# Patient Record
Sex: MALE | Race: BLACK OR AFRICAN AMERICAN | NOT HISPANIC OR LATINO | Employment: UNEMPLOYED | URBAN - NONMETROPOLITAN AREA
[De-identification: names, ages, dates, MRNs, and addresses within clinical notes are randomized per-mention and may not be internally consistent; named-entity substitution may affect disease eponyms.]

---

## 2020-02-25 ENCOUNTER — PREP FOR SURGERY (OUTPATIENT)
Dept: OTHER | Facility: HOSPITAL | Age: 60
End: 2020-02-25

## 2020-02-25 DIAGNOSIS — R07.9 CHEST PAIN, UNSPECIFIED TYPE: Primary | ICD-10-CM

## 2020-02-25 RX ORDER — SODIUM CHLORIDE 0.9 % (FLUSH) 0.9 %
3 SYRINGE (ML) INJECTION EVERY 12 HOURS SCHEDULED
Status: CANCELLED | OUTPATIENT
Start: 2020-02-28

## 2020-02-25 RX ORDER — SODIUM CHLORIDE 0.9 % (FLUSH) 0.9 %
10 SYRINGE (ML) INJECTION AS NEEDED
Status: CANCELLED | OUTPATIENT
Start: 2020-02-28

## 2020-02-25 RX ORDER — SODIUM CHLORIDE 9 MG/ML
75 INJECTION, SOLUTION INTRAVENOUS CONTINUOUS
Status: CANCELLED | OUTPATIENT
Start: 2020-02-28

## 2020-02-28 ENCOUNTER — HOSPITAL ENCOUNTER (OUTPATIENT)
Facility: HOSPITAL | Age: 60
Discharge: HOME | End: 2020-02-29
Attending: INTERNAL MEDICINE | Admitting: INTERNAL MEDICINE

## 2020-02-28 DIAGNOSIS — R07.9 CHEST PAIN, UNSPECIFIED TYPE: ICD-10-CM

## 2020-02-28 LAB
ACT BLD: 171 SECONDS (ref 82–152)
ACT BLD: 191 SECONDS (ref 82–152)
ACT BLD: 219 SECONDS (ref 82–152)
ACT BLD: 235 SECONDS (ref 82–152)
ANION GAP SERPL CALCULATED.3IONS-SCNC: 19 MMOL/L (ref 5–15)
BASOPHILS # BLD AUTO: 0.03 10*3/MM3 (ref 0–0.2)
BASOPHILS NFR BLD AUTO: 0.3 % (ref 0–1.5)
BUN BLD-MCNC: 38 MG/DL (ref 6–20)
BUN/CREAT SERPL: 2.6 (ref 7–25)
CALCIUM SPEC-SCNC: 8.4 MG/DL (ref 8.6–10.5)
CHLORIDE SERPL-SCNC: 97 MMOL/L (ref 98–107)
CO2 SERPL-SCNC: 25 MMOL/L (ref 22–29)
CREAT BLD-MCNC: 14.76 MG/DL (ref 0.76–1.27)
DEPRECATED RDW RBC AUTO: 42.5 FL (ref 37–54)
EOSINOPHIL # BLD AUTO: 0.2 10*3/MM3 (ref 0–0.4)
EOSINOPHIL NFR BLD AUTO: 1.9 % (ref 0.3–6.2)
ERYTHROCYTE [DISTWIDTH] IN BLOOD BY AUTOMATED COUNT: 15.1 % (ref 12.3–15.4)
GFR SERPL CREATININE-BSD FRML MDRD: 3 ML/MIN/1.73
GFR SERPL CREATININE-BSD FRML MDRD: 4 ML/MIN/1.73
GLUCOSE BLD-MCNC: 142 MG/DL (ref 65–99)
GLUCOSE BLDC GLUCOMTR-MCNC: 273 MG/DL (ref 70–130)
HBV SURFACE AG SERPL QL IA: NORMAL
HCT VFR BLD AUTO: 34 % (ref 37.5–51)
HGB BLD-MCNC: 10.2 G/DL (ref 13–17.7)
IMM GRANULOCYTES # BLD AUTO: 0.05 10*3/MM3 (ref 0–0.05)
IMM GRANULOCYTES NFR BLD AUTO: 0.5 % (ref 0–0.5)
INR PPP: 1.1 (ref 0.8–1.2)
LYMPHOCYTES # BLD AUTO: 2.78 10*3/MM3 (ref 0.7–3.1)
LYMPHOCYTES NFR BLD AUTO: 26.7 % (ref 19.6–45.3)
MCH RBC QN AUTO: 23.5 PG (ref 26.6–33)
MCHC RBC AUTO-ENTMCNC: 30 G/DL (ref 31.5–35.7)
MCV RBC AUTO: 78.3 FL (ref 79–97)
MONOCYTES # BLD AUTO: 1.6 10*3/MM3 (ref 0.1–0.9)
MONOCYTES NFR BLD AUTO: 15.4 % (ref 5–12)
NEUTROPHILS # BLD AUTO: 5.74 10*3/MM3 (ref 1.7–7)
NEUTROPHILS NFR BLD AUTO: 55.2 % (ref 42.7–76)
NRBC BLD AUTO-RTO: 0 /100 WBC (ref 0–0.2)
PLATELET # BLD AUTO: 253 10*3/MM3 (ref 140–450)
PMV BLD AUTO: 9.4 FL (ref 6–12)
POTASSIUM BLD-SCNC: 4.7 MMOL/L (ref 3.5–5.2)
PROTHROMBIN TIME: 14 SECONDS (ref 11.1–15.3)
RBC # BLD AUTO: 4.34 10*6/MM3 (ref 4.14–5.8)
SODIUM BLD-SCNC: 141 MMOL/L (ref 136–145)
WBC NRBC COR # BLD: 10.4 10*3/MM3 (ref 3.4–10.8)

## 2020-02-28 PROCEDURE — 25010000002 ONDANSETRON PER 1 MG: Performed by: INTERNAL MEDICINE

## 2020-02-28 PROCEDURE — 80048 BASIC METABOLIC PNL TOTAL CA: CPT | Performed by: INTERNAL MEDICINE

## 2020-02-28 PROCEDURE — 82962 GLUCOSE BLOOD TEST: CPT

## 2020-02-28 PROCEDURE — 85610 PROTHROMBIN TIME: CPT | Performed by: INTERNAL MEDICINE

## 2020-02-28 PROCEDURE — 85347 COAGULATION TIME ACTIVATED: CPT

## 2020-02-28 PROCEDURE — 25010000002 BIVALIRUDIN TRIFLUOROACETATE 250 MG RECONSTITUTED SOLUTION 1 EACH VIAL: Performed by: INTERNAL MEDICINE

## 2020-02-28 PROCEDURE — C1887 CATHETER, GUIDING: HCPCS | Performed by: INTERNAL MEDICINE

## 2020-02-28 PROCEDURE — 0 IOPAMIDOL PER 1 ML: Performed by: INTERNAL MEDICINE

## 2020-02-28 PROCEDURE — 25010000002 FENTANYL CITRATE (PF) 100 MCG/2ML SOLUTION: Performed by: INTERNAL MEDICINE

## 2020-02-28 PROCEDURE — 93459 L HRT ART/GRFT ANGIO: CPT | Performed by: INTERNAL MEDICINE

## 2020-02-28 PROCEDURE — C1769 GUIDE WIRE: HCPCS | Performed by: INTERNAL MEDICINE

## 2020-02-28 PROCEDURE — C1894 INTRO/SHEATH, NON-LASER: HCPCS | Performed by: INTERNAL MEDICINE

## 2020-02-28 PROCEDURE — 90945 DIALYSIS ONE EVALUATION: CPT

## 2020-02-28 PROCEDURE — G0378 HOSPITAL OBSERVATION PER HR: HCPCS

## 2020-02-28 PROCEDURE — 85025 COMPLETE CBC W/AUTO DIFF WBC: CPT | Performed by: INTERNAL MEDICINE

## 2020-02-28 PROCEDURE — C1725 CATH, TRANSLUMIN NON-LASER: HCPCS | Performed by: INTERNAL MEDICINE

## 2020-02-28 PROCEDURE — 63710000001 INSULIN ASPART PER 5 UNITS: Performed by: NURSE PRACTITIONER

## 2020-02-28 PROCEDURE — 87340 HEPATITIS B SURFACE AG IA: CPT | Performed by: INTERNAL MEDICINE

## 2020-02-28 PROCEDURE — 25010000002 MIDAZOLAM PER 1 MG: Performed by: INTERNAL MEDICINE

## 2020-02-28 RX ORDER — NITROGLYCERIN 20 MG/100ML
INJECTION INTRAVENOUS CONTINUOUS PRN
Status: COMPLETED | OUTPATIENT
Start: 2020-02-28 | End: 2020-02-28

## 2020-02-28 RX ORDER — SODIUM CHLORIDE 9 MG/ML
100 INJECTION, SOLUTION INTRAVENOUS CONTINUOUS
Status: DISCONTINUED | OUTPATIENT
Start: 2020-02-28 | End: 2020-02-29

## 2020-02-28 RX ORDER — DEXTROSE MONOHYDRATE, SODIUM CHLORIDE, SODIUM LACTATE, CALCIUM CHLORIDE, MAGNESIUM CHLORIDE 1.5; 538; 448; 18.4; 5.08 G/100ML; MG/100ML; MG/100ML; MG/100ML; MG/100ML
10 SOLUTION INTRAPERITONEAL AS NEEDED
Status: DISCONTINUED | OUTPATIENT
Start: 2020-02-28 | End: 2020-02-29 | Stop reason: HOSPADM

## 2020-02-28 RX ORDER — AMLODIPINE BESYLATE 10 MG/1
10 TABLET ORAL 3 TIMES DAILY
Status: DISCONTINUED | OUTPATIENT
Start: 2020-02-28 | End: 2020-02-29

## 2020-02-28 RX ORDER — NICOTINE POLACRILEX 4 MG
15 LOZENGE BUCCAL
Status: DISCONTINUED | OUTPATIENT
Start: 2020-02-28 | End: 2020-02-29 | Stop reason: HOSPADM

## 2020-02-28 RX ORDER — CINACALCET 30 MG/1
30 TABLET, FILM COATED ORAL DAILY
COMMUNITY

## 2020-02-28 RX ORDER — MIDAZOLAM HYDROCHLORIDE 1 MG/ML
INJECTION INTRAMUSCULAR; INTRAVENOUS AS NEEDED
Status: DISCONTINUED | OUTPATIENT
Start: 2020-02-28 | End: 2020-02-28 | Stop reason: HOSPADM

## 2020-02-28 RX ORDER — ALBUTEROL SULFATE 2.5 MG/3ML
2.5 SOLUTION RESPIRATORY (INHALATION) EVERY 6 HOURS PRN
Status: DISCONTINUED | OUTPATIENT
Start: 2020-02-28 | End: 2020-02-29 | Stop reason: HOSPADM

## 2020-02-28 RX ORDER — PRAVASTATIN SODIUM 20 MG
20 TABLET ORAL DAILY
COMMUNITY

## 2020-02-28 RX ORDER — SODIUM CHLORIDE 0.9 % (FLUSH) 0.9 %
10 SYRINGE (ML) INJECTION AS NEEDED
Status: DISCONTINUED | OUTPATIENT
Start: 2020-02-28 | End: 2020-02-29 | Stop reason: HOSPADM

## 2020-02-28 RX ORDER — ISOSORBIDE MONONITRATE 30 MG/1
30 TABLET, EXTENDED RELEASE ORAL DAILY
Status: DISCONTINUED | OUTPATIENT
Start: 2020-02-28 | End: 2020-02-29 | Stop reason: HOSPADM

## 2020-02-28 RX ORDER — CLOPIDOGREL BISULFATE 75 MG/1
75 TABLET ORAL DAILY
COMMUNITY

## 2020-02-28 RX ORDER — AMOXICILLIN 250 MG
2 CAPSULE ORAL 2 TIMES DAILY
Status: DISCONTINUED | OUTPATIENT
Start: 2020-02-28 | End: 2020-02-29 | Stop reason: HOSPADM

## 2020-02-28 RX ORDER — SODIUM CHLORIDE 9 MG/ML
75 INJECTION, SOLUTION INTRAVENOUS CONTINUOUS
Status: DISCONTINUED | OUTPATIENT
Start: 2020-02-28 | End: 2020-02-28 | Stop reason: SDUPTHER

## 2020-02-28 RX ORDER — SODIUM CHLORIDE 9 MG/ML
250 INJECTION, SOLUTION INTRAVENOUS ONCE AS NEEDED
Status: DISCONTINUED | OUTPATIENT
Start: 2020-02-28 | End: 2020-02-29 | Stop reason: HOSPADM

## 2020-02-28 RX ORDER — GENTAMICIN SULFATE 1 MG/G
1 CREAM TOPICAL 3 TIMES DAILY
COMMUNITY
End: 2021-06-10

## 2020-02-28 RX ORDER — ONDANSETRON 2 MG/ML
INJECTION INTRAMUSCULAR; INTRAVENOUS AS NEEDED
Status: DISCONTINUED | OUTPATIENT
Start: 2020-02-28 | End: 2020-02-28 | Stop reason: HOSPADM

## 2020-02-28 RX ORDER — ISOSORBIDE MONONITRATE 30 MG/1
30 TABLET, EXTENDED RELEASE ORAL DAILY
COMMUNITY

## 2020-02-28 RX ORDER — DEXTROSE MONOHYDRATE 25 G/50ML
25 INJECTION, SOLUTION INTRAVENOUS
Status: DISCONTINUED | OUTPATIENT
Start: 2020-02-28 | End: 2020-02-29 | Stop reason: HOSPADM

## 2020-02-28 RX ORDER — LIDOCAINE HYDROCHLORIDE 20 MG/ML
INJECTION, SOLUTION INFILTRATION; PERINEURAL AS NEEDED
Status: DISCONTINUED | OUTPATIENT
Start: 2020-02-28 | End: 2020-02-28 | Stop reason: HOSPADM

## 2020-02-28 RX ORDER — CALCITRIOL 0.5 UG/1
0.5 CAPSULE, LIQUID FILLED ORAL 2 TIMES DAILY
COMMUNITY

## 2020-02-28 RX ORDER — ACETAMINOPHEN 325 MG/1
650 TABLET ORAL EVERY 4 HOURS PRN
Status: DISCONTINUED | OUTPATIENT
Start: 2020-02-28 | End: 2020-02-29 | Stop reason: HOSPADM

## 2020-02-28 RX ORDER — CALCITRIOL 0.25 UG/1
0.5 CAPSULE, LIQUID FILLED ORAL 2 TIMES DAILY
Status: DISCONTINUED | OUTPATIENT
Start: 2020-02-28 | End: 2020-02-29 | Stop reason: HOSPADM

## 2020-02-28 RX ORDER — CLOPIDOGREL BISULFATE 75 MG/1
75 TABLET ORAL DAILY
Status: DISCONTINUED | OUTPATIENT
Start: 2020-02-28 | End: 2020-02-29 | Stop reason: HOSPADM

## 2020-02-28 RX ORDER — PRAVASTATIN SODIUM 20 MG
20 TABLET ORAL DAILY
Status: DISCONTINUED | OUTPATIENT
Start: 2020-02-28 | End: 2020-02-29 | Stop reason: HOSPADM

## 2020-02-28 RX ORDER — FENTANYL CITRATE 50 UG/ML
INJECTION, SOLUTION INTRAMUSCULAR; INTRAVENOUS AS NEEDED
Status: DISCONTINUED | OUTPATIENT
Start: 2020-02-28 | End: 2020-02-28 | Stop reason: HOSPADM

## 2020-02-28 RX ORDER — DEXTROSE MONOHYDRATE, SODIUM CHLORIDE, SODIUM LACTATE, CALCIUM CHLORIDE, MAGNESIUM CHLORIDE 2.5; 538; 448; 18.4; 5.08 G/100ML; MG/100ML; MG/100ML; MG/100ML; MG/100ML
5000 SOLUTION INTRAPERITONEAL AS NEEDED
Status: DISCONTINUED | OUTPATIENT
Start: 2020-02-28 | End: 2020-02-29 | Stop reason: HOSPADM

## 2020-02-28 RX ORDER — LOSARTAN POTASSIUM 100 MG/1
100 TABLET ORAL DAILY
Status: ON HOLD | COMMUNITY
End: 2020-10-09

## 2020-02-28 RX ORDER — BISACODYL 5 MG/1
5 TABLET, DELAYED RELEASE ORAL DAILY PRN
Status: DISCONTINUED | OUTPATIENT
Start: 2020-02-28 | End: 2020-02-29 | Stop reason: HOSPADM

## 2020-02-28 RX ORDER — LOSARTAN POTASSIUM 50 MG/1
100 TABLET ORAL DAILY
Status: DISCONTINUED | OUTPATIENT
Start: 2020-02-28 | End: 2020-02-29 | Stop reason: HOSPADM

## 2020-02-28 RX ORDER — ALBUTEROL SULFATE 90 UG/1
2 AEROSOL, METERED RESPIRATORY (INHALATION) EVERY 4 HOURS PRN
COMMUNITY

## 2020-02-28 RX ORDER — SODIUM CHLORIDE 0.9 % (FLUSH) 0.9 %
3 SYRINGE (ML) INJECTION EVERY 12 HOURS SCHEDULED
Status: DISCONTINUED | OUTPATIENT
Start: 2020-02-28 | End: 2020-02-29 | Stop reason: HOSPADM

## 2020-02-28 RX ORDER — METOPROLOL SUCCINATE 25 MG/1
25 TABLET, EXTENDED RELEASE ORAL
Status: DISCONTINUED | OUTPATIENT
Start: 2020-02-29 | End: 2020-02-29 | Stop reason: HOSPADM

## 2020-02-28 RX ORDER — BISACODYL 10 MG
10 SUPPOSITORY, RECTAL RECTAL DAILY PRN
Status: DISCONTINUED | OUTPATIENT
Start: 2020-02-28 | End: 2020-02-29 | Stop reason: HOSPADM

## 2020-02-28 RX ADMIN — CALCITRIOL 0.5 MCG: 0.25 CAPSULE ORAL at 15:10

## 2020-02-28 RX ADMIN — SODIUM CHLORIDE, PRESERVATIVE FREE 3 ML: 5 INJECTION INTRAVENOUS at 14:16

## 2020-02-28 RX ADMIN — CLOPIDOGREL BISULFATE 75 MG: 75 TABLET ORAL at 13:38

## 2020-02-28 RX ADMIN — DEXTROSE MONOHYDRATE, SODIUM CHLORIDE, SODIUM LACTATE, CALCIUM CHLORIDE, MAGNESIUM CHLORIDE 10000 ML: 1.5; 538; 448; 18.4; 5.08 SOLUTION INTRAPERITONEAL at 14:46

## 2020-02-28 RX ADMIN — INSULIN ASPART 8 UNITS: 100 INJECTION, SOLUTION INTRAVENOUS; SUBCUTANEOUS at 18:27

## 2020-02-28 RX ADMIN — ISOSORBIDE MONONITRATE 30 MG: 30 TABLET, EXTENDED RELEASE ORAL at 13:38

## 2020-02-28 RX ADMIN — CALCITRIOL 0.5 MCG: 0.25 CAPSULE ORAL at 20:20

## 2020-02-28 RX ADMIN — SODIUM CHLORIDE 75 ML/HR: 900 INJECTION, SOLUTION INTRAVENOUS at 08:20

## 2020-02-28 RX ADMIN — SENNOSIDES AND DOCUSATE SODIUM 2 TABLET: 8.6; 5 TABLET ORAL at 20:20

## 2020-02-28 RX ADMIN — PRAVASTATIN SODIUM 20 MG: 20 TABLET ORAL at 13:38

## 2020-02-28 RX ADMIN — DEXTROSE MONOHYDRATE, SODIUM CHLORIDE, SODIUM LACTATE, CALCIUM CHLORIDE, MAGNESIUM CHLORIDE 5000 ML: 2.5; 538; 448; 18.4; 5.08 SOLUTION INTRAPERITONEAL at 14:47

## 2020-02-28 RX ADMIN — INSULIN ASPART 3 UNITS: 100 INJECTION, SOLUTION INTRAVENOUS; SUBCUTANEOUS at 20:22

## 2020-02-29 VITALS
HEART RATE: 93 BPM | HEIGHT: 66 IN | OXYGEN SATURATION: 100 % | TEMPERATURE: 98.1 F | RESPIRATION RATE: 18 BRPM | BODY MASS INDEX: 32.24 KG/M2 | SYSTOLIC BLOOD PRESSURE: 120 MMHG | DIASTOLIC BLOOD PRESSURE: 56 MMHG | WEIGHT: 200.62 LBS

## 2020-02-29 PROBLEM — I25.118 CORONARY ARTERY DISEASE OF NATIVE ARTERY OF NATIVE HEART WITH STABLE ANGINA PECTORIS (HCC): Status: ACTIVE | Noted: 2020-02-29

## 2020-02-29 PROBLEM — Z99.2 ESRD (END STAGE RENAL DISEASE) ON DIALYSIS: Status: ACTIVE | Noted: 2020-02-29

## 2020-02-29 PROBLEM — N18.6 ESRD (END STAGE RENAL DISEASE) ON DIALYSIS (HCC): Status: ACTIVE | Noted: 2020-02-29

## 2020-02-29 LAB
ACT BLD: 166 SECONDS (ref 82–152)
ACT BLD: 171 SECONDS (ref 82–152)
ANION GAP SERPL CALCULATED.3IONS-SCNC: 16 MMOL/L (ref 5–15)
BUN BLD-MCNC: 35 MG/DL (ref 6–20)
BUN/CREAT SERPL: 2.6 (ref 7–25)
CALCIUM SPEC-SCNC: 7.8 MG/DL (ref 8.6–10.5)
CHLORIDE SERPL-SCNC: 94 MMOL/L (ref 98–107)
CO2 SERPL-SCNC: 23 MMOL/L (ref 22–29)
CREAT BLD-MCNC: 13.55 MG/DL (ref 0.76–1.27)
DEPRECATED RDW RBC AUTO: 43.4 FL (ref 37–54)
ERYTHROCYTE [DISTWIDTH] IN BLOOD BY AUTOMATED COUNT: 15.6 % (ref 12.3–15.4)
GFR SERPL CREATININE-BSD FRML MDRD: 4 ML/MIN/1.73
GFR SERPL CREATININE-BSD FRML MDRD: 5 ML/MIN/1.73
GLUCOSE BLD-MCNC: 144 MG/DL (ref 65–99)
GLUCOSE BLDC GLUCOMTR-MCNC: 174 MG/DL (ref 70–130)
GLUCOSE BLDC GLUCOMTR-MCNC: 93 MG/DL (ref 70–130)
HCT VFR BLD AUTO: 29.5 % (ref 37.5–51)
HGB BLD-MCNC: 8.9 G/DL (ref 13–17.7)
HOLD SPECIMEN: NORMAL
MCH RBC QN AUTO: 23.6 PG (ref 26.6–33)
MCHC RBC AUTO-ENTMCNC: 30.2 G/DL (ref 31.5–35.7)
MCV RBC AUTO: 78.2 FL (ref 79–97)
PLATELET # BLD AUTO: 240 10*3/MM3 (ref 140–450)
PMV BLD AUTO: 10.3 FL (ref 6–12)
POTASSIUM BLD-SCNC: 4.7 MMOL/L (ref 3.5–5.2)
RBC # BLD AUTO: 3.77 10*6/MM3 (ref 4.14–5.8)
SODIUM BLD-SCNC: 133 MMOL/L (ref 136–145)
WBC NRBC COR # BLD: 8.53 10*3/MM3 (ref 3.4–10.8)

## 2020-02-29 PROCEDURE — 85347 COAGULATION TIME ACTIVATED: CPT

## 2020-02-29 PROCEDURE — 82962 GLUCOSE BLOOD TEST: CPT

## 2020-02-29 PROCEDURE — 99217 PR OBSERVATION CARE DISCHARGE MANAGEMENT: CPT | Performed by: NURSE PRACTITIONER

## 2020-02-29 PROCEDURE — 85027 COMPLETE CBC AUTOMATED: CPT | Performed by: INTERNAL MEDICINE

## 2020-02-29 PROCEDURE — G0378 HOSPITAL OBSERVATION PER HR: HCPCS

## 2020-02-29 PROCEDURE — 80048 BASIC METABOLIC PNL TOTAL CA: CPT | Performed by: INTERNAL MEDICINE

## 2020-02-29 RX ORDER — RANOLAZINE 500 MG/1
500 TABLET, EXTENDED RELEASE ORAL 2 TIMES DAILY
Qty: 60 TABLET | Refills: 0 | Status: SHIPPED | OUTPATIENT
Start: 2020-02-29 | End: 2020-10-20 | Stop reason: HOSPADM

## 2020-02-29 RX ORDER — HYDROCODONE BITARTRATE AND ACETAMINOPHEN 5; 325 MG/1; MG/1
1 TABLET ORAL EVERY 8 HOURS PRN
Status: DISCONTINUED | OUTPATIENT
Start: 2020-02-29 | End: 2020-02-29 | Stop reason: HOSPADM

## 2020-02-29 RX ORDER — DEXTROSE MONOHYDRATE, SODIUM CHLORIDE, SODIUM LACTATE, CALCIUM CHLORIDE, MAGNESIUM CHLORIDE 2.5; 538; 448; 18.4; 5.08 G/100ML; MG/100ML; MG/100ML; MG/100ML; MG/100ML
5000 SOLUTION INTRAPERITONEAL AS NEEDED
Status: CANCELLED | OUTPATIENT
Start: 2020-02-29

## 2020-02-29 RX ORDER — DEXTROSE MONOHYDRATE, SODIUM CHLORIDE, SODIUM LACTATE, CALCIUM CHLORIDE, MAGNESIUM CHLORIDE 1.5; 538; 448; 18.4; 5.08 G/100ML; MG/100ML; MG/100ML; MG/100ML; MG/100ML
5000 SOLUTION INTRAPERITONEAL AS NEEDED
Status: CANCELLED | OUTPATIENT
Start: 2020-02-29

## 2020-02-29 RX ORDER — ASPIRIN 81 MG/1
81 TABLET, CHEWABLE ORAL DAILY
Status: DISCONTINUED | OUTPATIENT
Start: 2020-02-29 | End: 2020-02-29 | Stop reason: HOSPADM

## 2020-02-29 RX ORDER — NITROGLYCERIN 0.4 MG/1
TABLET SUBLINGUAL
Qty: 100 TABLET | Refills: 11 | Status: SHIPPED | OUTPATIENT
Start: 2020-02-29

## 2020-02-29 RX ORDER — RANOLAZINE 500 MG/1
500 TABLET, EXTENDED RELEASE ORAL EVERY 12 HOURS SCHEDULED
Status: DISCONTINUED | OUTPATIENT
Start: 2020-02-29 | End: 2020-02-29 | Stop reason: HOSPADM

## 2020-02-29 RX ADMIN — ISOSORBIDE MONONITRATE 30 MG: 30 TABLET, EXTENDED RELEASE ORAL at 09:14

## 2020-02-29 RX ADMIN — SODIUM CHLORIDE, PRESERVATIVE FREE 3 ML: 5 INJECTION INTRAVENOUS at 09:18

## 2020-02-29 RX ADMIN — CALCITRIOL 0.5 MCG: 0.25 CAPSULE ORAL at 09:15

## 2020-02-29 RX ADMIN — SENNOSIDES AND DOCUSATE SODIUM 2 TABLET: 8.6; 5 TABLET ORAL at 09:14

## 2020-02-29 RX ADMIN — RANOLAZINE 500 MG: 500 TABLET, FILM COATED, EXTENDED RELEASE ORAL at 11:40

## 2020-02-29 RX ADMIN — METOPROLOL SUCCINATE 25 MG: 25 TABLET, EXTENDED RELEASE ORAL at 09:14

## 2020-02-29 RX ADMIN — PRAVASTATIN SODIUM 20 MG: 20 TABLET ORAL at 09:14

## 2020-02-29 RX ADMIN — ASPIRIN 81 MG 81 MG: 81 TABLET ORAL at 11:40

## 2020-02-29 RX ADMIN — AMLODIPINE BESYLATE 10 MG: 10 TABLET ORAL at 09:15

## 2020-02-29 RX ADMIN — CLOPIDOGREL BISULFATE 75 MG: 75 TABLET ORAL at 09:15

## 2020-02-29 RX ADMIN — HYDROCODONE BITARTRATE AND ACETAMINOPHEN 1 TABLET: 5; 325 TABLET ORAL at 03:05

## 2020-02-29 RX ADMIN — ACETAMINOPHEN 650 MG: 325 TABLET, FILM COATED ORAL at 00:57

## 2020-02-29 RX ADMIN — LOSARTAN POTASSIUM 100 MG: 50 TABLET, FILM COATED ORAL at 09:15

## 2020-10-08 ENCOUNTER — LAB (OUTPATIENT)
Dept: LAB | Facility: HOSPITAL | Age: 60
End: 2020-10-08

## 2020-10-08 ENCOUNTER — APPOINTMENT (OUTPATIENT)
Dept: GENERAL RADIOLOGY | Facility: HOSPITAL | Age: 60
End: 2020-10-08

## 2020-10-08 ENCOUNTER — HOSPITAL ENCOUNTER (INPATIENT)
Facility: HOSPITAL | Age: 60
LOS: 10 days | Discharge: HOME HEALTH | End: 2020-10-20
Attending: STUDENT IN AN ORGANIZED HEALTH CARE EDUCATION/TRAINING PROGRAM | Admitting: INTERNAL MEDICINE

## 2020-10-08 DIAGNOSIS — R07.9 CHEST PAIN, UNSPECIFIED TYPE: ICD-10-CM

## 2020-10-08 DIAGNOSIS — I46.9 CARDIAC ARREST (HCC): ICD-10-CM

## 2020-10-08 DIAGNOSIS — Z74.09 IMPAIRED MOBILITY AND ADLS: ICD-10-CM

## 2020-10-08 DIAGNOSIS — I25.10 ATHEROSCLEROSIS OF NATIVE CORONARY ARTERY OF NATIVE HEART, ANGINA PRESENCE UNSPECIFIED: ICD-10-CM

## 2020-10-08 DIAGNOSIS — Z78.9 IMPAIRED MOBILITY AND ADLS: ICD-10-CM

## 2020-10-08 DIAGNOSIS — R07.9 CHEST PAIN, UNSPECIFIED TYPE: Primary | ICD-10-CM

## 2020-10-08 DIAGNOSIS — R07.2 PRECORDIAL PAIN: Primary | ICD-10-CM

## 2020-10-08 DIAGNOSIS — Z74.09 IMPAIRED FUNCTIONAL MOBILITY, BALANCE, GAIT, AND ENDURANCE: ICD-10-CM

## 2020-10-08 LAB
ALBUMIN SERPL-MCNC: 3.8 G/DL (ref 3.5–5.2)
ALBUMIN/GLOB SERPL: 0.9 G/DL
ALP SERPL-CCNC: 70 U/L (ref 39–117)
ALT SERPL W P-5'-P-CCNC: 11 U/L (ref 1–41)
ANION GAP SERPL CALCULATED.3IONS-SCNC: 17 MMOL/L (ref 5–15)
ANION GAP SERPL CALCULATED.3IONS-SCNC: 17 MMOL/L (ref 5–15)
AST SERPL-CCNC: 13 U/L (ref 1–40)
BASOPHILS # BLD AUTO: 0.04 10*3/MM3 (ref 0–0.2)
BASOPHILS # BLD AUTO: 0.05 10*3/MM3 (ref 0–0.2)
BASOPHILS NFR BLD AUTO: 0.4 % (ref 0–1.5)
BASOPHILS NFR BLD AUTO: 0.5 % (ref 0–1.5)
BILIRUB SERPL-MCNC: 0.3 MG/DL (ref 0–1.2)
BUN SERPL-MCNC: 65 MG/DL (ref 8–23)
BUN SERPL-MCNC: 66 MG/DL (ref 8–23)
BUN/CREAT SERPL: 4.9 (ref 7–25)
BUN/CREAT SERPL: 5 (ref 7–25)
CALCIUM SPEC-SCNC: 8.2 MG/DL (ref 8.6–10.5)
CALCIUM SPEC-SCNC: 8.6 MG/DL (ref 8.6–10.5)
CHLORIDE SERPL-SCNC: 98 MMOL/L (ref 98–107)
CHLORIDE SERPL-SCNC: 99 MMOL/L (ref 98–107)
CK MB SERPL-CCNC: 8.97 NG/ML
CK SERPL-CCNC: 289 U/L (ref 20–200)
CO2 SERPL-SCNC: 24 MMOL/L (ref 22–29)
CO2 SERPL-SCNC: 25 MMOL/L (ref 22–29)
CREAT SERPL-MCNC: 13.08 MG/DL (ref 0.76–1.27)
CREAT SERPL-MCNC: 13.19 MG/DL (ref 0.76–1.27)
D-DIMER, QUANTITATIVE (MAD,POW, STR): 903 NG/ML (FEU) (ref 0–470)
D-LACTATE SERPL-SCNC: 1.1 MMOL/L (ref 0.5–2)
DEPRECATED RDW RBC AUTO: 41.6 FL (ref 37–54)
DEPRECATED RDW RBC AUTO: 41.9 FL (ref 37–54)
EOSINOPHIL # BLD AUTO: 0.19 10*3/MM3 (ref 0–0.4)
EOSINOPHIL # BLD AUTO: 0.22 10*3/MM3 (ref 0–0.4)
EOSINOPHIL NFR BLD AUTO: 1.9 % (ref 0.3–6.2)
EOSINOPHIL NFR BLD AUTO: 2 % (ref 0.3–6.2)
ERYTHROCYTE [DISTWIDTH] IN BLOOD BY AUTOMATED COUNT: 14.7 % (ref 12.3–15.4)
ERYTHROCYTE [DISTWIDTH] IN BLOOD BY AUTOMATED COUNT: 14.8 % (ref 12.3–15.4)
GFR SERPL CREATININE-BSD FRML MDRD: 5 ML/MIN/1.73
GFR SERPL CREATININE-BSD FRML MDRD: 5 ML/MIN/1.73
GFR SERPL CREATININE-BSD FRML MDRD: ABNORMAL ML/MIN/{1.73_M2}
GFR SERPL CREATININE-BSD FRML MDRD: ABNORMAL ML/MIN/{1.73_M2}
GLOBULIN UR ELPH-MCNC: 4.4 GM/DL
GLUCOSE SERPL-MCNC: 181 MG/DL (ref 65–99)
GLUCOSE SERPL-MCNC: 254 MG/DL (ref 65–99)
HCT VFR BLD AUTO: 32.6 % (ref 37.5–51)
HCT VFR BLD AUTO: 32.7 % (ref 37.5–51)
HGB BLD-MCNC: 10.1 G/DL (ref 13–17.7)
HGB BLD-MCNC: 10.2 G/DL (ref 13–17.7)
HOLD SPECIMEN: NORMAL
HOLD SPECIMEN: NORMAL
IMM GRANULOCYTES # BLD AUTO: 0.15 10*3/MM3 (ref 0–0.05)
IMM GRANULOCYTES # BLD AUTO: 0.15 10*3/MM3 (ref 0–0.05)
IMM GRANULOCYTES NFR BLD AUTO: 1.4 % (ref 0–0.5)
IMM GRANULOCYTES NFR BLD AUTO: 1.5 % (ref 0–0.5)
LYMPHOCYTES # BLD AUTO: 2.26 10*3/MM3 (ref 0.7–3.1)
LYMPHOCYTES # BLD AUTO: 2.68 10*3/MM3 (ref 0.7–3.1)
LYMPHOCYTES NFR BLD AUTO: 22.7 % (ref 19.6–45.3)
LYMPHOCYTES NFR BLD AUTO: 24.1 % (ref 19.6–45.3)
MCH RBC QN AUTO: 24.5 PG (ref 26.6–33)
MCH RBC QN AUTO: 24.7 PG (ref 26.6–33)
MCHC RBC AUTO-ENTMCNC: 30.9 G/DL (ref 31.5–35.7)
MCHC RBC AUTO-ENTMCNC: 31.3 G/DL (ref 31.5–35.7)
MCV RBC AUTO: 78.9 FL (ref 79–97)
MCV RBC AUTO: 79.2 FL (ref 79–97)
MONOCYTES # BLD AUTO: 1.07 10*3/MM3 (ref 0.1–0.9)
MONOCYTES # BLD AUTO: 1.36 10*3/MM3 (ref 0.1–0.9)
MONOCYTES NFR BLD AUTO: 10.8 % (ref 5–12)
MONOCYTES NFR BLD AUTO: 12.2 % (ref 5–12)
NEUTROPHILS NFR BLD AUTO: 59.8 % (ref 42.7–76)
NEUTROPHILS NFR BLD AUTO: 6.24 10*3/MM3 (ref 1.7–7)
NEUTROPHILS NFR BLD AUTO: 6.65 10*3/MM3 (ref 1.7–7)
NEUTROPHILS NFR BLD AUTO: 62.7 % (ref 42.7–76)
NRBC BLD AUTO-RTO: 0 /100 WBC (ref 0–0.2)
NRBC BLD AUTO-RTO: 0.2 /100 WBC (ref 0–0.2)
NT-PROBNP SERPL-MCNC: ABNORMAL PG/ML (ref 0–900)
PLATELET # BLD AUTO: 319 10*3/MM3 (ref 140–450)
PLATELET # BLD AUTO: 324 10*3/MM3 (ref 140–450)
PMV BLD AUTO: 10.2 FL (ref 6–12)
PMV BLD AUTO: 11.2 FL (ref 6–12)
POTASSIUM SERPL-SCNC: 3.8 MMOL/L (ref 3.5–5.2)
POTASSIUM SERPL-SCNC: 3.8 MMOL/L (ref 3.5–5.2)
PROT SERPL-MCNC: 8.2 G/DL (ref 6–8.5)
RBC # BLD AUTO: 4.13 10*6/MM3 (ref 4.14–5.8)
RBC # BLD AUTO: 4.13 10*6/MM3 (ref 4.14–5.8)
SODIUM SERPL-SCNC: 140 MMOL/L (ref 136–145)
SODIUM SERPL-SCNC: 140 MMOL/L (ref 136–145)
TROPONIN T SERPL-MCNC: 1.93 NG/ML (ref 0–0.03)
TROPONIN T SERPL-MCNC: 2.05 NG/ML (ref 0–0.03)
WBC # BLD AUTO: 11.11 10*3/MM3 (ref 3.4–10.8)
WBC # BLD AUTO: 9.95 10*3/MM3 (ref 3.4–10.8)
WHOLE BLOOD HOLD SPECIMEN: NORMAL
WHOLE BLOOD HOLD SPECIMEN: NORMAL

## 2020-10-08 PROCEDURE — 82550 ASSAY OF CK (CPK): CPT

## 2020-10-08 PROCEDURE — 80048 BASIC METABOLIC PNL TOTAL CA: CPT | Performed by: STUDENT IN AN ORGANIZED HEALTH CARE EDUCATION/TRAINING PROGRAM

## 2020-10-08 PROCEDURE — 99284 EMERGENCY DEPT VISIT MOD MDM: CPT

## 2020-10-08 PROCEDURE — 36415 COLL VENOUS BLD VENIPUNCTURE: CPT

## 2020-10-08 PROCEDURE — 84484 ASSAY OF TROPONIN QUANT: CPT | Performed by: STUDENT IN AN ORGANIZED HEALTH CARE EDUCATION/TRAINING PROGRAM

## 2020-10-08 PROCEDURE — 83880 ASSAY OF NATRIURETIC PEPTIDE: CPT | Performed by: STUDENT IN AN ORGANIZED HEALTH CARE EDUCATION/TRAINING PROGRAM

## 2020-10-08 PROCEDURE — 25010000002 HEPARIN (PORCINE) PER 1000 UNITS: Performed by: STUDENT IN AN ORGANIZED HEALTH CARE EDUCATION/TRAINING PROGRAM

## 2020-10-08 PROCEDURE — 85730 THROMBOPLASTIN TIME PARTIAL: CPT | Performed by: STUDENT IN AN ORGANIZED HEALTH CARE EDUCATION/TRAINING PROGRAM

## 2020-10-08 PROCEDURE — 71046 X-RAY EXAM CHEST 2 VIEWS: CPT

## 2020-10-08 PROCEDURE — 93005 ELECTROCARDIOGRAM TRACING: CPT | Performed by: STUDENT IN AN ORGANIZED HEALTH CARE EDUCATION/TRAINING PROGRAM

## 2020-10-08 PROCEDURE — 93005 ELECTROCARDIOGRAM TRACING: CPT

## 2020-10-08 PROCEDURE — 83605 ASSAY OF LACTIC ACID: CPT | Performed by: STUDENT IN AN ORGANIZED HEALTH CARE EDUCATION/TRAINING PROGRAM

## 2020-10-08 PROCEDURE — 85025 COMPLETE CBC W/AUTO DIFF WBC: CPT

## 2020-10-08 PROCEDURE — 82553 CREATINE MB FRACTION: CPT

## 2020-10-08 PROCEDURE — 87340 HEPATITIS B SURFACE AG IA: CPT | Performed by: INTERNAL MEDICINE

## 2020-10-08 PROCEDURE — 80053 COMPREHEN METABOLIC PANEL: CPT

## 2020-10-08 PROCEDURE — 87635 SARS-COV-2 COVID-19 AMP PRB: CPT | Performed by: STUDENT IN AN ORGANIZED HEALTH CARE EDUCATION/TRAINING PROGRAM

## 2020-10-08 PROCEDURE — 85379 FIBRIN DEGRADATION QUANT: CPT | Performed by: STUDENT IN AN ORGANIZED HEALTH CARE EDUCATION/TRAINING PROGRAM

## 2020-10-08 PROCEDURE — G0378 HOSPITAL OBSERVATION PER HR: HCPCS

## 2020-10-08 PROCEDURE — 85610 PROTHROMBIN TIME: CPT | Performed by: STUDENT IN AN ORGANIZED HEALTH CARE EDUCATION/TRAINING PROGRAM

## 2020-10-08 PROCEDURE — 84484 ASSAY OF TROPONIN QUANT: CPT

## 2020-10-08 PROCEDURE — 93010 ELECTROCARDIOGRAM REPORT: CPT | Performed by: INTERNAL MEDICINE

## 2020-10-08 PROCEDURE — 85025 COMPLETE CBC W/AUTO DIFF WBC: CPT | Performed by: STUDENT IN AN ORGANIZED HEALTH CARE EDUCATION/TRAINING PROGRAM

## 2020-10-08 RX ORDER — HEPARIN SODIUM 5000 [USP'U]/ML
30 INJECTION, SOLUTION INTRAVENOUS; SUBCUTANEOUS AS NEEDED
Status: DISCONTINUED | OUTPATIENT
Start: 2020-10-08 | End: 2020-10-09

## 2020-10-08 RX ORDER — ASPIRIN 81 MG/1
324 TABLET, CHEWABLE ORAL ONCE
Status: COMPLETED | OUTPATIENT
Start: 2020-10-08 | End: 2020-10-08

## 2020-10-08 RX ORDER — HEPARIN SODIUM 10000 [USP'U]/100ML
12 INJECTION, SOLUTION INTRAVENOUS
Status: DISCONTINUED | OUTPATIENT
Start: 2020-10-08 | End: 2020-10-09

## 2020-10-08 RX ORDER — NITROGLYCERIN 0.4 MG/1
0.4 TABLET SUBLINGUAL
Status: DISCONTINUED | OUTPATIENT
Start: 2020-10-08 | End: 2020-10-20 | Stop reason: HOSPADM

## 2020-10-08 RX ORDER — HEPARIN SODIUM 5000 [USP'U]/ML
4000 INJECTION, SOLUTION INTRAVENOUS; SUBCUTANEOUS AS NEEDED
Status: DISCONTINUED | OUTPATIENT
Start: 2020-10-08 | End: 2020-10-09

## 2020-10-08 RX ORDER — HEPARIN SODIUM 5000 [USP'U]/ML
4000 INJECTION, SOLUTION INTRAVENOUS; SUBCUTANEOUS ONCE
Status: COMPLETED | OUTPATIENT
Start: 2020-10-08 | End: 2020-10-08

## 2020-10-08 RX ADMIN — HEPARIN SODIUM 4000 UNITS: 5000 INJECTION INTRAVENOUS; SUBCUTANEOUS at 23:21

## 2020-10-08 RX ADMIN — ASPIRIN 324 MG: 81 TABLET, CHEWABLE ORAL at 21:27

## 2020-10-09 PROBLEM — R07.2 PRECORDIAL PAIN: Status: ACTIVE | Noted: 2020-10-08

## 2020-10-09 PROBLEM — N18.6 ESRF (END STAGE RENAL FAILURE) (HCC): Chronic | Status: ACTIVE | Noted: 2020-02-29

## 2020-10-09 PROBLEM — I25.10 CAD (CORONARY ARTERY DISEASE): Status: ACTIVE | Noted: 2020-02-29

## 2020-10-09 LAB
ALBUMIN SERPL-MCNC: 3 G/DL (ref 3.5–5.2)
ALBUMIN/GLOB SERPL: 0.7 G/DL
ALP SERPL-CCNC: 59 U/L (ref 39–117)
ALT SERPL W P-5'-P-CCNC: 10 U/L (ref 1–41)
ANION GAP SERPL CALCULATED.3IONS-SCNC: 16 MMOL/L (ref 5–15)
ANION GAP SERPL CALCULATED.3IONS-SCNC: 19 MMOL/L (ref 5–15)
APTT PPP: 37.2 SECONDS (ref 20–40.3)
AST SERPL-CCNC: 17 U/L (ref 1–40)
BILIRUB SERPL-MCNC: 0.4 MG/DL (ref 0–1.2)
BUN SERPL-MCNC: 70 MG/DL (ref 8–23)
BUN SERPL-MCNC: 71 MG/DL (ref 8–23)
BUN/CREAT SERPL: 5.2 (ref 7–25)
BUN/CREAT SERPL: 5.2 (ref 7–25)
CALCIUM SPEC-SCNC: 8.2 MG/DL (ref 8.6–10.5)
CALCIUM SPEC-SCNC: 8.2 MG/DL (ref 8.6–10.5)
CHLORIDE SERPL-SCNC: 102 MMOL/L (ref 98–107)
CHLORIDE SERPL-SCNC: 98 MMOL/L (ref 98–107)
CO2 SERPL-SCNC: 19 MMOL/L (ref 22–29)
CO2 SERPL-SCNC: 22 MMOL/L (ref 22–29)
CREAT SERPL-MCNC: 13.51 MG/DL (ref 0.76–1.27)
CREAT SERPL-MCNC: 13.64 MG/DL (ref 0.76–1.27)
DEPRECATED RDW RBC AUTO: 39.8 FL (ref 37–54)
ERYTHROCYTE [DISTWIDTH] IN BLOOD BY AUTOMATED COUNT: 14.4 % (ref 12.3–15.4)
GFR SERPL CREATININE-BSD FRML MDRD: 5 ML/MIN/1.73
GFR SERPL CREATININE-BSD FRML MDRD: 5 ML/MIN/1.73
GFR SERPL CREATININE-BSD FRML MDRD: ABNORMAL ML/MIN/{1.73_M2}
GFR SERPL CREATININE-BSD FRML MDRD: ABNORMAL ML/MIN/{1.73_M2}
GLOBULIN UR ELPH-MCNC: 4.3 GM/DL
GLUCOSE BLDC GLUCOMTR-MCNC: 154 MG/DL (ref 70–130)
GLUCOSE SERPL-MCNC: 109 MG/DL (ref 65–99)
GLUCOSE SERPL-MCNC: 117 MG/DL (ref 65–99)
HBV SURFACE AG SERPL QL IA: NORMAL
HCT VFR BLD AUTO: 29.5 % (ref 37.5–51)
HGB BLD-MCNC: 9.2 G/DL (ref 13–17.7)
INR PPP: 1.05 (ref 0.8–1.2)
INR PPP: 1.8 (ref 0.8–1.2)
MAGNESIUM SERPL-MCNC: 2.1 MG/DL (ref 1.6–2.4)
MCH RBC QN AUTO: 24.3 PG (ref 26.6–33)
MCHC RBC AUTO-ENTMCNC: 31.2 G/DL (ref 31.5–35.7)
MCV RBC AUTO: 78 FL (ref 79–97)
PHOSPHATE SERPL-MCNC: 9.5 MG/DL (ref 2.5–4.5)
PLATELET # BLD AUTO: 299 10*3/MM3 (ref 140–450)
PMV BLD AUTO: 11.3 FL (ref 6–12)
POTASSIUM SERPL-SCNC: 4.3 MMOL/L (ref 3.5–5.2)
POTASSIUM SERPL-SCNC: 4.6 MMOL/L (ref 3.5–5.2)
PROT SERPL-MCNC: 7.3 G/DL (ref 6–8.5)
PROTHROMBIN TIME: 14.1 SECONDS (ref 11.1–15.3)
PROTHROMBIN TIME: 21.9 SECONDS (ref 11.1–15.3)
RBC # BLD AUTO: 3.78 10*6/MM3 (ref 4.14–5.8)
SARS-COV-2 N GENE RESP QL NAA+PROBE: NOT DETECTED
SODIUM SERPL-SCNC: 136 MMOL/L (ref 136–145)
SODIUM SERPL-SCNC: 140 MMOL/L (ref 136–145)
TROPONIN T SERPL-MCNC: 2.02 NG/ML (ref 0–0.03)
WBC # BLD AUTO: 8.71 10*3/MM3 (ref 3.4–10.8)

## 2020-10-09 PROCEDURE — 63710000001 INSULIN DETEMIR PER 5 UNITS: Performed by: HOSPITALIST

## 2020-10-09 PROCEDURE — 84100 ASSAY OF PHOSPHORUS: CPT | Performed by: HOSPITALIST

## 2020-10-09 PROCEDURE — 80053 COMPREHEN METABOLIC PANEL: CPT | Performed by: HOSPITALIST

## 2020-10-09 PROCEDURE — C1887 CATHETER, GUIDING: HCPCS | Performed by: INTERNAL MEDICINE

## 2020-10-09 PROCEDURE — C1769 GUIDE WIRE: HCPCS | Performed by: INTERNAL MEDICINE

## 2020-10-09 PROCEDURE — 83735 ASSAY OF MAGNESIUM: CPT | Performed by: HOSPITALIST

## 2020-10-09 PROCEDURE — 25010000002 HEPARIN (PORCINE) PER 1000 UNITS: Performed by: STUDENT IN AN ORGANIZED HEALTH CARE EDUCATION/TRAINING PROGRAM

## 2020-10-09 PROCEDURE — 25010000002 BIVALIRUDIN TRIFLUOROACETATE 250 MG RECONSTITUTED SOLUTION: Performed by: INTERNAL MEDICINE

## 2020-10-09 PROCEDURE — 85027 COMPLETE CBC AUTOMATED: CPT | Performed by: HOSPITALIST

## 2020-10-09 PROCEDURE — 63710000001 INSULIN DETEMIR PER 5 UNITS: Performed by: INTERNAL MEDICINE

## 2020-10-09 PROCEDURE — 0 IOPAMIDOL PER 1 ML: Performed by: INTERNAL MEDICINE

## 2020-10-09 PROCEDURE — 4A023N7 MEASUREMENT OF CARDIAC SAMPLING AND PRESSURE, LEFT HEART, PERCUTANEOUS APPROACH: ICD-10-PCS | Performed by: INTERNAL MEDICINE

## 2020-10-09 PROCEDURE — 25010000002 FENTANYL CITRATE (PF) 100 MCG/2ML SOLUTION: Performed by: INTERNAL MEDICINE

## 2020-10-09 PROCEDURE — C1725 CATH, TRANSLUMIN NON-LASER: HCPCS | Performed by: INTERNAL MEDICINE

## 2020-10-09 PROCEDURE — G0378 HOSPITAL OBSERVATION PER HR: HCPCS

## 2020-10-09 PROCEDURE — C1894 INTRO/SHEATH, NON-LASER: HCPCS | Performed by: INTERNAL MEDICINE

## 2020-10-09 PROCEDURE — 93455 CORONARY ART/GRFT ANGIO S&I: CPT | Performed by: INTERNAL MEDICINE

## 2020-10-09 PROCEDURE — 85610 PROTHROMBIN TIME: CPT | Performed by: INTERNAL MEDICINE

## 2020-10-09 PROCEDURE — 25010000002 MIDAZOLAM PER 1 MG: Performed by: INTERNAL MEDICINE

## 2020-10-09 PROCEDURE — 25010000002 BIVALIRUDIN TRIFLUOROACETATE 250 MG RECONSTITUTED SOLUTION 1 EACH VIAL: Performed by: INTERNAL MEDICINE

## 2020-10-09 PROCEDURE — B2111ZZ FLUOROSCOPY OF MULTIPLE CORONARY ARTERIES USING LOW OSMOLAR CONTRAST: ICD-10-PCS | Performed by: INTERNAL MEDICINE

## 2020-10-09 PROCEDURE — 80048 BASIC METABOLIC PNL TOTAL CA: CPT | Performed by: INTERNAL MEDICINE

## 2020-10-09 PROCEDURE — C1760 CLOSURE DEV, VASC: HCPCS | Performed by: INTERNAL MEDICINE

## 2020-10-09 PROCEDURE — 84484 ASSAY OF TROPONIN QUANT: CPT | Performed by: HOSPITALIST

## 2020-10-09 PROCEDURE — 82962 GLUCOSE BLOOD TEST: CPT

## 2020-10-09 RX ORDER — METOPROLOL SUCCINATE 100 MG/1
100 TABLET, EXTENDED RELEASE ORAL
Status: DISCONTINUED | OUTPATIENT
Start: 2020-10-09 | End: 2020-10-10

## 2020-10-09 RX ORDER — SODIUM CHLORIDE 9 MG/ML
100 INJECTION, SOLUTION INTRAVENOUS CONTINUOUS
Status: DISCONTINUED | OUTPATIENT
Start: 2020-10-09 | End: 2020-10-09

## 2020-10-09 RX ORDER — GABAPENTIN 400 MG/1
400 CAPSULE ORAL 2 TIMES DAILY
Status: DISCONTINUED | OUTPATIENT
Start: 2020-10-09 | End: 2020-10-09

## 2020-10-09 RX ORDER — SODIUM CHLORIDE 0.9 % (FLUSH) 0.9 %
3 SYRINGE (ML) INJECTION EVERY 12 HOURS SCHEDULED
Status: DISCONTINUED | OUTPATIENT
Start: 2020-10-09 | End: 2020-10-09

## 2020-10-09 RX ORDER — FENTANYL CITRATE 50 UG/ML
INJECTION, SOLUTION INTRAMUSCULAR; INTRAVENOUS AS NEEDED
Status: DISCONTINUED | OUTPATIENT
Start: 2020-10-09 | End: 2020-10-09 | Stop reason: HOSPADM

## 2020-10-09 RX ORDER — SEVELAMER CARBONATE 800 MG/1
800 TABLET, FILM COATED ORAL
COMMUNITY
End: 2021-06-10

## 2020-10-09 RX ORDER — SEVELAMER CARBONATE 800 MG/1
2400 TABLET, FILM COATED ORAL
Status: DISCONTINUED | OUTPATIENT
Start: 2020-10-09 | End: 2020-10-20 | Stop reason: HOSPADM

## 2020-10-09 RX ORDER — ONDANSETRON 4 MG/1
4 TABLET, FILM COATED ORAL EVERY 6 HOURS PRN
Status: DISCONTINUED | OUTPATIENT
Start: 2020-10-09 | End: 2020-10-20 | Stop reason: HOSPADM

## 2020-10-09 RX ORDER — SODIUM CHLORIDE 0.9 % (FLUSH) 0.9 %
10 SYRINGE (ML) INJECTION AS NEEDED
Status: DISCONTINUED | OUTPATIENT
Start: 2020-10-09 | End: 2020-10-20 | Stop reason: HOSPADM

## 2020-10-09 RX ORDER — FUROSEMIDE 40 MG/1
80 TABLET ORAL
Status: DISCONTINUED | OUTPATIENT
Start: 2020-10-09 | End: 2020-10-10

## 2020-10-09 RX ORDER — ACETAMINOPHEN 325 MG/1
650 TABLET ORAL EVERY 4 HOURS PRN
Status: DISCONTINUED | OUTPATIENT
Start: 2020-10-09 | End: 2020-10-20 | Stop reason: HOSPADM

## 2020-10-09 RX ORDER — GENTAMICIN SULFATE 1 MG/G
OINTMENT TOPICAL DAILY
Status: DISCONTINUED | OUTPATIENT
Start: 2020-10-10 | End: 2020-10-11

## 2020-10-09 RX ORDER — GABAPENTIN 100 MG/1
100 CAPSULE ORAL 3 TIMES DAILY
Status: ON HOLD | COMMUNITY
End: 2021-06-16 | Stop reason: SDUPTHER

## 2020-10-09 RX ORDER — GABAPENTIN 300 MG/1
100 CAPSULE ORAL 3 TIMES DAILY
Status: ON HOLD | COMMUNITY
End: 2020-10-09

## 2020-10-09 RX ORDER — SODIUM CHLORIDE 0.9 % (FLUSH) 0.9 %
10 SYRINGE (ML) INJECTION EVERY 12 HOURS SCHEDULED
Status: DISCONTINUED | OUTPATIENT
Start: 2020-10-09 | End: 2020-10-20 | Stop reason: HOSPADM

## 2020-10-09 RX ORDER — GENTAMICIN SULFATE 1 MG/G
OINTMENT TOPICAL EVERY 8 HOURS SCHEDULED
Status: DISCONTINUED | OUTPATIENT
Start: 2020-10-09 | End: 2020-10-09

## 2020-10-09 RX ORDER — MIDAZOLAM HYDROCHLORIDE 1 MG/ML
INJECTION INTRAMUSCULAR; INTRAVENOUS AS NEEDED
Status: DISCONTINUED | OUTPATIENT
Start: 2020-10-09 | End: 2020-10-09 | Stop reason: HOSPADM

## 2020-10-09 RX ORDER — ACETAMINOPHEN 160 MG/5ML
650 SOLUTION ORAL EVERY 4 HOURS PRN
Status: DISCONTINUED | OUTPATIENT
Start: 2020-10-09 | End: 2020-10-09 | Stop reason: SDUPTHER

## 2020-10-09 RX ORDER — RANOLAZINE 500 MG/1
1000 TABLET, EXTENDED RELEASE ORAL 2 TIMES DAILY
Status: DISCONTINUED | OUTPATIENT
Start: 2020-10-09 | End: 2020-10-14

## 2020-10-09 RX ORDER — BISACODYL 5 MG/1
5 TABLET, DELAYED RELEASE ORAL DAILY PRN
Status: DISCONTINUED | OUTPATIENT
Start: 2020-10-09 | End: 2020-10-20 | Stop reason: HOSPADM

## 2020-10-09 RX ORDER — NIFEDIPINE 60 MG/1
60 TABLET, EXTENDED RELEASE ORAL DAILY
COMMUNITY
End: 2020-10-20 | Stop reason: HOSPADM

## 2020-10-09 RX ORDER — HYDRALAZINE HYDROCHLORIDE 25 MG/1
25 TABLET, FILM COATED ORAL 3 TIMES DAILY
Status: ON HOLD | COMMUNITY
End: 2020-10-09

## 2020-10-09 RX ORDER — ONDANSETRON 2 MG/ML
4 INJECTION INTRAMUSCULAR; INTRAVENOUS EVERY 6 HOURS PRN
Status: DISCONTINUED | OUTPATIENT
Start: 2020-10-09 | End: 2020-10-20 | Stop reason: HOSPADM

## 2020-10-09 RX ORDER — PRAVASTATIN SODIUM 20 MG
20 TABLET ORAL DAILY
Status: DISCONTINUED | OUTPATIENT
Start: 2020-10-09 | End: 2020-10-20 | Stop reason: HOSPADM

## 2020-10-09 RX ORDER — HYDRALAZINE HYDROCHLORIDE 50 MG/1
50 TABLET, FILM COATED ORAL 3 TIMES DAILY
COMMUNITY

## 2020-10-09 RX ORDER — HYDRALAZINE HYDROCHLORIDE 25 MG/1
25 TABLET, FILM COATED ORAL 3 TIMES DAILY
Status: DISCONTINUED | OUTPATIENT
Start: 2020-10-09 | End: 2020-10-09

## 2020-10-09 RX ORDER — HYDRALAZINE HYDROCHLORIDE 50 MG/1
50 TABLET, FILM COATED ORAL 3 TIMES DAILY
Status: DISCONTINUED | OUTPATIENT
Start: 2020-10-09 | End: 2020-10-10

## 2020-10-09 RX ORDER — LOSARTAN POTASSIUM 50 MG/1
50 TABLET ORAL DAILY
COMMUNITY

## 2020-10-09 RX ORDER — GABAPENTIN 100 MG/1
100 CAPSULE ORAL 3 TIMES DAILY
Status: DISCONTINUED | OUTPATIENT
Start: 2020-10-10 | End: 2020-10-10

## 2020-10-09 RX ORDER — SODIUM CHLORIDE 9 MG/ML
75 INJECTION, SOLUTION INTRAVENOUS CONTINUOUS
Status: DISCONTINUED | OUTPATIENT
Start: 2020-10-09 | End: 2020-10-09

## 2020-10-09 RX ORDER — BIVALIRUDIN 250 MG/5ML
INJECTION, POWDER, LYOPHILIZED, FOR SOLUTION INTRAVENOUS AS NEEDED
Status: DISCONTINUED | OUTPATIENT
Start: 2020-10-09 | End: 2020-10-09 | Stop reason: HOSPADM

## 2020-10-09 RX ORDER — ACETAMINOPHEN 650 MG/1
650 SUPPOSITORY RECTAL EVERY 4 HOURS PRN
Status: DISCONTINUED | OUTPATIENT
Start: 2020-10-09 | End: 2020-10-20 | Stop reason: HOSPADM

## 2020-10-09 RX ORDER — LOSARTAN POTASSIUM 50 MG/1
50 TABLET ORAL DAILY
Status: DISCONTINUED | OUTPATIENT
Start: 2020-10-10 | End: 2020-10-10

## 2020-10-09 RX ORDER — SODIUM CHLORIDE 0.9 % (FLUSH) 0.9 %
10 SYRINGE (ML) INJECTION AS NEEDED
Status: DISCONTINUED | OUTPATIENT
Start: 2020-10-09 | End: 2020-10-09

## 2020-10-09 RX ORDER — LIDOCAINE HYDROCHLORIDE 20 MG/ML
INJECTION, SOLUTION INFILTRATION; PERINEURAL AS NEEDED
Status: DISCONTINUED | OUTPATIENT
Start: 2020-10-09 | End: 2020-10-09 | Stop reason: HOSPADM

## 2020-10-09 RX ORDER — LOSARTAN POTASSIUM 50 MG/1
100 TABLET ORAL DAILY
Status: DISCONTINUED | OUTPATIENT
Start: 2020-10-09 | End: 2020-10-09

## 2020-10-09 RX ORDER — CALCITRIOL 0.25 UG/1
1 CAPSULE, LIQUID FILLED ORAL DAILY
Status: DISCONTINUED | OUTPATIENT
Start: 2020-10-09 | End: 2020-10-20 | Stop reason: HOSPADM

## 2020-10-09 RX ORDER — DEXTROSE MONOHYDRATE, SODIUM CHLORIDE, SODIUM LACTATE, CALCIUM CHLORIDE, MAGNESIUM CHLORIDE 2.5; 538; 448; 18.4; 5.08 G/100ML; MG/100ML; MG/100ML; MG/100ML; MG/100ML
5000 SOLUTION INTRAPERITONEAL AS NEEDED
Status: DISCONTINUED | OUTPATIENT
Start: 2020-10-09 | End: 2020-10-20 | Stop reason: HOSPADM

## 2020-10-09 RX ORDER — AMLODIPINE BESYLATE 10 MG/1
10 TABLET ORAL 3 TIMES DAILY
Status: DISCONTINUED | OUTPATIENT
Start: 2020-10-09 | End: 2020-10-09

## 2020-10-09 RX ORDER — ISOSORBIDE MONONITRATE 30 MG/1
30 TABLET, EXTENDED RELEASE ORAL DAILY
Status: DISCONTINUED | OUTPATIENT
Start: 2020-10-09 | End: 2020-10-12

## 2020-10-09 RX ORDER — NIFEDIPINE 60 MG/1
60 TABLET, EXTENDED RELEASE ORAL
Status: DISCONTINUED | OUTPATIENT
Start: 2020-10-09 | End: 2020-10-10

## 2020-10-09 RX ORDER — FUROSEMIDE 80 MG
80 TABLET ORAL 2 TIMES DAILY
Status: ON HOLD | COMMUNITY
End: 2020-10-20 | Stop reason: SDUPTHER

## 2020-10-09 RX ORDER — CLOPIDOGREL BISULFATE 75 MG/1
75 TABLET ORAL DAILY
Status: DISCONTINUED | OUTPATIENT
Start: 2020-10-09 | End: 2020-10-20 | Stop reason: HOSPADM

## 2020-10-09 RX ADMIN — SEVELAMER CARBONATE 2400 MG: 800 TABLET, FILM COATED ORAL at 17:07

## 2020-10-09 RX ADMIN — HYDRALAZINE HYDROCHLORIDE 50 MG: 50 TABLET, FILM COATED ORAL at 17:07

## 2020-10-09 RX ADMIN — AMLODIPINE BESYLATE 10 MG: 10 TABLET ORAL at 08:39

## 2020-10-09 RX ADMIN — SODIUM CHLORIDE, PRESERVATIVE FREE 10 ML: 5 INJECTION INTRAVENOUS at 08:41

## 2020-10-09 RX ADMIN — RANOLAZINE 1000 MG: 500 TABLET, FILM COATED, EXTENDED RELEASE ORAL at 08:39

## 2020-10-09 RX ADMIN — PRAVASTATIN SODIUM 20 MG: 20 TABLET ORAL at 08:39

## 2020-10-09 RX ADMIN — RANOLAZINE 1000 MG: 500 TABLET, FILM COATED, EXTENDED RELEASE ORAL at 21:57

## 2020-10-09 RX ADMIN — INSULIN DETEMIR 20 UNITS: 100 INJECTION, SOLUTION SUBCUTANEOUS at 03:15

## 2020-10-09 RX ADMIN — ISOSORBIDE MONONITRATE 30 MG: 30 TABLET, EXTENDED RELEASE ORAL at 08:39

## 2020-10-09 RX ADMIN — NIFEDIPINE 60 MG: 60 TABLET, EXTENDED RELEASE ORAL at 13:15

## 2020-10-09 RX ADMIN — FUROSEMIDE 80 MG: 40 TABLET ORAL at 17:07

## 2020-10-09 RX ADMIN — GABAPENTIN 400 MG: 400 CAPSULE ORAL at 08:39

## 2020-10-09 RX ADMIN — CLOPIDOGREL BISULFATE 75 MG: 75 TABLET ORAL at 08:39

## 2020-10-09 RX ADMIN — HYDRALAZINE HYDROCHLORIDE 50 MG: 50 TABLET, FILM COATED ORAL at 21:57

## 2020-10-09 RX ADMIN — FUROSEMIDE 80 MG: 40 TABLET ORAL at 13:14

## 2020-10-09 RX ADMIN — INSULIN DETEMIR 20 UNITS: 100 INJECTION, SOLUTION SUBCUTANEOUS at 21:59

## 2020-10-09 RX ADMIN — HYDRALAZINE HYDROCHLORIDE 25 MG: 25 TABLET, FILM COATED ORAL at 08:39

## 2020-10-09 RX ADMIN — METOPROLOL SUCCINATE 100 MG: 50 TABLET, EXTENDED RELEASE ORAL at 08:39

## 2020-10-09 RX ADMIN — LOSARTAN POTASSIUM 100 MG: 50 TABLET, FILM COATED ORAL at 08:39

## 2020-10-09 RX ADMIN — HEPARIN SODIUM 12 UNITS/KG/HR: 10000 INJECTION, SOLUTION INTRAVENOUS at 00:01

## 2020-10-09 NOTE — ED NOTES
Pt presents to ED per Dr Benavides. Pt states he had bloodwork drawn today, Dr Benavides called stating he needed to come to ED due to abnormal results - pt unsure of what test was abnormal.      Shannan Gonzalez RN  10/08/20 2034

## 2020-10-09 NOTE — H&P
.        [facility] Medicine Admission      Date of Admission: 10/8/2020  Patient was seen and evaluated on 10/8/2020.  However note was written after midnight    Primary Care Physician: Elizabeth Burleson APRN      Chief Complaint: **Chest pain with elevated labs*    HPI:*  Patient is a 60-year-old male who presents to the [facility] emergency room with complaints of chest pain.  He was seen at his cardiology office today and had blood work drawn.  The cardiologist called the ER stating that the patient needed to come to the ED due to abnormal test results of and the patient was unsure of what test was abnormal.  Noted in the emergency room to have a troponin of 2.1.  He has been having chest pain off and on for the past 2 years he has a history of coronary artery disease with CABG in 2014.  He has had no stents since then.  His last cath was in February 2020 4 by Dr. Garza   (with unsuccessful PTCA of the chronic total occlusion of the left main and selective cannulation of the left internal mammary artery and of the saphenous vein graft to the obtuse marginal branch.  There is 100% occlusion of the left main 100% occlusion of the proximal right coronary artery 100% occlusion of the second obtuse marginal after branch patent LIMA to the LAD patent saphenous vein graft to the obtuse marginal branch #1.)    The emergency room patient was found to have a troponin of 2.1 at age of 13 and a BNP of 41,000.  He was started on a heparin drip.  Nitroglycerin and aspirin was given prior in the ER.    Concurrent Medical History:  has a past medical history of Chronic kidney disease, stage 3, Coronary arteriosclerosis, End stage renal disease (CMS/HCC), Essential hypertension, Heterozygous thalassemia, Hyperlipidemia, Peripheral vascular disease (CMS/Formerly [facility]), Surgical follow-up care, and Type 2 diabetes mellitus (CMS/Formerly [facility]).    Past Surgical History:  has a past surgical  history that includes Amputation finger / thumb (Right); Coronary artery bypass graft (10/11/2014); Cardiac catheterization (10/11/2014); Cardiac catheterization (N/A, 2/28/2020); and Cardiac surgery.    Family History: family history includes Coronary artery disease in an other family member; Heart disease in an other family member. **    Social History:  reports that he has never smoked. He has never used smokeless tobacco. He reports previous alcohol use.    Allergies: No Known Allergies    Medications:   Prior to Admission medications    Medication Sig Start Date End Date Taking? Authorizing Provider   amLODIPine (NORVASC) 10 MG tablet Take 10 mg by mouth 3 (Three) Times a Day.   Yes Lynne Castillo MD   aspirin 81 MG tablet Take 1 tablet by mouth Daily.   Yes Darlene Dickey APRN   calcitriol (ROCALTROL) 0.5 MCG capsule Take 0.5 mcg by mouth 2 (Two) Times a Day.   Yes Lynne Castillo MD   cinacalcet (SENSIPAR) 30 MG tablet Take 30 mg by mouth Daily.   Yes Lynne Castillo MD   clopidogrel (PLAVIX) 75 MG tablet Take 75 mg by mouth Daily.   Yes Lynne Castillo MD   Dulaglutide (TRULICITY) 1.5 MG/0.5ML solution pen-injector Inject 10 mg under the skin into the appropriate area as directed 1 (One) Time Per Week.   Yes Lynne Castillo MD   Evolocumab (REPATHA SC) Inject 140 mg under the skin into the appropriate area as directed Every 14 (Fourteen) Days.   Yes Lynne Castillo MD   gabapentin (NEURONTIN) 300 MG capsule Take 500 mg by mouth 2 (Two) Times a Day.   Yes Lynne Castillo MD   gentamicin (GARAMYCIN) 0.1 % cream Apply 1 application topically to the appropriate area as directed 3 (Three) Times a Day.   Yes Lynne Castillo MD   hydrALAZINE (APRESOLINE) 25 MG tablet Take 25 mg by mouth 3 (Three) Times a Day.   Yes Lynne Castillo MD   Insulin Glargine, 2 Unit Dial, (TOUJEO MAX SOLOSTAR) 300 UNIT/ML solution pen-injector injection Inject 28 Units under the  skin into the appropriate area as directed Every Night.   Yes Lynne Castillo MD   isosorbide mononitrate (IMDUR) 30 MG 24 hr tablet Take 30 mg by mouth Daily.   Yes Lynne Castillo MD   losartan (COZAAR) 100 MG tablet Take 100 mg by mouth Daily.   Yes Lynne Castillo MD   Metoprolol Succinate 100 MG capsule extended-release 24 hour sprinkle Take 200 mg by mouth Daily.   Yes Lynne Castillo MD   nitroglycerin (NITROSTAT) 0.4 MG SL tablet 1 under the tongue as needed for angina, may repeat q5mins for up three doses 2/29/20  Yes Darlene Dickey APRN   pravastatin (PRAVACHOL) 20 MG tablet Take 20 mg by mouth Daily.   Yes Lynne Castillo MD   ranolazine (RANEXA) 500 MG 12 hr tablet Take 1 tablet by mouth 2 (Two) Times a Day.  Patient taking differently: Take 1,000 mg by mouth 2 (Two) Times a Day. 2/29/20  Yes Darlene Dickey APRN   albuterol sulfate  (90 Base) MCG/ACT inhaler Inhale 2 puffs Every 4 (Four) Hours As Needed for Wheezing.    Lynne Castillo MD       Review of Systems:  Review of Systems   12 point review of systems obtained pertinent positives and negatives noted above in HPI otherwise complete ROS is negative except as mentioned above.    Physical Exam:   Temp:  [97.3 °F (36.3 °C)] 97.3 °F (36.3 °C)  Heart Rate:  [74-88] 74  Resp:  [16-18] 16  BP: (139-177)/(76-94) 152/78  Physical Exam  Vitals signs and nursing note reviewed.   Constitutional:       Appearance: Normal appearance.   HENT:      Head: Normocephalic and atraumatic.      Nose: Nose normal.      Mouth/Throat:      Pharynx: Oropharynx is clear.   Eyes:      Extraocular Movements: Extraocular movements intact.      Conjunctiva/sclera: Conjunctivae normal.   Neck:      Musculoskeletal: No neck rigidity.   Cardiovascular:      Rate and Rhythm: Normal rate and regular rhythm.      Pulses: Normal pulses.   Pulmonary:      Breath sounds: Normal breath sounds.   Abdominal:      General: Bowel sounds are  normal.      Palpations: Abdomen is soft.   Musculoskeletal:         General: No tenderness or deformity.   Skin:     General: Skin is warm and dry.   Neurological:      General: No focal deficit present.      Mental Status: He is alert.      Cranial Nerves: No cranial nerve deficit.   Psychiatric:         Mood and Affect: Mood normal.         Behavior: Behavior normal.         Thought Content: Thought content normal.         Judgment: Judgment normal.           Results Reviewed:  I have personally reviewed current lab, radiology, and data and agree with results.  Lab Results (last 24 hours)     Procedure Component Value Units Date/Time    Protime-INR [[id number]]  (Normal) Collected: 10/08/20 2054    Specimen: Blood Updated: 10/09/20 0058     Protime 14.1 Seconds      INR 1.05    Narrative:      Therapeutic range for most indications is 2.0-3.0 INR,  or 2.5-3.5 for mechanical heart valves.    aPTT [[id number]]  (Normal) Collected: 10/08/20 2054    Specimen: Blood Updated: 10/09/20 0058     PTT 37.2 seconds     Narrative:      The recommended Heparin therapeutic range is 68-97 seconds.    COVID PRE-OP / PRE-PROCEDURE SCREENING ORDER (NO ISOLATION) - Swab, Nasopharynx [[id number]] Collected: 10/08/20 2321    Specimen: Swab from Nasopharynx Updated: 10/08/20 2328    Narrative:      The following orders were created for panel order COVID PRE-OP / PRE-PROCEDURE SCREENING ORDER (NO ISOLATION) - Swab, Nasopharynx.  Procedure                               Abnormality         Status                     ---------                               -----------         ------                     COVID-[facility] IN-HOUS...[[id number]]                      In process                   Please view results for these tests on the individual orders.    COVID-[facility] IN-HOUSE, NP SWAB IN TRANSPORT MEDIA 8-10 HR TAT - Swab, Nasopharynx [[id number]] Collected: 10/08/20 2321    Specimen: Swab from Nasopharynx Updated: 10/08/20 2328    [place]  Draw [[id number]] Collected: 10/08/20 2054    Specimen: Blood Updated: 10/08/20 2245    Narrative:      The following orders were created for panel order [place] Draw.  Procedure                               Abnormality         Status                     ---------                               -----------         ------                     Light Blue Top[[id number]]                                   Final result               Green Top (Gel)[[id number]]                                  Final result               Lavender Top[[id number]]                                     Final result               Gold Top - SST[[id number]]                                   Final result                 Please view results for these tests on the individual orders.    Green Top (Gel) [[id number]] Collected: 10/08/20 2138    Specimen: Blood Updated: 10/08/20 2245     Extra Tube Hold for add-ons.     Comment: Auto resulted.       BNP [[id number]]  (Abnormal) Collected: 10/08/20 2138    Specimen: Blood Updated: 10/08/20 2228     proBNP 40,839.0 pg/mL     Narrative:      Among patients with dyspnea, NT-proBNP is highly sensitive for the detection of acute congestive heart failure. In addition NT-proBNP of <300 pg/ml effectively rules out acute congestive heart failure with 99% negative predictive value.    Results may be falsely decreased if patient taking Biotin.      Troponin [[id number]]  (Abnormal) Collected: 10/08/20 2138    Specimen: Blood Updated: 10/08/20 2223     Troponin T 1.930 ng/mL     Narrative:      Troponin T Reference Range:  <= 0.03 ng/mL-   Negative for AMI  >0.03 ng/mL-     Abnormal for myocardial necrosis.  Clinicians would have to utilize clinical acumen, EKG, Troponin and serial changes to determine if it is an Acute Myocardial Infarction or myocardial injury due to an underlying chronic condition.       Results may be falsely decreased if patient taking Biotin.      Basic Metabolic Panel [[id number]]  (Abnormal)  Collected: 10/08/20 2138    Specimen: Blood Updated: 10/08/20 2219     Glucose 254 mg/dL      BUN 66 mg/dL      Creatinine 13.08 mg/dL      Sodium 140 mmol/L      Potassium 3.8 mmol/L      Chloride 99 mmol/L      CO2 24.0 mmol/L      Calcium 8.2 mg/dL      eGFR  African Amer 5 mL/min/1.73      Comment: <15 Indicative of kidney failure.        eGFR Non  Amer --     Comment: <15 Indicative of kidney failure.        BUN/Creatinine Ratio 5.0     Anion Gap 17.0 mmol/L     Narrative:      GFR Normal >60  Chronic Kidney Disease <60  Kidney Failure <15      Light Blue Top [[id number]] Collected: 10/08/20 2054    Specimen: Blood Updated: 10/08/20 2200     Extra Tube hold for add-on     Comment: Auto resulted       Lavender Top [[id number]] Collected: 10/08/20 2054    Specimen: Blood Updated: 10/08/20 2200     Extra Tube hold for add-on     Comment: Auto resulted       Gold Top - SST [[id number]] Collected: 10/08/20 2054    Specimen: Blood Updated: 10/08/20 2200     Extra Tube Hold for add-ons.     Comment: Auto resulted.       D-dimer, Quantitative [[id number]]  (Abnormal) Collected: 10/08/20 2054    Specimen: Blood Updated: 10/08/20 2131     D-Dimer, Quantitative 903 ng/mL (FEU)     Narrative:      Dimer values <500 ng/ml FEU are FDA approved as aid in diagnosis of deep venous thrombosis and pulmonary embolism.  This test should not be used in an exclusion strategy with pretest probability alone.    A recent guideline regarding diagnosis for pulmonary thromboembolism recommends an adjusted exclusion criterion of age x 10 ng/ml FEU for patients >50 years of age ([employer]ley Intern Med 2015; 163: 701-711).      Lactic Acid, Plasma [[id number]]  (Normal) Collected: 10/08/20 2054    Specimen: Blood Updated: 10/08/20 2118     Lactate 1.1 mmol/L     CBC & Differential [[id number]]  (Abnormal) Collected: 10/08/20 2054    Specimen: Blood Updated: 10/08/20 2103    Narrative:      The following orders were created for panel order CBC &  Differential.  Procedure                               Abnormality         Status                     ---------                               -----------         ------                     CBC Auto Differential[[id number]]        Abnormal            Final result                 Please view results for these tests on the individual orders.    CBC Auto Differential [[id number]]  (Abnormal) Collected: 10/08/20 2054    Specimen: Blood Updated: 10/08/20 2103     WBC 9.95 10*3/mm3      RBC 4.13 10*6/mm3      Hemoglobin 10.2 g/dL      Hematocrit 32.6 %      MCV 78.9 fL      MCH 24.7 pg      MCHC 31.3 g/dL      RDW 14.8 %      RDW-SD 41.6 fl      MPV 11.2 fL      Platelets 319 10*3/mm3      Neutrophil % 62.7 %      Lymphocyte % 22.7 %      Monocyte % 10.8 %      Eosinophil % 1.9 %      Basophil % 0.4 %      Immature Grans % 1.5 %      Neutrophils, Absolute 6.24 10*3/mm3      Lymphocytes, Absolute 2.26 10*3/mm3      Monocytes, Absolute 1.07 10*3/mm3      Eosinophils, Absolute 0.19 10*3/mm3      Basophils, Absolute 0.04 10*3/mm3      Immature Grans, Absolute 0.15 10*3/mm3      nRBC 0.2 /100 WBC         Imaging Results (Last 24 Hours)     Procedure Component Value Units Date/Time    XR Chest 2 View [[id number]] Collected: 10/08/20 2122     Updated: 10/08/20 2140    Narrative:        CHEST X-RAY 2 view on  10/8/2020     CLINICAL INDICATION: Elevated troponin, weakness, shortness of  breath    COMPARISON: 10/29/2014    FINDINGS: The patient is status post median sternotomy and  probable CABG. Cardiomegaly is noted. Mild vascular congestion is  noted. There is mild elevation of the right hemidiaphragm. The  lungs are otherwise clear.      Impression:      Cardiomegaly with mild vascular congestion.    Electronically signed by:  Alek Conklin  10/8/2020 9:38 PM  CDT Workstation: [id number]            Assessment:    Active Hospital Problems    Diagnosis   • Chest pain     Added automatically from request for surgery [id number]                  Plan:*  Chest pain patient was sent from Dr. Garza's office with an elevated troponin.  He is been placed on a heparin drip placed in stepdown as needed nitroglycerin patient is already on Ranexa and large amounts of Imdur.  Placed on Dr. Garza's list n.p.o. after midnight    End-stage renal failure on peritoneal dialysis will inform nephrology in the a.m.    Hypertension continue antihypertensive medications    Diabetes sliding scale insulin Accu-Cheks    I discussed the patient's findings and my recommendations with: *Patient and family at the bedside    Luana Zamudio MD

## 2020-10-09 NOTE — PROGRESS NOTES
Progress Note  Valentín Lewis MD  Hospitalist    Date of visit: 10/9/2020     LOS: 0 days   Patient Care Team:  Elizabeth Burleson APRN as PCP - General (Family Medicine)    Chief Complaint: chest pain    Subjective     Interval History:     Patient Complaints: chest pain / improved to a great extent.    History taken from: patient    Medication Review:   Current Facility-Administered Medications   Medication Dose Route Frequency Provider Last Rate Last Dose   • [MAR Hold] acetaminophen (TYLENOL) tablet 650 mg  650 mg Oral Q4H PRN Luana Zamudio MD        Or   • [MAR Hold] acetaminophen (TYLENOL) suppository 650 mg  650 mg Rectal Q4H PRN Luana Zamudio MD       • [MAR Hold] bisacodyl (DULCOLAX) EC tablet 5 mg  5 mg Oral Daily PRN Luana Zamudio MD       • Bivalirudin Trifluoroacetate (ANGIOMAX) injection    PRN Gibran Garza MD   65.7 mg at 10/09/20 1133   • [MAR Hold] calcitriol (ROCALTROL) capsule 1 mcg  1 mcg Oral Daily Mauro Saravia MD       • [MAR Hold] clopidogrel (PLAVIX) tablet 75 mg  75 mg Oral Daily Luana Zamudio MD   75 mg at 10/09/20 0839   • [MAR Hold] Delflex-LC/2.5% Dextrose (DIANEAL) CAPD 5,000 mL  5,000 mL Intraperitoneal PRN Mauro Saravia MD       • [MAR Hold] epoetin arturo (EPOGEN,PROCRIT) injection 6,000 Units  6,000 Units Subcutaneous Weekly Mauro Saravia MD       • fentaNYL citrate (PF) (SUBLIMAZE) injection    PRN Gibran Garza MD   25 mcg at 10/09/20 1112   • [MAR Hold] furosemide (LASIX) tablet 80 mg  80 mg Oral BID Valentín Lewis MD       • [START ON 10/10/2020] gabapentin (NEURONTIN) capsule 100 mg  100 mg Oral TID Valentín Lewis MD       • [MAR Hold] gentamicin (GARAMYCIN) 0.1 % ointment   Topical Q8H Luana Zamudioee, MD       • heparin 1000 units in sodium chloride 0.9% IV infusion    PRN Gibran Garza MD   1,000 mL at 10/09/20 1059   • heparin 5000 units  in sodium chloride 0.9% IV infusion    PRN Gibran Garza MD   500 mL at 10/09/20 1059   • hydrALAZINE (APRESOLINE) tablet 50 mg  50 mg Oral TID Valentín Lewis MD       • [MAR Hold] influenza vac split quad (FLUZONE,FLUARIX,AFLURIA,FLULAVAL) injection 0.5 mL  0.5 mL Intramuscular During Hospitalization Luana Zamudio MD       • [MAR Hold] insulin detemir (LEVEMIR) injection 20 Units  20 Units Subcutaneous Nightly Luana Zamudio MD   20 Units at 10/09/20 0315   • [MAR Hold] isosorbide mononitrate (IMDUR) 24 hr tablet 30 mg  30 mg Oral Daily Luana Zamudio MD   30 mg at 10/09/20 0839   • lidocaine (XYLOCAINE) 2% injection    PRN Gibran Garza MD   20 mL at 10/09/20 1112   • [START ON 10/10/2020] losartan (COZAAR) tablet 50 mg  50 mg Oral Daily Valentín Lewis MD       • metoprolol succinate XL (TOPROL-XL) 24 hr tablet 100 mg  100 mg Oral Q24H Luana Zamudio MD   100 mg at 10/09/20 0839   • midazolam (VERSED) injection    PRN Gibran Garza MD   1 mg at 10/09/20 1112   • NIFEdipine XL (PROCARDIA XL) 24 hr tablet 60 mg  60 mg Oral Q24H Valentín Lewis MD       • [MAR Hold] nitroglycerin (NITROSTAT) SL tablet 0.4 mg  0.4 mg Sublingual Q5 Min PRN Musa Saenz MD       • [MAR Hold] ondansetron (ZOFRAN) tablet 4 mg  4 mg Oral Q6H PRN Luana Zamudio MD        Or   • [MAR Hold] ondansetron (ZOFRAN) injection 4 mg  4 mg Intravenous Q6H PRN Luana Zamudio MD       • [MAR Hold] pravastatin (PRAVACHOL) tablet 20 mg  20 mg Oral Daily Luana Zamudio MD   20 mg at 10/09/20 0839   • [MAR Hold] ranolazine (RANEXA) 12 hr tablet 1,000 mg  1,000 mg Oral BID Luana Zamudio MD   1,000 mg at 10/09/20 0839   • [MAR Hold] sevelamer (RENVELA) tablet 2,400 mg  2,400 mg Oral TID With Meals Valentín Lewis MD       • [MAR Hold] sodium chloride  0.9 % flush 10 mL  10 mL Intravenous Q12H Luana Zamudio MD   10 mL at 10/09/20 0841   • [MAR Hold] sodium chloride 0.9 % flush 10 mL  10 mL Intravenous PRN Luana Zamudio MD           Review of Systems:   Review of Systems   Constitutional: Positive for fatigue. Negative for fever.   Respiratory: Negative for cough, choking, wheezing and stridor.    Cardiovascular: Negative for chest pain and palpitations.   Gastrointestinal: Negative for abdominal distention, abdominal pain, anal bleeding, nausea and vomiting.   Genitourinary: Negative for discharge, dysuria, flank pain, genital sores and urgency.   Musculoskeletal: Positive for arthralgias. Negative for back pain, gait problem, joint swelling and neck pain.   Skin: Positive for pallor. Negative for color change and rash.   Neurological: Positive for weakness. Negative for seizures, syncope, speech difficulty, light-headedness and headaches.   Psychiatric/Behavioral: Negative for agitation, behavioral problems and confusion.   All other systems reviewed and are negative.      Objective     Vital Signs  Temp:  [97.3 °F (36.3 °C)-98.4 °F (36.9 °C)] 98.4 °F (36.9 °C)  Heart Rate:  [73-88] 77  Resp:  [16-18] 16  BP: (120-177)/(68-95) 120/68    Physical Exam:  Physical Exam  Vitals signs reviewed.   Constitutional:       General: He is not in acute distress.     Appearance: Normal appearance. He is not ill-appearing.   HENT:      Head: Normocephalic and atraumatic.   Eyes:      General: No scleral icterus.     Extraocular Movements: Extraocular movements intact.      Pupils: Pupils are equal, round, and reactive to light.   Neck:      Musculoskeletal: Normal range of motion and neck supple. No neck rigidity or muscular tenderness.   Cardiovascular:      Rate and Rhythm: Normal rate and regular rhythm.   Pulmonary:      Effort: Pulmonary effort is normal. No respiratory distress.      Breath sounds: Normal breath sounds.  No wheezing or rhonchi.   Abdominal:      General: Bowel sounds are normal. There is no distension.      Palpations: Abdomen is soft. There is no mass.      Tenderness: There is no abdominal tenderness.   Musculoskeletal: Normal range of motion.         General: No swelling, tenderness or signs of injury.      Right lower leg: No edema.   Skin:     General: Skin is warm and dry.      Coloration: Skin is not jaundiced or pale.   Neurological:      General: No focal deficit present.      Mental Status: He is alert and oriented to person, place, and time.      Cranial Nerves: No cranial nerve deficit.      Sensory: No sensory deficit.      Motor: No weakness.   Psychiatric:         Mood and Affect: Mood normal.         Behavior: Behavior normal.          Results Review:    Lab Results (last 24 hours)     Procedure Component Value Units Date/Time    Comprehensive Metabolic Panel [[id number]]  (Abnormal) Collected: 10/09/20 0647    Specimen: Blood Updated: 10/09/20 0716     Glucose 117 mg/dL      BUN 70 mg/dL      Creatinine 13.51 mg/dL      Sodium 140 mmol/L      Potassium 4.3 mmol/L      Comment: Slight hemolysis detected by analyzer. Results may be affected.        Chloride 102 mmol/L      CO2 19.0 mmol/L      Calcium 8.2 mg/dL      Total Protein 7.3 g/dL      Albumin 3.00 g/dL      ALT (SGPT) 10 U/L      AST (SGOT) 17 U/L      Comment: Slight hemolysis detected by analyzer. Results may be affected.        Alkaline Phosphatase 59 U/L      Total Bilirubin 0.4 mg/dL      eGFR Non  Amer --     Comment: <15 Indicative of kidney failure.        eGFR  African Amer 5 mL/min/1.73      Comment: <15 Indicative of kidney failure.        Globulin 4.3 gm/dL      A/G Ratio 0.7 g/dL      BUN/Creatinine Ratio 5.2     Anion Gap 19.0 mmol/L     Narrative:      GFR Normal >60  Chronic Kidney Disease <60  Kidney Failure <15      Troponin [[id number]]  (Abnormal) Collected: 10/09/20 0647    Specimen: Blood Updated: 10/09/20 0714      Troponin T 2.020 ng/mL     Narrative:      Troponin T Reference Range:  <= 0.03 ng/mL-   Negative for AMI  >0.03 ng/mL-     Abnormal for myocardial necrosis.  Clinicians would have to utilize clinical acumen, EKG, Troponin and serial changes to determine if it is an Acute Myocardial Infarction or myocardial injury due to an underlying chronic condition.       Results may be falsely decreased if patient taking Biotin.      Magnesium [[id number]]  (Normal) Collected: 10/09/20 0647    Specimen: Blood Updated: 10/09/20 0712     Magnesium 2.1 mg/dL     Phosphorus [[id number]]  (Abnormal) Collected: 10/09/20 0647    Specimen: Blood Updated: 10/09/20 0712     Phosphorus 9.5 mg/dL     CBC (No Diff) [[id number]]  (Abnormal) Collected: 10/09/20 0647    Specimen: Blood Updated: 10/09/20 0650     WBC 8.71 10*3/mm3      RBC 3.78 10*6/mm3      Hemoglobin 9.2 g/dL      Hematocrit 29.5 %      MCV 78.0 fL      MCH 24.3 pg      MCHC 31.2 g/dL      RDW 14.4 %      RDW-SD 39.8 fl      MPV 11.3 fL      Platelets 299 10*3/mm3     COVID PRE-OP / PRE-PROCEDURE SCREENING ORDER (NO ISOLATION) - Swab, Nasopharynx [[id number]]  (Normal) Collected: 10/08/20 2321    Specimen: Swab from Nasopharynx Updated: 10/09/20 0547    Narrative:      The following orders were created for panel order COVID PRE-OP / PRE-PROCEDURE SCREENING ORDER (NO ISOLATION) - Swab, Nasopharynx.  Procedure                               Abnormality         Status                     ---------                               -----------         ------                     COVID-19, [facility]...[[id number]]  Normal              Final result                 Please view results for these tests on the individual orders.    COVID-19, [facility], NP SWAB IN TRANSPORT MEDIA 8-10 HR TAT - Swab, Nasopharynx [[id number]]  (Normal) Collected: 10/08/20 2321    Specimen: Swab from Nasopharynx Updated: 10/09/20 0547     COVID19 Not Detected    Narrative:      Testing performed by Real  Time RT-PCR  This test has not been approved by the [facility] but is authorized under the Emergency Use Act (EUA)    Fact sheet for providers: https://www.fda.gov/media/644611/download    Fact sheet for patients: https://www.fda.gov/media/966870/download        Protime-INR [[id number]]  (Normal) Collected: 10/08/20 2054    Specimen: Blood Updated: 10/09/20 0058     Protime 14.1 Seconds      INR 1.05    Narrative:      Therapeutic range for most indications is 2.0-3.0 INR,  or 2.5-3.5 for mechanical heart valves.    aPTT [[id number]]  (Normal) Collected: 10/08/20 2054    Specimen: Blood Updated: 10/09/20 0058     PTT 37.2 seconds     Narrative:      The recommended Heparin therapeutic range is 68-97 seconds.    [facility] Draw [[id number]] Collected: 10/08/20 2054    Specimen: Blood Updated: 10/08/20 2245    Narrative:      The following orders were created for panel order [facility] Draw.  Procedure                               Abnormality         Status                     ---------                               -----------         ------                     Light Blue Top[[id number]]                                   Final result               Green Top (Gel)[[id number]]                                  Final result               Lavender Top[[id number]]                                     Final result               Gold Top - SST[[id number]]                                   Final result                 Please view results for these tests on the individual orders.    Green Top (Gel) [[id number]] Collected: 10/08/20 2138    Specimen: Blood Updated: 10/08/20 2245     Extra Tube Hold for add-ons.     Comment: Auto resulted.       BNP [[id number]]  (Abnormal) Collected: 10/08/20 2138    Specimen: Blood Updated: 10/08/20 2228     proBNP 40,839.0 pg/mL     Narrative:      Among patients with dyspnea, NT-proBNP is highly sensitive for the detection of acute congestive heart failure. In addition NT-proBNP of <300 pg/ml effectively rules  out acute congestive heart failure with 99% negative predictive value.    Results may be falsely decreased if patient taking Biotin.      Troponin [[id number]]  (Abnormal) Collected: 10/08/20 2138    Specimen: Blood Updated: 10/08/20 2223     Troponin T 1.930 ng/mL     Narrative:      Troponin T Reference Range:  <= 0.03 ng/mL-   Negative for AMI  >0.03 ng/mL-     Abnormal for myocardial necrosis.  Clinicians would have to utilize clinical acumen, EKG, Troponin and serial changes to determine if it is an Acute Myocardial Infarction or myocardial injury due to an underlying chronic condition.       Results may be falsely decreased if patient taking Biotin.      Basic Metabolic Panel [[id number]]  (Abnormal) Collected: 10/08/20 2138    Specimen: Blood Updated: 10/08/20 2219     Glucose 254 mg/dL      BUN 66 mg/dL      Creatinine 13.08 mg/dL      Sodium 140 mmol/L      Potassium 3.8 mmol/L      Chloride 99 mmol/L      CO2 24.0 mmol/L      Calcium 8.2 mg/dL      eGFR  African Amer 5 mL/min/1.73      Comment: <15 Indicative of kidney failure.        eGFR Non  Amer --     Comment: <15 Indicative of kidney failure.        BUN/Creatinine Ratio 5.0     Anion Gap 17.0 mmol/L     Narrative:      GFR Normal >60  Chronic Kidney Disease <60  Kidney Failure <15      Light Blue Top [[id number]] Collected: 10/08/20 2054    Specimen: Blood Updated: 10/08/20 2200     Extra Tube hold for add-on     Comment: Auto resulted       Lavender Top [[id number]] Collected: 10/08/20 2054    Specimen: Blood Updated: 10/08/20 2200     Extra Tube hold for add-on     Comment: Auto resulted       Gold Top - SST [[id number]] Collected: 10/08/20 2054    Specimen: Blood Updated: 10/08/20 2200     Extra Tube Hold for add-ons.     Comment: Auto resulted.       D-dimer, Quantitative [[id number]]  (Abnormal) Collected: 10/08/20 2054    Specimen: Blood Updated: 10/08/20 2131     D-Dimer, Quantitative 903 ng/mL (FEU)     Narrative:      Dimer values <500  ng/ml FEU are FDA approved as aid in diagnosis of deep venous thrombosis and pulmonary embolism.  This test should not be used in an exclusion strategy with pretest probability alone.    A recent guideline regarding diagnosis for pulmonary thromboembolism recommends an adjusted exclusion criterion of age x 10 ng/ml FEU for patients >50 years of age ([employer] Intern Med 2015; 163: 701-711).      Lactic Acid, Plasma [[id number]]  (Normal) Collected: 10/08/20 2054    Specimen: Blood Updated: 10/08/20 2118     Lactate 1.1 mmol/L     CBC & Differential [[id number]]  (Abnormal) Collected: 10/08/20 2054    Specimen: Blood Updated: 10/08/20 2103    Narrative:      The following orders were created for panel order CBC & Differential.  Procedure                               Abnormality         Status                     ---------                               -----------         ------                     CBC Auto Differential[[id number]]        Abnormal            Final result                 Please view results for these tests on the individual orders.    CBC Auto Differential [[id number]]  (Abnormal) Collected: 10/08/20 2054    Specimen: Blood Updated: 10/08/20 2103     WBC 9.95 10*3/mm3      RBC 4.13 10*6/mm3      Hemoglobin 10.2 g/dL      Hematocrit 32.6 %      MCV 78.9 fL      MCH 24.7 pg      MCHC 31.3 g/dL      RDW 14.8 %      RDW-SD 41.6 fl      MPV 11.2 fL      Platelets 319 10*3/mm3      Neutrophil % 62.7 %      Lymphocyte % 22.7 %      Monocyte % 10.8 %      Eosinophil % 1.9 %      Basophil % 0.4 %      Immature Grans % 1.5 %      Neutrophils, Absolute 6.24 10*3/mm3      Lymphocytes, Absolute 2.26 10*3/mm3      Monocytes, Absolute 1.07 10*3/mm3      Eosinophils, Absolute 0.19 10*3/mm3      Basophils, Absolute 0.04 10*3/mm3      Immature Grans, Absolute 0.15 10*3/mm3      nRBC 0.2 /100 WBC           Imaging Results (Last 24 Hours)     Procedure Component Value Units Date/Time    XR Chest 2 View [[id number]] Collected:  10/08/20 2122     Updated: 10/08/20 2140    Narrative:        CHEST X-RAY 2 view on  10/8/2020     CLINICAL INDICATION: Elevated troponin, weakness, shortness of  breath    COMPARISON: 10/29/2014    FINDINGS: The patient is status post median sternotomy and  probable CABG. Cardiomegaly is noted. Mild vascular congestion is  noted. There is mild elevation of the right hemidiaphragm. The  lungs are otherwise clear.      Impression:      Cardiomegaly with mild vascular congestion.    Electronically signed by:  Alek Conklin  10/8/2020 9:38 PM  CDT Workstation: [id number]          Assessment/Plan       Chest pain    CAD (coronary artery disease)    ESRF (end stage renal failure) (CMS/[facility])    Diabetes mellitus (CMS/[facility])    Hypertension    Precordial pain    Continue with the current therapy, scheduled for coronary angiogram given the new ECG changes.     Valentín Lewis MD  10/09/20  11:39 CDT

## 2020-10-09 NOTE — ED PROVIDER NOTES
Subjective   Sent from cardiology office      Chest Pain  Pain location:  Substernal area  Pain quality: pressure    Pain radiates to:  Does not radiate  Pain severity:  Moderate  Onset quality:  Gradual  Duration:  24 months  Timing:  Intermittent  Progression:  Waxing and waning  Chronicity:  Recurrent  Context: movement    Context: not breathing and not at rest    Relieved by:  Nitroglycerin and rest  Worsened by:  Exertion and movement  Ineffective treatments:  None tried  Associated symptoms: no abdominal pain, no cough, no diaphoresis, no dizziness, no fatigue, no fever, no headache, no nausea, no palpitations and no shortness of breath        Review of Systems   Constitutional: Negative for chills, diaphoresis, fatigue and fever.   HENT: Negative for congestion and rhinorrhea.    Respiratory: Negative for cough, shortness of breath and wheezing.    Cardiovascular: Positive for chest pain. Negative for palpitations and leg swelling.   Gastrointestinal: Negative for abdominal pain, diarrhea and nausea.   Genitourinary: Negative for dysuria and flank pain.   Skin: Negative for color change and rash.   Neurological: Negative for dizziness and headaches.   Psychiatric/Behavioral: Negative for agitation. The patient is not nervous/anxious.        Past Medical History:   Diagnosis Date   • Chronic kidney disease, stage 3    • Coronary arteriosclerosis     eCABGx3 10/2014 LM 95% LAD diff 70% D1 70% OM2 70% OM3 70% & % [place] STEMI      • End stage renal disease (CMS/HCC)    • Essential hypertension    • Heterozygous thalassemia     Thalassemia minor      • Hyperlipidemia    • Peripheral vascular disease (CMS/HCC)     WILSON RIGHT 0.97 LEFT 0.79, stent JADYN ZELAYA 1/2015      • Surgical follow-up care     Emergent CABG X 3     • Type 2 diabetes mellitus (CMS/[facility])        No Known Allergies    Past Surgical History:   Procedure Laterality Date   • AMPUTATION FINGER / THUMB Right     RIGHT middle finger   • CARDIAC  CATHETERIZATION  10/11/2014    Distal left main critical stenosis of up to 95-99% with evidence of filling defect suggestive of a plaque. LAD and OMB 1 and 2 stenosis.A 100% occludeed RCA.   • CARDIAC CATHETERIZATION N/A 2/28/2020    Procedure: Left Heart Cath 2/28/2020 @ 9;  Surgeon: Gibran Garza MD;  Location: [facility] CATH INVASIVE LOCATION;  Service: Cardiology;  Laterality: N/A;   • CORONARY ARTERY BYPASS GRAFT  10/11/2014    Emergent CABG X 3 LIM[place]->LAD SVG->OM SVG->PDA       Family History   Problem Relation Age of Onset   • Coronary artery disease Other    • Heart disease Other        Social History     Socioeconomic History   • Marital status:      Spouse name: Not on file   • Number of children: Not on file   • Years of education: Not on file   • Highest education level: Not on file   Tobacco Use   • Smoking status: Never Smoker   • Smokeless tobacco: Never Used   Substance and Sexual Activity   • Alcohol use: Not Currently     Frequency: Never   • Sexual activity: Yes     Partners: Female           Objective    Vitals:    10/08/20 2201 10/08/20 2216 10/08/20 2226 10/08/20 2231   BP: 151/76 149/79  146/77   BP Location:       Patient Position:       Pulse: 86 84 84 84   Resp:       Temp:       TempSrc:       SpO2: 98% 97% 98% 98%   Weight:       Height:           Physical Exam  Vitals signs and nursing note reviewed.   Constitutional:       General: He is not in acute distress.     Appearance: He is well-developed. He is obese. He is not ill-appearing, toxic-appearing or diaphoretic.   HENT:      Head: Normocephalic.      Right Ear: External ear normal.      Left Ear: External ear normal.   Eyes:      Conjunctiva/sclera: Conjunctivae normal.   Pulmonary:      Effort: Pulmonary effort is normal. No accessory muscle usage or respiratory distress.      Breath sounds: No decreased breath sounds or wheezing.   Chest:      Chest wall: No tenderness.   Abdominal:      General: Bowel sounds are normal.       Palpations: Abdomen is soft. Abdomen is not rigid.      Tenderness: There is no abdominal tenderness (deep palpation).   Skin:     General: Skin is warm and dry.      Capillary Refill: Capillary refill takes less than 2 seconds.   Neurological:      Mental Status: He is alert and oriented to person, place, and time. He is not disoriented.      Cranial Nerves: No cranial nerve deficit (grossly intact).   Psychiatric:         Behavior: Behavior normal.         ECG 12 Lead      Date/Time: 10/8/2020 9:04 PM  Performed by: Musa Saenz MD  Authorized by: Musa Saenz MD   Interpreted by physician  Rhythm: sinus rhythm  Rate: normal  T depression: I, II, aVF, aVL, V3, V4, V5 and V6  Clinical impression: abnormal ECG                 ED Course      Results for orders placed or performed during the hospital encounter of 10/08/20   Basic Metabolic Panel    Specimen: Blood   Result Value Ref Range    Glucose 254 (H) 65 - 99 mg/dL    BUN 66 (H) 8 - 23 mg/dL    Creatinine 13.08 (H) 0.76 - 1.27 mg/dL    Sodium 140 136 - 145 mmol/L    Potassium 3.8 3.5 - 5.2 mmol/L    Chloride 99 98 - 107 mmol/L    CO2 24.0 22.0 - 29.0 mmol/L    Calcium 8.2 (L) 8.6 - 10.5 mg/dL    eGFR  African Amer 5 (L) >60 mL/min/1.73    eGFR Non African Amer      BUN/Creatinine Ratio 5.0 (L) 7.0 - 25.0    Anion Gap 17.0 (H) 5.0 - 15.0 mmol/L   BNP    Specimen: Blood   Result Value Ref Range    proBNP 40,839.0 (H) 0.0 - 900.0 pg/mL   D-dimer, Quantitative    Specimen: Blood   Result Value Ref Range    D-Dimer, Quantitative 903 (H) 0 - 470 ng/mL (FEU)   Troponin    Specimen: Blood   Result Value Ref Range    Troponin T 1.930 (C) 0.000 - 0.030 ng/mL   Lactic Acid, Plasma    Specimen: Blood   Result Value Ref Range    Lactate 1.1 0.5 - 2.0 mmol/L   CBC Auto Differential    Specimen: Blood   Result Value Ref Range    WBC 9.95 3.40 - 10.80 10*3/mm3    RBC 4.13 (L) 4.14 - 5.80 10*6/mm3    Hemoglobin 10.2 (L) 13.0 - 17.7 g/dL    Hematocrit 32.6 (L) 37.5 -  51.0 %    MCV 78.9 (L) 79.0 - 97.0 fL    MCH 24.7 (L) 26.6 - 33.0 pg    MCHC 31.3 (L) 31.5 - 35.7 g/dL    RDW 14.8 12.3 - 15.4 %    RDW-SD 41.6 37.0 - 54.0 fl    MPV 11.2 6.0 - 12.0 fL    Platelets 319 140 - 450 10*3/mm3    Neutrophil % 62.7 42.7 - 76.0 %    Lymphocyte % 22.7 19.6 - 45.3 %    Monocyte % 10.8 5.0 - 12.0 %    Eosinophil % 1.9 0.3 - 6.2 %    Basophil % 0.4 0.0 - 1.5 %    Immature Grans % 1.5 (H) 0.0 - 0.5 %    Neutrophils, Absolute 6.24 1.70 - 7.00 10*3/mm3    Lymphocytes, Absolute 2.26 0.70 - 3.10 10*3/mm3    Monocytes, Absolute 1.07 (H) 0.10 - 0.90 10*3/mm3    Eosinophils, Absolute 0.19 0.00 - 0.40 10*3/mm3    Basophils, Absolute 0.04 0.00 - 0.20 10*3/mm3    Immature Grans, Absolute 0.15 (H) 0.00 - 0.05 10*3/mm3    nRBC 0.2 0.0 - 0.2 /100 WBC   Light Blue Top   Result Value Ref Range    Extra Tube hold for add-on    Lavender Top   Result Value Ref Range    Extra Tube hold for add-on    Gold Top - SST   Result Value Ref Range    Extra Tube Hold for add-ons.      XR Chest 2 View   Final Result   Cardiomegaly with mild vascular congestion.      Electronically signed by:  Alek Conklin  10/8/2020 9:38 PM   CDT Workstation: [id number]              [facility]  Number of Diagnoses or Management Options  Precordial pain: new and requires workup  Diagnosis management comments: Vital signs are stable, afebrile.  Labs significant for creatinine of 13 which appears to be chronic.  Troponin 1.9 and a d-dimer of 900.  BNP is 41,000.  Chest x-ray showed cardiomegaly with mild vascular congestion.  Patient received aspirin and nitro in the ER.  Spoke with the on-call hospitalist who agreed to admit for further evaluation and treatment.  No ST segment changes on EKG.  We will start him on heparin drip.       Amount and/or Complexity of Data Reviewed  Clinical lab tests: ordered and reviewed  Tests in the radiology section of CPT®: reviewed and ordered  Tests in the medicine section of CPT®: ordered and reviewed  Decide to  obtain previous medical records or to obtain history from someone other than the patient: yes  Obtain history from someone other than the patient: yes  Review and summarize past medical records: yes  Discuss the patient with other providers: yes    Patient Progress  Patient progress: stable      Final diagnoses:   Precordial pain            Musa Saenz MD  10/08/20 [id number]

## 2020-10-09 NOTE — CONSULTS
Cardiology Consultation Note.        Patient Name: Sumanth Robledo  Age/Sex: 60 y.o. male  : 1960  MRN: [id number]    Date of consultation: 10/9/2020  Consulting Physician: Gibran Garza MD  Primary care Physician: Elizabeth Burleson APRN  Requesting Physician:   Luana Zamudio MD    Reason for consultation: Chest pain positive troponin      Subjective:       Chief Complaint: Chest pain    History of Present Illness:  Sumanth Robledo is a 60 y.o. male     Body mass index is 30.25 kg/m².  With a past medical history significant for atherosclerotic coronary artery disease with coronary artery bypass grafting done in  for distal left main 95 to 99% stenosis with a LIMA to the LAD and a saphenous vein graft to the obtuse marginal branch done in 2014, last coronary angiogram done in 2020 which had revealed 100% occlusion of the left main coronary artery and 100% occlusion of the proximal right coronary artery with a patent LIMA to the LAD and a patent saphenous vein graft to the obtuse marginal branch with 100% occlusion of the second obtuse marginal branch and the proximal circumflex artery with 80% stenosis and 70% stenosis in the midportion noted on retrograde filling through the saphenous vein graft to the obtuse marginal branch with unsuccessful percutaneous intervention to the left main coronary artery in 2020 with subsequent medical management, evaluation at [facility] in 2020 with further recommendation for medical management with no percutaneous intervention done, history of arterial hypertension, hypertensive heart disease, thalassemia minor, hyperlipidemia, end-stage renal disease on peritoneal dialysis.  Patient has complained of having symptoms of substernal chest pain on and off.  Patient has been on maximum medical therapy with Ranexa Nitropatch beta-blocker and isosorbide.  Patient has complained of having  symptoms of chest pain and shortness of breath with minimal activity.  Patient has been using nitroglycerin with partial resolution of the discomfort.    Due to the patient persistent discomfort patient was evaluated.  Patient resting electrocardiogram done revealed diffuse ST segment depression.  Patient on questioning has had episodes of shortness of breath and chest discomfort.    Patient on further questioning denies any symptoms of palpitation.  Patient denies any symptoms of lightheaded dizziness or near syncope.  Patient denies any nausea vomiting    Patient has continued to have increasing episodes of chest pain.  Review of the record indicate patient last coronary angiogram was done in May 2020.  Patient had a patent LIMA and a patent saphenous vein graft to the obtuse marginal branch with 100% occlusion of the left main and 100% occlusion of the native right coronary artery.  Patient was not a candidate for consideration for a redo coronary artery bypass grafting in February 2020.    Due to the patient persistent chest pain and EKG changes patient underwent troponin evaluation.  Patient troponin was elevated to 2.  Due to the patient elevated troponin with electrocardiographic changes with on and off symptoms of chest pain patient was recommended to present to the emergency room.    Patient's repeat troponin in the emergency room was still 1.9.  Patient was subsequently hospitalized.  Patient has been on peritoneal dialysis.      Patient 10 point review of system except for what stated in the history of present illness and negative      Concurrent  Medical History:  1.  Chest pain elevated troponin suggestive of a non-Q myocardial infarction.  2.  Atherosclerotic coronary artery disease.  3.  Last coronary angiogram done in February 2020 which had revealed:  a.  100% occlusion of the left main.  b.  100% occlusion of the proximal right coronary artery.  c.  Patent saphenous vein graft to the obtuse  marginal branch #1.  d.  Patent LIMA to the LAD.  e.  100% occlusion of the second obtuse marginal branch and proximal circumflex artery with 80% stenosis in the mid circumflex artery with 70% stenosis noted on retrograde filling through the saphenous vein graft to the obtuse marginal branch.  f.  Anteroapical wall hypokinesis with an ejection fraction of 50%  4.  Unsuccessful PTCA of the left main .  5.  Coronary artery bypass grafting done in October 2014 with:  A.  LIMA to the LAD.  B.  Saphenous vein graft to the obtuse marginal branch.  C.  Saphenous vein graft to the posterior descending artery.  6.  Arterial hypertension.  7.  Hypertensive heart disease.  8.  Nonrheumatic mild mitral and nonrheumatic mild tricuspid regurgitation.  9.  Hyperlipidemia.  10.  Heterozygous thalassemia minor.  11.  End-stage renal disease on peritoneal dialysis.  12.  Diabetes.  13.  Peripheral vascular disease.      Past Surgical History:  1.  Coronary angiogram done in October 2014 and February 2020.  2.  Coronary artery bypass grafting with three-vessel bypass grafting with LIMA to the LAD and a saphenous vein graft to the right coronary artery and a saphenous vein graft to the obtuse marginal branch.  3.  Amputation of the finger and arm the right hand.        Family History: Family history significant for atherosclerotic coronary artery disease        Social History: No history of tobacco alcohol intake.        Cardiac Risk factor:   1.    Male.  2.    Family history of coronary artery disease.  3.    Hypertension.  4.    Hyperlipidemia.  5.    Diabetes.  6.    Mild obesity.        Allergies:  No Known Allergies    Medication:  Medications Prior to Admission   Medication Sig Dispense Refill Last Dose   • amLODIPine (NORVASC) 10 MG tablet Take 10 mg by mouth 3 (Three) Times a Day.   10/9/2020 at Unknown time   • aspirin 81 MG tablet Take 1 tablet by mouth Daily. 30 tablet 0 10/9/2020 at Unknown time   • calcitriol  (ROCALTROL) 0.5 MCG capsule Take 0.5 mcg by mouth 2 (Two) Times a Day.   10/9/2020 at Unknown time   • cinacalcet (SENSIPAR) 30 MG tablet Take 30 mg by mouth Daily.   10/9/2020 at Unknown time   • clopidogrel (PLAVIX) 75 MG tablet Take 75 mg by mouth Daily.   10/9/2020 at Unknown time   • Dulaglutide (TRULICITY) 1.5 MG/0.5ML solution pen-injector Inject 10 mg under the skin into the appropriate area as directed 1 (One) Time Per Week.   10/9/2020 at Unknown time   • Evolocumab (REPATHA SC) Inject 140 mg under the skin into the appropriate area as directed Every 14 (Fourteen) Days.   10/9/2020 at Unknown time   • gabapentin (NEURONTIN) 300 MG capsule Take 500 mg by mouth 2 (Two) Times a Day.      • gentamicin (GARAMYCIN) 0.1 % cream Apply 1 application topically to the appropriate area as directed 3 (Three) Times a Day.   10/9/2020 at Unknown time   • hydrALAZINE (APRESOLINE) 25 MG tablet Take 25 mg by mouth 3 (Three) Times a Day.   10/8/2020 at Unknown time   • Insulin Glargine, 2 Unit Dial, (TOUJEO MAX SOLOSTAR) 300 UNIT/ML solution pen-injector injection Inject 28 Units under the skin into the appropriate area as directed Every Night.   10/8/2020 at Unknown time   • isosorbide mononitrate (IMDUR) 30 MG 24 hr tablet Take 30 mg by mouth Daily.   10/9/2020 at Unknown time   • losartan (COZAAR) 100 MG tablet Take 100 mg by mouth Daily.   10/9/2020 at Unknown time   • Metoprolol Succinate 100 MG capsule extended-release 24 hour sprinkle Take 200 mg by mouth Daily.   10/9/2020 at Unknown time   • nitroglycerin (NITROSTAT) 0.4 MG SL tablet 1 under the tongue as needed for angina, may repeat q5mins for up three doses 100 tablet 11 10/9/2020 at Unknown time   • pravastatin (PRAVACHOL) 20 MG tablet Take 20 mg by mouth Daily.   10/9/2020 at Unknown time   • ranolazine (RANEXA) 500 MG 12 hr tablet Take 1 tablet by mouth 2 (Two) Times a Day. (Patient taking differently: Take 1,000 mg by mouth 2 (Two) Times a Day.) 60 tablet 0  10/9/2020 at Unknown time   • albuterol sulfate  (90 Base) MCG/ACT inhaler Inhale 2 puffs Every 4 (Four) Hours As Needed for Wheezing.   Unknown at Unknown time           Review of Systems:       Constitutional:  Denies recent weight loss, weight gain, fever or chills, no change in exercise tolerance.     HENT:  Denies any hearing loss, epistaxis, hoarseness, or difficulty speaking.     Eyes: Wears eyeglasses or contact lenses     Respiratory:  Denies dyspnea with exertion,no cough, wheezing, or hemoptysis.     Cardiovascular: Positive for chest pain.  Negative for palpitations,  orthopnea, PND, peripheral edema, syncope, or claudication.     Gastrointestinal:  Denies change in bowel habits, dyspepsia, ulcer disease, hematochezia, or melena.  No nausea, no vomiting, no hematemesis, no diarrhea or constipation.    Endocrine: Negative for cold intolerance, heat intolerance, polydipsia, polyphagia or polyuria. Denies any history of weight change or unintended weight loss.    Genitourinary: Negative for hematuria.  No frequent urination or nocturia.      Musculoskeletal: Denies any history of arthritic symptoms or back problems .  No joint pain, joint stiffness, joint swelling, muscle pain, muscle weakness or neck pain.    Skin:  Denies any change in hair or nails, rashes, or skin lesions.     Allergic/Immunologic: Negative.  Negative for environmental allergies, food allergies or immunocompromised state.     Neurological:  Denies any history of recurrent headaches, strokes, TIA, or seizure disorder.     Hematological: Denies excessive bleeding, easy bruising, fatigue, lymphadenopathy or petechiae or any bleeding disorders.     Psychiatric/Behavioral: Denies any history of depression, substance abuse, or change in cognitive function. Denies any psychomotor reaction or tangential thought.  No depression, homicidal ideations or suicidal ideations.          Objective:     Objective:  Temp:  [97.3 °F (36.3 °C)] 97.3  °F (36.3 °C)  Heart Rate:  [73-88] 80  Resp:  [16-18] 16  BP: (139-177)/(76-94) 151/88      Body mass index is 30.25 kg/m².           Physical Exam:   General Appearance:    Alert, oriented, cooperative, in no acute distress.   Head:    Normocephalic, atraumatic, without obvious abnormality.   Eyes:           ZULEMA.  Lids and lashes normal, conjunctivae and sclerae normal, no icterus, no pallor.   Ears:    Ears appear intact with no abnormalities noted.   Throat:   Mucous membranes pink and moist.   Neck:   Supple, trachea midline, no carotid bruit, no organomegaly or JVD.   Lungs:     Clear to auscultation and percussion, respirations regular, even and unlabored. No wheezes, rales or rhonchi.    Heart:    Regular rhythm and normal rate, normal S1 and S2, no            murmur, no gallop, no rub, no click.   Abdomen:     Soft, non-tender, non-distended, no guarding, no rebound tenderness, normal bowel sounds in all four quadrants, no masses, liver and spleen nonpalpable.   Genitalia:    Deferred.   Extremities:   Moves all extremities well, no edema, no cyanosis, no              redness, no clubbing.   Pulses:   Pulses palpable and equal bilaterally.   Skin:   Moist and warm. No bleeding, bruising or rash.   Neurologic/Psychiatric:   Alert and oriented to person, place, and time.  Motor, power and tone in upper and lower extremities are grossly intact. No focal neurological deficits. Normal cognitive function. No psychomotor reaction or tangential thought. No depression, homicidal ideations and suicidal ideations.       Medication Review:   Current Facility-Administered Medications   Medication Dose Route Frequency Provider Last Rate Last Dose   • acetaminophen (TYLENOL) tablet 650 mg  650 mg Oral Q4H PRN Luana Zamudio MD        Or   • acetaminophen (TYLENOL) suppository 650 mg  650 mg Rectal Q4H PRN Luana Zamudio MD       • amLODIPine (NORVASC) tablet 10 mg  10 mg  Oral TID Luana Zamudio MD       • bisacodyl (DULCOLAX) EC tablet 5 mg  5 mg Oral Daily PRN Luana Zamudio MD       • clopidogrel (PLAVIX) tablet 75 mg  75 mg Oral Daily Luana Zamudio MD       • gabapentin (NEURONTIN) capsule 400 mg  400 mg Oral BID Luana Zamudio MD       • gentamicin (GARAMYCIN) 0.1 % ointment   Topical Q8H Luana Zamudio MD       • hydrALAZINE (APRESOLINE) tablet 25 mg  25 mg Oral TID Luana Zamudio MD       • influenza vac split quad (FLUZONE,FLUARIX,AFLURIA,FLULAVAL) injection 0.5 mL  0.5 mL Intramuscular During Hospitalization Luana Zamudio MD       • insulin detemir (LEVEMIR) injection 20 Units  20 Units Subcutaneous Nightly Luana Zamudio MD   20 Units at 10/09/20 0315   • isosorbide mononitrate (IMDUR) 24 hr tablet 30 mg  30 mg Oral Daily Luana Zamudio MD       • losartan (COZAAR) tablet 100 mg  100 mg Oral Daily Luana Zamudio MD       • metoprolol succinate XL (TOPROL-XL) 24 hr tablet 100 mg  100 mg Oral Q24H Luana Zamudio MD       • nitroglycerin (NITROSTAT) SL tablet 0.4 mg  0.4 mg Sublingual Q5 Min PRN Musa Saenz MD       • ondansetron (ZOFRAN) tablet 4 mg  4 mg Oral Q6H PRN Luana Zamudio MD        Or   • ondansetron (ZOFRAN) injection 4 mg  4 mg Intravenous Q6H PRN Luana Zamudio MD       • pravastatin (PRAVACHOL) tablet 20 mg  20 mg Oral Daily Luana Zamudio MD       • ranolazine (RANEXA) 12 hr tablet 1,000 mg  1,000 mg Oral BID Luana Zamudio MD       • sodium chloride 0.9 % flush 10 mL  10 mL Intravenous Q12H Luana Zamudio MD       • sodium chloride 0.9 % flush 10 mL  10 mL Intravenous PRN  Luana Zamudio MD           Lab Review:     Results from last 7 days   Lab Units 10/09/20  0647   SODIUM mmol/L 140   POTASSIUM mmol/L 4.3   CHLORIDE mmol/L 102   CO2 mmol/L 19.0*   BUN mg/dL 70*   CREATININE mg/dL 13.51*   CALCIUM mg/dL 8.2*   BILIRUBIN mg/dL 0.4   ALK PHOS U/L 59   ALT (SGPT) U/L 10   AST (SGOT) U/L 17   GLUCOSE mg/dL 117*     Results from last 7 days   Lab Units 10/09/20  0647 10/08/20  2138 10/08/20  1704   CK TOTAL U/L  --   --  289*   TROPONIN T ng/mL 2.020* 1.930* 2.050*         Results from last 7 days   Lab Units 10/09/20  0647   WBC 10*3/mm3 8.71   HEMOGLOBIN g/dL 9.2*   HEMATOCRIT % 29.5*   PLATELETS 10*3/mm3 299     Results from last 7 days   Lab Units 10/08/20  2054   INR  1.05   APTT seconds 37.2     Results from last 7 days   Lab Units 10/09/20  0647   MAGNESIUM mg/dL 2.1                   EKG:   ECG/EMG Results (last 24 hours)     Procedure Component Value Units Date/Time    ECG 12 Lead [[id number]]  (Abnormal) Collected: 10/08/20 2044     Updated: 10/08/20 2243          ECHO:        Imaging:  Imaging Results (Last 24 Hours)     Procedure Component Value Units Date/Time    XR Chest 2 View [[id number]] Collected: 10/08/20 2122     Updated: 10/08/20 2140    Narrative:        CHEST X-RAY 2 view on  10/8/2020     CLINICAL INDICATION: Elevated troponin, weakness, shortness of  breath    COMPARISON: 10/29/2014    FINDINGS: The patient is status post median sternotomy and  probable CABG. Cardiomegaly is noted. Mild vascular congestion is  noted. There is mild elevation of the right hemidiaphragm. The  lungs are otherwise clear.      Impression:      Cardiomegaly with mild vascular congestion.    Electronically signed by:  Alek Conklin  10/8/2020 9:38 PM  CDT Workstation: [id number]          I personally viewed and interpreted the patient's EKG/Telemetry data.    Assessment:   1.  Chest pain positive troponin.  2.  Atherosclerotic coronary artery disease status post  coronary artery bypass grafting.  3.  End-stage renal disease on hemodialysis.          Plan:   1.  Chest pain positive troponin positive EKG.  Patient last coronary angiogram was done in February 2020.  Patient had a patent saphenous vein graft to the obtuse marginal branch and a patent LIMA to the LAD.  Patient now has symptoms of chest pain with positive troponin would be concerned about the saphenous vein graft to the obtuse marginal branch with a lateral ST-T wave changes.  Patient at the present time has been recommended hospitalization.  Patient has been recommended a coronary angiogram.  Risk-benefit treatment options of the coronary angiogram were discussed with the patient and an informed consent was obtained.  Patient Mallampati score #2 patient ASA classification 1 #2.  Risk for minimal sedation was also discussed with the patient.  Plan was to proceed with a coronary angiogram.  Patient would need dialysis and would be evaluated by nephrology.  Patient has been on maximum medical therapy with Ranexa Nitropatch and isosorbide.    2.  Arterial hypertension.  Patient blood pressure has been labile but well controlled.  Patient will be continued on the home dose of the antihypertensive medication.    3.  Hypertensive heart disease.  Clinically at the present time patient is euvolemic.  Patient has been counseled to decrease her salt intake and to continue with the present dose of the diuretic.    4.  Hyperlipidemia.  Patient has been counseled on low-fat low-cholesterol diet.  Patient would be continued on the present dose of the pravastatin.    5.  Diabetes.  Patient has been counseled on American diabetic Association diet.    6.  End-stage renal disease on peritoneal dialysis.  Patient would be evaluated by nephrology.    7.  Anemia.  Patient has not had any hemoptysis hematuria bright of blood per rectum.  Patient is on dual antiplatelets.    Thank you for the consultation      Above plan of management  were discussed with the patient      Time: Time spent in face-to-face evaluation of greater than 55 minutes interacting, formulating, examining and discussing the plan with the patient with 50% of greater time spent in face-to-face interaction.    Electronically signed by Gibran Garza MD, 10/09/20, 8:15 AM CDT.    Dictated utilizing Dragon dictation.

## 2020-10-09 NOTE — CONSULTS
[facility]  [address] [place] [address]. [id number]   - [phone]  F  [phone]     Consultation         PATIENT  DEMOGRAPHICS   PATIENT NAME: Sumanth Robledo                      PHYSICIAN: Mauro Saravia MD  : 1960  MRN: [id number]    Subjective   SUBJECTIVE   Referring Provider: Dr Hammond  Reason for Consultation: esrd  History of present illness:      Mr. Robledo is a 60-year-old gentleman with a history of end-stage renal disease currently on peritoneal dialysis.  he is on hemodialysis since 2018.  Patient has a left upper extremity AV fistula also. His peritoneal dialysis regimen is 2800 mL with 5 cycles.  Last fill is 1000 cc.  His estimated dry weight is 84 kg.    Patient has a significant history of coronary artery disease with coronary artery bypass grafting in .  Patient has a recent cardiac cath in 2020 and unable to intervene because of advanced atherosclerotic disease.  He came to see Dr. Hammond with substernal chest pain and found to have elevated troponin.  Patient is therefore admitted and plan to do left heart catheterization today.  He denies any chest pain at present    Past Medical History:   Diagnosis Date   • Chronic kidney disease, stage 3    • Coronary arteriosclerosis     eCABGx3 10/2014 LM 95% LAD diff 70% D1 70% OM2 70% OM3 70% & % USA STEMI      • End stage renal disease (CMS/[facility])    • Essential hypertension    • Heterozygous thalassemia     Thalassemia minor      • Hyperlipidemia    • Peripheral vascular disease (CMS/[facility])     WILSON RIGHT 0.97 LEFT 0.79, stent RSRADHIKA, JADYN 2015      • Surgical follow-up care     Emergent CABG X 3     • Type 2 diabetes mellitus (CMS/[facility])      Past Surgical History:   Procedure Laterality Date   • AMPUTATION FINGER / THUMB Right     RIGHT middle finger   • CARDIAC CATHETERIZATION  10/11/2014    Distal left main critical stenosis of up to 95-99% with evidence of filling defect suggestive  "of a plaque. LAD and OMB 1 and 2 stenosis.A 100% occludeed RCA.   • CARDIAC CATHETERIZATION N/A 2/28/2020    Procedure: Left Heart Cath 2/28/2020 @ 9;  Surgeon: Gibran Garza MD;  Location: [facility] CATH INVASIVE LOCATION;  Service: Cardiology;  Laterality: N/A;   • CARDIAC SURGERY     • CORONARY ARTERY BYPASS GRAFT  10/11/2014    Emergent CABG X 3 LIMA->LAD SVG->OM SVG->PDA     Family History   Problem Relation Age of Onset   • Coronary artery disease Other    • Heart disease Other      Social History     Tobacco Use   • Smoking status: Never Smoker   • Smokeless tobacco: Never Used   Substance Use Topics   • Alcohol use: Not Currently     Frequency: Never   • Drug use: Not on file     Allergies:  Patient has no known allergies.     REVIEW OF SYSTEMS    Review of Systems   Constitutional: Negative for chills and fever.   Respiratory: Negative for chest tightness and shortness of breath.    Cardiovascular: Negative for chest pain and leg swelling.   Gastrointestinal: Negative for abdominal pain, diarrhea and nausea.   Genitourinary: Negative for dysuria, flank pain and hematuria.   Neurological: Negative for dizziness, syncope and weakness.       Objective   OBJECTIVE   Vital Signs  Temp:  [97.3 °F (36.3 °C)-98.4 °F (36.9 °C)] 98.4 °F (36.9 °C)  Heart Rate:  [73-88] 77  Resp:  [16-18] 16  BP: (120-177)/(68-95) 120/68    Flowsheet Rows      First Filed Value   Admission Height  170.2 cm (67\") Documented at 10/08/2020 2035   Admission Weight  81.5 kg (179 lb 9.6 oz) Documented at 10/08/2020 2046           No intake/output data recorded.    PHYSICAL EXAM    Physical Exam  Vitals signs reviewed.   Constitutional:       General: He is not in acute distress.     Appearance: He is well-developed.   Eyes:      General:         Right eye: No discharge.   Neck:      Vascular: JVD present.   Cardiovascular:      Rate and Rhythm: Normal rate and regular rhythm.      Heart sounds: Normal heart sounds, S1 normal and S2 normal. " No murmur.   Pulmonary:      Effort: Pulmonary effort is normal. No respiratory distress.      Breath sounds: No rales.   Abdominal:      General: Bowel sounds are normal. There is no distension.      Palpations: Abdomen is soft.      Tenderness: There is no abdominal tenderness.   Skin:     Coloration: Skin is not pale.      Findings: No erythema.   Neurological:      Mental Status: He is alert and oriented to person, place, and time.      Sensory: No sensory deficit.      Motor: No abnormal muscle tone.         RESULTS   Results Review:    Results from last 7 days   Lab Units 10/09/20  0647 10/08/20  2138 10/08/20  1704   SODIUM mmol/L 140 140 140   POTASSIUM mmol/L 4.3 3.8 3.8   CHLORIDE mmol/L 102 99 98   CO2 mmol/L 19.0* 24.0 25.0   BUN mg/dL 70* 66* 65*   CREATININE mg/dL 13.51* 13.08* 13.19*   CALCIUM mg/dL 8.2* 8.2* 8.6   BILIRUBIN mg/dL 0.4  --  0.3   ALK PHOS U/L 59  --  70   ALT (SGPT) U/L 10  --  11   AST (SGOT) U/L 17  --  13   GLUCOSE mg/dL 117* 254* 181*       Estimated Creatinine Clearance: 6.1 mL/min (A) (by C-G formula based on SCr of 13.51 mg/dL (H)).    Results from last 7 days   Lab Units 10/09/20  0647   MAGNESIUM mg/dL 2.1   PHOSPHORUS mg/dL 9.5*             Results from last 7 days   Lab Units 10/09/20  0647 10/08/20  2054 10/08/20  1704   WBC 10*3/mm3 8.71 9.95 11.11*   HEMOGLOBIN g/dL 9.2* 10.2* 10.1*   PLATELETS 10*3/mm3 299 319 324       Results from last 7 days   Lab Units 10/08/20  2054   INR  1.05        MEDICATIONS    calcitriol, 1 mcg, Oral, Daily  clopidogrel, 75 mg, Oral, Daily  [START ON 10/10/2020] epoetin arturo/arturo-epbx, 6,000 Units, Subcutaneous, Weekly  furosemide, 80 mg, Oral, BID  [START ON 10/10/2020] gabapentin, 100 mg, Oral, TID  gentamicin, , Topical, Q8H  hydrALAZINE, 50 mg, Oral, TID  insulin detemir, 20 Units, Subcutaneous, Nightly  isosorbide mononitrate, 30 mg, Oral, Daily  [START ON 10/10/2020] losartan, 50 mg, Oral, Daily  metoprolol succinate XL, 100 mg, Oral,  Q24H  NIFEdipine XL, 60 mg, Oral, Q24H  pravastatin, 20 mg, Oral, Daily  ranolazine, 1,000 mg, Oral, BID  sevelamer, 2,400 mg, Oral, TID With Meals  sodium chloride, 10 mL, Intravenous, Q12H      sodium chloride, 100 mL/hr      Medications Prior to Admission   Medication Sig Dispense Refill Last Dose   • aspirin 81 MG tablet Take 1 tablet by mouth Daily. 30 tablet 0 10/9/2020 at Unknown time   • calcitriol (ROCALTROL) 0.5 MCG capsule Take 0.5 mcg by mouth 2 (Two) Times a Day.   10/9/2020 at Unknown time   • cinacalcet (SENSIPAR) 30 MG tablet Take 30 mg by mouth Daily.   10/9/2020 at Unknown time   • clopidogrel (PLAVIX) 75 MG tablet Take 75 mg by mouth Daily.   10/9/2020 at Unknown time   • Dulaglutide (TRULICITY) 1.5 MG/0.5ML solution pen-injector Inject 10 mg under the skin into the appropriate area as directed 1 (One) Time Per Week.   10/9/2020 at Unknown time   • Evolocumab (REPATHA SC) Inject 140 mg under the skin into the appropriate area as directed Every 14 (Fourteen) Days.   10/9/2020 at Unknown time   • furosemide (LASIX) 80 MG tablet Take 80 mg by mouth 2 (Two) Times a Day.      • gabapentin (NEURONTIN) 100 MG capsule Take 100 mg by mouth 3 (Three) Times a Day.      • gentamicin (GARAMYCIN) 0.1 % cream Apply 1 application topically to the appropriate area as directed 3 (Three) Times a Day.   10/9/2020 at Unknown time   • hydrALAZINE (APRESOLINE) 50 MG tablet Take 50 mg by mouth 3 (Three) Times a Day.      • Insulin Glargine, 2 Unit Dial, (TOUJEO MAX SOLOSTAR) 300 UNIT/ML solution pen-injector injection Inject 28 Units under the skin into the appropriate area as directed Every Night.   10/8/2020 at Unknown time   • isosorbide mononitrate (IMDUR) 30 MG 24 hr tablet Take 30 mg by mouth Daily.   10/9/2020 at Unknown time   • losartan (COZAAR) 50 MG tablet Take 50 mg by mouth Daily.      • Metoprolol Succinate 100 MG capsule extended-release 24 hour sprinkle Take 200 mg by mouth Daily.   10/9/2020 at Unknown  time   • NIFEdipine XL (PROCARDIA XL) 60 MG 24 hr tablet Take 60 mg by mouth Daily.      • nitroglycerin (NITROSTAT) 0.4 MG SL tablet 1 under the tongue as needed for angina, may repeat q5mins for up three doses 100 tablet 11 10/9/2020 at Unknown time   • pravastatin (PRAVACHOL) 20 MG tablet Take 20 mg by mouth Daily.   10/9/2020 at Unknown time   • ranolazine (RANEXA) 500 MG 12 hr tablet Take 1 tablet by mouth 2 (Two) Times a Day. (Patient taking differently: Take 1,000 mg by mouth 2 (Two) Times a Day.) 60 tablet 0 10/9/2020 at Unknown time   • sevelamer (RENVELA) 800 MG tablet Take 800 mg by mouth 3 (Three) Times a Day With Meals. 3 tablets three times a day with meals, plus 3 tablets once daily with snack      • albuterol sulfate  (90 Base) MCG/ACT inhaler Inhale 2 puffs Every 4 (Four) Hours As Needed for Wheezing.   Unknown at Unknown time     Assessment/Plan   ASSESSMENT / PLAN      Chest pain    CAD (coronary artery disease)    ESRF (end stage renal failure) (CMS/[facility])    Hypertension    Diabetes mellitus (CMS/[facility])    Precordial pain    1.end-stage renal disease currently on peritoneal dialysis every night.  Patient is doing pd with 2800 cc 5 cycles with last fill of 1000 cc.  His estimated dry weight is 84 kg.  Patient is close to his dry weight.  We will arrange peritoneal dialysis tonight    2.anemia chronic kidney disease we will continue with Epogen 6000 units once a week    3.CKD/mineral bone disorder.  Patient will remain on calcitriol 1 mcg daily along with Renvela 2400 mg 3 times a day    4.coronary artery disease with history of coronary artery bypass grafting before no chest pain,  elevated troponin plan to undergo heart cath today.  We will plan on do an extra exchange today and start his peritoneal dialysis earlier    Thank you for the referral will continue to follow the patient during his hospital stay         I discussed the patients findings and my recommendations with patient, nursing  staff and primary care team         This document has been electronically signed by Mauro Saravia MD on October 9, 2020 12:55 CDT

## 2020-10-10 ENCOUNTER — ANESTHESIA EVENT (OUTPATIENT)
Dept: ICU | Facility: HOSPITAL | Age: 60
End: 2020-10-10

## 2020-10-10 ENCOUNTER — ANESTHESIA (OUTPATIENT)
Dept: ICU | Facility: HOSPITAL | Age: 60
End: 2020-10-10

## 2020-10-10 ENCOUNTER — APPOINTMENT (OUTPATIENT)
Dept: CARDIOLOGY | Facility: HOSPITAL | Age: 60
End: 2020-10-10

## 2020-10-10 ENCOUNTER — APPOINTMENT (OUTPATIENT)
Dept: GENERAL RADIOLOGY | Facility: HOSPITAL | Age: 60
End: 2020-10-10

## 2020-10-10 ENCOUNTER — APPOINTMENT (OUTPATIENT)
Dept: CT IMAGING | Facility: HOSPITAL | Age: 60
End: 2020-10-10

## 2020-10-10 PROBLEM — I46.9 CARDIAC ARREST (HCC): Status: ACTIVE | Noted: 2020-10-10

## 2020-10-10 PROBLEM — R55 SYNCOPE: Chronic | Status: ACTIVE | Noted: 2020-10-10

## 2020-10-10 PROBLEM — I25.10 CAD (CORONARY ARTERY DISEASE): Chronic | Status: ACTIVE | Noted: 2020-02-29

## 2020-10-10 PROBLEM — I95.9 HYPOTENSION: Chronic | Status: ACTIVE | Noted: 2020-10-10

## 2020-10-10 LAB
ALBUMIN SERPL-MCNC: 3.1 G/DL (ref 3.5–5.2)
ALBUMIN/GLOB SERPL: 0.8 G/DL
ALP SERPL-CCNC: 58 U/L (ref 39–117)
ALT SERPL W P-5'-P-CCNC: 31 U/L (ref 1–41)
ANION GAP SERPL CALCULATED.3IONS-SCNC: 18 MMOL/L (ref 5–15)
ANION GAP SERPL CALCULATED.3IONS-SCNC: 22 MMOL/L (ref 5–15)
AST SERPL-CCNC: 39 U/L (ref 1–40)
BASOPHILS # BLD AUTO: 0.03 10*3/MM3 (ref 0–0.2)
BASOPHILS # BLD AUTO: 0.04 10*3/MM3 (ref 0–0.2)
BASOPHILS NFR BLD AUTO: 0.3 % (ref 0–1.5)
BASOPHILS NFR BLD AUTO: 0.5 % (ref 0–1.5)
BH CV ECHO MEAS - ACS: 2.1 CM
BH CV ECHO MEAS - AO ISTHMUS: 2.7 CM
BH CV ECHO MEAS - AO MAX PG (FULL): 4.6 MMHG
BH CV ECHO MEAS - AO MAX PG: 8.1 MMHG
BH CV ECHO MEAS - AO MEAN PG (FULL): 2 MMHG
BH CV ECHO MEAS - AO MEAN PG: 4 MMHG
BH CV ECHO MEAS - AO ROOT AREA: 8.6 CM^2
BH CV ECHO MEAS - AO ROOT DIAM: 3.3 CM
BH CV ECHO MEAS - AO V2 MAX: 142 CM/SEC
BH CV ECHO MEAS - AO V2 MEAN: 91.9 CM/SEC
BH CV ECHO MEAS - AO V2 VTI: 26.5 CM
BH CV ECHO MEAS - ASC AORTA: 3 CM
BH CV ECHO MEAS - AVA(I,A): 2.3 CM^2
BH CV ECHO MEAS - AVA(I,D): 2.3 CM^2
BH CV ECHO MEAS - AVA(V,A): 2 CM^2
BH CV ECHO MEAS - AVA(V,D): 2 CM^2
BH CV ECHO MEAS - EDV(CUBED): 148.9 ML
BH CV ECHO MEAS - EDV(MOD-SP2): 142 ML
BH CV ECHO MEAS - EDV(MOD-SP4): 128 ML
BH CV ECHO MEAS - EDV(TEICH): 135.3 ML
BH CV ECHO MEAS - EF(CUBED): 51.3 %
BH CV ECHO MEAS - EF(MOD-SP2): 41.5 %
BH CV ECHO MEAS - EF(MOD-SP4): 46.3 %
BH CV ECHO MEAS - EF(TEICH): 42.9 %
BH CV ECHO MEAS - ESV(CUBED): 72.5 ML
BH CV ECHO MEAS - ESV(MOD-SP2): 83.1 ML
BH CV ECHO MEAS - ESV(MOD-SP4): 68.7 ML
BH CV ECHO MEAS - ESV(TEICH): 77.3 ML
BH CV ECHO MEAS - FS: 21.3 %
BH CV ECHO MEAS - IVS/LVPW: 1
BH CV ECHO MEAS - IVSD: 1.3 CM
BH CV ECHO MEAS - LA DIMENSION: 4.7 CM
BH CV ECHO MEAS - LA/AO: 1.4
BH CV ECHO MEAS - LV MASS(C)D: 274.6 GRAMS
BH CV ECHO MEAS - LV MAX PG: 3.4 MMHG
BH CV ECHO MEAS - LV MEAN PG: 2 MMHG
BH CV ECHO MEAS - LV V1 MAX: 92.5 CM/SEC
BH CV ECHO MEAS - LV V1 MEAN: 56.4 CM/SEC
BH CV ECHO MEAS - LV V1 VTI: 19.4 CM
BH CV ECHO MEAS - LVIDD: 5.3 CM
BH CV ECHO MEAS - LVIDS: 4.2 CM
BH CV ECHO MEAS - LVLD AP2: 9.1 CM
BH CV ECHO MEAS - LVLD AP4: 8.3 CM
BH CV ECHO MEAS - LVLS AP2: 7.9 CM
BH CV ECHO MEAS - LVLS AP4: 7.7 CM
BH CV ECHO MEAS - LVOT AREA (M): 3.1 CM^2
BH CV ECHO MEAS - LVOT AREA: 3.1 CM^2
BH CV ECHO MEAS - LVOT DIAM: 2 CM
BH CV ECHO MEAS - LVPWD: 1.2 CM
BH CV ECHO MEAS - MR MAX PG: 48.4 MMHG
BH CV ECHO MEAS - MR MAX VEL: 348 CM/SEC
BH CV ECHO MEAS - MV A MAX VEL: 49.7 CM/SEC
BH CV ECHO MEAS - MV DEC SLOPE: 412 CM/SEC^2
BH CV ECHO MEAS - MV E MAX VEL: 95.1 CM/SEC
BH CV ECHO MEAS - MV E/A: 1.9
BH CV ECHO MEAS - MV MAX PG: 4.2 MMHG
BH CV ECHO MEAS - MV MEAN PG: 1 MMHG
BH CV ECHO MEAS - MV P1/2T MAX VEL: 104 CM/SEC
BH CV ECHO MEAS - MV P1/2T: 73.9 MSEC
BH CV ECHO MEAS - MV V2 MAX: 102 CM/SEC
BH CV ECHO MEAS - MV V2 MEAN: 49.9 CM/SEC
BH CV ECHO MEAS - MV V2 VTI: 24.4 CM
BH CV ECHO MEAS - MVA P1/2T LCG: 2.1 CM^2
BH CV ECHO MEAS - MVA(P1/2T): 3 CM^2
BH CV ECHO MEAS - MVA(VTI): 2.5 CM^2
BH CV ECHO MEAS - PA MAX PG: 2.4 MMHG
BH CV ECHO MEAS - PA V2 MAX: 76.7 CM/SEC
BH CV ECHO MEAS - RAP SYSTOLE: 10 MMHG
BH CV ECHO MEAS - RVDD: 2.6 CM
BH CV ECHO MEAS - RVSP: 29 MMHG
BH CV ECHO MEAS - SV(AO): 226.7 ML
BH CV ECHO MEAS - SV(CUBED): 76.4 ML
BH CV ECHO MEAS - SV(LVOT): 60.9 ML
BH CV ECHO MEAS - SV(MOD-SP2): 58.9 ML
BH CV ECHO MEAS - SV(MOD-SP4): 59.3 ML
BH CV ECHO MEAS - SV(TEICH): 58.1 ML
BH CV ECHO MEAS - TR MAX VEL: 218 CM/SEC
BILIRUB SERPL-MCNC: 0.3 MG/DL (ref 0–1.2)
BUN SERPL-MCNC: 68 MG/DL (ref 8–23)
BUN SERPL-MCNC: 69 MG/DL (ref 8–23)
BUN/CREAT SERPL: 5.1 (ref 7–25)
BUN/CREAT SERPL: 5.5 (ref 7–25)
CALCIUM SPEC-SCNC: 7.7 MG/DL (ref 8.6–10.5)
CALCIUM SPEC-SCNC: 8.3 MG/DL (ref 8.6–10.5)
CHLORIDE SERPL-SCNC: 95 MMOL/L (ref 98–107)
CHLORIDE SERPL-SCNC: 96 MMOL/L (ref 98–107)
CO2 SERPL-SCNC: 19 MMOL/L (ref 22–29)
CO2 SERPL-SCNC: 23 MMOL/L (ref 22–29)
CREAT SERPL-MCNC: 12.62 MG/DL (ref 0.76–1.27)
CREAT SERPL-MCNC: 13.33 MG/DL (ref 0.76–1.27)
DEPRECATED RDW RBC AUTO: 40.4 FL (ref 37–54)
DEPRECATED RDW RBC AUTO: 43.3 FL (ref 37–54)
EOSINOPHIL # BLD AUTO: 0.17 10*3/MM3 (ref 0–0.4)
EOSINOPHIL # BLD AUTO: 0.17 10*3/MM3 (ref 0–0.4)
EOSINOPHIL NFR BLD AUTO: 1.5 % (ref 0.3–6.2)
EOSINOPHIL NFR BLD AUTO: 2 % (ref 0.3–6.2)
ERYTHROCYTE [DISTWIDTH] IN BLOOD BY AUTOMATED COUNT: 14.4 % (ref 12.3–15.4)
ERYTHROCYTE [DISTWIDTH] IN BLOOD BY AUTOMATED COUNT: 15 % (ref 12.3–15.4)
GFR SERPL CREATININE-BSD FRML MDRD: 5 ML/MIN/1.73
GFR SERPL CREATININE-BSD FRML MDRD: 5 ML/MIN/1.73
GFR SERPL CREATININE-BSD FRML MDRD: ABNORMAL ML/MIN/{1.73_M2}
GFR SERPL CREATININE-BSD FRML MDRD: ABNORMAL ML/MIN/{1.73_M2}
GLOBULIN UR ELPH-MCNC: 4 GM/DL
GLUCOSE SERPL-MCNC: 127 MG/DL (ref 65–99)
GLUCOSE SERPL-MCNC: 174 MG/DL (ref 65–99)
HCT VFR BLD AUTO: 27.9 % (ref 37.5–51)
HCT VFR BLD AUTO: 28.2 % (ref 37.5–51)
HGB BLD-MCNC: 8.6 G/DL (ref 13–17.7)
HGB BLD-MCNC: 8.8 G/DL (ref 13–17.7)
IMM GRANULOCYTES # BLD AUTO: 0.07 10*3/MM3 (ref 0–0.05)
IMM GRANULOCYTES # BLD AUTO: 0.21 10*3/MM3 (ref 0–0.05)
IMM GRANULOCYTES NFR BLD AUTO: 0.8 % (ref 0–0.5)
IMM GRANULOCYTES NFR BLD AUTO: 1.8 % (ref 0–0.5)
LYMPHOCYTES # BLD AUTO: 1.6 10*3/MM3 (ref 0.7–3.1)
LYMPHOCYTES # BLD AUTO: 4.34 10*3/MM3 (ref 0.7–3.1)
LYMPHOCYTES NFR BLD AUTO: 18.7 % (ref 19.6–45.3)
LYMPHOCYTES NFR BLD AUTO: 37.9 % (ref 19.6–45.3)
MAXIMAL PREDICTED HEART RATE: 160 BPM
MCH RBC QN AUTO: 24.4 PG (ref 26.6–33)
MCH RBC QN AUTO: 25 PG (ref 26.6–33)
MCHC RBC AUTO-ENTMCNC: 30.8 G/DL (ref 31.5–35.7)
MCHC RBC AUTO-ENTMCNC: 31.2 G/DL (ref 31.5–35.7)
MCV RBC AUTO: 78.3 FL (ref 79–97)
MCV RBC AUTO: 81.1 FL (ref 79–97)
MONOCYTES # BLD AUTO: 0.94 10*3/MM3 (ref 0.1–0.9)
MONOCYTES # BLD AUTO: 1.15 10*3/MM3 (ref 0.1–0.9)
MONOCYTES NFR BLD AUTO: 10.1 % (ref 5–12)
MONOCYTES NFR BLD AUTO: 11 % (ref 5–12)
NEUTROPHILS NFR BLD AUTO: 48.4 % (ref 42.7–76)
NEUTROPHILS NFR BLD AUTO: 5.54 10*3/MM3 (ref 1.7–7)
NEUTROPHILS NFR BLD AUTO: 5.75 10*3/MM3 (ref 1.7–7)
NEUTROPHILS NFR BLD AUTO: 67 % (ref 42.7–76)
NRBC BLD AUTO-RTO: 0 /100 WBC (ref 0–0.2)
NRBC BLD AUTO-RTO: 0 /100 WBC (ref 0–0.2)
PLATELET # BLD AUTO: 273 10*3/MM3 (ref 140–450)
PLATELET # BLD AUTO: 303 10*3/MM3 (ref 140–450)
PMV BLD AUTO: 11.3 FL (ref 6–12)
PMV BLD AUTO: 11.6 FL (ref 6–12)
POTASSIUM SERPL-SCNC: 3.8 MMOL/L (ref 3.5–5.2)
POTASSIUM SERPL-SCNC: 4.1 MMOL/L (ref 3.5–5.2)
PROT SERPL-MCNC: 7.1 G/DL (ref 6–8.5)
RBC # BLD AUTO: 3.44 10*6/MM3 (ref 4.14–5.8)
RBC # BLD AUTO: 3.6 10*6/MM3 (ref 4.14–5.8)
SODIUM SERPL-SCNC: 136 MMOL/L (ref 136–145)
SODIUM SERPL-SCNC: 137 MMOL/L (ref 136–145)
STRESS TARGET HR: 136 BPM
TROPONIN T SERPL-MCNC: 1.94 NG/ML (ref 0–0.03)
WBC # BLD AUTO: 11.44 10*3/MM3 (ref 3.4–10.8)
WBC # BLD AUTO: 8.57 10*3/MM3 (ref 3.4–10.8)

## 2020-10-10 PROCEDURE — 05HM33Z INSERTION OF INFUSION DEVICE INTO RIGHT INTERNAL JUGULAR VEIN, PERCUTANEOUS APPROACH: ICD-10-PCS | Performed by: HOSPITALIST

## 2020-10-10 PROCEDURE — 84484 ASSAY OF TROPONIN QUANT: CPT | Performed by: INTERNAL MEDICINE

## 2020-10-10 PROCEDURE — 94799 UNLISTED PULMONARY SVC/PX: CPT

## 2020-10-10 PROCEDURE — 93306 TTE W/DOPPLER COMPLETE: CPT | Performed by: INTERNAL MEDICINE

## 2020-10-10 PROCEDURE — 99232 SBSQ HOSP IP/OBS MODERATE 35: CPT | Performed by: INTERNAL MEDICINE

## 2020-10-10 PROCEDURE — C1751 CATH, INF, PER/CENT/MIDLINE: HCPCS | Performed by: ANESTHESIOLOGY

## 2020-10-10 PROCEDURE — 63710000001 INSULIN DETEMIR PER 5 UNITS: Performed by: INTERNAL MEDICINE

## 2020-10-10 PROCEDURE — 25010000002 EPOETIN ALFA PER 1000 UNITS: Performed by: INTERNAL MEDICINE

## 2020-10-10 PROCEDURE — 92950 HEART/LUNG RESUSCITATION CPR: CPT

## 2020-10-10 PROCEDURE — 93306 TTE W/DOPPLER COMPLETE: CPT

## 2020-10-10 PROCEDURE — 80048 BASIC METABOLIC PNL TOTAL CA: CPT | Performed by: INTERNAL MEDICINE

## 2020-10-10 PROCEDURE — 85025 COMPLETE CBC W/AUTO DIFF WBC: CPT | Performed by: INTERNAL MEDICINE

## 2020-10-10 PROCEDURE — 93005 ELECTROCARDIOGRAM TRACING: CPT | Performed by: INTERNAL MEDICINE

## 2020-10-10 PROCEDURE — 80053 COMPREHEN METABOLIC PANEL: CPT | Performed by: INTERNAL MEDICINE

## 2020-10-10 PROCEDURE — 25010000002 EPINEPHRINE 1 MG/10ML SOLUTION PREFILLED SYRINGE: Performed by: INTERNAL MEDICINE

## 2020-10-10 PROCEDURE — 25010000002 LORAZEPAM PER 2 MG: Performed by: INTERNAL MEDICINE

## 2020-10-10 PROCEDURE — 70450 CT HEAD/BRAIN W/O DYE: CPT

## 2020-10-10 RX ORDER — LORAZEPAM 2 MG/ML
INJECTION INTRAMUSCULAR
Status: DISPENSED
Start: 2020-10-10 | End: 2020-10-11

## 2020-10-10 RX ORDER — EPINEPHRINE 0.1 MG/ML
SYRINGE (ML) INJECTION
Status: COMPLETED | OUTPATIENT
Start: 2020-10-10 | End: 2020-10-10

## 2020-10-10 RX ORDER — SODIUM CHLORIDE 9 MG/ML
60 INJECTION, SOLUTION INTRAVENOUS CONTINUOUS
Status: DISCONTINUED | OUTPATIENT
Start: 2020-10-10 | End: 2020-10-11

## 2020-10-10 RX ORDER — NOREPINEPHRINE BIT/0.9 % NACL 8 MG/250ML
.02-.3 INFUSION BOTTLE (ML) INTRAVENOUS
Status: DISCONTINUED | OUTPATIENT
Start: 2020-10-10 | End: 2020-10-11

## 2020-10-10 RX ORDER — LORAZEPAM 2 MG/ML
1 INJECTION INTRAMUSCULAR ONCE
Status: COMPLETED | OUTPATIENT
Start: 2020-10-10 | End: 2020-10-10

## 2020-10-10 RX ADMIN — GABAPENTIN 100 MG: 100 CAPSULE ORAL at 08:32

## 2020-10-10 RX ADMIN — EPINEPHRINE 1 MG: 0.1 INJECTION INTRACARDIAC; INTRAVENOUS at 12:48

## 2020-10-10 RX ADMIN — CALCITRIOL 1 MCG: 0.25 CAPSULE ORAL at 08:33

## 2020-10-10 RX ADMIN — NIFEDIPINE 60 MG: 60 TABLET, EXTENDED RELEASE ORAL at 08:33

## 2020-10-10 RX ADMIN — INSULIN DETEMIR 20 UNITS: 100 INJECTION, SOLUTION SUBCUTANEOUS at 21:38

## 2020-10-10 RX ADMIN — SEVELAMER CARBONATE 2400 MG: 800 TABLET, FILM COATED ORAL at 11:12

## 2020-10-10 RX ADMIN — HYDRALAZINE HYDROCHLORIDE 50 MG: 50 TABLET, FILM COATED ORAL at 08:32

## 2020-10-10 RX ADMIN — PRAVASTATIN SODIUM 20 MG: 20 TABLET ORAL at 08:32

## 2020-10-10 RX ADMIN — EPINEPHRINE 1 MG: 0.1 INJECTION INTRACARDIAC; INTRAVENOUS at 12:42

## 2020-10-10 RX ADMIN — SODIUM CHLORIDE 125 ML/HR: 9 INJECTION, SOLUTION INTRAVENOUS at 15:34

## 2020-10-10 RX ADMIN — FUROSEMIDE 80 MG: 40 TABLET ORAL at 08:32

## 2020-10-10 RX ADMIN — LORAZEPAM 1 MG: 2 INJECTION INTRAMUSCULAR; INTRAVENOUS at 13:52

## 2020-10-10 RX ADMIN — SODIUM CHLORIDE, PRESERVATIVE FREE 10 ML: 5 INJECTION INTRAVENOUS at 08:34

## 2020-10-10 RX ADMIN — METOPROLOL SUCCINATE 100 MG: 50 TABLET, EXTENDED RELEASE ORAL at 08:32

## 2020-10-10 RX ADMIN — RANOLAZINE 1000 MG: 500 TABLET, FILM COATED, EXTENDED RELEASE ORAL at 08:32

## 2020-10-10 RX ADMIN — SODIUM CHLORIDE 1000 ML: 9 INJECTION, SOLUTION INTRAVENOUS at 14:23

## 2020-10-10 RX ADMIN — RANOLAZINE 1000 MG: 500 TABLET, FILM COATED, EXTENDED RELEASE ORAL at 21:37

## 2020-10-10 RX ADMIN — SODIUM CHLORIDE, PRESERVATIVE FREE 10 ML: 5 INJECTION INTRAVENOUS at 21:44

## 2020-10-10 RX ADMIN — CLOPIDOGREL BISULFATE 75 MG: 75 TABLET ORAL at 08:33

## 2020-10-10 RX ADMIN — SEVELAMER CARBONATE 2400 MG: 800 TABLET, FILM COATED ORAL at 08:33

## 2020-10-10 RX ADMIN — EPOETIN ALFA 6000 UNITS: 10000 SOLUTION INTRAVENOUS; SUBCUTANEOUS at 11:12

## 2020-10-10 RX ADMIN — LOSARTAN POTASSIUM 50 MG: 50 TABLET, FILM COATED ORAL at 08:32

## 2020-10-10 RX ADMIN — ISOSORBIDE MONONITRATE 30 MG: 30 TABLET, EXTENDED RELEASE ORAL at 08:33

## 2020-10-10 NOTE — NURSING NOTE
Dr Miranda called rapid response. Started compressions. Witnessed by Luís ORTEGA as he entered room.

## 2020-10-10 NOTE — PLAN OF CARE
Problem: Adult Inpatient Plan of Care  Goal: Plan of Care Review  Outcome: Ongoing, Progressing  Flowsheets (Taken 10/10/2020 0139)  Progress: improving  Plan of Care Reviewed With: patient     Problem: Adult Inpatient Plan of Care  Goal: Patient-Specific Goal (Individualized)  Outcome: Ongoing, Progressing     Problem: Adult Inpatient Plan of Care  Goal: Absence of Hospital-Acquired Illness or Injury  Outcome: Ongoing, Progressing     Problem: Adult Inpatient Plan of Care  Goal: Absence of Hospital-Acquired Illness or Injury  Intervention: Identify and Manage Fall Risk  Flowsheets  Taken 10/10/2020 0000  Safety Promotion/Fall Prevention:   assistive device/personal items within reach   clutter free environment maintained   nonskid shoes/slippers when out of bed   safety round/check completed  Taken 10/9/2020 2200  Safety Promotion/Fall Prevention:   safety round/check completed   nonskid shoes/slippers when out of bed   clutter free environment maintained   assistive device/personal items within reach  Taken 10/9/2020 2100  Safety Promotion/Fall Prevention:   assistive device/personal items within reach   clutter free environment maintained   nonskid shoes/slippers when out of bed   safety round/check completed  Taken 10/9/2020 2000  Safety Promotion/Fall Prevention:   safety round/check completed   nonskid shoes/slippers when out of bed   clutter free environment maintained   assistive device/personal items within reach  Taken 10/9/2020 1900  Safety Promotion/Fall Prevention:   nonskid shoes/slippers when out of bed   safety round/check completed     Problem: Adult Inpatient Plan of Care  Goal: Absence of Hospital-Acquired Illness or Injury  Intervention: Prevent Skin Injury  Flowsheets  Taken 10/10/2020 0000  Body Position: position changed independently  Taken 10/9/2020 2200  Body Position: position changed independently  Taken 10/9/2020 2100  Body Position: position changed independently  Taken 10/9/2020  2000  Body Position: position changed independently  Taken 10/9/2020 1900  Body Position: position changed independently     Problem: Adult Inpatient Plan of Care  Goal: Absence of Hospital-Acquired Illness or Injury  Intervention: Prevent and Manage VTE (venous thromboembolism) Risk  Flowsheets (Taken 10/9/2020 2000)  VTE Prevention/Management: bleeding risk factor(s) identified     Problem: Adult Inpatient Plan of Care  Goal: Absence of Hospital-Acquired Illness or Injury  Intervention: Prevent Infection  Flowsheets  Taken 10/10/2020 0000  Infection Prevention: rest/sleep promoted  Taken 10/9/2020 2200  Infection Prevention:   rest/sleep promoted   single patient room provided  Taken 10/9/2020 2100  Infection Prevention:   single patient room provided   rest/sleep promoted  Taken 10/9/2020 2000  Infection Prevention:   rest/sleep promoted   single patient room provided  Taken 10/9/2020 1900  Infection Prevention:   single patient room provided   rest/sleep promoted     Problem: Adult Inpatient Plan of Care  Goal: Optimal Comfort and Wellbeing  Outcome: Ongoing, Progressing     Problem: Adult Inpatient Plan of Care  Goal: Optimal Comfort and Wellbeing  Intervention: Provide Person-Centered Care  Flowsheets (Taken 10/9/2020 2000)  Trust Relationship/Rapport:   care explained   questions encouraged     Problem: Adult Inpatient Plan of Care  Goal: Readiness for Transition of Care  Outcome: Ongoing, Progressing     Problem: Adult Inpatient Plan of Care  Goal: Readiness for Transition of Care  Intervention: Mutually Develop Transition Plan  Flowsheets  Taken 10/9/2020 0040 by Rhea Ca RN  Transportation Anticipated: family or friend will provide  Patient/Family Anticipated Services at Transition: none  Patient/Family Anticipates Transition to: home with family  Taken 10/9/2020 0038 by Rhea Ca RN  Equipment Currently Used at Home: none     Problem: Adjustment to Illness (Acute Coronary Syndrome)  Goal:  Optimal Adaptation to Illness  Outcome: Ongoing, Progressing     Problem: Adjustment to Illness (Acute Coronary Syndrome)  Goal: Optimal Adaptation to Illness  Intervention: Support Adjustment to Life-Changing Event  Flowsheets (Taken 10/9/2020 2000)  Supportive Measures: active listening utilized     Problem: Arrhythmia/Dysrhythmia (Acute Coronary Syndrome)  Goal: Normalized Cardiac Rhythm  Outcome: Ongoing, Progressing     Problem: Arrhythmia/Dysrhythmia (Acute Coronary Syndrome)  Goal: Normalized Cardiac Rhythm  Intervention: Monitor and Manage Cardiac Rhythm Effects  Flowsheets (Taken 10/9/2020 2000)  VTE Prevention/Management: bleeding risk factor(s) identified     Problem: Cardiac-Related Pain (Acute Coronary Syndrome)  Goal: Absence of Cardiac-Related Pain  Outcome: Ongoing, Progressing     Problem: Hemodynamic Instability (Acute Coronary Syndrome)  Goal: Effective Cardiac Pump Function  Outcome: Ongoing, Progressing     Problem: Tissue Perfusion (Acute Coronary Syndrome)  Goal: Adequate Tissue Perfusion  Outcome: Ongoing, Progressing     Problem: Tissue Perfusion (Acute Coronary Syndrome)  Goal: Adequate Tissue Perfusion  Intervention: Optimize Cardiac Tissue Perfusion  Flowsheets  Taken 10/10/2020 0000  Activity Management: up ad naun  Taken 10/9/2020 2200  Activity Management: up ad naun  Taken 10/9/2020 2100  Activity Management: up ad naun  Taken 10/9/2020 2000  Activity Management: up ad naun  Environmental Support: calm environment promoted  Taken 10/9/2020 1900  Activity Management: up ad naun     Problem: Glycemic Control Impaired  Goal: Blood Glucose Level Within Desired Range  Outcome: Ongoing, Progressing     Problem: Glycemic Control Impaired  Goal: Blood Glucose Level Within Desired Range  Intervention: Optimize Glycemic Control  Flowsheets (Taken 10/9/2020 2000)  Glycemic Management: blood glucose monitoring     Problem: Diabetes Comorbidity  Goal: Blood Glucose Level Within Desired Range  Outcome:  Ongoing, Progressing     Problem: Diabetes Comorbidity  Goal: Blood Glucose Level Within Desired Range  Intervention: Maintain Glycemic Control  Flowsheets (Taken 10/9/2020 2000)  Glycemic Management: blood glucose monitoring     Problem: Hypertension Comorbidity  Goal: Blood Pressure in Desired Range  Outcome: Ongoing, Progressing     Problem: Hypertension Comorbidity  Goal: Blood Pressure in Desired Range  Intervention: Maintain Hypertension-Management Strategies  Flowsheets (Taken 10/9/2020 2000)  Medication Review/Management: medications reviewed     Problem: Arrhythmia/Dysrhythmia (Cardiac Catheterization)  Goal: Stable Heart Rate and Rhythm  Outcome: Ongoing, Progressing     Problem: Bleeding (Cardiac Catheterization)  Goal: Absence of Bleeding  Outcome: Ongoing, Progressing     Problem: Contrast-Induced Injury Risk (Cardiac Catheterization)  Goal: Absence of Contrast-Induced Injury  Outcome: Ongoing, Progressing     Problem: Embolism (Cardiac Catheterization)  Goal: Absence of Embolism Signs and Symptoms  Outcome: Ongoing, Progressing     Problem: Embolism (Cardiac Catheterization)  Goal: Absence of Embolism Signs and Symptoms  Intervention: Prevent or Manage Embolism  Flowsheets (Taken 10/9/2020 2000)  VTE Prevention/Management: bleeding risk factor(s) identified     Problem: Ongoing Anesthesia/Sedation Effects (Cardiac Catheterization)  Goal: Anesthesia/Sedation Recovery  Outcome: Ongoing, Progressing     Problem: Ongoing Anesthesia/Sedation Effects (Cardiac Catheterization)  Goal: Anesthesia/Sedation Recovery  Intervention: Optimize Anesthesia Recovery  Flowsheets  Taken 10/10/2020 0000  Safety Promotion/Fall Prevention:   assistive device/personal items within reach   clutter free environment maintained   nonskid shoes/slippers when out of bed   safety round/check completed  Taken 10/9/2020 2200  Safety Promotion/Fall Prevention:   safety round/check completed   nonskid shoes/slippers when out of bed    clutter free environment maintained   assistive device/personal items within reach  Taken 10/9/2020 2100  Safety Promotion/Fall Prevention:   assistive device/personal items within reach   clutter free environment maintained   nonskid shoes/slippers when out of bed   safety round/check completed  Taken 10/9/2020 2000  Safety Promotion/Fall Prevention:   safety round/check completed   nonskid shoes/slippers when out of bed   clutter free environment maintained   assistive device/personal items within reach  Taken 10/9/2020 1900  Safety Promotion/Fall Prevention:   nonskid shoes/slippers when out of bed   safety round/check completed     Problem: Pain (Cardiac Catheterization)  Goal: Acceptable Pain Control  Outcome: Ongoing, Progressing     Problem: Pain (Cardiac Catheterization)  Goal: Acceptable Pain Control  Intervention: Prevent or Manage Pain  Flowsheets (Taken 10/9/2020 2000)  Diversional Activities:   television   smartphone     Problem: Vascular Access Protection (Cardiac Catheterization)  Goal: Absence of Vascular Access Complication  Outcome: Ongoing, Progressing     Problem: Vascular Access Protection (Cardiac Catheterization)  Goal: Absence of Vascular Access Complication  Intervention: Prevent Vascular Access Site Complication  Flowsheets  Taken 10/10/2020 0000  Activity Management: up ad naun  Head of Bed (HOB): HOB at 20-30 degrees  Bleeding Precautions: blood pressure closely monitored  Taken 10/9/2020 2200  Activity Management: up ad naun  Head of Bed (HOB): HOB at 20-30 degrees  Taken 10/9/2020 2100  Activity Management: up ad naun  Head of Bed (HOB): HOB at 20-30 degrees  Taken 10/9/2020 2000  Activity Management: up ad naun  Head of Bed (HOB): HOB at 20-30 degrees  Bleeding Precautions: blood pressure closely monitored  Taken 10/9/2020 1900  Activity Management: up ad naun  Head of Bed (HOB): HOB at 20-30 degrees   Goal Outcome Evaluation:  Plan of Care Reviewed With: patient  Progress: improving

## 2020-10-10 NOTE — ANESTHESIA PROCEDURE NOTES
Central Line    Pre-sedation assessment completed: 10/10/2020 3:33 PM    Patient reassessed immediately prior to procedure    Patient location during procedure: ICU  Start time: 10/10/2020 3:25 PM  Stop Time:10/10/2020 3:33 PM  Indications: central pressure monitoring, vascular access and MD/Surgeon request  Staff  Anesthesiologist: Tarsha Yuen DO  Preanesthetic Checklist  Completed: patient identified, site marked, surgical consent, pre-op evaluation, timeout performed, IV checked, risks and benefits discussed and monitors and equipment checked  Central Line Prep  Sterile Tech:cap, gloves, gown, mask and sterile barriers  Prep: chloraprep  Patient monitoring: blood pressure monitoring, continuous pulse oximetry and EKG  Central Line Procedure  Laterality:right  Location:internal jugular  Catheter Type:triple lumen  Catheter Size:8 Fr  Guidance:ultrasound guided  PROCEDURE NOTE/ULTRASOUND INTERPRETATION.  Using ultrasound guidance the potential vascular sites for insertion of the catheter were visualized to determine the patency of the vessel to be used for vascular access.  After selecting the appropriate site for insertion, the needle was visualized under ultrasound being inserted into the internal jugular vein, followed by ultrasound confirmation of wire and catheter placement. There were no abnormalities seen on ultrasound; an image was taken; and the patient tolerated the procedure with no complications. Images: still images not obtained  Assessment  Post procedure:biopatch applied and line sutured  Assessement:blood return through all ports, no pneumothorax on x-ray, placement verified by x-ray and free fluid flow  Complications:no  Patient Tolerance:patient tolerated the procedure well with no apparent complications  Additional Notes  IMAGE SAVED TO ULTRASOUND MACHINE

## 2020-10-10 NOTE — PROGRESS NOTES
[facility] Cardiology  INPATIENT PROGRESS NOTE    Name: Sumanth Robledo  Age/Sex: 60 y.o. male  :  1960        PCP: Elizabeth Burleson APRN    Vital Signs  Temp:  [96.8 °F (36 °C)-98.7 °F (37.1 °C)] 96.9 °F (36.1 °C)  Heart Rate:  [50-81] 69  Resp:  [18] 18  BP: (101-133)/(57-74) 108/58  Body mass index is 31.32 kg/m².      Subjective   Patient an episode of not feeling well, syncopized into brief PEA requiring CPR service requested for further evaluation.  Patient had what appeared to be several seizure episodes afterwards as well.    Patient is alert oriented now denying any chest pain.    Patient Active Problem List   Diagnosis   • Chest pain   • CAD (coronary artery disease)   • ESRF (end stage renal failure) (CMS/[facility])   • Hypertension   • Diabetes mellitus (CMS/[facility])   • Precordial pain   • Cardiac arrest (CMS/[facility])       Past Medical History:   Diagnosis Date   • Chronic kidney disease, stage 3    • Coronary arteriosclerosis     eCABGx3 10/2014 LM 95% LAD diff 70% D1 70% OM2 70% OM3 70% & % USA STEMI      • End stage renal disease (CMS/[facility])    • Essential hypertension    • Heterozygous thalassemia     Thalassemia minor      • Hyperlipidemia    • Peripheral vascular disease (CMS/HCC)     [facility] 0[facility]97 [facility] 0.79[facility]      • Surgical follow-up care     Emergent CABG X 3     • Type 2 diabetes mellitus (CMS/[facility])        Current Facility-Administered Medications   Medication Dose Route Frequency Provider Last Rate Last Dose   • acetaminophen (TYLENOL) tablet 650 mg  650 mg Oral Q4H PRN Valentín Lewis MD        Or   • acetaminophen (TYLENOL) suppository 650 mg  650 mg Rectal Q4H PRN Valentín Lewis MD       • acetaminophen (TYLENOL) tablet 650 mg  650 mg Oral Q4H PRN Valentín Lewis MD       • bisacodyl (DULCOLAX) EC tablet 5 mg  5 mg Oral Daily PRN Valentín Lewis MD       • calcitriol (ROCALTROL) capsule 1 mcg  1 mcg Oral Daily Valentín Lewis MD   1 mcg at  10/10/20 0833   • clopidogrel (PLAVIX) tablet 75 mg  75 mg Oral Daily Valentín Lewis MD   75 mg at 10/10/20 0833   • Delflex-LC/2.5% Dextrose (DIANEAL) CAPD 5,000 mL  5,000 mL Intraperitoneal PRN Valentín Lewis MD       • epoetin arturo (EPOGEN,PROCRIT) injection 6,000 Units  6,000 Units Subcutaneous Weekly Valentín Lewis MD   6,000 Units at 10/10/20 1112   • furosemide (LASIX) tablet 80 mg  80 mg Oral BID Valentín Lewis MD   80 mg at 10/10/20 0832   • gabapentin (NEURONTIN) capsule 100 mg  100 mg Oral TID Valentín Lewis MD   100 mg at 10/10/20 0832   • gentamicin (GARAMYCIN) 0.1 % ointment   Topical Daily Valentín Lewis MD       • hydrALAZINE (APRESOLINE) tablet 50 mg  50 mg Oral TID Valentín Lewis MD   50 mg at 10/10/20 0832   • influenza vac split quad (FLUZONE,FLUARIX,AFLURIA,FLULAVAL) injection 0.5 mL  0.5 mL Intramuscular During Hospitalization Valentín Lewis MD       • insulin detemir (LEVEMIR) injection 20 Units  20 Units Subcutaneous Nightly Valentín Lewis MD   20 Units at 10/09/20 2159   • isosorbide mononitrate (IMDUR) 24 hr tablet 30 mg  30 mg Oral Daily Valentín Lewis MD   30 mg at 10/10/20 0833   • LORazepam (ATIVAN) 2 MG/ML injection  - ADS Override Pull            • losartan (COZAAR) tablet 50 mg  50 mg Oral Daily Valentín Lewis MD   50 mg at 10/10/20 0832   • metoprolol succinate XL (TOPROL-XL) 24 hr tablet 100 mg  100 mg Oral Q24H Valentín Lewis MD   100 mg at 10/10/20 0832   • NIFEdipine XL (PROCARDIA XL) 24 hr tablet 60 mg  60 mg Oral Q24H Valentín Lewis MD   60 mg at 10/10/20 0833   • nitroglycerin (NITROSTAT) SL tablet 0.4 mg  0.4 mg Sublingual Q5 Min PRN Valentín Lewis MD       • ondansetron (ZOFRAN) tablet 4 mg  4 mg Oral Q6H PRN Valentín Lewis MD        Or   • ondansetron (ZOFRAN) injection 4 mg  4 mg Intravenous Q6H PRN Valentín Lewis MD       • pravastatin (PRAVACHOL) tablet 20 mg  20 mg Oral Daily Valentín Lewis MD   20 mg at 10/10/20 0832   • ranolazine (RANEXA) 12 hr  tablet 1,000 mg  1,000 mg Oral BID Valentín Lewis MD   1,000 mg at 10/10/20 0832   • sevelamer (RENVELA) tablet 2,400 mg  2,400 mg Oral TID With Meals Valentín Lewis MD   2,400 mg at 10/10/20 1112   • sodium chloride 0.9 % bolus 1,000 mL  1,000 mL Intravenous Once Jose E Fuentes MD 2,000 mL/hr at 10/10/20 1423 1,000 mL at 10/10/20 1423   • sodium chloride 0.9 % flush 10 mL  10 mL Intravenous Q12H Valentín Lewis MD   10 mL at 10/10/20 0834   • sodium chloride 0.9 % flush 10 mL  10 mL Intravenous PRN Valentín Lewis MD           Past Surgical History:   Procedure Laterality Date   • AMPUTATION FINGER / THUMB Right     RIGHT middle finger   • CARDIAC CATHETERIZATION  10/11/2014    Distal left main critical stenosis of up to 95-99% with evidence of filling defect suggestive of a plaque. LAD and OMB 1 and 2 stenosis.A 100% occludeed RCA.   • CARDIAC CATHETERIZATION N/A 2/28/2020    Procedure: Left Heart Cath 2/28/2020 @ 9;  Surgeon: Gibran Garza MD;  Location: [facility] CATH INVASIVE LOCATION;  Service: Cardiology;  Laterality: N/A;   • CARDIAC SURGERY     • CORONARY ARTERY BYPASS GRAFT  10/11/2014    Emergent CABG X 3 LIMA->LAD SVG->OM SVG->PDA       Social History     Socioeconomic History   • Marital status:      Spouse name: Not on file   • Number of children: Not on file   • Years of education: Not on file   • Highest education level: Not on file   Tobacco Use   • Smoking status: Never Smoker   • Smokeless tobacco: Never Used   Substance and Sexual Activity   • Alcohol use: Not Currently     Frequency: Never   • Sexual activity: Yes     Partners: Female       O/E    Neck: Supple.  No JVD, no thyroid enlargement.  Chest: Air entry equal, normal respiration.  No rhonchi or creps.  Cardiovascular system:  Regular rate and rhythm, no murmurs.  Abdomen: Soft, no tenderness, bowel sounds present, no hepatosplenomegaly.  CNS: Alert, oriented to place and time.  No motor or sensory deficit.  Cranial nerves  intact.  Musculoskeletal: No deformity of the back or spine.  Extremities:  No edema.  Pulses equal on both sides.      Lab Results (last 24 hours)     Procedure Component Value Units Date/Time    POC Glucose Once [[id number]]  (Abnormal) Collected: 10/09/20 1634    Specimen: Blood Updated: 10/09/20 1652     Glucose 154 mg/dL     CBC & Differential [[id number]]  (Abnormal) Collected: 10/10/20 0610    Specimen: Blood Updated: 10/10/20 0704    Narrative:      The following orders were created for panel order CBC & Differential.  Procedure                               Abnormality         Status                     ---------                               -----------         ------                     CBC Auto Differential[[id number]]        Abnormal            Final result                 Please view results for these tests on the individual orders.    Basic Metabolic Panel [[id number]]  (Abnormal) Collected: 10/10/20 0610    Specimen: Blood Updated: 10/10/20 0706     Glucose 127 mg/dL      BUN 69 mg/dL      Creatinine 12.62 mg/dL      Sodium 137 mmol/L      Potassium 3.8 mmol/L      Chloride 96 mmol/L      CO2 23.0 mmol/L      Calcium 8.3 mg/dL      eGFR  African Amer 5 mL/min/1.73      Comment: <15 Indicative of kidney failure.        eGFR Non  Amer --     Comment: <15 Indicative of kidney failure.        BUN/Creatinine Ratio 5.5     Anion Gap 18.0 mmol/L     Narrative:      GFR Normal >60  Chronic Kidney Disease <60  Kidney Failure <15      CBC Auto Differential [[id number]]  (Abnormal) Collected: 10/10/20 0610    Specimen: Blood Updated: 10/10/20 0704     WBC 8.57 10*3/mm3      RBC 3.60 10*6/mm3      Hemoglobin 8.8 g/dL      Hematocrit 28.2 %      MCV 78.3 fL      MCH 24.4 pg      MCHC 31.2 g/dL      RDW 14.4 %      RDW-SD 40.4 fl      MPV 11.6 fL      Platelets 303 10*3/mm3      Neutrophil % 67.0 %      Lymphocyte % 18.7 %      Monocyte % 11.0 %      Eosinophil % 2.0 %      Basophil % 0.5 %      Immature Grans %  0.8 %      Neutrophils, Absolute 5.75 10*3/mm3      Lymphocytes, Absolute 1.60 10*3/mm3      Monocytes, Absolute 0.94 10*3/mm3      Eosinophils, Absolute 0.17 10*3/mm3      Basophils, Absolute 0.04 10*3/mm3      Immature Grans, Absolute 0.07 10*3/mm3      nRBC 0.0 /100 WBC     Troponin [[id number]]  (Abnormal) Collected: 10/10/20 1316    Specimen: Blood Updated: 10/10/20 1358     Troponin T 1.940 ng/mL     Narrative:      Troponin T Reference Range:  <= 0.03 ng/mL-   Negative for AMI  >0.03 ng/mL-     Abnormal for myocardial necrosis.  Clinicians would have to utilize clinical acumen, EKG, Troponin and serial changes to determine if it is an Acute Myocardial Infarction or myocardial injury due to an underlying chronic condition.       Results may be falsely decreased if patient taking Biotin.      Comprehensive Metabolic Panel [[id number]]  (Abnormal) Collected: 10/10/20 1316    Specimen: Blood Updated: 10/10/20 1355     Glucose 174 mg/dL      BUN 68 mg/dL      Creatinine 13.33 mg/dL      Sodium 136 mmol/L      Potassium 4.1 mmol/L      Chloride 95 mmol/L      CO2 19.0 mmol/L      Calcium 7.7 mg/dL      Total Protein 7.1 g/dL      Albumin 3.10 g/dL      ALT (SGPT) 31 U/L      AST (SGOT) 39 U/L      Alkaline Phosphatase 58 U/L      Total Bilirubin 0.3 mg/dL      eGFR Non  Amer --     Comment: <15 Indicative of kidney failure.        eGFR  African Amer 5 mL/min/1.73      Comment: <15 Indicative of kidney failure.        Globulin 4.0 gm/dL      A/G Ratio 0.8 g/dL      BUN/Creatinine Ratio 5.1     Anion Gap 22.0 mmol/L     Narrative:      GFR Normal >60  Chronic Kidney Disease <60  Kidney Failure <15      CBC & Differential [[id number]]  (Abnormal) Collected: 10/10/20 1316    Specimen: Blood Updated: 10/10/20 1329    Narrative:      The following orders were created for panel order CBC & Differential.  Procedure                               Abnormality         Status                     ---------                                -----------         ------                     CBC Auto Differential[[id number]]        Abnormal            Final result                 Please view results for these tests on the individual orders.    CBC Auto Differential [[id number]]  (Abnormal) Collected: 10/10/20 1316    Specimen: Blood Updated: 10/10/20 1329     WBC 11.44 10*3/mm3      RBC 3.44 10*6/mm3      Hemoglobin 8.6 g/dL      Hematocrit 27.9 %      MCV 81.1 fL      MCH 25.0 pg      MCHC 30.8 g/dL      RDW 15.0 %      RDW-SD 43.3 fl      MPV 11.3 fL      Platelets 273 10*3/mm3      Neutrophil % 48.4 %      Lymphocyte % 37.9 %      Monocyte % 10.1 %      Eosinophil % 1.5 %      Basophil % 0.3 %      Immature Grans % 1.8 %      Neutrophils, Absolute 5.54 10*3/mm3      Lymphocytes, Absolute 4.34 10*3/mm3      Monocytes, Absolute 1.15 10*3/mm3      Eosinophils, Absolute 0.17 10*3/mm3      Basophils, Absolute 0.03 10*3/mm3      Immature Grans, Absolute 0.21 10*3/mm3      nRBC 0.0 /100 WBC             A/P  This is a 60-year-old gentleman with extensive history of coronary artery disease status post CABG in 2014 follows with Dr. Garza, patient was admitted to the hospital with chest pain, Dr. Hobson had done a cardiac cath which showed his left main was 100% occluded, however his LIMA to LAD was patent, SVG to OM was patent with occluded SVG to OM 2, SVG to RCA was opened as well.  Dr. Hobson had attempted left main  PCI was not successful.  He has been referred to an outside hospital previously with another previous attempt in his left main  was not successful.  Upon medical management is recommended.  Of note patient also has end-stage renal disease and is on peritoneal dialysis.    This morning patient had an episode when he went to the restroom, he fell down, hit his head and lost pulse for brief period, patient had CPR and received epi x2 with subsequent return of spontaneous circulation.  Patient and subsequently had 2 seizure episodes  while he was in CCU.  Repeat EKG showed diffuse nonspecific ST-T changes which are unchanged from before.  Troponin trended down.  CT head pending.    Recommendations:  It appears that his PA was most likely related to potential vasovagal episode and potential hemodynamic shifts from his peritoneal dialysis.  Patient does have significant metabolic derangements with uremia and his end-stage renal failure.  Patient did not appear to have any ischemic etiology for his cardiac arrest and is post PNR shockable rhythm.    Bedside echocardiogram is without any evidence of pericardial effusion.  His groin appears soft without any evidence of hematoma, hemoglobin did trend down slightly with no evidence of active bleeding.    I recommend following up his CT head as patient had seizures as well.  Demises metabolic derangements.  Continue optimal medical therapy for his coronary artery disease, patient is not a candidate for further percutaneous interventions.    Management of diabetes, end-stage renal disease and anemia per primary team.    Thank you for the consult will continue to follow.                Patient's Body mass index is 31.32 kg/m². BMI is above normal parameters. Recommendations include: exercise counseling and nutrition counseling.      Sumanth Robledo  reports that he has never smoked. He has never used smokeless tobacco..          This document has been electronically signed by Zahida Lewis MD on October 10, 2020 14:26 CDT         Part of this note may be an electronic transcription/translation of spoken language to printed text using the Dragon Dictation System.

## 2020-10-10 NOTE — PROGRESS NOTES
Progress Note  Valentín Lewis MD  Hospitalist    Date of visit: 10/10/2020     LOS: 0 days   Patient Care Team:  Elizabeth Burleson APRN as PCP - General (Family Medicine)    Chief Complaint: syncopal episode    Subjective     Interval History:     Patient Complaints: syncopal episode, dropped and hit his head due to a minutes long pulseless interval requiring CPR and IV Epinephrine in order to regain consciousness, heart rate and a measurable BP. He was transferred to the ICU in stable condition.    He has extensive CAD on the recent cardiac cath (not amenable to PTCA).      History taken from: nursing    Medication Review:   Current Facility-Administered Medications   Medication Dose Route Frequency Provider Last Rate Last Dose   • acetaminophen (TYLENOL) tablet 650 mg  650 mg Oral Q4H PRN Valentín Lewis MD        Or   • acetaminophen (TYLENOL) suppository 650 mg  650 mg Rectal Q4H PRN Valentín Lewis MD       • acetaminophen (TYLENOL) tablet 650 mg  650 mg Oral Q4H PRN Valentín Lewis MD       • bisacodyl (DULCOLAX) EC tablet 5 mg  5 mg Oral Daily PRN Valentín Lewis MD       • calcitriol (ROCALTROL) capsule 1 mcg  1 mcg Oral Daily Valentín Lewis MD   1 mcg at 10/10/20 0833   • clopidogrel (PLAVIX) tablet 75 mg  75 mg Oral Daily Valentín Lewis MD   75 mg at 10/10/20 0833   • Delflex-LC/2.5% Dextrose (DIANEAL) CAPD 5,000 mL  5,000 mL Intraperitoneal PRN Valentín Lewis MD       • epoetin arturo (EPOGEN,PROCRIT) injection 6,000 Units  6,000 Units Subcutaneous Weekly Valentín Lewis MD   6,000 Units at 10/10/20 1112   • gentamicin (GARAMYCIN) 0.1 % ointment   Topical Daily Valentín Lewis MD       • influenza vac split quad (FLUZONE,FLUARIX,AFLURIA,FLULAVAL) injection 0.5 mL  0.5 mL Intramuscular During Hospitalization Valentín Lewis MD       • insulin detemir (LEVEMIR) injection 20 Units  20 Units Subcutaneous Nightly Valentín Lewis MD   20 Units at 10/09/20 2159   • isosorbide mononitrate (IMDUR) 24 hr tablet  30 mg  30 mg Oral Daily Valentín Lewis MD   30 mg at 10/10/20 0833   • LORazepam (ATIVAN) 2 MG/ML injection  - ADS Override Pull            • nitroglycerin (NITROSTAT) SL tablet 0.4 mg  0.4 mg Sublingual Q5 Min PRN Valentín Lewis MD       • norepinephrine (LEVOPHED) 8 mg in 250 mL NS infusion (premix)  0.02-0.3 mcg/kg/min Intravenous Titrated Valentín Lewis MD       • ondansetron (ZOFRAN) tablet 4 mg  4 mg Oral Q6H PRN Valentín Lewis MD        Or   • ondansetron (ZOFRAN) injection 4 mg  4 mg Intravenous Q6H PRN Valentín Lewis MD       • pravastatin (PRAVACHOL) tablet 20 mg  20 mg Oral Daily Valentín Lewis MD   20 mg at 10/10/20 0832   • ranolazine (RANEXA) 12 hr tablet 1,000 mg  1,000 mg Oral BID Valentín Lewis MD   1,000 mg at 10/10/20 0832   • sevelamer (RENVELA) tablet 2,400 mg  2,400 mg Oral TID With Meals Valentín Lewis MD   2,400 mg at 10/10/20 1112   • sodium chloride 0.9 % flush 10 mL  10 mL Intravenous Q12H Valentín Lewis MD   10 mL at 10/10/20 0834   • sodium chloride 0.9 % flush 10 mL  10 mL Intravenous PRN Valentín Lewis MD       • sodium chloride 0.9 % infusion  125 mL/hr Intravenous Continuous Valentín Lewis  mL/hr at 10/10/20 1534 125 mL/hr at 10/10/20 1534       Review of Systems:   Review of Systems   Constitutional: Positive for fatigue. Negative for fever.   Respiratory: Positive for cough. Negative for shortness of breath and wheezing.    Cardiovascular: Negative for chest pain and leg swelling.   Gastrointestinal: Negative for abdominal distention, abdominal pain, blood in stool, constipation, nausea and vomiting.   Genitourinary: Negative for dysuria, frequency, hematuria, penile pain, penile swelling and urgency.   Musculoskeletal: Positive for arthralgias.   Skin: Positive for pallor. Negative for color change and rash.   Neurological: Positive for dizziness, syncope, weakness and light-headedness.   Psychiatric/Behavioral: Negative for agitation, behavioral problems and  confusion.       Objective     Vital Signs  Temp:  [96.8 °F (36 °C)-98.7 °F (37.1 °C)] 96.9 °F (36.1 °C)  Heart Rate:  [50-81] 69  Resp:  [18] 18  BP: (108-133)/(58-74) 108/58    Physical Exam:  Physical Exam  Vitals signs reviewed.   Constitutional:       Appearance: Normal appearance. He is ill-appearing.   HENT:      Head: Normocephalic and atraumatic.   Eyes:      General: No scleral icterus.     Extraocular Movements: Extraocular movements intact.      Pupils: Pupils are equal, round, and reactive to light.   Neck:      Musculoskeletal: Normal range of motion and neck supple. No neck rigidity or muscular tenderness.   Cardiovascular:      Rate and Rhythm: Normal rate and regular rhythm.   Pulmonary:      Effort: No respiratory distress.      Breath sounds: Normal breath sounds. No stridor. No wheezing or rhonchi.   Abdominal:      General: Abdomen is flat. There is no distension.      Palpations: Abdomen is soft. There is no mass.      Tenderness: There is no guarding.      Hernia: No hernia is present.   Musculoskeletal: Normal range of motion.         General: No swelling, tenderness or signs of injury.      Left lower leg: No edema.   Skin:     General: Skin is dry.      Coloration: Skin is pale. Skin is not jaundiced.   Neurological:      Mental Status: He is alert and oriented to person, place, and time. Mental status is at baseline.      Cranial Nerves: No cranial nerve deficit.   Psychiatric:         Mood and Affect: Mood normal.          Results Review:    Lab Results (last 24 hours)     Procedure Component Value Units Date/Time    Troponin [[id number]]  (Abnormal) Collected: 10/10/20 1316    Specimen: Blood Updated: 10/10/20 1358     Troponin T 1.940 ng/mL     Narrative:      Troponin T Reference Range:  <= 0.03 ng/mL-   Negative for AMI  >0.03 ng/mL-     Abnormal for myocardial necrosis.  Clinicians would have to utilize clinical acumen, EKG, Troponin and serial changes to determine if it is an Acute  Myocardial Infarction or myocardial injury due to an underlying chronic condition.       Results may be falsely decreased if patient taking Biotin.      Comprehensive Metabolic Panel [[id number]]  (Abnormal) Collected: 10/10/20 1316    Specimen: Blood Updated: 10/10/20 1355     Glucose 174 mg/dL      BUN 68 mg/dL      Creatinine 13.33 mg/dL      Sodium 136 mmol/L      Potassium 4.1 mmol/L      Chloride 95 mmol/L      CO2 19.0 mmol/L      Calcium 7.7 mg/dL      Total Protein 7.1 g/dL      Albumin 3.10 g/dL      ALT (SGPT) 31 U/L      AST (SGOT) 39 U/L      Alkaline Phosphatase 58 U/L      Total Bilirubin 0.3 mg/dL      eGFR Non  Amer --     Comment: <15 Indicative of kidney failure.        eGFR  African Amer 5 mL/min/1.73      Comment: <15 Indicative of kidney failure.        Globulin 4.0 gm/dL      A/G Ratio 0.8 g/dL      BUN/Creatinine Ratio 5.1     Anion Gap 22.0 mmol/L     Narrative:      GFR Normal >60  Chronic Kidney Disease <60  Kidney Failure <15      CBC & Differential [[id number]]  (Abnormal) Collected: 10/10/20 1316    Specimen: Blood Updated: 10/10/20 1329    Narrative:      The following orders were created for panel order CBC & Differential.  Procedure                               Abnormality         Status                     ---------                               -----------         ------                     CBC Auto Differential[[id number]]        Abnormal            Final result                 Please view results for these tests on the individual orders.    CBC Auto Differential [[id number]]  (Abnormal) Collected: 10/10/20 1316    Specimen: Blood Updated: 10/10/20 1329     WBC 11.44 10*3/mm3      RBC 3.44 10*6/mm3      Hemoglobin 8.6 g/dL      Hematocrit 27.9 %      MCV 81.1 fL      MCH 25.0 pg      MCHC 30.8 g/dL      RDW 15.0 %      RDW-SD 43.3 fl      MPV 11.3 fL      Platelets 273 10*3/mm3      Neutrophil % 48.4 %      Lymphocyte % 37.9 %      Monocyte % 10.1 %      Eosinophil % 1.5 %       Basophil % 0.3 %      Immature Grans % 1.8 %      Neutrophils, Absolute 5.54 10*3/mm3      Lymphocytes, Absolute 4.34 10*3/mm3      Monocytes, Absolute 1.15 10*3/mm3      Eosinophils, Absolute 0.17 10*3/mm3      Basophils, Absolute 0.03 10*3/mm3      Immature Grans, Absolute 0.21 10*3/mm3      nRBC 0.0 /100 WBC     Basic Metabolic Panel [[id number]]  (Abnormal) Collected: 10/10/20 0610    Specimen: Blood Updated: 10/10/20 0706     Glucose 127 mg/dL      BUN 69 mg/dL      Creatinine 12.62 mg/dL      Sodium 137 mmol/L      Potassium 3.8 mmol/L      Chloride 96 mmol/L      CO2 23.0 mmol/L      Calcium 8.3 mg/dL      eGFR  African Amer 5 mL/min/1.73      Comment: <15 Indicative of kidney failure.        eGFR Non  Amer --     Comment: <15 Indicative of kidney failure.        BUN/Creatinine Ratio 5.5     Anion Gap 18.0 mmol/L     Narrative:      GFR Normal >60  Chronic Kidney Disease <60  Kidney Failure <15      CBC & Differential [[id number]]  (Abnormal) Collected: 10/10/20 0610    Specimen: Blood Updated: 10/10/20 0704    Narrative:      The following orders were created for panel order CBC & Differential.  Procedure                               Abnormality         Status                     ---------                               -----------         ------                     CBC Auto Differential[[id number]]        Abnormal            Final result                 Please view results for these tests on the individual orders.    CBC Auto Differential [[id number]]  (Abnormal) Collected: 10/10/20 0610    Specimen: Blood Updated: 10/10/20 0704     WBC 8.57 10*3/mm3      RBC 3.60 10*6/mm3      Hemoglobin 8.8 g/dL      Hematocrit 28.2 %      MCV 78.3 fL      MCH 24.4 pg      MCHC 31.2 g/dL      RDW 14.4 %      RDW-SD 40.4 fl      MPV 11.6 fL      Platelets 303 10*3/mm3      Neutrophil % 67.0 %      Lymphocyte % 18.7 %      Monocyte % 11.0 %      Eosinophil % 2.0 %      Basophil % 0.5 %      Immature Grans % 0.8 %       Neutrophils, Absolute 5.75 10*3/mm3      Lymphocytes, Absolute 1.60 10*3/mm3      Monocytes, Absolute 0.94 10*3/mm3      Eosinophils, Absolute 0.17 10*3/mm3      Basophils, Absolute 0.04 10*3/mm3      Immature Grans, Absolute 0.07 10*3/mm3      nRBC 0.0 /100 WBC     POC Glucose Once [[id number]]  (Abnormal) Collected: 10/09/20 1634    Specimen: Blood Updated: 10/09/20 1652     Glucose 154 mg/dL           Imaging Results (Last 24 Hours)     Procedure Component Value Units Date/Time    XR Chest Post CVA Port [[id number]] Collected: 10/10/20 1537     Updated: 10/10/20 1600    Narrative:      PROCEDURE: XR CHEST POST CVA PORT    VIEWS:Single    INDICATION: Venous catheter placement    COMPARISON: CXR: 10/8/2020    FINDINGS:       - lines/tubes: Right-sided central venous catheter terminates  at the level of the cavoatrial junction. Median sternotomy wires  again noted.    - cardiac: Mild cardiomegaly is similar to previous study    - mediastinum: Contour unchanged.     - lungs: Mild Central vascular and interstitial prominence and  indistinctness, concerning for CHF.     - pleura: No evidence of  fluid.      - osseous: Unremarkable for age.      Impression:      New right-sided central venous catheter terminates at the  cavoatrial junction, with no other significant interval change  since the previous examination of 10/8/2020      Electronically signed by:  Jazzy Pillai MD  10/10/2020 3:59 PM  CDT Workstation: [id number]    CT Head Without Contrast [[id number]] Collected: 10/10/20 1358     Updated: 10/10/20 1447    Narrative:      PROCEDURE: CT HEAD WO CONTRAST    INDICATION:  Fall, pain    COMPARISON:  None    TECHNIQUE:  CT head, without iv contrast.       This exam was performed according to our departmental  dose-optimization program, which includes automated exposure  control, adjustment of the mA and/or kV according to patient size  and/or use of iterative reconstruction technique.  DLP is 923.9    FINDINGS:    The  degree of cerebral atrophy is within normal limits for age.    There is preservation of the gray-white matter differentiation.      No focal parenchymal lesions.    There are mild age related degenerative cortical and deep white  matter changes.    There is no evidence of a hemorrhage or intracranial mass.    There is no midline shift.    The ventricles are normal in size and configuration.    The brain stem, cerebellum, globes, paranasal sinuses, and  osseous structures are within normal limits. It should be noted  that vessels and the dural venous sinuses are hyperattenuating  compared to their usual appearance but this may be due to  hemoconcentration. No definite hemorrhage is identified, and this  hyperdense appearance is symmetric along the tentorium  bilaterally as well as the falx. Veins in the extra-axial spaces  are unusually prominent as well.        Impression:      1. Hyperdense appearance of both venous and arterial structures,  without obvious intracranial hemorrhage. This may be due to  hemoconcentration/dehydration. Clinical correlation needed. No  intracranial abnormality identified.  2. Small left periorbital scalp hematoma    If pain or symptoms persist beyond reasonable expectations, an  MRI examination is suggested as is deemed clinically appropriate.    C requested to arrange critical complex with a shunt is  ordering clinician at 3:25 PM EST on 10/10/2020. Findings  discussed with Dr. Lewis at 3:40 PM EST on 10/10/2020    Electronically signed by:  Jazzy Pillai MD  10/10/2020 2:46 PM  CDT Workstation: [id number]          Assessment/Plan       Hypotension    Cardiac arrest (CMS/HCC)    CAD (coronary artery disease)    Syncope    ESRF (end stage renal failure) (CMS/HCC)    Diabetes mellitus (CMS/HCC)    Hold Toprol XL, Nifedipine XL, Cozaar, Hydralazine, Imdur and Lasix for now given the degree of hypotension. Start IV fluid, IV pressors if necessary in order to maintain a MAP of at least  60.    His head CT was unremarkable; his Echo did not show any new abnormalities (LV EF of 40% with a degree of LVH).    Valentín Lewis MD  10/10/20  16:28 CDT

## 2020-10-10 NOTE — PROGRESS NOTES
"[facility]  [address] [place] [address]. [address]  T - [phone]  F - [phone]     Progress Note          PATIENT  DEMOGRAPHICS   PATIENT NAME: Sumanth Robledo                      PHYSICIAN: EMANUEL Sadler  : 1960  MRN: [id number]   LOS: 0 days    Patient Care Team:  Elizabeth Burleson APRN as PCP - General (Family Medicine)  Subjective   SUBJECTIVE   No acute distress. Awake and alert. Denies CO, SOA, no fever or chills, no N&V. Denies dizziness- trace ryann LE edema. Report he had angiogram done yesterday and he did okay- stents were placed but he is unsure how many. Planning on discharge later today.         Objective   OBJECTIVE   Vital Signs  Temp:  [96.8 °F (36 °C)-98.7 °F (37.1 °C)] 97.5 °F (36.4 °C)  Heart Rate:  [68-81] 78  Resp:  [16-18] 18  BP: ()/(54-74) 130/73    Flowsheet Rows      First Filed Value   Admission Height  170.2 cm (67\") Documented at 10/08/2020 2035   Admission Weight  81.5 kg (179 lb 9.6 oz) Documented at 10/08/2020 2046           I/O last 3 completed shifts:  In: -   Out: 150 [Urine:150]    PHYSICAL EXAM    Physical Exam  Vitals signs and nursing note reviewed.   Constitutional:       Appearance: Normal appearance.   HENT:      Head: Normocephalic and atraumatic.      Mouth/Throat:      Mouth: Mucous membranes are moist.   Eyes:      Pupils: Pupils are equal, round, and reactive to light.   Cardiovascular:      Rate and Rhythm: Normal rate and regular rhythm.   Abdominal:      General: Bowel sounds are normal. There is distension.   Musculoskeletal:         General: Swelling present.   Skin:     General: Skin is warm and dry.   Neurological:      General: No focal deficit present.      Mental Status: He is alert and oriented to person, place, and time.   Psychiatric:         Mood and Affect: Mood normal.         Behavior: Behavior normal.         RESULTS   Results Review:    Results from last 7 days   Lab Units 10/10/20  0610 " 10/09/20  1403 10/09/20  0647  10/08/20  1704   SODIUM mmol/L 137 136 140   < > 140   POTASSIUM mmol/L 3.8 4.6 4.3   < > 3.8   CHLORIDE mmol/L 96* 98 102   < > 98   CO2 mmol/L 23.0 22.0 19.0*   < > 25.0   BUN mg/dL 69* 71* 70*   < > 65*   CREATININE mg/dL 12.62* 13.64* 13.51*   < > 13.19*   CALCIUM mg/dL 8.3* 8.2* 8.2*   < > 8.6   BILIRUBIN mg/dL  --   --  0.4  --  0.3   ALK PHOS U/L  --   --  59  --  70   ALT (SGPT) U/L  --   --  10  --  11   AST (SGOT) U/L  --   --  17  --  13   GLUCOSE mg/dL 127* 109* 117*   < > 181*    < > = values in this interval not displayed.       Estimated Creatinine Clearance: 6.7 mL/min (A) (by C-G formula based on SCr of 12.62 mg/dL (H)).    Results from last 7 days   Lab Units 10/09/20  0647   MAGNESIUM mg/dL 2.1   PHOSPHORUS mg/dL 9.5*             Results from last 7 days   Lab Units 10/10/20  0610 10/09/20  0647 10/08/20  2054 10/08/20  1704   WBC 10*3/mm3 8.57 8.71 9.95 11.11*   HEMOGLOBIN g/dL 8.8* 9.2* 10.2* 10.1*   PLATELETS 10*3/mm3 303 299 319 324       Results from last 7 days   Lab Units 10/09/20  1403 10/08/20  2054   INR  1.80* 1.05         Imaging Results (Last 24 Hours)     ** No results found for the last 24 hours. **           MEDICATIONS    calcitriol, 1 mcg, Oral, Daily  clopidogrel, 75 mg, Oral, Daily  epoetin arturo/arturo-epbx, 6,000 Units, Subcutaneous, Weekly  furosemide, 80 mg, Oral, BID  gabapentin, 100 mg, Oral, TID  gentamicin, , Topical, Daily  hydrALAZINE, 50 mg, Oral, TID  insulin detemir, 20 Units, Subcutaneous, Nightly  isosorbide mononitrate, 30 mg, Oral, Daily  losartan, 50 mg, Oral, Daily  metoprolol succinate XL, 100 mg, Oral, Q24H  NIFEdipine XL, 60 mg, Oral, Q24H  pravastatin, 20 mg, Oral, Daily  ranolazine, 1,000 mg, Oral, BID  sevelamer, 2,400 mg, Oral, TID With Meals  sodium chloride, 10 mL, Intravenous, Q12H           Assessment/Plan   ASSESSMENT / PLAN      Chest pain    CAD (coronary artery disease)    ESRF (end stage renal failure) (CMS/[facility])     Hypertension    Diabetes mellitus (CMS/[facility])    1.end-stage renal disease currently on peritoneal dialysis every night.  Patient is doing pd with 2800 cc 5 cycles with last fill of 1000 cc.  His estimated dry weight is 84 kg.  Patient is close to his dry weight.  Tolerated PD last night without issues. UOP about like usual. Cr 12.62 (which is down from previous 13.64)     2.anemia chronic kidney disease-Hgb 8.8. We will continue with Epogen 6000 units once a week     3.CKD/mineral bone disorder.  Patient will remain on calcitriol 1 mcg daily along with Renvela 2400 mg 3 times a day- last phos 9.5     4.coronary artery disease with history of coronary artery bypass grafting before no chest pain,  elevated troponin plan to undergo heart cath today.  We will plan on do an extra exchange today and start his peritoneal dialysis earlier              This document has been electronically signed by EMANUEL Sadler on October 10, 2020 10:18 CDT

## 2020-10-10 NOTE — CODE DOCUMENTATION
CODE BLUE documentation    Patient had a witnessed cardiac arrest by me in room 319 at 12:40 PM.      Shortly after routine physical exam, the patient got up out of bed and suddenly dropped to his knees before falling forward striking his mouth and face on the ground. Patient was unresponsive and apneic. Carotid pulse was not palpable.  KRISSY BRINK was called and CPR was initiated. AED was attached and the rhythm was pulseless electrical activity.  Patient received CPR for 4 minutes with 2 rounds of epinephrine administered before ROSC was achieved. Afterwards, patient was breathing spontaneously and protecting his airway. He was alert, following commands and moving all limbs afterwards.      He was transferred to the CCU for closer monitoring.  EKG done showed no ST segment elevation.  Will check stat troponin, CBC, BMP, echocardiogram.  Cardiology has been consulted.

## 2020-10-10 NOTE — NURSING NOTE
Pt receiving CPR at this time. Pts wife stated that the pt did not feel well and had gotten up to go to the bathroom. At that time the pt feel to the floor. Wife alerted Dr. Miranda who was in a nearby room.

## 2020-10-11 LAB
MAGNESIUM SERPL-MCNC: 1.8 MG/DL (ref 1.6–2.4)
NT-PROBNP SERPL-MCNC: ABNORMAL PG/ML (ref 0–900)

## 2020-10-11 PROCEDURE — 99233 SBSQ HOSP IP/OBS HIGH 50: CPT | Performed by: NURSE PRACTITIONER

## 2020-10-11 PROCEDURE — 25010000002 ONDANSETRON PER 1 MG: Performed by: INTERNAL MEDICINE

## 2020-10-11 PROCEDURE — 63710000001 INSULIN DETEMIR PER 5 UNITS: Performed by: INTERNAL MEDICINE

## 2020-10-11 PROCEDURE — 94799 UNLISTED PULMONARY SVC/PX: CPT

## 2020-10-11 PROCEDURE — 83735 ASSAY OF MAGNESIUM: CPT | Performed by: NURSE PRACTITIONER

## 2020-10-11 PROCEDURE — 83880 ASSAY OF NATRIURETIC PEPTIDE: CPT | Performed by: NURSE PRACTITIONER

## 2020-10-11 RX ORDER — DEXTROSE MONOHYDRATE, SODIUM CHLORIDE, SODIUM LACTATE, CALCIUM CHLORIDE, MAGNESIUM CHLORIDE 1.5; 538; 448; 18.4; 5.08 G/100ML; MG/100ML; MG/100ML; MG/100ML; MG/100ML
12200 SOLUTION INTRAPERITONEAL AS NEEDED
Status: DISCONTINUED | OUTPATIENT
Start: 2020-10-11 | End: 2020-10-20 | Stop reason: HOSPADM

## 2020-10-11 RX ORDER — ASPIRIN 81 MG/1
81 TABLET, CHEWABLE ORAL DAILY
Status: DISCONTINUED | OUTPATIENT
Start: 2020-10-11 | End: 2020-10-20 | Stop reason: HOSPADM

## 2020-10-11 RX ORDER — ASPIRIN 81 MG/1
TABLET, CHEWABLE ORAL
Status: COMPLETED
Start: 2020-10-11 | End: 2020-10-11

## 2020-10-11 RX ADMIN — GENTAMICIN SULFATE: 1 OINTMENT TOPICAL at 09:27

## 2020-10-11 RX ADMIN — ASPIRIN 81 MG: 81 TABLET, CHEWABLE ORAL at 12:32

## 2020-10-11 RX ADMIN — ONDANSETRON HYDROCHLORIDE 4 MG: 2 INJECTION, SOLUTION INTRAMUSCULAR; INTRAVENOUS at 08:24

## 2020-10-11 RX ADMIN — CALCITRIOL 1 MCG: 0.25 CAPSULE ORAL at 08:11

## 2020-10-11 RX ADMIN — ACETAMINOPHEN 650 MG: 325 TABLET, FILM COATED ORAL at 11:08

## 2020-10-11 RX ADMIN — RANOLAZINE 1000 MG: 500 TABLET, FILM COATED, EXTENDED RELEASE ORAL at 08:11

## 2020-10-11 RX ADMIN — SEVELAMER CARBONATE 2400 MG: 800 TABLET, FILM COATED ORAL at 12:32

## 2020-10-11 RX ADMIN — SODIUM BICARBONATE 50 MEQ: 84 INJECTION INTRAVENOUS at 09:23

## 2020-10-11 RX ADMIN — SEVELAMER CARBONATE 2400 MG: 800 TABLET, FILM COATED ORAL at 17:36

## 2020-10-11 RX ADMIN — RANOLAZINE 1000 MG: 500 TABLET, FILM COATED, EXTENDED RELEASE ORAL at 20:32

## 2020-10-11 RX ADMIN — SODIUM CHLORIDE, PRESERVATIVE FREE 10 ML: 5 INJECTION INTRAVENOUS at 20:38

## 2020-10-11 RX ADMIN — SODIUM CHLORIDE, PRESERVATIVE FREE 10 ML: 5 INJECTION INTRAVENOUS at 09:24

## 2020-10-11 RX ADMIN — ISOSORBIDE MONONITRATE 30 MG: 30 TABLET, EXTENDED RELEASE ORAL at 08:10

## 2020-10-11 RX ADMIN — INSULIN DETEMIR 20 UNITS: 100 INJECTION, SOLUTION SUBCUTANEOUS at 20:33

## 2020-10-11 RX ADMIN — SODIUM CHLORIDE, PRESERVATIVE FREE 10 ML: 5 INJECTION INTRAVENOUS at 08:11

## 2020-10-11 RX ADMIN — SODIUM CHLORIDE, PRESERVATIVE FREE 10 ML: 5 INJECTION INTRAVENOUS at 20:33

## 2020-10-11 RX ADMIN — SODIUM CHLORIDE 125 ML/HR: 9 INJECTION, SOLUTION INTRAVENOUS at 00:15

## 2020-10-11 RX ADMIN — SEVELAMER CARBONATE 2400 MG: 800 TABLET, FILM COATED ORAL at 08:11

## 2020-10-11 RX ADMIN — CLOPIDOGREL BISULFATE 75 MG: 75 TABLET ORAL at 08:10

## 2020-10-11 RX ADMIN — PRAVASTATIN SODIUM 20 MG: 20 TABLET ORAL at 08:11

## 2020-10-11 NOTE — PROGRESS NOTES
[facility] Cardiology  INPATIENT PROGRESS NOTE    Name: Sumanth Robledo  Age/Sex: 60 y.o. male  :  1960        PCP: Elizabeth Burleson APRN    Vital Signs  Temp:  [96.9 °F (36.1 °C)-98.3 °F (36.8 °C)] 97.9 °F (36.6 °C)  Heart Rate:  [50-78] 73  Resp:  [14-18] 16  BP: ()/(45-91) 127/82  Body mass index is 32.35 kg/m².      Subjective     Pt resting comfortably. Soreness in his chest from compressions. Stop IVFs.     Patient Active Problem List   Diagnosis   • Chest pain   • CAD (coronary artery disease)   • ESRF (end stage renal failure) (CMS/Formerly [facility])   • Hypertension   • Diabetes mellitus (CMS/Formerly [facility])   • Precordial pain   • Cardiac arrest (CMS/Formerly [facility])   • Hypotension   • Syncope       Past Medical History:   Diagnosis Date   • Chronic kidney disease, stage 3    • Coronary arteriosclerosis     eCABGx3 10/2014 LM 95% LAD diff 70% D1 70% OM2 70% OM3 70% & % USA STEMI      • End stage renal disease (CMS/Formerly [facility])    • Essential hypertension    • Heterozygous thalassemia     Thalassemia minor      • Hyperlipidemia    • Peripheral vascular disease (CMS/Formerly [facility])     [facility] 0[facility]97 [facility]      • Surgical follow-up care     Emergent CABG X 3     • Type 2 diabetes mellitus (CMS/Formerly [facility])        Current Facility-Administered Medications   Medication Dose Route Frequency Provider Last Rate Last Dose   • acetaminophen (TYLENOL) tablet 650 mg  650 mg Oral Q4H PRN Valentín Lewis MD        Or   • acetaminophen (TYLENOL) suppository 650 mg  650 mg Rectal Q4H PRN Valentín Lewis MD       • acetaminophen (TYLENOL) tablet 650 mg  650 mg Oral Q4H PRN Valentín Lewis MD       • bisacodyl (DULCOLAX) EC tablet 5 mg  5 mg Oral Daily PRN Valentín Lewis MD       • calcitriol (ROCALTROL) capsule 1 mcg  1 mcg Oral Daily Valentín Lewis MD   1 mcg at 10/11/20 0811   • clopidogrel (PLAVIX) tablet 75 mg  75 mg Oral Daily Valentín Lewis MD   75 mg at 10/11/20 0810   • Delflex-LC/2.5% Dextrose  (DIANEAL) CAPD 5,000 mL  5,000 mL Intraperitoneal PRN Valentín Lewis MD       • epoetin arturo (EPOGEN,PROCRIT) injection 6,000 Units  6,000 Units Subcutaneous Weekly Valentín Lewis MD   6,000 Units at 10/10/20 1112   • gentamicin (GARAMYCIN) 0.1 % ointment   Topical Daily Valentín Lewis MD       • influenza vac split quad (FLUZONE,FLUARIX,AFLURIA,FLULAVAL) injection 0.5 mL  0.5 mL Intramuscular During Hospitalization Valentín Lewis MD       • insulin detemir (LEVEMIR) injection 20 Units  20 Units Subcutaneous Nightly Valentín Lewis MD   20 Units at 10/10/20 2138   • isosorbide mononitrate (IMDUR) 24 hr tablet 30 mg  30 mg Oral Daily Valentín Lewis MD   30 mg at 10/11/20 0810   • nitroglycerin (NITROSTAT) SL tablet 0.4 mg  0.4 mg Sublingual Q5 Min PRN Valentín Lewis MD       • norepinephrine (LEVOPHED) 8 mg in 250 mL NS infusion (premix)  0.02-0.3 mcg/kg/min Intravenous Titrated Valentín Lewis MD       • ondansetron (ZOFRAN) tablet 4 mg  4 mg Oral Q6H PRN Valentín Lewis MD        Or   • ondansetron (ZOFRAN) injection 4 mg  4 mg Intravenous Q6H PRN Valentín Lewis MD   4 mg at 10/11/20 0824   • pravastatin (PRAVACHOL) tablet 20 mg  20 mg Oral Daily Valentín Lewis MD   20 mg at 10/11/20 0811   • ranolazine (RANEXA) 12 hr tablet 1,000 mg  1,000 mg Oral BID Valentín Lewis MD   1,000 mg at 10/11/20 0811   • sevelamer (RENVELA) tablet 2,400 mg  2,400 mg Oral TID With Meals Valentín Lewis MD   2,400 mg at 10/11/20 0811   • sodium bicarbonate injection 8.4% 50 mEq  50 mEq Intravenous Once Freida Ca APRN       • sodium chloride 0.9 % flush 10 mL  10 mL Intravenous Q12H Valentín Lewis MD   10 mL at 10/11/20 0811   • sodium chloride 0.9 % flush 10 mL  10 mL Intravenous PRN Valentín Lewis MD       • sodium chloride 0.9 % infusion  60 mL/hr Intravenous Continuous Freida Ca APRN 60 mL/hr at 10/11/20 0907 60 mL/hr at 10/11/20 0907       Past Surgical History:   Procedure Laterality Date   • AMPUTATION FINGER  / THUMB Right     RIGHT middle finger   • CARDIAC CATHETERIZATION  10/11/2014    Distal left main critical stenosis of up to 95-99% with evidence of filling defect suggestive of a plaque. LAD and OMB 1 and 2 stenosis.A 100% occludeed RCA.   • CARDIAC CATHETERIZATION N/A 2/28/2020    Procedure: Left Heart Cath 2/28/2020 @ 9;  Surgeon: Gibran Garza MD;  Location: [facility] CATH INVASIVE LOCATION;  Service: Cardiology;  Laterality: N/A;   • CARDIAC SURGERY     • CORONARY ARTERY BYPASS GRAFT  10/11/2014    Emergent CABG X 3 LIMA->LAD SVG->OM SVG->PDA       Social History     Socioeconomic History   • Marital status:      Spouse name: Not on file   • Number of children: Not on file   • Years of education: Not on file   • Highest education level: Not on file   Tobacco Use   • Smoking status: Never Smoker   • Smokeless tobacco: Never Used   Substance and Sexual Activity   • Alcohol use: Not Currently     Frequency: Never   • Sexual activity: Yes     Partners: Female       O/E    Neck: Supple.  No JVD, no thyroid enlargement.  Chest: Air entry equal, normal respiration.  No rhonchi or creps.  Cardiovascular system:  Regular rate and rhythm, no murmurs.  Abdomen: Soft, no tenderness, bowel sounds present, no hepatosplenomegaly.  CNS: Alert, oriented to place and time.  No motor or sensory deficit.  Cranial nerves intact.  Musculoskeletal: No deformity of the back or spine.  Extremities:  No edema.  Pulses equal on both sides.      Lab Results (last 24 hours)     Procedure Component Value Units Date/Time    Troponin [[id number]]  (Abnormal) Collected: 10/10/20 1316    Specimen: Blood Updated: 10/10/20 1358     Troponin T 1.940 ng/mL     Narrative:      Troponin T Reference Range:  <= 0.03 ng/mL-   Negative for AMI  >0.03 ng/mL-     Abnormal for myocardial necrosis.  Clinicians would have to utilize clinical acumen, EKG, Troponin and serial changes to determine if it is an Acute Myocardial Infarction or myocardial  injury due to an underlying chronic condition.       Results may be falsely decreased if patient taking Biotin.      Comprehensive Metabolic Panel [[id number]]  (Abnormal) Collected: 10/10/20 1316    Specimen: Blood Updated: 10/10/20 1355     Glucose 174 mg/dL      BUN 68 mg/dL      Creatinine 13.33 mg/dL      Sodium 136 mmol/L      Potassium 4.1 mmol/L      Chloride 95 mmol/L      CO2 19.0 mmol/L      Calcium 7.7 mg/dL      Total Protein 7.1 g/dL      Albumin 3.10 g/dL      ALT (SGPT) 31 U/L      AST (SGOT) 39 U/L      Alkaline Phosphatase 58 U/L      Total Bilirubin 0.3 mg/dL      eGFR Non  Amer --     Comment: <15 Indicative of kidney failure.        eGFR  African Amer 5 mL/min/1.73      Comment: <15 Indicative of kidney failure.        Globulin 4.0 gm/dL      A/G Ratio 0.8 g/dL      BUN/Creatinine Ratio 5.1     Anion Gap 22.0 mmol/L     Narrative:      GFR Normal >60  Chronic Kidney Disease <60  Kidney Failure <15      CBC & Differential [[id number]]  (Abnormal) Collected: 10/10/20 1316    Specimen: Blood Updated: 10/10/20 1329    Narrative:      The following orders were created for panel order CBC & Differential.  Procedure                               Abnormality         Status                     ---------                               -----------         ------                     CBC Auto Differential[[id number]]        Abnormal            Final result                 Please view results for these tests on the individual orders.    CBC Auto Differential [[id number]]  (Abnormal) Collected: 10/10/20 1316    Specimen: Blood Updated: 10/10/20 1329     WBC 11.44 10*3/mm3      RBC 3.44 10*6/mm3      Hemoglobin 8.6 g/dL      Hematocrit 27.9 %      MCV 81.1 fL      MCH 25.0 pg      MCHC 30.8 g/dL      RDW 15.0 %      RDW-SD 43.3 fl      MPV 11.3 fL      Platelets 273 10*3/mm3      Neutrophil % 48.4 %      Lymphocyte % 37.9 %      Monocyte % 10.1 %      Eosinophil % 1.5 %      Basophil % 0.3 %      Immature  Grans % 1.8 %      Neutrophils, Absolute 5.54 10*3/mm3      Lymphocytes, Absolute 4.34 10*3/mm3      Monocytes, Absolute 1.15 10*3/mm3      Eosinophils, Absolute 0.17 10*3/mm3      Basophils, Absolute 0.03 10*3/mm3      Immature Grans, Absolute 0.21 10*3/mm3      nRBC 0.0 /100 WBC           Syncope with PEA arrest  - cath site remains stable.   - no evidence of cardiac cause of syncope/arrest.  - continue medical therapy for CAD    Management of diabetes, end-stage renal disease and anemia per primary team.      Disp: Dr. Garza tomorrow.          This document has been electronically signed by EMANUEL Peña on October 11, 2020 09:25 CDT

## 2020-10-11 NOTE — PROGRESS NOTES
Progress Note  Valentín Lewis MD  Hospitalist    Date of visit: 10/11/2020     LOS: 1 day   Patient Care Team:  Elizabeth Burleson APRN as PCP - General (Family Medicine)    Chief Complaint: syncopal episode    Subjective     Interval History:     Patient Complaints: syncopal episode, dropped and hit his head due to a minutes long pulseless interval requiring CPR and IV Epinephrine in order to regain consciousness, heart rate and a measurable BP.    He feels better today, his blood pressure has improved    He has extensive CAD on the recent cardiac cath (not amenable to PTCA).      History taken from: nursing    Medication Review:   Current Facility-Administered Medications   Medication Dose Route Frequency Provider Last Rate Last Dose   • acetaminophen (TYLENOL) tablet 650 mg  650 mg Oral Q4H PRN Valentín Lewis MD        Or   • acetaminophen (TYLENOL) suppository 650 mg  650 mg Rectal Q4H PRN Valentín Lewis MD       • acetaminophen (TYLENOL) tablet 650 mg  650 mg Oral Q4H PRN Valentín Lewis MD       • bisacodyl (DULCOLAX) EC tablet 5 mg  5 mg Oral Daily PRN Valentín Lewis MD       • calcitriol (ROCALTROL) capsule 1 mcg  1 mcg Oral Daily Valentín Lewis MD   1 mcg at 10/11/20 0811   • clopidogrel (PLAVIX) tablet 75 mg  75 mg Oral Daily Valentín Lewis MD   75 mg at 10/11/20 0810   • Delflex-LC/2.5% Dextrose (DIANEAL) CAPD 5,000 mL  5,000 mL Intraperitoneal PRN Valentín Lewis MD       • epoetin arturo (EPOGEN,PROCRIT) injection 6,000 Units  6,000 Units Subcutaneous Weekly Valentín Lewis MD   6,000 Units at 10/10/20 1112   • gentamicin (GARAMYCIN) 0.1 % ointment   Topical Daily Valentín Lewis MD       • influenza vac split quad (FLUZONE,FLUARIX,AFLURIA,FLULAVAL) injection 0.5 mL  0.5 mL Intramuscular During Hospitalization Valentín Lewis MD       • insulin detemir (LEVEMIR) injection 20 Units  20 Units Subcutaneous Nightly Valentín Lewis MD   20 Units at 10/10/20 2138   • isosorbide mononitrate (IMDUR) 24 hr  tablet 30 mg  30 mg Oral Daily Valentín Lewis MD   30 mg at 10/11/20 0810   • nitroglycerin (NITROSTAT) SL tablet 0.4 mg  0.4 mg Sublingual Q5 Min PRN Valentín Lewis MD       • ondansetron (ZOFRAN) tablet 4 mg  4 mg Oral Q6H PRN Valentín Lewis MD        Or   • ondansetron (ZOFRAN) injection 4 mg  4 mg Intravenous Q6H PRN Valentín Lewis MD   4 mg at 10/11/20 0824   • pravastatin (PRAVACHOL) tablet 20 mg  20 mg Oral Daily Valentín Lewis MD   20 mg at 10/11/20 0811   • ranolazine (RANEXA) 12 hr tablet 1,000 mg  1,000 mg Oral BID Valentín Lewis MD   1,000 mg at 10/11/20 0811   • sevelamer (RENVELA) tablet 2,400 mg  2,400 mg Oral TID With Meals Valentín Lewis MD   2,400 mg at 10/11/20 0811   • sodium chloride 0.9 % flush 10 mL  10 mL Intravenous Q12H Valentín Lewis MD   10 mL at 10/11/20 0811   • sodium chloride 0.9 % flush 10 mL  10 mL Intravenous PRN Valentín Lewis MD   10 mL at 10/11/20 0924       Review of Systems:   Review of Systems   Constitutional: Positive for fatigue. Negative for fever.   Respiratory: Positive for cough. Negative for shortness of breath and wheezing.    Cardiovascular: Negative for chest pain and leg swelling.   Gastrointestinal: Negative for abdominal distention, abdominal pain, blood in stool, constipation, nausea and vomiting.   Genitourinary: Negative for dysuria, frequency, hematuria, penile pain, penile swelling and urgency.   Musculoskeletal: Positive for arthralgias.   Skin: Positive for pallor. Negative for color change and rash.   Neurological: Positive for dizziness, syncope, weakness and light-headedness.   Psychiatric/Behavioral: Negative for agitation, behavioral problems and confusion.       Objective     Vital Signs  Temp:  [96.9 °F (36.1 °C)-98.3 °F (36.8 °C)] 97.9 °F (36.6 °C)  Heart Rate:  [50-78] 73  Resp:  [14-18] 16  BP: ()/(45-91) 127/82    Physical Exam:  Physical Exam  Vitals signs reviewed.   Constitutional:       Appearance: Normal appearance. He is  ill-appearing.   HENT:      Head: Normocephalic and atraumatic.   Eyes:      General: No scleral icterus.     Extraocular Movements: Extraocular movements intact.      Pupils: Pupils are equal, round, and reactive to light.   Neck:      Musculoskeletal: Normal range of motion and neck supple. No neck rigidity or muscular tenderness.   Cardiovascular:      Rate and Rhythm: Normal rate and regular rhythm.   Pulmonary:      Effort: No respiratory distress.      Breath sounds: Normal breath sounds. No stridor. No wheezing or rhonchi.   Abdominal:      General: Abdomen is flat. There is no distension.      Palpations: Abdomen is soft. There is no mass.      Tenderness: There is no guarding.      Hernia: No hernia is present.   Musculoskeletal: Normal range of motion.         General: No swelling, tenderness or signs of injury.      Left lower leg: No edema.   Skin:     General: Skin is dry.      Coloration: Skin is pale. Skin is not jaundiced.   Neurological:      Mental Status: He is alert and oriented to person, place, and time. Mental status is at baseline.      Cranial Nerves: No cranial nerve deficit.   Psychiatric:         Mood and Affect: Mood normal.          Results Review:    Lab Results (last 24 hours)     Procedure Component Value Units Date/Time    BNP [[id number]] Collected: 10/11/20 0936    Specimen: Blood Updated: 10/11/20 0938    Magnesium [[id number]] Collected: 10/11/20 0936    Specimen: Blood Updated: 10/11/20 0938    Troponin [[id number]]  (Abnormal) Collected: 10/10/20 1316    Specimen: Blood Updated: 10/10/20 1358     Troponin T 1.940 ng/mL     Narrative:      Troponin T Reference Range:  <= 0.03 ng/mL-   Negative for AMI  >0.03 ng/mL-     Abnormal for myocardial necrosis.  Clinicians would have to utilize clinical acumen, EKG, Troponin and serial changes to determine if it is an Acute Myocardial Infarction or myocardial injury due to an underlying chronic condition.       Results may be falsely  decreased if patient taking Biotin.      Comprehensive Metabolic Panel [[id number]]  (Abnormal) Collected: 10/10/20 1316    Specimen: Blood Updated: 10/10/20 1355     Glucose 174 mg/dL      BUN 68 mg/dL      Creatinine 13.33 mg/dL      Sodium 136 mmol/L      Potassium 4.1 mmol/L      Chloride 95 mmol/L      CO2 19.0 mmol/L      Calcium 7.7 mg/dL      Total Protein 7.1 g/dL      Albumin 3.10 g/dL      ALT (SGPT) 31 U/L      AST (SGOT) 39 U/L      Alkaline Phosphatase 58 U/L      Total Bilirubin 0.3 mg/dL      eGFR Non  Amer --     Comment: <15 Indicative of kidney failure.        eGFR  African Amer 5 mL/min/1.73      Comment: <15 Indicative of kidney failure.        Globulin 4.0 gm/dL      A/G Ratio 0.8 g/dL      BUN/Creatinine Ratio 5.1     Anion Gap 22.0 mmol/L     Narrative:      GFR Normal >60  Chronic Kidney Disease <60  Kidney Failure <15      CBC & Differential [[id number]]  (Abnormal) Collected: 10/10/20 1316    Specimen: Blood Updated: 10/10/20 1329    Narrative:      The following orders were created for panel order CBC & Differential.  Procedure                               Abnormality         Status                     ---------                               -----------         ------                     CBC Auto Differential[[id number]]        Abnormal            Final result                 Please view results for these tests on the individual orders.    CBC Auto Differential [[id number]]  (Abnormal) Collected: 10/10/20 1316    Specimen: Blood Updated: 10/10/20 1329     WBC 11.44 10*3/mm3      RBC 3.44 10*6/mm3      Hemoglobin 8.6 g/dL      Hematocrit 27.9 %      MCV 81.1 fL      MCH 25.0 pg      MCHC 30.8 g/dL      RDW 15.0 %      RDW-SD 43.3 fl      MPV 11.3 fL      Platelets 273 10*3/mm3      Neutrophil % 48.4 %      Lymphocyte % 37.9 %      Monocyte % 10.1 %      Eosinophil % 1.5 %      Basophil % 0.3 %      Immature Grans % 1.8 %      Neutrophils, Absolute 5.54 10*3/mm3      Lymphocytes,  Absolute 4.34 10*3/mm3      Monocytes, Absolute 1.15 10*3/mm3      Eosinophils, Absolute 0.17 10*3/mm3      Basophils, Absolute 0.03 10*3/mm3      Immature Grans, Absolute 0.21 10*3/mm3      nRBC 0.0 /100 WBC           Imaging Results (Last 24 Hours)     Procedure Component Value Units Date/Time    XR Chest Post CVA Port [[id number]] Collected: 10/10/20 1537     Updated: 10/10/20 1600    Narrative:      PROCEDURE: XR CHEST POST CVA PORT    VIEWS:Single    INDICATION: Venous catheter placement    COMPARISON: CXR: 10/8/2020    FINDINGS:       - lines/tubes: Right-sided central venous catheter terminates  at the level of the cavoatrial junction. Median sternotomy wires  again noted.    - cardiac: Mild cardiomegaly is similar to previous study    - mediastinum: Contour unchanged.     - lungs: Mild Central vascular and interstitial prominence and  indistinctness, concerning for CHF.     - pleura: No evidence of  fluid.      - osseous: Unremarkable for age.      Impression:      New right-sided central venous catheter terminates at the  cavoatrial junction, with no other significant interval change  since the previous examination of 10/8/2020      Electronically signed by:  Jazzy Pillai MD  10/10/2020 3:59 PM  CDT Workstation: [id number]    CT Head Without Contrast [[id number]] Collected: 10/10/20 1358     Updated: 10/10/20 1447    Narrative:      PROCEDURE: CT HEAD WO CONTRAST    INDICATION:  Fall, pain    COMPARISON:  None    TECHNIQUE:  CT head, without iv contrast.       This exam was performed according to our departmental  dose-optimization program, which includes automated exposure  control, adjustment of the mA and/or kV according to patient size  and/or use of iterative reconstruction technique.  DLP is 923.9    FINDINGS:    The degree of cerebral atrophy is within normal limits for age.    There is preservation of the gray-white matter differentiation.      No focal parenchymal lesions.    There are mild age  related degenerative cortical and deep white  matter changes.    There is no evidence of a hemorrhage or intracranial mass.    There is no midline shift.    The ventricles are normal in size and configuration.    The brain stem, cerebellum, globes, paranasal sinuses, and  osseous structures are within normal limits. It should be noted  that vessels and the dural venous sinuses are hyperattenuating  compared to their usual appearance but this may be due to  hemoconcentration. No definite hemorrhage is identified, and this  hyperdense appearance is symmetric along the tentorium  bilaterally as well as the falx. Veins in the extra-axial spaces  are unusually prominent as well.        Impression:      1. Hyperdense appearance of both venous and arterial structures,  without obvious intracranial hemorrhage. This may be due to  hemoconcentration/dehydration. Clinical correlation needed. No  intracranial abnormality identified.  2. Small left periorbital scalp hematoma    If pain or symptoms persist beyond reasonable expectations, an  MRI examination is suggested as is deemed clinically appropriate.    RSC requested to arrange critical complex with a shunt is  ordering clinician at 3:25 PM EST on 10/10/2020. Findings  discussed with Dr. Lewis at 3:40 PM EST on 10/10/2020    Electronically signed by:  Jazzy Pillai MD  10/10/2020 2:46 PM  CDT Workstation: [id number]          Assessment/Plan       Hypotension    Cardiac arrest (CMS/HCC)    CAD (coronary artery disease)    Syncope    ESRF (end stage renal failure) (CMS/HCC)    Diabetes mellitus (CMS/HCC)    Hold Toprol XL, Nifedipine XL, Cozaar, Hydralazine, Imdur and Lasix for now given the degree of hypotension. Start IV fluid, IV pressors if necessary in order to maintain a MAP of at least 60.    His head CT was unremarkable; his Echo did not show any new abnormalities (LV EF of 40% with a degree of LVH).    Continue with supportive care, peritoneal dialysis.    Valentín  MD Debbie  10/11/20  09:41 CDT

## 2020-10-11 NOTE — PLAN OF CARE
Problem: Adult Inpatient Plan of Care  Goal: Plan of Care Review  Outcome: Ongoing, Progressing  Flowsheets  Taken 10/11/2020 1854 by Rc Carlson, RN  Progress: no change  Outcome Summary: ACS: patient has had no significant cardiac events this shift, patient denies ischemic chest pain, but verbalizes anterior,medial tenderness/soreness r/t chest compressions peformed on 10/10/2020.  Continue to monitor patient for s/s of ACS syndrome. Afebrile. VSS.  Taken 10/10/2020 0139 by Shaw Michaels, RN  Plan of Care Reviewed With: patient   Goal Outcome Evaluation:  Plan of Care Reviewed With: patient  Progress: improving

## 2020-10-11 NOTE — PROGRESS NOTES
"[facility]  [address] [place] [address]. [address]  T - [phone]  F - [phone]     Progress Note          PATIENT  DEMOGRAPHICS   PATIENT NAME: Sumanth Robledo                      PHYSICIAN: EMANUEL Sadler  : 1960  MRN: [id number]   LOS: 1 day    Patient Care Team:  Elizabeth Burleson APRN as PCP - General (Family Medicine)  Subjective   SUBJECTIVE   Alert and responsive. Denies CP, SOA, no fever or chills. Some nausea this morning. Has hematoma frontal area of scalp.          Objective   OBJECTIVE   Vital Signs  Temp:  [96.9 °F (36.1 °C)-98.3 °F (36.8 °C)] 98.3 °F (36.8 °C)  Heart Rate:  [50-78] 71  Resp:  [14-18] 16  BP: ()/(45-91) 125/65    Flowsheet Rows      First Filed Value   Admission Height  170.2 cm (67\") Documented at 10/08/2020 2035   Admission Weight  81.5 kg (179 lb 9.6 oz) Documented at 10/08/2020 2046           I/O last 3 completed shifts:  In: 1345 [I.V.:1345]  Out: 237 [Urine:150; Other:87]    PHYSICAL EXAM    Physical Exam  Constitutional:       General: He is not in acute distress.  HENT:      Head:      Comments: Hematoma scalp- frontal area     Mouth/Throat:      Mouth: Mucous membranes are moist.   Cardiovascular:      Rate and Rhythm: Tachycardia present.   Abdominal:      General: There is distension.      Palpations: Abdomen is soft.   Musculoskeletal:         General: Swelling present.      Comments: Trace ryann LE edema   Skin:     General: Skin is warm and dry.   Neurological:      Mental Status: He is alert. He is disoriented.      Comments: Reports he is a little confused about what happened yesterday   Psychiatric:         Mood and Affect: Mood normal.         Behavior: Behavior normal.         RESULTS   Results Review:    Results from last 7 days   Lab Units 10/10/20  1316 10/10/20  0610 10/09/20  1403 10/09/20  0647  10/08/20  1704   SODIUM mmol/L 136 137 136 140   < > 140   POTASSIUM mmol/L 4.1 3.8 4.6 4.3   < > 3.8 "   CHLORIDE mmol/L 95* 96* 98 102   < > 98   CO2 mmol/L 19.0* 23.0 22.0 19.0*   < > 25.0   BUN mg/dL 68* 69* 71* 70*   < > 65*   CREATININE mg/dL 13.33* 12.62* 13.64* 13.51*   < > 13.19*   CALCIUM mg/dL 7.7* 8.3* 8.2* 8.2*   < > 8.6   BILIRUBIN mg/dL 0.3  --   --  0.4  --  0.3   ALK PHOS U/L 58  --   --  59  --  70   ALT (SGPT) U/L 31  --   --  10  --  11   AST (SGOT) U/L 39  --   --  17  --  13   GLUCOSE mg/dL 174* 127* 109* 117*   < > 181*    < > = values in this interval not displayed.       Estimated Creatinine Clearance: 6.4 mL/min (A) (by C-G formula based on SCr of 13.33 mg/dL (H)).    Results from last 7 days   Lab Units 10/09/20  0647   MAGNESIUM mg/dL 2.1   PHOSPHORUS mg/dL 9.5*             Results from last 7 days   Lab Units 10/10/20  1316 10/10/20  0610 10/09/20  0647 10/08/20  2054 10/08/20  1704   WBC 10*3/mm3 11.44* 8.57 8.71 9.95 11.11*   HEMOGLOBIN g/dL 8.6* 8.8* 9.2* 10.2* 10.1*   PLATELETS 10*3/mm3 273 303 299 319 324       Results from last 7 days   Lab Units 10/09/20  1403 10/08/20  2054   INR  1.80* 1.05         Imaging Results (Last 24 Hours)     Procedure Component Value Units Date/Time    XR Chest Post CVA Port [[id number]] Collected: 10/10/20 1537     Updated: 10/10/20 1600    Narrative:      PROCEDURE: XR CHEST POST CVA PORT    VIEWS:Single    INDICATION: Venous catheter placement    COMPARISON: CXR: 10/8/2020    FINDINGS:       - lines/tubes: Right-sided central venous catheter terminates  at the level of the cavoatrial junction. Median sternotomy wires  again noted.    - cardiac: Mild cardiomegaly is similar to previous study    - mediastinum: Contour unchanged.     - lungs: Mild Central vascular and interstitial prominence and  indistinctness, concerning for CHF.     - pleura: No evidence of  fluid.      - osseous: Unremarkable for age.      Impression:      New right-sided central venous catheter terminates at the  cavoatrial junction, with no other significant interval change  since  the previous examination of 10/8/2020      Electronically signed by:  Jazzy Pillai MD  10/10/2020 3:59 PM  CDT Workstation: [id number]    CT Head Without Contrast [[id number]] Collected: 10/10/20 1358     Updated: 10/10/20 1447    Narrative:      PROCEDURE: CT HEAD WO CONTRAST    INDICATION:  Fall, pain    COMPARISON:  None    TECHNIQUE:  CT head, without iv contrast.       This exam was performed according to our departmental  dose-optimization program, which includes automated exposure  control, adjustment of the mA and/or kV according to patient size  and/or use of iterative reconstruction technique.  DLP is 923.9    FINDINGS:    The degree of cerebral atrophy is within normal limits for age.    There is preservation of the gray-white matter differentiation.      No focal parenchymal lesions.    There are mild age related degenerative cortical and deep white  matter changes.    There is no evidence of a hemorrhage or intracranial mass.    There is no midline shift.    The ventricles are normal in size and configuration.    The brain stem, cerebellum, globes, paranasal sinuses, and  osseous structures are within normal limits. It should be noted  that vessels and the dural venous sinuses are hyperattenuating  compared to their usual appearance but this may be due to  hemoconcentration. No definite hemorrhage is identified, and this  hyperdense appearance is symmetric along the tentorium  bilaterally as well as the falx. Veins in the extra-axial spaces  are unusually prominent as well.        Impression:      1. Hyperdense appearance of both venous and arterial structures,  without obvious intracranial hemorrhage. This may be due to  hemoconcentration/dehydration. Clinical correlation needed. No  intracranial abnormality identified.  2. Small left periorbital scalp hematoma    If pain or symptoms persist beyond reasonable expectations, an  MRI examination is suggested as is deemed clinically appropriate.    [facility]  requested to arrange critical complex with a shunt is  ordering clinician at 3:25 PM EST on 10/10/2020. Findings  discussed with Dr. Lewis at 3:40 PM EST on 10/10/2020    Electronically signed by:  Jazzy Pillai MD  10/10/2020 2:46 PM  CDT Workstation: [id number]      Heart cath 10/9/20  Procedure performed:  1.  Left heart catheterization with iliofemoral arteriogram and Angio-Seal closure device.  2.  Attempted unsuccessful PTCA of the 100% occluded left main .  3.  Selective cannulation of the left internal mammary artery.  4.  Selective cannulation of the saphenous vein graft to the right coronary artery and to the obtuse marginal branch.     MEDICATIONS    calcitriol, 1 mcg, Oral, Daily  clopidogrel, 75 mg, Oral, Daily  epoetin arturo/arturo-epbx, 6,000 Units, Subcutaneous, Weekly  gentamicin, , Topical, Daily  insulin detemir, 20 Units, Subcutaneous, Nightly  isosorbide mononitrate, 30 mg, Oral, Daily  pravastatin, 20 mg, Oral, Daily  ranolazine, 1,000 mg, Oral, BID  sevelamer, 2,400 mg, Oral, TID With Meals  sodium chloride, 10 mL, Intravenous, Q12H      norepinephrine, 0.02-0.3 mcg/kg/min  sodium chloride, 125 mL/hr, Last Rate: 125 mL/hr (10/11/20 0015)        Assessment/Plan   ASSESSMENT / PLAN      Hypotension    CAD (coronary artery disease)    ESRF (end stage renal failure) (CMS/[facility])    Diabetes mellitus (CMS/[facility])    Cardiac arrest (CMS/[facility])    Syncope    1.end-stage renal disease currently on peritoneal dialysis every night.  Patient is doing pd with 2800 cc 5 cycles with last fill of 1000 cc.  His estimated dry weight is 84 kg.  Weight is increased 3 kgs last 24 hours.  Tolerated PD last night without issues. UOP about like usual. Cr 13.33. Corrected calcium 8.4- requesting mag level     2.anemia chronic kidney disease-Hgb 8.6. We will continue with Epogen 6000 units once a week     3.CKD/mineral bone disorder.  Patient will remain on calcitriol 1 mcg daily along with Renvela 2400 mg 3 times a day-  last phos 9.5     4.CAD/ HTN/ PEA- Cardiac cath 10/9/20 showed 100% occlusion left main . Had episode of PEA Saturday AM and CPR was administered x 4 minutes and patient received epinepherine x 2. Nifedipine ER, Toprol XL, losartan, and lasix are currently on hold. Troponins 2.0-1.9. Rec'd 1 liter NS following code and continues on NS at 125cc/hr will decrease to 60cc/hr. Has levophed on order but has not been given. /70 this am. Will request BNP- last BNP 40K    5. ? Seizures- witnessed seizure x 2 post Code Blue- Ct scan of head was unremarkable.     6. Metabolic acidosis- bicarb 19- will give sodium bicarb 50meq x 1 IV    7. WBC increased 11.4. Afebrile- monitoring- chest xray done yesterday without infiltrate              This document has been electronically signed by EMANUEL Sadler on October 11, 2020 07:52 CDT

## 2020-10-11 NOTE — PLAN OF CARE
Goal Outcome Evaluation:  Plan of Care Reviewed With: patient  Progress: improving  Coded with compressions late yesterday afternoon. Pt denies angina this shift. Pt does c/o chest soreness when moving.Alert and orientedX4. Vital signs remain stable without cardiac gtts. Pt receiving peritoneal dialysis at this time, tolerating well without c/o's.

## 2020-10-12 ENCOUNTER — APPOINTMENT (OUTPATIENT)
Dept: PULMONOLOGY | Facility: HOSPITAL | Age: 60
End: 2020-10-12

## 2020-10-12 LAB — GLUCOSE BLDC GLUCOMTR-MCNC: 212 MG/DL (ref 70–130)

## 2020-10-12 PROCEDURE — 97166 OT EVAL MOD COMPLEX 45 MIN: CPT

## 2020-10-12 PROCEDURE — 95816 EEG AWAKE AND DROWSY: CPT

## 2020-10-12 PROCEDURE — 25010000002 HEPARIN (PORCINE) PER 1000 UNITS: Performed by: INTERNAL MEDICINE

## 2020-10-12 PROCEDURE — 63710000001 INSULIN DETEMIR PER 5 UNITS: Performed by: INTERNAL MEDICINE

## 2020-10-12 PROCEDURE — 82962 GLUCOSE BLOOD TEST: CPT

## 2020-10-12 PROCEDURE — 97162 PT EVAL MOD COMPLEX 30 MIN: CPT

## 2020-10-12 RX ORDER — DEXTROSE MONOHYDRATE, SODIUM CHLORIDE, SODIUM LACTATE, CALCIUM CHLORIDE, MAGNESIUM CHLORIDE 2.5; 538; 448; 18.4; 5.08 G/100ML; MG/100ML; MG/100ML; MG/100ML; MG/100ML
5000 SOLUTION INTRAPERITONEAL AS NEEDED
Status: DISCONTINUED | OUTPATIENT
Start: 2020-10-12 | End: 2020-10-20 | Stop reason: HOSPADM

## 2020-10-12 RX ORDER — HYDRALAZINE HYDROCHLORIDE 20 MG/ML
10 INJECTION INTRAMUSCULAR; INTRAVENOUS EVERY 6 HOURS PRN
Status: DISCONTINUED | OUTPATIENT
Start: 2020-10-12 | End: 2020-10-12 | Stop reason: SDUPTHER

## 2020-10-12 RX ORDER — LOSARTAN POTASSIUM 50 MG/1
50 TABLET ORAL
Status: DISCONTINUED | OUTPATIENT
Start: 2020-10-12 | End: 2020-10-20 | Stop reason: HOSPADM

## 2020-10-12 RX ORDER — ISOSORBIDE MONONITRATE 60 MG/1
60 TABLET, EXTENDED RELEASE ORAL
Status: DISCONTINUED | OUTPATIENT
Start: 2020-10-13 | End: 2020-10-20 | Stop reason: HOSPADM

## 2020-10-12 RX ORDER — HYDRALAZINE HYDROCHLORIDE 25 MG/1
25 TABLET, FILM COATED ORAL EVERY 8 HOURS SCHEDULED
Status: DISCONTINUED | OUTPATIENT
Start: 2020-10-12 | End: 2020-10-20 | Stop reason: HOSPADM

## 2020-10-12 RX ORDER — SODIUM BICARBONATE 650 MG/1
650 TABLET ORAL 3 TIMES DAILY
Status: DISCONTINUED | OUTPATIENT
Start: 2020-10-12 | End: 2020-10-15

## 2020-10-12 RX ORDER — FAMOTIDINE 20 MG/1
20 TABLET, FILM COATED ORAL DAILY
Status: DISCONTINUED | OUTPATIENT
Start: 2020-10-12 | End: 2020-10-20 | Stop reason: HOSPADM

## 2020-10-12 RX ORDER — CARVEDILOL 6.25 MG/1
6.25 TABLET ORAL 2 TIMES DAILY WITH MEALS
Status: DISCONTINUED | OUTPATIENT
Start: 2020-10-12 | End: 2020-10-19

## 2020-10-12 RX ORDER — ISOSORBIDE MONONITRATE 30 MG/1
30 TABLET, EXTENDED RELEASE ORAL ONCE
Status: COMPLETED | OUTPATIENT
Start: 2020-10-12 | End: 2020-10-12

## 2020-10-12 RX ORDER — HEPARIN SODIUM 5000 [USP'U]/ML
5000 INJECTION, SOLUTION INTRAVENOUS; SUBCUTANEOUS EVERY 12 HOURS SCHEDULED
Status: DISCONTINUED | OUTPATIENT
Start: 2020-10-12 | End: 2020-10-20 | Stop reason: HOSPADM

## 2020-10-12 RX ADMIN — HEPARIN SODIUM 5000 UNITS: 5000 INJECTION INTRAVENOUS; SUBCUTANEOUS at 11:50

## 2020-10-12 RX ADMIN — LOSARTAN POTASSIUM 50 MG: 50 TABLET, FILM COATED ORAL at 21:13

## 2020-10-12 RX ADMIN — RANOLAZINE 1000 MG: 500 TABLET, FILM COATED, EXTENDED RELEASE ORAL at 09:05

## 2020-10-12 RX ADMIN — DEXTROSE MONOHYDRATE, SODIUM CHLORIDE, SODIUM LACTATE, CALCIUM CHLORIDE, MAGNESIUM CHLORIDE 5000 ML: 2.5; 538; 448; 18.4; 5.08 SOLUTION INTRAPERITONEAL at 15:56

## 2020-10-12 RX ADMIN — HYDRALAZINE HYDROCHLORIDE 25 MG: 25 TABLET, FILM COATED ORAL at 19:48

## 2020-10-12 RX ADMIN — FAMOTIDINE 20 MG: 20 TABLET, FILM COATED ORAL at 11:49

## 2020-10-12 RX ADMIN — SEVELAMER CARBONATE 2400 MG: 800 TABLET, FILM COATED ORAL at 18:08

## 2020-10-12 RX ADMIN — SEVELAMER CARBONATE 2400 MG: 800 TABLET, FILM COATED ORAL at 12:43

## 2020-10-12 RX ADMIN — SODIUM CHLORIDE, PRESERVATIVE FREE 10 ML: 5 INJECTION INTRAVENOUS at 21:15

## 2020-10-12 RX ADMIN — CARVEDILOL 6.25 MG: 6.25 TABLET, FILM COATED ORAL at 19:48

## 2020-10-12 RX ADMIN — CLOPIDOGREL BISULFATE 75 MG: 75 TABLET ORAL at 09:20

## 2020-10-12 RX ADMIN — SODIUM BICARBONATE 650 MG: 650 TABLET ORAL at 11:49

## 2020-10-12 RX ADMIN — ISOSORBIDE MONONITRATE 30 MG: 30 TABLET, EXTENDED RELEASE ORAL at 09:05

## 2020-10-12 RX ADMIN — METOROPROLOL TARTRATE 5 MG: 5 INJECTION, SOLUTION INTRAVENOUS at 23:18

## 2020-10-12 RX ADMIN — SODIUM BICARBONATE 650 MG: 650 TABLET ORAL at 18:08

## 2020-10-12 RX ADMIN — ISOSORBIDE MONONITRATE 30 MG: 30 TABLET, EXTENDED RELEASE ORAL at 19:48

## 2020-10-12 RX ADMIN — RANOLAZINE 1000 MG: 500 TABLET, FILM COATED, EXTENDED RELEASE ORAL at 21:13

## 2020-10-12 RX ADMIN — PRAVASTATIN SODIUM 20 MG: 20 TABLET ORAL at 09:05

## 2020-10-12 RX ADMIN — INSULIN DETEMIR 20 UNITS: 100 INJECTION, SOLUTION SUBCUTANEOUS at 21:12

## 2020-10-12 RX ADMIN — SODIUM BICARBONATE 650 MG: 650 TABLET ORAL at 21:15

## 2020-10-12 RX ADMIN — HEPARIN SODIUM 5000 UNITS: 5000 INJECTION INTRAVENOUS; SUBCUTANEOUS at 21:15

## 2020-10-12 RX ADMIN — ASPIRIN 81 MG: 81 TABLET, CHEWABLE ORAL at 09:06

## 2020-10-12 RX ADMIN — SEVELAMER CARBONATE 2400 MG: 800 TABLET, FILM COATED ORAL at 09:05

## 2020-10-12 RX ADMIN — SODIUM CHLORIDE, PRESERVATIVE FREE 10 ML: 5 INJECTION INTRAVENOUS at 09:12

## 2020-10-12 RX ADMIN — CALCITRIOL 1 MCG: 0.25 CAPSULE ORAL at 09:05

## 2020-10-12 NOTE — PROGRESS NOTES
[facility] Services  INPATIENT PROGRESS NOTE    Length of Stay: 2  Date of Admission: 10/8/2020  Primary Care Physician: Elizabeth Burleson APRN    Subjective   Chief Complaint: No new complaints.    HPI: Patient is seen for follow-up.  He is doing well, tolerating prescribed diet, deconditioned and voices no new complaints.  He is on supplemental oxygen of 2 L nasal prongs with saturation in the low to mid 90s.    Review of Systems   Constitutional: Positive for activity change and fatigue. Negative for appetite change, chills, diaphoresis and fever.   HENT: Negative for trouble swallowing and voice change.    Eyes: Negative for photophobia and visual disturbance.   Respiratory: Negative for cough, choking, chest tightness, shortness of breath, wheezing and stridor.    Cardiovascular: Negative for chest pain, palpitations and leg swelling.   Gastrointestinal: Negative for abdominal distention, abdominal pain, blood in stool, constipation, diarrhea, nausea and vomiting.   Endocrine: Negative for cold intolerance, heat intolerance, polydipsia, polyphagia and polyuria.   Genitourinary: Negative for decreased urine volume, difficulty urinating, dysuria, enuresis, flank pain, frequency, hematuria and urgency.   Musculoskeletal: Negative for arthralgias, gait problem, myalgias, neck pain and neck stiffness.   Skin: Negative for pallor, rash and wound.   Neurological: Negative for dizziness, tremors, seizures, syncope, facial asymmetry, speech difficulty, weakness, light-headedness, numbness and headaches.   Hematological: Does not bruise/bleed easily.   Psychiatric/Behavioral: Negative for agitation, behavioral problems and confusion.       Objective    Temp:  [97.4 °F (36.3 °C)-98.2 °F (36.8 °C)] 97.4 °F (36.3 °C)  Heart Rate:  [75-86] 86  Resp:  [14-20] 16  BP: (108-138)/() 130/96    Physical Exam  Vitals signs and nursing note reviewed.   Constitutional:        General: He is not in acute distress.     Appearance: He is well-developed. He is obese. He is not diaphoretic.   HENT:      Head: Normocephalic and atraumatic.   Eyes:      General: No scleral icterus.     Pupils: Pupils are equal, round, and reactive to light.   Neck:      Musculoskeletal: Normal range of motion and neck supple.      Thyroid: No thyromegaly.      Vascular: No JVD.   Cardiovascular:      Rate and Rhythm: Normal rate and regular rhythm.      Heart sounds: Normal heart sounds. No murmur. No friction rub. No gallop.    Pulmonary:      Effort: Pulmonary effort is normal.      Breath sounds: Normal breath sounds. No wheezing or rales.   Chest:      Chest wall: No tenderness.   Abdominal:      General: Bowel sounds are normal. There is no distension.      Palpations: Abdomen is soft. There is no mass.      Tenderness: There is no abdominal tenderness. There is no guarding or rebound.   Musculoskeletal:         General: No tenderness or deformity.   Skin:     General: Skin is warm and dry.      Coloration: Skin is not pale.      Findings: No erythema or rash.   Neurological:      Mental Status: He is alert and oriented to person, place, and time.      Cranial Nerves: No cranial nerve deficit.      Sensory: No sensory deficit.      Motor: No weakness or abnormal muscle tone.      Coordination: Coordination normal.      Deep Tendon Reflexes: Reflexes normal.   Psychiatric:         Behavior: Behavior normal.         Thought Content: Thought content normal.         Judgment: Judgment normal.           Medication Review:    Current Facility-Administered Medications:   •  acetaminophen (TYLENOL) tablet 650 mg, 650 mg, Oral, Q4H PRN, 650 mg at 10/11/20 1108 **OR** [DISCONTINUED] acetaminophen (TYLENOL) 160 MG/5ML solution 650 mg, 650 mg, Oral, Q4H PRN **OR** acetaminophen (TYLENOL) suppository 650 mg, 650 mg, Rectal, Q4H PRN, Valentín Lewis MD  •  acetaminophen (TYLENOL) tablet 650 mg, 650 mg, Oral, Q4H PRN,  Valentín Lewis MD  •  aspirin chewable tablet 81 mg, 81 mg, Oral, Daily, Zahida Lewis MD, 81 mg at 10/12/20 0906  •  bisacodyl (DULCOLAX) EC tablet 5 mg, 5 mg, Oral, Daily PRN, Valentín Lewis MD  •  calcitriol (ROCALTROL) capsule 1 mcg, 1 mcg, Oral, Daily, Valentín Lewis MD, 1 mcg at 10/12/20 0905  •  clopidogrel (PLAVIX) tablet 75 mg, 75 mg, Oral, Daily, Valentín Lewis MD, 75 mg at 10/12/20 0920  •  Delflex-LC/1.5% Dextrose (DIANEAL) CAPD 12,200 mL, 12,200 mL, Intraperitoneal, PRN, Jose E Funetes MD  •  Delflex-LC/2.5% Dextrose (DIANEAL) CAPD 5,000 mL, 5,000 mL, Intraperitoneal, PRN, Valentín Lewis MD  •  [START ON 10/17/2020] epoetin arturo-epbx (RETACRIT) injection 10,000 Units, 10,000 Units, Subcutaneous, Weekly, Mauro Saravia MD  •  influenza vac split quad (FLUZONE,FLUARIX,AFLURIA,FLULAVAL) injection 0.5 mL, 0.5 mL, Intramuscular, During Hospitalization, Valentín Lewis MD  •  insulin detemir (LEVEMIR) injection 20 Units, 20 Units, Subcutaneous, Nightly, Valentín Lewis MD, 20 Units at 10/11/20 2033  •  isosorbide mononitrate (IMDUR) 24 hr tablet 30 mg, 30 mg, Oral, Daily, Valentín Lewis MD, 30 mg at 10/12/20 0905  •  nitroglycerin (NITROSTAT) SL tablet 0.4 mg, 0.4 mg, Sublingual, Q5 Min PRN, Valentín Lewis MD  •  ondansetron (ZOFRAN) tablet 4 mg, 4 mg, Oral, Q6H PRN **OR** ondansetron (ZOFRAN) injection 4 mg, 4 mg, Intravenous, Q6H PRN, Valentín Lewis MD, 4 mg at 10/11/20 0824  •  pravastatin (PRAVACHOL) tablet 20 mg, 20 mg, Oral, Daily, Valentín Lewis MD, 20 mg at 10/12/20 0905  •  ranolazine (RANEXA) 12 hr tablet 1,000 mg, 1,000 mg, Oral, BID, Valentín Lewis MD, 1,000 mg at 10/12/20 0905  •  sevelamer (RENVELA) tablet 2,400 mg, 2,400 mg, Oral, TID With Meals, Valentín Lewis MD, 2,400 mg at 10/12/20 0905  •  sodium bicarbonate tablet 650 mg, 650 mg, Oral, TID, Mauro Saravia MD  •  sodium chloride 0.9 % flush 10 mL, 10 mL, Intravenous, Q12H, Valentín Lewis MD, 10 mL at 10/12/20 0912  •   sodium chloride 0.9 % flush 10 mL, 10 mL, Intravenous, PRN, Valentín Lewis MD, 10 mL at 10/11/20 2038    Results Review:  I have reviewed the labs, radiology results, and diagnostic studies.    Laboratory Data:   Results from last 7 days   Lab Units 10/10/20  1316 10/10/20  0610 10/09/20  1403 10/09/20  0647  10/08/20  1704   SODIUM mmol/L 136 137 136 140   < > 140   POTASSIUM mmol/L 4.1 3.8 4.6 4.3   < > 3.8   CHLORIDE mmol/L 95* 96* 98 102   < > 98   CO2 mmol/L 19.0* 23.0 22.0 19.0*   < > 25.0   BUN mg/dL 68* 69* 71* 70*   < > 65*   CREATININE mg/dL 13.33* 12.62* 13.64* 13.51*   < > 13.19*   GLUCOSE mg/dL 174* 127* 109* 117*   < > 181*   CALCIUM mg/dL 7.7* 8.3* 8.2* 8.2*   < > 8.6   BILIRUBIN mg/dL 0.3  --   --  0.4  --  0.3   ALK PHOS U/L 58  --   --  59  --  70   ALT (SGPT) U/L 31  --   --  10  --  11   AST (SGOT) U/L 39  --   --  17  --  13   ANION GAP mmol/L 22.0* 18.0* 16.0* 19.0*   < > 17.0*    < > = values in this interval not displayed.     Estimated Creatinine Clearance: 6.4 mL/min (A) (by C-G formula based on SCr of 13.33 mg/dL (H)).  Results from last 7 days   Lab Units 10/11/20  0936 10/09/20  0647   MAGNESIUM mg/dL 1.8 2.1   PHOSPHORUS mg/dL  --  9.5*         Results from last 7 days   Lab Units 10/10/20  1316 10/10/20  0610 10/09/20  0647 10/08/20  2054 10/08/20  1704   WBC 10*3/mm3 11.44* 8.57 8.71 9.95 11.11*   HEMOGLOBIN g/dL 8.6* 8.8* 9.2* 10.2* 10.1*   HEMATOCRIT % 27.9* 28.2* 29.5* 32.6* 32.7*   PLATELETS 10*3/mm3 273 303 299 319 324     Results from last 7 days   Lab Units 10/09/20  1403 10/08/20  2054   INR  1.80* 1.05       Culture Data:   No results found for: BLOODCX  No results found for: URINECX  No results found for: RESPCX  No results found for: WOUNDCX  No results found for: STOOLCX  No components found for: BODYFLD    Radiology Data:   Imaging Results (Last 24 Hours)     ** No results found for the last 24 hours. **          I have reviewed the patient's current medications.          Assessment/Plan     Hospital Problem List:  Principal Problem:    Hypotension  Active Problems:    CAD (coronary artery disease)    ESRF (end stage renal failure) (CMS/[facility])    Diabetes mellitus (CMS/[facility])    Cardiac arrest (CMS/[facility])    Syncope    History of coronary artery disease status post cardiac arrest/syncope: Patient is clinically stable and currently asymptomatic.  Cardiologist thinks patient may have had an episode of PEA.  Continue guideline directed medical therapy.  Echocardiogram done 10/10/2020 showed:  · Left ventricular ejection fraction appears to be 41 - 45%. Left ventricular systolic function is mildly decreased.  · Left ventricular diastolic function is consistent with (grade II w/high LAP) pseudonormalization.  · Left ventricular wall thickness is consistent with concentric hypertrophy.  · Mild mitral annular calcification is present.  · Mild mitral valve regurgitation is present.  · Estimated right ventricular systolic pressure from tricuspid regurgitation is normal (<35 mmHg).  · The left atrial cavity is moderately dilated.  · Mildly reduced right ventricular systolic function noted.    Diabetes mellitus: Stable.  Continue anti-glycemic with Accu-Cheks and sliding scale insulin.    End-stage renal disease: Patient is on peritoneal dialysis nightly.  Nephrologist is following.    Anemia of chronic inflammation: Hemoglobin is stable.  Continue weekly Epogen.    Questionable seizures: Noncontrast CT scan of the head was unremarkable and patient remains asymptomatic.  Check EEG.    Deconditioning: Consult PT and OT.    Continue GI and DVT prophylaxis.      Discharge Planning: In progress.    Brendan Moon MD   10/12/20   10:19 CDT

## 2020-10-12 NOTE — THERAPY EVALUATION
Patient Name: Sumanth Robledo  : 1960    MRN: [id number]                              Today's Date: 10/12/2020       Admit Date: 10/8/2020    Visit Dx:     ICD-10-CM ICD-9-CM   1. Precordial pain  R07.2 786.51   2. Impaired mobility and ADLs  Z74.09 V49.89    Z78.9    3. Impaired functional mobility, balance, gait, and endurance  Z74.09 V49.89     Patient Active Problem List   Diagnosis   • Chest pain   • CAD (coronary artery disease)   • ESRF (end stage renal failure) (CMS/[facility])   • Hypertension   • Diabetes mellitus (CMS/[facility])   • Precordial pain   • Cardiac arrest (CMS/[facility])   • Hypotension   • Syncope     Past Medical History:   Diagnosis Date   • Chronic kidney disease, stage 3    • Coronary arteriosclerosis     eCABGx3 10/2014 LM 95% LAD diff 70% D1 70% OM2 70% OM3 70% & % USA STEMI      • End stage renal disease (CMS/[facility])    • Essential hypertension    • Heterozygous thalassemia     Thalassemia minor      • Hyperlipidemia    • Peripheral vascular disease (CMS/[facility])     WILSON RIGHT 0.97 LEFT 0.79, stent RS, JADYN 2015      • Surgical follow-up care     Emergent CABG X 3     • Type 2 diabetes mellitus (CMS/[facility])      Past Surgical History:   Procedure Laterality Date   • AMPUTATION FINGER / THUMB Right     RIGHT middle finger   • CARDIAC CATHETERIZATION  10/11/2014    Distal left main critical stenosis of up to 95-99% with evidence of filling defect suggestive of a plaque. LAD and OMB 1 and 2 stenosis.A 100% occludeed RCA.   • CARDIAC CATHETERIZATION N/A 2020    Procedure: Left Heart Cath 2020 @ 9;  Surgeon: Gibran Garza MD;  Location: [facility] CATH INVASIVE LOCATION;  Service: Cardiology;  Laterality: N/A;   • CARDIAC CATHETERIZATION N/A 10/9/2020    Procedure: Left Heart Cath;  Surgeon: Gibran Garza MD;  Location: [facility] CATH INVASIVE LOCATION;  Service: Cardiology;  Laterality: N/A;   • CARDIAC SURGERY     • CORONARY ARTERY BYPASS GRAFT  10/11/2014    Emergent CABG X 3 LIMA->LAD  SVG->OM SVG->PDA     General Information     Row Name 10/12/20 1342          Physical Therapy Time and Intention    Document Type  evaluation  -KW     Mode of Treatment  individual therapy;physical therapy  -KW     Row Name 10/12/20 1342          General Information    Patient Profile Reviewed  yes  -KW     Prior Level of Function  independent:;community mobility;gait;ADL's;driving  -KW     Existing Precautions/Restrictions  fall  -KW     Row Name 10/12/20 1342          Living Environment    Lives With  spouse  -KW     Row Name 10/12/20 1342          Home Main Entrance    Number of Stairs, Main Entrance  two  -KW     Stair Railings, Main Entrance  railing on left side (ascending)  -KW     Row Name 10/12/20 1342          Stairs Within Home, Primary    Number of Stairs, Within Home, Primary  none  -KW     Row Name 10/12/20 1342          Cognition    Orientation Status (Cognition)  oriented x 4  -KW     Row Name 10/12/20 1342          Safety Issues, Functional Mobility    Impairments Affecting Function (Mobility)  strength;endurance/activity tolerance;balance  -KW       User Key  (r) = Recorded By, (t) = Taken By, (c) = Cosigned By    Initials Name Provider Type    KW Jaquelin Gabriel, PT Physical Therapist        Mobility     Row Name 10/12/20 1418          Bed Mobility    Bed Mobility  sit-supine  -KW     Sit-Supine Cross (Bed Mobility)  minimum assist (75% patient effort)  -KW     Assistive Device (Bed Mobility)  bed rails;head of bed elevated  -KW     Row Name 10/12/20 1418          Bed-Chair Transfer    Bed-Chair Cross (Transfers)  contact guard  -KW     Assistive Device (Bed-Chair Transfers)  walker, front-wheeled  -KW     Row Name 10/12/20 1418          Sit-Stand Transfer    Sit-Stand Cross (Transfers)  contact guard  -KW     Assistive Device (Sit-Stand Transfers)  walker, front-wheeled  -KW     Row Name 10/12/20 1418          Gait/Stairs (Locomotion)    Cross Level (Gait)  contact guard   -KW     Assistive Device (Gait)  walker, front-wheeled  -KW     Distance in Feet (Gait)  3ft from chair to bed  -KW     Comment (Gait/Stairs)  pt deferring further mobility beyond bed<>chair at this time  -KW       User Key  (r) = Recorded By, (t) = Taken By, (c) = Cosigned By    Initials Name Provider Type    KW Jaquelin Gabriel, PT Physical Therapist        Obj/Interventions     Row Name 10/12/20 1420          Range of Motion Comprehensive    Comment, General Range of Motion  BLE AROM WFL  -KW     Row Name 10/12/20 1420          Strength Comprehensive (MMT)    Comment, General Manual Muscle Testing (MMT) Assessment  BLE grossly 4/5  -KW     Row Name 10/12/20 1420          Balance    Balance Assessment  sitting static balance  -KW     Static Sitting Balance  WFL;sitting in chair  -KW     Row Name 10/12/20 1420          Sensory Assessment (Somatosensory)    Sensory Assessment (Somatosensory)  LE sensation intact BLE light touch assessed  -KW       User Key  (r) = Recorded By, (t) = Taken By, (c) = Cosigned By    Initials Name Provider Type    KW Jaquelin Gabriel, PT Physical Therapist        Goals/Plan     Row Name 10/12/20 1423          Bed Mobility Goal 1 (PT)    Activity/Assistive Device (Bed Mobility Goal 1, PT)  sit to supine;supine to sit  -KW     [place] Level/Cues Needed (Bed Mobility Goal 1, PT)  modified independence  -KW     Time Frame (Bed Mobility Goal 1, PT)  3 days  -KW     Strategies/Barriers (Bed Mobility Goal 1, PT)  bed flat, no hand rails  -KW     Progress/Outcomes (Bed Mobility Goal 1, PT)  goal not met  -KW     Row Name 10/12/20 1423          Transfer Goal 1 (PT)    Activity/Assistive Device (Transfer Goal 1, PT)  sit-to-stand/stand-to-sit;bed-to-chair/chair-to-bed LRAD PRN  -KW     [place] Level/Cues Needed (Transfer Goal 1, PT)  modified independence  -KW     Time Frame (Transfer Goal 1, PT)  3 days  -KW     Progress/Outcome (Transfer Goal 1, PT)  goal not met  -KW     Row Name 10/12/20  1423          Gait Training Goal 1 (PT)    Activity/Assistive Device (Gait Training Goal 1, PT)  gait (walking locomotion);assistive device use;decrease fall risk;increase endurance/gait distance LRAD PRN  -KW     [place] Level (Gait Training Goal 1, PT)  modified independence  -KW     Distance (Gait Training Goal 1, PT)  150ft or more  -KW     Time Frame (Gait Training Goal 1, PT)  5 days  -KW     Progress/Outcome (Gait Training Goal 1, PT)  goal not met  -KW     Row Name 10/12/20 1423          Stairs Goal 1 (PT)    Activity/Assistive Device (Stairs Goal 1, PT)  ascending stairs;descending stairs;using handrail, left  -KW     [place] Level/Cues Needed (Stairs Goal 1, PT)  standby assist  -KW     Number of Stairs (Stairs Goal 1, PT)  2 or more  -KW     Time Frame (Stairs Goal 1, PT)  by discharge  -KW     Progress/Outcome (Stairs Goal 1, PT)  goal not met  -KW       User Key  (r) = Recorded By, (t) = Taken By, (c) = Cosigned By    Initials Name Provider Type    KW Jaquelin Gabriel, PT Physical Therapist        Clinical Impression     Row Name 10/12/20 1421          Pain    Additional Documentation  Pain Scale: Numbers Pre/Post-Treatment (Group)  -KW     Row Name 10/12/20 1421          Pain Scale: Numbers Pre/Post-Treatment    Pretreatment Pain Rating  5/10  -KW     Posttreatment Pain Rating  5/10  -KW     Pain Location  chest  -KW     Pre/Posttreatment Pain Comment  pain from CPR  -KW     Pain Intervention(s)  Repositioned;Ambulation/increased activity;Rest  -KW     Row Name 10/12/20 1421          Plan of Care Review    Plan of Care Reviewed With  patient;spouse  -KW     Row Name 10/12/20 1421          Therapy Assessment/Plan (PT)    Rehab Potential (PT)  good, to achieve stated therapy goals  -KW     Criteria for Skilled Interventions Met (PT)  yes;skilled treatment is necessary  -KW     Predicted Duration of Therapy Intervention (PT)  until goals met or d/c from acute care  -KW     Row Name 10/12/20 1421           Vital Signs    Pre Systolic BP Rehab  133  -KW     Pre Treatment Diastolic BP  77  -KW     Post Systolic BP Rehab  132  -KW     Post Treatment Diastolic BP  77  -KW     Pretreatment Heart Rate (beats/min)  90  -KW     Posttreatment Heart Rate (beats/min)  89  -KW     Pre SpO2 (%)  94  -KW     O2 Delivery Pre Treatment  supplemental O2  -KW     Post SpO2 (%)  94  -KW     O2 Delivery Post Treatment  supplemental O2  -KW     Pre Patient Position  Sitting  -KW     Post Patient Position  Supine  -KW     Row Name 10/12/20 1421          Positioning and Restraints    Pre-Treatment Position  sitting in chair/recliner  -KW     Post Treatment Position  bed  -KW     In Bed  fowlers;call light within reach;encouraged to call for assist;with family/caregiver;side rails up x2  -KW       User Key  (r) = Recorded By, (t) = Taken By, (c) = Cosigned By    Initials Name Provider Type    Jaquelin Thompson PT Physical Therapist        Outcome Measures     Row Name 10/12/20 1430          How much help from another person do you currently need...    Turning from your back to your side while in flat bed without using bedrails?  3  -KW     Moving from lying on back to sitting on the side of a flat bed without bedrails?  3  -KW     Moving to and from a bed to a chair (including a wheelchair)?  3  -KW     Standing up from a chair using your arms (e.g., wheelchair, bedside chair)?  3  -KW     Climbing 3-5 steps with a railing?  3  -KW     To walk in hospital room?  3  -KW     AM-PAC 6 Clicks Score (PT)  18  -KW     Row Name 10/12/20 1430          Functional Assessment    Outcome Measure Options  AM-PAC 6 Clicks Basic Mobility (PT)  -KW       User Key  (r) = Recorded By, (t) = Taken By, (c) = Cosigned By    Initials Name Provider Type    Jaquelin Thompson PT Physical Therapist        Physical Therapy Education                 Title: PT OT SLP Therapies (In Progress)     Topic: Physical Therapy (In Progress)     Point: Mobility training  (Done)     Learning Progress Summary           Patient Acceptance, E, VU by  at 10/12/2020 1431    Comment: Role of PT, POC, use of gait belt                   Point: Home exercise program (Not Started)     Learner Progress:  Not documented in this visit.          Point: Body mechanics (Not Started)     Learner Progress:  Not documented in this visit.          Point: Precautions (Done)     Learning Progress Summary           Patient Acceptance, E, VU by  at 10/12/2020 1431    Comment: Role of PT, POC, use of gait belt                               User Key     Initials Effective Dates Name Provider Type Discipline     08/09/20 -  Jaquelin Gabriel, PT Physical Therapist PT              PT Recommendation and Plan  Planned Therapy Interventions (PT): balance training, bed mobility training, gait training, home exercise program, patient/family education, strengthening, stair training, transfer training  Plan of Care Reviewed With: patient, spouse  Outcome Summary: PT evaluation completed this date. RN mary khalil and pt agreeable and wanting to get back in bed from chair. Pt performing sit<>stand with CGA and FWW and ambulating 3ft from chair>bed. Pt deferring further mobility at this time and performing sit>supine with Nehal. Pt reporting dizziness after return to supine that resolved over a few minutes. VSS throughout. Pt would benefit from further skilled PT to increase functional mobility, endurance, strength, safety, balance, and to progress towards PLOF. Upon d/c from acute care, recommend home with assist as needed and possible HHPT, pending progress in acute care. Will continue to assess need for AD as pt progresses.     Time Calculation:   PT Charges     Row Name 10/12/20 1435             Time Calculation    Start Time  1340  -KW      Stop Time  1356  -KW      Time Calculation (min)  16 min  -KW      PT Received On  10/12/20  -KW      PT Goal Re-Cert Due Date  10/25/20  -KW        User Key  (r) = Recorded  By, (t) = Taken By, (c) = Cosigned By    Initials Name Provider Type    KW Jaquelin Gabriel, PT Physical Therapist        Therapy Charges for Today     Code Description Service Date Service Provider Modifiers Qty    87447327149 HC PT EVAL MOD COMPLEXITY 1 10/12/2020 Jaquelin Gabriel, PT GP 1          PT G-Codes  Outcome Measure Options: AM-PAC 6 Clicks Basic Mobility (PT)  AM-PAC 6 Clicks Score (PT): 18  AM-PAC 6 Clicks Score (OT): 18    Jaquelin Gabriel PT  10/12/2020

## 2020-10-12 NOTE — PLAN OF CARE
Problem: Adult Inpatient Plan of Care  Goal: Plan of Care Review  Flowsheets  Taken 10/12/2020 1320  Plan of Care Reviewed With:   patient   spouse  Outcome Summary: RN okay'malena OT evaluation this date. Pt pleasant and agreeable. Ox4. Reporting he lives with his wife in a 1 level home with 2 steps to enter. Has a tub/shower. Prior to admission, pt was IND with all ADLs/IADLs. BUE ROM WFLs. MMT grossly 4/5. Sup>sit ModA. Additional time required during transfers d/t pt easily fatigued and c/o dizziness. BP and other vitals WFLs. Pt required MaxA to kirk/doff B socks. Sit>Stand CG and VCs for hand placement. Pt took steps to the chair with CG and AD. In chair end of assessment. Needs met and RN notified as chair alarm being delivered. Educated pt not to get up on his own. Wife also present. Verbalizing understanding. Pt would benefit from continued skilled OT services while IP. Rec home with assist and possibly home OT, pending progress.  Taken 10/12/2020 1147  Plan of Care Reviewed With:   patient   spouse

## 2020-10-12 NOTE — PROGRESS NOTES
"[facility]  [address] [place] [address]. [address]   - [phone]  F - [phone]     Progress Note          PATIENT  DEMOGRAPHICS   PATIENT NAME: Sumanth Robledo                      PHYSICIAN: Mauro Saravia MD  : 1960  MRN: [id number]   LOS: 2 days    Patient Care Team:  Elizabeth Burleson APRN as PCP - General (Family Medicine)  Subjective   SUBJECTIVE   Alert and responsive. Denies CP, SOA, no fever or chills. No dizziness       Objective   OBJECTIVE   Vital Signs  Temp:  [97.4 °F (36.3 °C)-98.2 °F (36.8 °C)] 97.4 °F (36.3 °C)  Heart Rate:  [75-84] 83  Resp:  [14-20] 19  BP: (108-138)/() 130/79    Flowsheet Rows      First Filed Value   Admission Height  170.2 cm (67\") Documented at 10/08/2020 2035   Admission Weight  81.5 kg (179 lb 9.6 oz) Documented at 10/08/2020 2046           I/O last 3 completed shifts:  In: 2279.8 [P.O.:594; I.V.:1685.8]  Out: -396 [Urine:150]    PHYSICAL EXAM    Physical Exam  Constitutional:       General: He is not in acute distress.  HENT:      Head:      Comments: Hematoma scalp- frontal area     Mouth/Throat:      Mouth: Mucous membranes are moist.   Cardiovascular:      Rate and Rhythm: Tachycardia present.   Abdominal:      General: There is distension.      Palpations: Abdomen is soft.   Musculoskeletal:         General: Swelling present.      Comments: Trace ryann LE edema   Skin:     General: Skin is warm and dry.   Neurological:      General: No focal deficit present.      Mental Status: He is alert and oriented to person, place, and time.   Psychiatric:         Mood and Affect: Mood normal.         Behavior: Behavior normal.         RESULTS   Results Review:    Results from last 7 days   Lab Units 10/10/20  1316 10/10/20  0610 10/09/20  1403 10/09/20  0647  10/08/20  1704   SODIUM mmol/L 136 137 136 140   < > 140   POTASSIUM mmol/L 4.1 3.8 4.6 4.3   < > 3.8   CHLORIDE mmol/L 95* 96* 98 102   < > 98   CO2 mmol/L 19.0* 23.0 22.0 " 19.0*   < > 25.0   BUN mg/dL 68* 69* 71* 70*   < > 65*   CREATININE mg/dL 13.33* 12.62* 13.64* 13.51*   < > 13.19*   CALCIUM mg/dL 7.7* 8.3* 8.2* 8.2*   < > 8.6   BILIRUBIN mg/dL 0.3  --   --  0.4  --  0.3   ALK PHOS U/L 58  --   --  59  --  70   ALT (SGPT) U/L 31  --   --  10  --  11   AST (SGOT) U/L 39  --   --  17  --  13   GLUCOSE mg/dL 174* 127* 109* 117*   < > 181*    < > = values in this interval not displayed.       Estimated Creatinine Clearance: 6.4 mL/min (A) (by C-G formula based on SCr of 13.33 mg/dL (H)).    Results from last 7 days   Lab Units 10/11/20  0936 10/09/20  0647   MAGNESIUM mg/dL 1.8 2.1   PHOSPHORUS mg/dL  --  9.5*             Results from last 7 days   Lab Units 10/10/20  1316 10/10/20  0610 10/09/20  0647 10/08/20  2054 10/08/20  1704   WBC 10*3/mm3 11.44* 8.57 8.71 9.95 11.11*   HEMOGLOBIN g/dL 8.6* 8.8* 9.2* 10.2* 10.1*   PLATELETS 10*3/mm3 273 303 299 319 324       Results from last 7 days   Lab Units 10/09/20  1403 10/08/20  2054   INR  1.80* 1.05         Imaging Results (Last 24 Hours)     ** No results found for the last 24 hours. **      Heart cath 10/9/20  Procedure performed:  1.  Left heart catheterization with iliofemoral arteriogram and Angio-Seal closure device.  2.  Attempted unsuccessful PTCA of the 100% occluded left main .  3.  Selective cannulation of the left internal mammary artery.  4.  Selective cannulation of the saphenous vein graft to the right coronary artery and to the obtuse marginal branch.     MEDICATIONS    aspirin, 81 mg, Oral, Daily  calcitriol, 1 mcg, Oral, Daily  clopidogrel, 75 mg, Oral, Daily  epoetin arturo/arturo-epbx, 6,000 Units, Subcutaneous, Weekly  insulin detemir, 20 Units, Subcutaneous, Nightly  isosorbide mononitrate, 30 mg, Oral, Daily  pravastatin, 20 mg, Oral, Daily  ranolazine, 1,000 mg, Oral, BID  sevelamer, 2,400 mg, Oral, TID With Meals  sodium chloride, 10 mL, Intravenous, Q12H           Assessment/Plan   ASSESSMENT / PLAN       Hypotension    CAD (coronary artery disease)    ESRF (end stage renal failure) (CMS/[facility])    Diabetes mellitus (CMS/[facility])    Cardiac arrest (CMS/[facility])    Syncope    1.end-stage renal disease currently on peritoneal dialysis every night.  Patient is doing pd with 2800 cc 5 cycles with last fill of 1000 cc.  His estimated dry weight is 84 kg.  Weight is increasing now and bp is stable. Will try 2.5% bags tonight. Only had 300 UF.  Tolerated PD last night without issues.      2.anemia chronic kidney disease-Hgb low. We will continue with Epogen and increase to 93320 units once a week     3.CKD/mineral bone disorder.  Patient will remain on calcitriol 1 mcg daily along with Renvela 2400 mg 3 times a day- last phos 9.5     4.CAD/ HTN/ PEA- Cardiac cath 10/9/20 showed 100% occlusion left main . Had episode of PEA Saturday AM and CPR was administered x 4 minutes and patient received epinepherine x 2. Nifedipine ER, Toprol XL, losartan, and lasix are currently on hold. Troponins 2.0-1.9. Rec'd 1 liter NS following code and continues on NS at 125cc/hr. It is now stopped.     5. ? Seizures- witnessed seizure x 2 post Code Blue- Ct scan of head was unremarkable.     6. Metabolic acidosis- bicarb 19- add po na bicarb              This document has been electronically signed by Mauro Saravia MD on October 12, 2020 09:49 CDT

## 2020-10-12 NOTE — PLAN OF CARE
Goal Outcome Evaluation:  Plan of Care Reviewed With: patient  Progress: no change   No c/o's of chest pain this shift. Peritoneal dialysis in progress at this time.

## 2020-10-12 NOTE — NURSING NOTE
[facility] initiated per policy and procedure without difficulty. Changed PD dressing per outpt routine using gentamicin cream from home since the cream is not on hospital formulary. Site clear. Tolerated without difficulty. Tubing secured to abd.

## 2020-10-12 NOTE — THERAPY EVALUATION
Acute Care - Occupational Therapy Initial Evaluation  [facility]     Patient Name: Sumanth Robledo  : 1960  MRN: [id number]  Today's Date: 10/12/2020  Onset of Illness/Injury or Date of Surgery: 10/08/20  Date of Referral to OT: 10/12/20  Referring Physician: Red BARKSDALE MD    Admit Date: 10/8/2020       ICD-10-CM ICD-9-CM   1. Precordial pain  R07.2 786.51   2. Impaired mobility and ADLs  Z74.09 V49.89    Z78.9      Patient Active Problem List   Diagnosis   • Chest pain   • CAD (coronary artery disease)   • ESRF (end stage renal failure) (CMS/[facility])   • Hypertension   • Diabetes mellitus (CMS/[facility])   • Precordial pain   • Cardiac arrest (CMS/[facility])   • Hypotension   • Syncope     Past Medical History:   Diagnosis Date   • Chronic kidney disease, stage 3    • Coronary arteriosclerosis     eCABGx3 10/2014 LM 95% LAD diff 70% D1 70% OM2 70% OM3 70% & % USA STEMI      • End stage renal disease (CMS/[facility])    • Essential hypertension    • Heterozygous thalassemia     Thalassemia minor      • Hyperlipidemia    • Peripheral vascular disease (CMS/[facility])     WILSON RIGHT 0.97 LEFT 0.79, stent JADYN ZELAYA 2015      • Surgical follow-up care     Emergent CABG X 3     • Type 2 diabetes mellitus (CMS/[facility])      Past Surgical History:   Procedure Laterality Date   • AMPUTATION FINGER / THUMB Right     RIGHT middle finger   • CARDIAC CATHETERIZATION  10/11/2014    Distal left main critical stenosis of up to 95-99% with evidence of filling defect suggestive of a plaque. LAD and OMB 1 and 2 stenosis.A 100% occludeed RCA.   • CARDIAC CATHETERIZATION N/A 2020    Procedure: Left Heart Cath 2020 @ 9;  Surgeon: Gibran Garza MD;  Location: [facility] CATH INVASIVE LOCATION;  Service: Cardiology;  Laterality: N/A;   • CARDIAC CATHETERIZATION N/A 10/9/2020    Procedure: Left Heart Cath;  Surgeon: Gibran Garza MD;  Location: [facility] CATH INVASIVE LOCATION;  Service: Cardiology;  Laterality: N/A;   • CARDIAC SURGERY      • CORONARY ARTERY BYPASS GRAFT  10/11/2014    Emergent CABG X 3 LIMA->LAD SVG->OM SVG->PDA          OT ASSESSMENT FLOWSHEET (last 12 hours)      OT Evaluation and Treatment     Row Name 10/12/20 1147                   OT Time and Intention    Subjective Information  complains of;pain  -KO        Document Type  evaluation  -KO        Mode of Treatment  individual therapy  -KO        Patient Effort  good  -KO           General Information    Patient Profile Reviewed  yes  -KO        Onset of Illness/Injury or Date of Surgery  10/08/20  -KO        Referring Physician  Red BARKSDALE MD  -KO        Patient/Family/Caregiver Comments/Observations  Wife present  -KO        General Observations of Patient  IV, tele, O2  -KO        Prior Level of Function  independent:;ADL's;gait;driving;shopping  -KO        Equipment Currently Used at Home  none  -KO        Equipment Issued to Patient  gait belt  -KO        Risks Reviewed  patient:;family:;LOB;nausea/vomiting;dizziness;increased discomfort;change in vital signs;increased drainage;lines disloged  -KO        Benefits Reviewed  patient:;family:;improve function;increase independence;increase strength;increase balance;decrease pain;decrease risk of DVT;improve skin integrity;increase knowledge  -KO           Previous Level of Function/Home Environm    BADLs, Premorbid Functional Level  independent  -KO        IADLs, Premorbid Functional Level  independent  -KO           Living Environment    Current Living Arrangements  home/apartment/condo  -KO        Home Accessibility  stairs to enter home  -KO        Lives With  spouse  -KO           Home Main Entrance    Number of Stairs, Main Entrance  two  -KO        Stair Railings, Main Entrance  none  -KO        Stairs Comment, Main Entrance  -- 2 steps from garage; 5 from front  -KO           Home Use of Assistive/Adaptive Equipment    Equipment Currently Used at Home  none  -KO           Cognition    Affect/Mental Status (Cognitive)   WFL  -KO        Orientation Status (Cognition)  oriented x 4  -KO        Follows Commands (Cognition)  WFL  -KO           Pain Assessment    Additional Documentation  Pain Scale: Numbers Pre/Post-Treatment (Group)  -KO           Pain Scale: Numbers Pre/Post-Treatment    Pretreatment Pain Rating  5/10  -KO        Posttreatment Pain Rating  5/10  -KO        Pre/Posttreatment Pain Comment  reporting pain in chest from CPR  -KO        Pain Intervention(s)  Repositioned notified RN  -KO           Range of Motion (ROM)    Range of Motion  ROM is WFL;bilateral upper extremities  -KO           Range of Motion Comprehensive    General Range of Motion  bilateral upper extremity ROM WFL  -KO           Strength (Manual Muscle Testing)    Strength (Manual Muscle Testing)  bilateral upper extremities  -KO        Additional Documentation  -- 4/5  -KO           Strength Comprehensive (MMT)    General Manual Muscle Testing (MMT) Assessment  upper extremity strength deficits identified  -KO           Bed Mobility    Bed Mobility  rolling right;supine-sit  -KO        Rolling Right [place] (Bed Mobility)  minimum assist (75% patient effort)  -KO        Supine-Sit [place] (Bed Mobility)  moderate assist (50% patient effort)  -KO           Functional Mobility    Functional Mobility- Ind. Level  contact guard assist  -KO        Functional Mobility- Device  rolling walker  -KO        Functional Mobility- Comment  bed>chair  -KO           Transfer Assessment/Treatment    Transfers  sit-stand transfer;stand-sit transfer;bed-chair transfer  -KO           Transfers    Bed-Chair [place]ting[place] (Transfers)  contact guard  -KO        Assistive Device (Bed-Chair Transfers)  walker, front-wheeled  -KO        Sit-Stand [place]ting[place] (Transfers)  contact guard;verbal cues  -KO        Stand-Sit [place] (Transfers)  contact guard;verbal cues  -KO           Balance    Balance Assessment  sitting static balance;sitting dynamic balance  -KO         Static Sitting Balance  -- SBA  -KO        Dynamic Sitting Balance  -- SBA  -KO           BADL Safety/Performance    Impairments, BADL Safety/Performance  endurance/activity tolerance;pain;shortness of breath;strength  -KO           Plan of Care Review    Plan of Care Reviewed With  patient;spouse  -KO           Vital Signs    Pre Systolic BP Rehab  132  -KO        Pre Treatment Diastolic BP  78  -KO        Intra Systolic BP Rehab  131  -KO        Intra Treatment Diastolic BP  78  -KO        Post Systolic BP Rehab  135  -KO        Post Treatment Diastolic BP  80  -KO        Pretreatment Heart Rate (beats/min)  90  -KO        Posttreatment Heart Rate (beats/min)  93  -KO        Pre SpO2 (%)  92  -KO        O2 Delivery Pre Treatment  supplemental O2  -KO        Post SpO2 (%)  94  -KO        O2 Delivery Post Treatment  supplemental O2  -KO           OT Goals    Bed Mobility Goal Selection (OT)  bed mobility, OT goal 1  -KO        Transfer Goal Selection (OT)  transfer, OT goal 1  -KO        Bathing Goal Selection (OT)  bathing, OT goal 1  -KO        Dressing Goal Selection (OT)  dressing, OT goal 1  -KO        Toileting Goal Selection (OT)  toileting, OT goal 1  -KO        Grooming Goal Selection (OT)  grooming, OT goal 1  -KO        Activity Tolerance Goal Selection (OT)  activity tolerance, OT goal 1  -KO           Bed Mobility Goal 1 (OT)    Activity/Assistive Device (Bed Mobility Goal 1, OT)  bed mobility activities, all  -KO        [place] Level/Cues Needed (Bed Mobility Goal 1, OT)  independent  -KO        Time Frame (Bed Mobility Goal 1, OT)  long term goal (LTG);by discharge  -KO        Progress/Outcomes (Bed Mobility Goal 1, OT)  goal not met  -KO           Transfer Goal 1 (OT)    Activity/Assistive Device (Transfer Goal 1, OT)  sit-to-stand/stand-to-sit;bed-to-chair/chair-to-bed;toilet  -KO        [place] Level/Cues Needed (Transfer Goal 1, OT)  independent  -KO        Time Frame (Transfer Goal  1, OT)  long term goal (LTG);by discharge  -KO        Progress/Outcome (Transfer Goal 1, OT)  goal not met  -KO           Bathing Goal 1 (OT)    Activity/Device (Bathing Goal 1, OT)  lower body bathing  -KO        [place] Level/Cues Needed (Bathing Goal 1, OT)  independent  -KO        Time Frame (Bathing Goal 1, OT)  long term goal (LTG);by discharge  -KO        Progress/Outcomes (Bathing Goal 1, OT)  goal not met  -KO           Dressing Goal 1 (OT)    Activity/Device (Dressing Goal 1, OT)  lower body dressing  -KO        [place]/Cues Needed (Dressing Goal 1, OT)  independent  -KO        Time Frame (Dressing Goal 1, OT)  long term goal (LTG);by discharge  -KO        Progress/Outcome (Dressing Goal 1, OT)  goal not met  -KO           Toileting Goal 1 (OT)    Activity/Device (Toileting Goal 1, OT)  toileting skills, all  -KO        [place] Level/Cues Needed (Toileting Goal 1, OT)  independent  -KO        Time Frame (Toileting Goal 1, OT)  long term goal (LTG);by discharge  -KO        Progress/Outcome (Toileting Goal 1, OT)  goal not met  -KO           Grooming Goal 1 (OT)    Activity/Device (Grooming Goal 1, OT)  grooming skills, all standing at sink  -KO        [place] (Grooming Goal 1, OT)  independent  -KO        Time Frame (Grooming Goal 1, OT)  long term goal (LTG);by discharge  -KO        Progress/Outcome (Grooming Goal 1, OT)  goal not met  -KO            Activity Tolerance Goal 1 (OT)    Activity Tolerance Goal 1 (OT)  BUE ex  -KO        Activity Level (Endurance Goal 1, OT)  15 min activity  -KO        Time Frame (Activity Tolerance Goal 1, OT)  long term goal (LTG);by discharge  -KO        Progress/Outcome (Activity Tolerance Goal 1, OT)  goal not met  -KO           Therapy Assessment/Plan (OT)    Date of Referral to OT  10/12/20  -KO        Patient/Family Therapy Goal Statement (OT)  return to PLOF  -KO        OT Diagnosis  impaired mobility and ADLs  -KO        Rehab Potential (OT)   good, to achieve stated therapy goals  -KO        Criteria for Skilled Therapeutic Interventions Met (OT)  yes;meets criteria  -KO        Therapy Frequency (OT)  other (see comments) 5-7x/wk  -KO        Predicted Duration of Therapy Intervention (OT)  until OT goals met or d/c facility  -KO        Problem List (OT)  strength;pain;balance;mobility  -KO        Activity Limitations Related to Problem List (OT)  BADLs not performed adequately or safely;IADLs not performed adequately or safely  -KO        Planned Therapy Interventions (OT)  activity tolerance training;adaptive equipment training;BADL retraining;IADL retraining;patient/caregiver education/training;ROM/therapeutic exercise;strengthening exercise  -KO           Therapy Plan Review/Discharge Plan (OT)    Therapy Plan Review (OT)  evaluation/treatment results reviewed;care plan/treatment goals reviewed;risks/benefits reviewed;patient;spouse/significant other  -KO        Anticipated Discharge Disposition (OT)  home with assist;home with home health pending progress  -KO          User Key  (r) = Recorded By, (t) = Taken By, (c) = Cosigned By    Initials Name Effective Dates    Tiana Quarles, OT 08/09/20 -          Occupational Therapy Education                 Title: PT OT SLP Therapies (In Progress)     Topic: Occupational Therapy (In Progress)     Point: ADL training (Not Started)     Description:   Instruct learner(s) on proper safety adaptation and remediation techniques during self care or transfers.   Instruct in proper use of assistive devices.              Learner Progress:  Not documented in this visit.          Point: Home exercise program (Not Started)     Description:   Instruct learner(s) on appropriate technique for monitoring, assisting and/or progressing therapeutic exercises/activities.              Learner Progress:  Not documented in this visit.          Point: Precautions (Done)     Description:   Instruct learner(s) on prescribed  precautions during self-care and functional transfers.              Learning Progress Summary           Patient Acceptance, E, VU by STEPHANIE at 10/12/2020 1320    Comment: Educated pt on safety during ADLs and fall prevention                   Point: Body mechanics (Not Started)     Description:   Instruct learner(s) on proper positioning and spine alignment during self-care, functional mobility activities and/or exercises.              Learner Progress:  Not documented in this visit.                      User Key     Initials Effective Dates Name Provider Type Discipline    STEPHANIE 08/09/20 -  Tiana Sifuentes OT Occupational Therapist OT                  OT Recommendation and Plan  Planned Therapy Interventions (OT): activity tolerance training, adaptive equipment training, BADL retraining, IADL retraining, patient/caregiver education/training, ROM/therapeutic exercise, strengthening exercise  Therapy Frequency (OT): other (see comments)(5-7x/wk)  Plan of Care Review  Plan of Care Reviewed With: patient, spouse  Outcome Summary: RN okay'd OT evaluation this date. Pt pleasant and agreeable. Ox4. Reporting he lives with his wife in a 1 level home with 2 steps to enter. Has a tub/shower. Prior to admission, pt was IND with all ADLs/IADLs. BUE ROM WFLs. MMT grossly 4/5. Sup>sit ModA. Additional time required during transfers d/t pt easily fatigued and c/o dizziness. BP and other vitals WFLs. Pt required MaxA to kirk/doff B socks. Sit>Stand CG and VCs for hand placement. Pt took steps to the chair with CG and AD. In chair end of assessment. Needs met and RN notified as chair alarm being delivered. Educated pt not to get up on his own. Wife also present. Verbalizing understanding. Pt would benefit from continued skilled OT services while IP. Rec home with assist and possibly home OT, pending progress.  Plan of Care Reviewed With: patient, spouse  Outcome Summary: RN okay'd OT evaluation this date. Pt pleasant and agreeable. Ox4.  Reporting he lives with his wife in a 1 level home with 2 steps to enter. Has a tub/shower. Prior to admission, pt was IND with all ADLs/IADLs. BUE ROM WFLs. MMT grossly 4/5. Sup>sit ModA. Additional time required during transfers d/t pt easily fatigued and c/o dizziness. BP and other vitals WFLs. Pt required MaxA to kirk/doff B socks. Sit>Stand CG and VCs for hand placement. Pt took steps to the chair with CG and AD. In chair end of assessment. Needs met and RN notified as chair alarm being delivered. Educated pt not to get up on his own. Wife also present. Verbalizing understanding. Pt would benefit from continued skilled OT services while IP. Rec home with assist and possibly home OT, pending progress.    Outcome Measures     Row Name 10/12/20 1147             How much help from another is currently needed...    Putting on and taking off regular lower body clothing?  2  -KO      Bathing (including washing, rinsing, and drying)  3  -KO      Toileting (which includes using toilet bed pan or urinal)  3  -KO      Putting on and taking off regular upper body clothing  3  -KO      Taking care of personal grooming (such as brushing teeth)  3  -KO      Eating meals  4  -KO      AM-PAC 6 Clicks Score (OT)  18  -KO         Functional Assessment    Outcome Measure Options  AM-PAC 6 Clicks Daily Activity (OT)  -KO        User Key  (r) = Recorded By, (t) = Taken By, (c) = Cosigned By    Initials Name Provider Type    Tiana Quarles OT Occupational Therapist          Time Calculation:   Time Calculation- OT     Row Name 10/12/20 1327             Time Calculation- OT    OT Start Time  1147  -KO      OT Stop Time  1225  -KO      OT Time Calculation (min)  38 min  -KO      OT Received On  10/12/20  -KO      OT Goal Re-Cert Due Date  10/25/20  -KO        User Key  (r) = Recorded By, (t) = Taken By, (c) = Cosigned By    Initials Name Provider Type    Tiana Quarles OT Occupational Therapist        Therapy Charges for Today      Code Description Service Date Service Provider Modifiers Qty    79966926384 HC OT EVAL MOD COMPLEXITY 3 10/12/2020 Tiana Sifuentes OT GO 1               Tiana Sifuentes OT  10/12/2020

## 2020-10-12 NOTE — PLAN OF CARE
Problem: Adult Inpatient Plan of Care  Goal: Plan of Care Review  Outcome: Ongoing, Progressing  Flowsheets  Taken 10/12/2020 1432  Plan of Care Reviewed With:   patient   spouse  Outcome Summary: PT evaluation completed this date. RN mary khalil and pt agreeable and wanting to get back in bed from chair. Pt performing sit<>stand with CGA and FWW and ambulating 3ft from chair>bed. Pt deferring further mobility at this time and performing sit>supine with Nehal. Pt reporting dizziness after return to supine that resolved over a few minutes. VSS throughout. Pt would benefit from further skilled PT to increase functional mobility, endurance, strength, safety, balance, and to progress towards PLOF. Upon d/c from acute care, recommend home with assist as needed and possible HHPT, pending progress in acute care. Will continue to assess need for AD as pt progresses.  Taken 10/12/2020 1421  Plan of Care Reviewed With:   patient   spouse

## 2020-10-12 NOTE — PLAN OF CARE
Goal Outcome Evaluation:  Plan of Care Reviewed With: patient, spouse  Progress: no change  Pt V/S stable, no urine - pt does daily peritoneal dialysis, EEG done today - awaiting results to rule out seizures

## 2020-10-13 LAB
ANION GAP SERPL CALCULATED.3IONS-SCNC: 15 MMOL/L (ref 5–15)
BASOPHILS # BLD AUTO: 0.04 10*3/MM3 (ref 0–0.2)
BASOPHILS NFR BLD AUTO: 0.5 % (ref 0–1.5)
BUN SERPL-MCNC: 60 MG/DL (ref 8–23)
BUN/CREAT SERPL: 5.1 (ref 7–25)
CALCIUM SPEC-SCNC: 8.3 MG/DL (ref 8.6–10.5)
CHLORIDE SERPL-SCNC: 95 MMOL/L (ref 98–107)
CO2 SERPL-SCNC: 24 MMOL/L (ref 22–29)
CREAT SERPL-MCNC: 11.87 MG/DL (ref 0.76–1.27)
DEPRECATED RDW RBC AUTO: 41 FL (ref 37–54)
EOSINOPHIL # BLD AUTO: 0.21 10*3/MM3 (ref 0–0.4)
EOSINOPHIL NFR BLD AUTO: 2.5 % (ref 0.3–6.2)
ERYTHROCYTE [DISTWIDTH] IN BLOOD BY AUTOMATED COUNT: 14.7 % (ref 12.3–15.4)
GFR SERPL CREATININE-BSD FRML MDRD: 5 ML/MIN/1.73
GFR SERPL CREATININE-BSD FRML MDRD: ABNORMAL ML/MIN/{1.73_M2}
GLUCOSE BLDC GLUCOMTR-MCNC: 218 MG/DL (ref 70–130)
GLUCOSE SERPL-MCNC: 111 MG/DL (ref 65–99)
HCT VFR BLD AUTO: 26.5 % (ref 37.5–51)
HGB BLD-MCNC: 8.2 G/DL (ref 13–17.7)
IMM GRANULOCYTES # BLD AUTO: 0.18 10*3/MM3 (ref 0–0.05)
IMM GRANULOCYTES NFR BLD AUTO: 2.1 % (ref 0–0.5)
LYMPHOCYTES # BLD AUTO: 1.44 10*3/MM3 (ref 0.7–3.1)
LYMPHOCYTES NFR BLD AUTO: 16.9 % (ref 19.6–45.3)
MAGNESIUM SERPL-MCNC: 1.8 MG/DL (ref 1.6–2.4)
MCH RBC QN AUTO: 24.3 PG (ref 26.6–33)
MCHC RBC AUTO-ENTMCNC: 30.9 G/DL (ref 31.5–35.7)
MCV RBC AUTO: 78.4 FL (ref 79–97)
MONOCYTES # BLD AUTO: 1.12 10*3/MM3 (ref 0.1–0.9)
MONOCYTES NFR BLD AUTO: 13.2 % (ref 5–12)
NEUTROPHILS NFR BLD AUTO: 5.51 10*3/MM3 (ref 1.7–7)
NEUTROPHILS NFR BLD AUTO: 64.8 % (ref 42.7–76)
NRBC BLD AUTO-RTO: 0.2 /100 WBC (ref 0–0.2)
PHOSPHATE SERPL-MCNC: 10 MG/DL (ref 2.5–4.5)
PLATELET # BLD AUTO: 248 10*3/MM3 (ref 140–450)
PMV BLD AUTO: 11.6 FL (ref 6–12)
POTASSIUM SERPL-SCNC: 3.8 MMOL/L (ref 3.5–5.2)
RBC # BLD AUTO: 3.38 10*6/MM3 (ref 4.14–5.8)
SODIUM SERPL-SCNC: 134 MMOL/L (ref 136–145)
WBC # BLD AUTO: 8.5 10*3/MM3 (ref 3.4–10.8)

## 2020-10-13 PROCEDURE — 82962 GLUCOSE BLOOD TEST: CPT

## 2020-10-13 PROCEDURE — 80048 BASIC METABOLIC PNL TOTAL CA: CPT | Performed by: INTERNAL MEDICINE

## 2020-10-13 PROCEDURE — 84100 ASSAY OF PHOSPHORUS: CPT | Performed by: INTERNAL MEDICINE

## 2020-10-13 PROCEDURE — 97530 THERAPEUTIC ACTIVITIES: CPT

## 2020-10-13 PROCEDURE — 85025 COMPLETE CBC W/AUTO DIFF WBC: CPT | Performed by: INTERNAL MEDICINE

## 2020-10-13 PROCEDURE — 25010000002 HEPARIN (PORCINE) PER 1000 UNITS: Performed by: INTERNAL MEDICINE

## 2020-10-13 PROCEDURE — 83735 ASSAY OF MAGNESIUM: CPT | Performed by: INTERNAL MEDICINE

## 2020-10-13 PROCEDURE — 63710000001 INSULIN DETEMIR PER 5 UNITS: Performed by: INTERNAL MEDICINE

## 2020-10-13 PROCEDURE — 97110 THERAPEUTIC EXERCISES: CPT

## 2020-10-13 RX ORDER — DEXTROSE MONOHYDRATE, SODIUM CHLORIDE, SODIUM LACTATE, CALCIUM CHLORIDE, MAGNESIUM CHLORIDE 2.5; 538; 448; 18.4; 5.08 G/100ML; MG/100ML; MG/100ML; MG/100ML; MG/100ML
5000 SOLUTION INTRAPERITONEAL AS NEEDED
Status: DISCONTINUED | OUTPATIENT
Start: 2020-10-13 | End: 2020-10-20 | Stop reason: HOSPADM

## 2020-10-13 RX ORDER — DEXTROSE MONOHYDRATE, SODIUM CHLORIDE, SODIUM LACTATE, CALCIUM CHLORIDE, MAGNESIUM CHLORIDE 4.25; 538; 448; 18.4; 5.08 G/100ML; MG/100ML; MG/100ML; MG/100ML; MG/100ML
5000 SOLUTION INTRAPERITONEAL AS NEEDED
Status: COMPLETED | OUTPATIENT
Start: 2020-10-13 | End: 2020-10-13

## 2020-10-13 RX ORDER — GABAPENTIN 100 MG/1
100 CAPSULE ORAL 3 TIMES DAILY
Status: DISCONTINUED | OUTPATIENT
Start: 2020-10-13 | End: 2020-10-14

## 2020-10-13 RX ADMIN — RANOLAZINE 1000 MG: 500 TABLET, FILM COATED, EXTENDED RELEASE ORAL at 08:11

## 2020-10-13 RX ADMIN — SEVELAMER CARBONATE 2400 MG: 800 TABLET, FILM COATED ORAL at 11:28

## 2020-10-13 RX ADMIN — CLOPIDOGREL BISULFATE 75 MG: 75 TABLET ORAL at 08:12

## 2020-10-13 RX ADMIN — SODIUM CHLORIDE, PRESERVATIVE FREE 10 ML: 5 INJECTION INTRAVENOUS at 08:12

## 2020-10-13 RX ADMIN — HEPARIN SODIUM 5000 UNITS: 5000 INJECTION INTRAVENOUS; SUBCUTANEOUS at 21:19

## 2020-10-13 RX ADMIN — CARVEDILOL 6.25 MG: 6.25 TABLET, FILM COATED ORAL at 08:15

## 2020-10-13 RX ADMIN — SODIUM BICARBONATE 650 MG: 650 TABLET ORAL at 08:11

## 2020-10-13 RX ADMIN — SEVELAMER CARBONATE 2400 MG: 800 TABLET, FILM COATED ORAL at 17:47

## 2020-10-13 RX ADMIN — PRAVASTATIN SODIUM 20 MG: 20 TABLET ORAL at 08:12

## 2020-10-13 RX ADMIN — DEXTROSE MONOHYDRATE, SODIUM CHLORIDE, SODIUM LACTATE, CALCIUM CHLORIDE, MAGNESIUM CHLORIDE 5000 ML: 4.25; 538; 448; 18.4; 5.08 SOLUTION INTRAPERITONEAL at 15:45

## 2020-10-13 RX ADMIN — HEPARIN SODIUM 5000 UNITS: 5000 INJECTION INTRAVENOUS; SUBCUTANEOUS at 08:12

## 2020-10-13 RX ADMIN — CALCITRIOL 1 MCG: 0.25 CAPSULE ORAL at 08:11

## 2020-10-13 RX ADMIN — SEVELAMER CARBONATE 2400 MG: 800 TABLET, FILM COATED ORAL at 08:11

## 2020-10-13 RX ADMIN — INSULIN DETEMIR 20 UNITS: 100 INJECTION, SOLUTION SUBCUTANEOUS at 21:20

## 2020-10-13 RX ADMIN — RANOLAZINE 1000 MG: 500 TABLET, FILM COATED, EXTENDED RELEASE ORAL at 21:19

## 2020-10-13 RX ADMIN — SODIUM CHLORIDE, PRESERVATIVE FREE 10 ML: 5 INJECTION INTRAVENOUS at 21:20

## 2020-10-13 RX ADMIN — ISOSORBIDE MONONITRATE 60 MG: 60 TABLET, EXTENDED RELEASE ORAL at 08:11

## 2020-10-13 RX ADMIN — HYDRALAZINE HYDROCHLORIDE 25 MG: 25 TABLET, FILM COATED ORAL at 21:19

## 2020-10-13 RX ADMIN — GABAPENTIN 100 MG: 100 CAPSULE ORAL at 17:47

## 2020-10-13 RX ADMIN — FAMOTIDINE 20 MG: 20 TABLET, FILM COATED ORAL at 08:11

## 2020-10-13 RX ADMIN — SODIUM BICARBONATE 650 MG: 650 TABLET ORAL at 21:22

## 2020-10-13 RX ADMIN — CARVEDILOL 6.25 MG: 6.25 TABLET, FILM COATED ORAL at 17:47

## 2020-10-13 RX ADMIN — LOSARTAN POTASSIUM 50 MG: 50 TABLET, FILM COATED ORAL at 08:11

## 2020-10-13 RX ADMIN — DEXTROSE MONOHYDRATE, SODIUM CHLORIDE, SODIUM LACTATE, CALCIUM CHLORIDE, MAGNESIUM CHLORIDE 5000 ML: 2.5; 538; 448; 18.4; 5.08 SOLUTION INTRAPERITONEAL at 15:45

## 2020-10-13 RX ADMIN — GABAPENTIN 100 MG: 100 CAPSULE ORAL at 11:28

## 2020-10-13 RX ADMIN — HYDRALAZINE HYDROCHLORIDE 25 MG: 25 TABLET, FILM COATED ORAL at 13:51

## 2020-10-13 RX ADMIN — HYDRALAZINE HYDROCHLORIDE 25 MG: 25 TABLET, FILM COATED ORAL at 05:37

## 2020-10-13 RX ADMIN — SODIUM BICARBONATE 650 MG: 650 TABLET ORAL at 18:40

## 2020-10-13 RX ADMIN — GABAPENTIN 100 MG: 100 CAPSULE ORAL at 21:19

## 2020-10-13 RX ADMIN — ASPIRIN 81 MG: 81 TABLET, CHEWABLE ORAL at 08:12

## 2020-10-13 NOTE — THERAPY TREATMENT NOTE
Acute Care - Occupational Therapy Treatment Note  [facility]     Patient Name: Sumanth Robledo  : 1960  MRN: [id number]  Today's Date: 10/13/2020  Onset of Illness/Injury or Date of Surgery: 10/08/20  Date of Referral to OT: 10/12/20  Referring Physician: Red BARKSDALE MD    Admit Date: 10/8/2020       ICD-10-CM ICD-9-CM   1. Precordial pain  R07.2 786.51   2. Impaired mobility and ADLs  Z74.09 V49.89    Z78.9    3. Impaired functional mobility, balance, gait, and endurance  Z74.09 V49.89     Patient Active Problem List   Diagnosis   • Chest pain   • CAD (coronary artery disease)   • ESRF (end stage renal failure) (CMS/[facility])   • Hypertension   • Diabetes mellitus (CMS/[facility])   • Precordial pain   • Cardiac arrest (CMS/[facility])   • Hypotension   • Syncope     Past Medical History:   Diagnosis Date   • Chronic kidney disease, stage 3    • Coronary arteriosclerosis     eCABGx3 10/2014 LM 95% LAD diff 70% D1 70% OM2 70% OM3 70% & % USA STEMI      • End stage renal disease (CMS/[facility])    • Essential hypertension    • Heterozygous thalassemia     Thalassemia minor      • Hyperlipidemia    • Peripheral vascular disease (CMS/[facility])     WILSON RIGHT 0.97 LEFT 0.79, stent JADYN ZELAYA 2015      • Surgical follow-up care     Emergent CABG X 3     • Type 2 diabetes mellitus (CMS/[facility])      Past Surgical History:   Procedure Laterality Date   • AMPUTATION FINGER / THUMB Right     RIGHT middle finger   • CARDIAC CATHETERIZATION  10/11/2014    Distal left main critical stenosis of up to 95-99% with evidence of filling defect suggestive of a plaque. LAD and OMB 1 and 2 stenosis.A 100% occludeed RCA.   • CARDIAC CATHETERIZATION N/A 2020    Procedure: Left Heart Cath 2020 @ 9;  Surgeon: Gibran Garza MD;  Location: [facility] INVASIVE LOCATION;  Service: Cardiology;  Laterality: N/A;   • CARDIAC CATHETERIZATION N/A 10/9/2020    Procedure: Left Heart Cath;  Surgeon: Gibran Garza MD;  Location: [facility] CATH  INVASIVE LOCATION;  Service: Cardiology;  Laterality: N/A;   • CARDIAC SURGERY     • CORONARY ARTERY BYPASS GRAFT  10/11/2014    Emergent CABG X 3 LIMA->LAD SVG->OM SVG->PDA          OT ASSESSMENT FLOWSHEET (last 12 hours)      OT Evaluation and Treatment     Row Name 10/13/20 1039                   OT Time and Intention    Subjective Information  no complaints  -AS        Document Type  therapy note (daily note)  -AS        Mode of Treatment  individual therapy;occupational therapy  -AS        Total Minutes, Occupational Therapy  29  -AS        Patient Effort  good  -AS           Transfer Assessment/Treatment    Transfers  sit-stand transfer;stand-sit transfer  -AS        Comment (Transfers)  Pt instrcuted in 10 reps sit<>stand from chair to increase strength and endurance required for ADLs and transfers  -AS           Transfers    Sit-Stand [place] (Transfers)  contact guard  -AS        Stand-Sit [place] (Transfers)  contact guard;verbal cues  -AS           Sit-Stand Transfer    Assistive Device (Sit-Stand Transfers)  walker, front-wheeled  -AS           Stand-Sit Transfer    Assistive Device (Stand-Sit Transfers)  walker, front-wheeled  -AS           Safety Issues, Functional Mobility    Impairments Affecting Function (Mobility)  strength;endurance/activity tolerance;balance  -AS           Balance    Comment, Balance  While in standing, pt instructed in 1 set 10 reps alternating SF and chest press to improve endurance and dynamic standing to benefit ADL participation. Pt instructed in 1 minute of marching no UE support. Pt required CGA for activities with no LOB.   -AS           Activities of Daily Living    BADL Assessment/Intervention  lower body dressing  -AS           Lower Body Dressing Assessment/Training    [place] Level (Lower Body Dressing)  don;socks;dependent (less than 25% patient effort);pants/bottoms;moderate assist (50% patient effort)  -AS        Position (Lower Body Dressing)   supported sitting  -AS           BADL Safety/Performance    Impairments, BADL Safety/Performance  endurance/activity tolerance;pain;shortness of breath;strength  -AS           Plan of Care Review    Plan of Care Reviewed With  patient  -AS           Vital Signs    Pre Systolic BP Rehab  153  -AS        Pre Treatment Diastolic BP  72  -AS        Post Systolic BP Rehab  152  -AS        Post Treatment Diastolic BP  71  -AS        Pre SpO2 (%)  97  -AS        O2 Delivery Pre Treatment  nasal cannula  -AS        Post SpO2 (%)  95  -AS        O2 Delivery Post Treatment  nasal cannula  -AS        Pre Patient Position  Sitting  -AS        Post Patient Position  Sitting  -AS           Positioning and Restraints    Pre-Treatment Position  sitting in chair/recliner  -AS        Post Treatment Position  chair  -AS        In Chair  sitting;encouraged to call for assist;call light within reach;with family/caregiver  -AS           Progress Summary (OT)    Progress Toward Functional Goals (OT)  progress toward functional goals is good  -AS        Daily Progress Summary (OT)  Cont OT POC   -AS        Impairments Still Limiting Function (OT)  decreased endurance, moblity, balance impacting independence in ADLs  -AS           Therapy Plan Review/Discharge Plan (OT)    Anticipated Discharge Disposition (OT)  home with assist;home with home health  -AS          User Key  (r) = Recorded By, (t) = Taken By, (c) = Cosigned By    Initials Name Effective Dates    AS Noemi Madrid OT 07/05/20 -          Occupational Therapy Education                 Title: PT OT SLP Therapies (In Progress)     Topic: Occupational Therapy (In Progress)     Point: ADL training (Done)     Description:   Instruct learner(s) on proper safety adaptation and remediation techniques during self care or transfers.   Instruct in proper use of assistive devices.              Learning Progress Summary           Patient Acceptance, E, VU by AS at 10/13/2020 4241     Comment: endurance actvities, LBD, PLB   Significant Other Acceptance, E, VU by AS at 10/13/2020 1307    Comment: endurance actvities, LBD, PLB                   Point: Home exercise program (Done)     Description:   Instruct learner(s) on appropriate technique for monitoring, assisting and/or progressing therapeutic exercises/activities.              Learning Progress Summary           Patient Acceptance, E, VU by AS at 10/13/2020 1307    Comment: endurance actvities, LBD, PLB   Significant Other Acceptance, E, VU by AS at 10/13/2020 1307    Comment: endurance actvities, LBD, PLB                   Point: Precautions (Done)     Description:   Instruct learner(s) on prescribed precautions during self-care and functional transfers.              Learning Progress Summary           Patient Acceptance, E, VU by KO at 10/12/2020 1320    Comment: Educated pt on safety during ADLs and fall prevention                   Point: Body mechanics (Not Started)     Description:   Instruct learner(s) on proper positioning and spine alignment during self-care, functional mobility activities and/or exercises.              Learner Progress:  Not documented in this visit.                      User Key     Initials Effective Dates Name Provider Type Discipline    AS 07/05/20 -  Noemi Madrid OT Occupational Therapist OT    KO 08/09/20 -  Tiana Sifuentes OT Occupational Therapist OT                  OT Recommendation and Plan     Progress Toward Functional Goals (OT): progress toward functional goals is good  Plan of Care Review  Plan of Care Reviewed With: patient  Outcome Summary: OT tx completed; Pt rec'd seated in chair. Pt instructed in LBD. Pt (D) for donning socks and mod A for donning pants (required assist fully donning over bilat LE). Pt instructed in various activities to increase endurance, strength, standing balance, and mobility to benefit ADL and fxnl transfers. Pt CGA for sit<>Stand and dynamic tasks. No goals met on  this date; Cont OT POC  Plan of Care Reviewed With: patient  Outcome Summary: OT tx completed; Pt rec'd seated in chair. Pt instructed in LBD. Pt (D) for donning socks and mod A for donning pants (required assist fully donning over bilat LE). Pt instructed in various activities to increase endurance, strength, standing balance, and mobility to benefit ADL and fxnl transfers. Pt CGA for sit<>Stand and dynamic tasks. No goals met on this date; Cont OT POC    Outcome Measures     Row Name 10/13/20 1039 10/12/20 1147          How much help from another is currently needed...    Putting on and taking off regular lower body clothing?  2  -AS  2  -KO     Bathing (including washing, rinsing, and drying)  3  -AS  3  -KO     Toileting (which includes using toilet bed pan or urinal)  3  -AS  3  -KO     Putting on and taking off regular upper body clothing  3  -AS  3  -KO     Taking care of personal grooming (such as brushing teeth)  3  -AS  3  -KO     Eating meals  4  -AS  4  -KO     AM-PAC 6 Clicks Score (OT)  18  -AS  18  -KO        Functional Assessment    Outcome Measure Options  AM-PAC 6 Clicks Daily Activity (OT)  -AS  AM-PAC 6 Clicks Daily Activity (OT)  -KO       User Key  (r) = Recorded By, (t) = Taken By, (c) = Cosigned By    Initials Name Provider Type    AS Noemi Madrid OT Occupational Therapist    Tiana Quarles OT Occupational Therapist          Time Calculation:   Time Calculation- OT     Row Name 10/13/20 1312             Time Calculation- OT    OT Start Time  1039  -AS      OT Stop Time  1108  -AS      OT Time Calculation (min)  29 min  -AS      Total Timed Code Minutes- OT  29 minute(s)  -AS      OT Received On  10/13/20  -AS        User Key  (r) = Recorded By, (t) = Taken By, (c) = Cosigned By    Initials Name Provider Type    AS Noemi Madrid OT Occupational Therapist        Therapy Charges for Today     Code Description Service Date Service Provider Modifiers Qty    [id number]  OT  THERAPEUTIC ACT EA 15 MIN 10/13/2020 Noemi Madrid, OT GO 2               Noemi Madrid, WAYNE  10/13/2020

## 2020-10-13 NOTE — PROGRESS NOTES
Cardiology Progress Note     LOS: 2 days   Patient Care Team:  Elizabeth Burleson APRN as PCP - General (Family Medicine)    Subjective:    Chart reviewed. Patient seen and examined. Patient had undergone attempted PTCA of the left main  which was unsuccessful.  Patient had a patent LIMA to the LAD and a patent saphenous vein graft to the right coronary artery and obtuse marginal branch.  Patient over the weekend had episodes of pulseless electrical activity.  Patient was subsequently transferred to the intensive care unit.  Patient underwent repeat transthoracic echocardiogram which did not reveal of any evidence of any pericardial effusion.  Patient currently is complaining of having symptoms of fatigue and weakness.    Objective:  Temp:  [97.3 °F (36.3 °C)-97.5 °F (36.4 °C)] 97.3 °F (36.3 °C)  Heart Rate:  [75-96] 91  Resp:  [14-20] 19  BP: (111-203)/() 194/96    Intake/Output Summary (Last 24 hours) at 10/12/2020 1936  Last data filed at 10/12/2020 1810  Gross per 24 hour   Intake 960 ml   Output 263 ml   Net 697 ml       Physical Exam:   General Appearance:    Alert, oriented, cooperative, in no acute distress.   Head:    Normocephalic, atraumatic, without obvious abnormality   Eyes:             ZULEMA. Lids and lashes normal, conjunctivae and sclerae normal, no icterus, no pallor.   Ears:    Ears appear intact with no abnormalities noted.   Throat:   Mucous membranes pink and moist.   Neck:  Supple, trachea midline, no carotid bruit, no organomegaly or JVD.   Lungs:    Clear to auscultation and percussion.  Respirations regular, even and unlabored. No wheezes, rales, or rhonchi.    Heart:    Regular rhythm and normal rate, normal S1 and S2, no      murmur, no gallop, no rub, no click.   Abdomen:    Soft, nontender, nondistended, no guarding, no rebound tenderness. Normal bowel sounds in all four quadrants, no masses, liver and spleen nonpalpable.    Genitalia:    Deferred.   Extremities:   Moves all  extremities well, no edema, no cyanosis, no       redness, no clubbing.   Pulses:   Pulses palpable and equal bilaterally.   Skin:   Moist and warm. No bleeding, bruising or rash.   Neurologic/Psychiatric:   Alert and oriented to person, place, and time.  Motor, power and tone in upper and lower extremities are grossly intact.  No focal neurological deficits. Normal cognitive function. No psychomotor reaction or tangential thought. No depression, homicidal ideations and suicidal ideations.          Results Review:    Results from last 7 days   Lab Units 10/10/20  1316   SODIUM mmol/L 136   POTASSIUM mmol/L 4.1   CHLORIDE mmol/L 95*   CO2 mmol/L 19.0*   BUN mg/dL 68*   CREATININE mg/dL 13.33*   CALCIUM mg/dL 7.7*   BILIRUBIN mg/dL 0.3   ALK PHOS U/L 58   ALT (SGPT) U/L 31   AST (SGOT) U/L 39   GLUCOSE mg/dL 174*     Results from last 7 days   Lab Units 10/10/20  1316 10/09/20  0647 10/08/20  2138 10/08/20  1704   CK TOTAL U/L  --   --   --  289*   TROPONIN T ng/mL 1.940* 2.020* 1.930* 2.050*         Results from last 7 days   Lab Units 10/10/20  1316   WBC 10*3/mm3 11.44*   HEMOGLOBIN g/dL 8.6*   HEMATOCRIT % 27.9*   PLATELETS 10*3/mm3 273     Results from last 7 days   Lab Units 10/09/20  1403 10/08/20  2054   INR  1.80* 1.05   APTT seconds  --  37.2     Results from last 7 days   Lab Units 10/11/20  0936   MAGNESIUM mg/dL 1.8                   ECHO:  Results for orders placed during the hospital encounter of 10/08/20   Adult Transthoracic Echo Complete W/ Cont if Necessary Per Protocol    Narrative · Left ventricular ejection fraction appears to be 41 - 45%. Left   ventricular systolic function is mildly decreased.  · Left ventricular diastolic function is consistent with (grade II w/high   LAP) pseudonormalization.  · Left ventricular wall thickness is consistent with concentric   hypertrophy.  · Mild mitral annular calcification is present.  · Mild mitral valve regurgitation is present.  · Estimated right  ventricular systolic pressure from tricuspid   regurgitation is normal (<35 mmHg).  · The left atrial cavity is moderately dilated.  · Mildly reduced right ventricular systolic function noted.          ECG 12 Lead   Final Result   Test Reason : Syncope   Blood Pressure : **/** mmHG   Vent. Rate : 074 BPM     Atrial Rate : 074 BPM      P-R Int : 178 ms          QRS Dur : 112 ms       QT Int : 448 ms       P-R-T Axes : 025 033 193 degrees      QTc Int : 497 ms      Normal sinus rhythm   Incomplete left bundle branch block   ST &  T wave abnormality, consider inferior ischemia   ST &  T wave abnormality, consider anterolateral ischemia   Prolonged QT   Abnormal ECG   When compared with ECG of 08-OCT-2020 20:44,   No significant change was found   Confirmed by EUGENIO FLETCHER ([id number]) on 10/10/2020 2:58:17 PM      Referred By:             Confirmed By:EUGENIO FLETCHER      ECG 12 Lead   ED Interpretation   Abnormal   Musa Saenz MD     10/8/2020 10:43 PM   ECG 12 Lead         Date/Time: 10/8/2020 9:04 PM   Performed by: Musa Saenz MD   Authorized by: Musa Saenz MD    Interpreted by physician   Rhythm: sinus rhythm   Rate: normal   T depression: I, II, aVF, aVL, V3, V4, V5 and V6   Clinical impression: abnormal ECG         Final Result   Test Reason : abnormal lab   Blood Pressure : **/** mmHG   Vent. Rate : 091 BPM     Atrial Rate : 091 BPM      P-R Int : 134 ms          QRS Dur : 102 ms       QT Int : 368 ms       P-R-T Axes : 042 026 197 degrees      QTc Int : 452 ms      Normal sinus rhythm   Possible Left atrial enlargement   ST &  T wave abnormality, consider inferior ischemia   ST &  T wave abnormality, consider anterolateral ischemia   Abnormal ECG   When compared with ECG of 29-OCT-2014 09:12,   No significant change was found   Confirmed by LUDMILA HAGER ([id number]) on 10/9/2020 12:16:27 PM      Referred By:  CHELSIE           Confirmed By:LUDMILA HAGER      SCANNED EKG   Final Result      SCANNED EKG   Final  Result           Medication Review:   Current Facility-Administered Medications   Medication Dose Route Frequency Provider Last Rate Last Dose   • acetaminophen (TYLENOL) tablet 650 mg  650 mg Oral Q4H PRN Valentín Lewis MD   650 mg at 10/11/20 1108    Or   • acetaminophen (TYLENOL) suppository 650 mg  650 mg Rectal Q4H PRN Valentín Lewis MD       • acetaminophen (TYLENOL) tablet 650 mg  650 mg Oral Q4H PRN Valentín Lewis MD       • aspirin chewable tablet 81 mg  81 mg Oral Daily Zahida Lewis MD   81 mg at 10/12/20 0906   • bisacodyl (DULCOLAX) EC tablet 5 mg  5 mg Oral Daily PRN Valentín Lewis MD       • calcitriol (ROCALTROL) capsule 1 mcg  1 mcg Oral Daily Valentín Lewis MD   1 mcg at 10/12/20 0905   • carvedilol (COREG) tablet 6.25 mg  6.25 mg Oral BID With Meals Gibran Garza MD       • clopidogrel (PLAVIX) tablet 75 mg  75 mg Oral Daily Valentín Lewis MD   75 mg at 10/12/20 0920   • Delflex-LC/1.5% Dextrose (DIANEAL) CAPD 12,200 mL  12,200 mL Intraperitoneal PRN Jose E Fuentes MD       • Delflex-LC/2.5% Dextrose (DIANEAL) CAPD 5,000 mL  5,000 mL Intraperitoneal PRN Valentín Lewis MD       • Delflex-LC/2.5% Dextrose (DIANEAL) CAPD 5,000 mL  5,000 mL Intraperitoneal PRN Mauro Saravia MD   5,000 mL at 10/12/20 1556   • [START ON 10/17/2020] epoetin arturo-epbx (RETACRIT) injection 10,000 Units  10,000 Units Subcutaneous Weekly Mauro Saravia MD       • famotidine (PEPCID) tablet 20 mg  20 mg Oral Daily Brendan Moon MD   20 mg at 10/12/20 1149   • heparin (porcine) 5000 UNIT/ML injection 5,000 Units  5,000 Units Subcutaneous Q12H Brendan Moon MD   5,000 Units at 10/12/20 1150   • hydrALAZINE (APRESOLINE) injection 10 mg  10 mg Intravenous Q6H PRN Brody Benavides MD       • hydrALAZINE (APRESOLINE) tablet 25 mg  25 mg Oral Q8H Gibran Garza MD       • influenza vac split quad (FLUZONE,FLUARIX,AFLURIA,FLULAVAL) injection 0.5 mL  0.5 mL Intramuscular During Hospitalization  Valentín Lewis MD       • insulin detemir (LEVEMIR) injection 20 Units  20 Units Subcutaneous Nightly Valentín Lewis MD   20 Units at 10/11/20 2033   • isosorbide mononitrate (IMDUR) 24 hr tablet 30 mg  30 mg Oral Once Brody Benavides MD       • [START ON 10/13/2020] isosorbide mononitrate (IMDUR) 24 hr tablet 60 mg  60 mg Oral Q24H Brody Benavides MD       • losartan (COZAAR) tablet 50 mg  50 mg Oral Q24H Gibran Garza MD       • nitroglycerin (NITROSTAT) SL tablet 0.4 mg  0.4 mg Sublingual Q5 Min PRN Valentín Lewis MD       • ondansetron (ZOFRAN) tablet 4 mg  4 mg Oral Q6H PRN Valentín Lewis MD        Or   • ondansetron (ZOFRAN) injection 4 mg  4 mg Intravenous Q6H PRN Valentín Lewis MD   4 mg at 10/11/20 0824   • pravastatin (PRAVACHOL) tablet 20 mg  20 mg Oral Daily Valentín Lewis MD   20 mg at 10/12/20 0905   • ranolazine (RANEXA) 12 hr tablet 1,000 mg  1,000 mg Oral BID Valentín Lewis MD   1,000 mg at 10/12/20 0905   • sevelamer (RENVELA) tablet 2,400 mg  2,400 mg Oral TID With Meals Valentín Lewis MD   2,400 mg at 10/12/20 1808   • sodium bicarbonate tablet 650 mg  650 mg Oral TID Mauro Saravia MD   650 mg at 10/12/20 1808   • sodium chloride 0.9 % flush 10 mL  10 mL Intravenous Q12H Valentín Lewis MD   10 mL at 10/12/20 0912   • sodium chloride 0.9 % flush 10 mL  10 mL Intravenous PRN Valentín Lewis MD   10 mL at 10/11/20 2038       Assessment and Plan:      Hypotension    CAD (coronary artery disease)    ESRF (end stage renal failure) (CMS/HCC)    Diabetes mellitus (CMS/HCC)    Cardiac arrest (CMS/HCC)    Syncope  1.  Cardiopulmonary arrest.  Patient had pulseless electrical activity and subsequently was transferred to the intensive care unit.  Patient has not had any further recurrent symptoms of tachycardia or bradycardia arrhythmia.  The etiology of the patient's pulseless electrical activity is unclear.  Patient underwent transthoracic echocardiogram which had revealed preserved  left ventricular systolic function.  There was no evidence of any pericardial effusion.  Patient currently not having symptoms of chest pain.  Patient would be followed on telemetry monitor.    2.  Atherosclerotic coronary artery disease.  Patient has history of documented coronary artery disease with 100% occlusion of the native right coronary artery in the left main coronary artery.  Patient has a patent saphenous vein graft to the right coronary artery and to the obtuse marginal branch and a patent LIMA to the LAD.  Patient at the present time has been recommended medical management.    3.  Arterial hypertension.  Patient blood pressure has been labile.  Patient would be continued on the present dose of the antihypertensive medication.    4.  Chronic kidney disease on peritoneal dialysis.  Patient has been followed by nephrology.    The above plan of management were discussed with the patient and the family.            Electronically signed by Gibran Garza MD, 10/12/20, 7:36 PM CDT.      Time: Spent on face-to-face interaction 20 minutes    Dictated utilizing Dragon dictation.

## 2020-10-13 NOTE — PLAN OF CARE
Problem: Adult Inpatient Plan of Care  Goal: Plan of Care Review  Outcome: Ongoing, Progressing  Flowsheets (Taken 10/13/2020 0908)  Plan of Care Reviewed With: patient  Outcome Summary: PT tx complete. Pt pleasant and agreeable to PT. Pt initially on 3.5 L O2 but RN decreased to 1L per MD request during PT tx. Pt performed seated and standing therex, excellent participation and motivation. CGA for sit<>stand transfer with RW. Pt ambulated 3' fwd/bwd with FWW and CGA, limited by lines. Able to perform standing marching and dynamic reaching for a total standing duration of 7 min. Pt's O2 fluctuated between 87% and 99% during standing activities but recovered in less than 15s with PLB. No goals met. Recomend home with assist upon d/c from facility. Continue skilled PT.

## 2020-10-13 NOTE — PROGRESS NOTES
"[facility]  [address] [place] [address]. [id number]  T - [phone]  F - [phone]     Progress Note          PATIENT  DEMOGRAPHICS   PATIENT NAME: Sumanth Robledo                      PHYSICIAN: Mauro Saravia MD  : 1960  MRN: [id number]   LOS: 3 days    Patient Care Team:  Elizabeth Burleson APRN as PCP - General (Family Medicine)  Subjective   SUBJECTIVE   OOB to chair, bp is stable now (not low). Had 1.2LUF overnight tolerated the procedure well        Objective   OBJECTIVE   Vital Signs  Temp:  [97.3 °F (36.3 °C)-98.5 °F (36.9 °C)] 98.5 °F (36.9 °C)  Heart Rate:  [81-96] 85  Resp:  [16-19] 18  BP: (119-216)/() 148/78    Flowsheet Rows      First Filed Value   Admission Height  170.2 cm (67\") Documented at 10/08/2020 2035   Admission Weight  81.5 kg (179 lb 9.6 oz) Documented at 10/08/2020 2046           I/O last 3 completed shifts:  In: 1080 [P.O.:1080]  Out: 263 [Other:263]    PHYSICAL EXAM    Physical Exam  Constitutional:       General: He is not in acute distress.  HENT:      Head:      Comments: Hematoma scalp- frontal area     Mouth/Throat:      Mouth: Mucous membranes are moist.   Cardiovascular:      Rate and Rhythm: Normal rate and regular rhythm.   Pulmonary:      Effort: No respiratory distress.      Breath sounds: No wheezing.   Abdominal:      General: There is distension.      Palpations: Abdomen is soft.   Musculoskeletal:         General: Swelling present.      Comments: Trace ryann LE edema   Skin:     General: Skin is warm and dry.   Neurological:      General: No focal deficit present.      Mental Status: He is alert and oriented to person, place, and time.   Psychiatric:         Mood and Affect: Mood normal.         Behavior: Behavior normal.         RESULTS   Results Review:    Results from last 7 days   Lab Units 10/13/20  0358 10/10/20  1316 10/10/20  0610  10/09/20  0647  10/08/20  1704   SODIUM mmol/L 134* 136 137   < > 140   < > 140 "   POTASSIUM mmol/L 3.8 4.1 3.8   < > 4.3   < > 3.8   CHLORIDE mmol/L 95* 95* 96*   < > 102   < > 98   CO2 mmol/L 24.0 19.0* 23.0   < > 19.0*   < > 25.0   BUN mg/dL 60* 68* 69*   < > 70*   < > 65*   CREATININE mg/dL 11.87* 13.33* 12.62*   < > 13.51*   < > 13.19*   CALCIUM mg/dL 8.3* 7.7* 8.3*   < > 8.2*   < > 8.6   BILIRUBIN mg/dL  --  0.3  --   --  0.4  --  0.3   ALK PHOS U/L  --  58  --   --  59  --  70   ALT (SGPT) U/L  --  31  --   --  10  --  11   AST (SGOT) U/L  --  39  --   --  17  --  13   GLUCOSE mg/dL 111* 174* 127*   < > 117*   < > 181*    < > = values in this interval not displayed.       Estimated Creatinine Clearance: 7.3 mL/min (A) (by C-G formula based on SCr of 11.87 mg/dL (H)).    Results from last 7 days   Lab Units 10/13/20  0358 10/11/20  0936 10/09/20  0647   MAGNESIUM mg/dL  --  1.8 2.1   PHOSPHORUS mg/dL 10.0*  --  9.5*             Results from last 7 days   Lab Units 10/13/20  0358 10/10/20  1316 10/10/20  0610 10/09/20  0647 10/08/20  2054   WBC 10*3/mm3 8.50 11.44* 8.57 8.71 9.95   HEMOGLOBIN g/dL 8.2* 8.6* 8.8* 9.2* 10.2*   PLATELETS 10*3/mm3 248 273 303 299 319       Results from last 7 days   Lab Units 10/09/20  1403 10/08/20  2054   INR  1.80* 1.05         Imaging Results (Last 24 Hours)     ** No results found for the last 24 hours. **      Heart cath 10/9/20  Procedure performed:  1.  Left heart catheterization with iliofemoral arteriogram and Angio-Seal closure device.  2.  Attempted unsuccessful PTCA of the 100% occluded left main .  3.  Selective cannulation of the left internal mammary artery.  4.  Selective cannulation of the saphenous vein graft to the right coronary artery and to the obtuse marginal branch.     MEDICATIONS    aspirin, 81 mg, Oral, Daily  calcitriol, 1 mcg, Oral, Daily  carvedilol, 6.25 mg, Oral, BID With Meals  clopidogrel, 75 mg, Oral, Daily  [START ON 10/17/2020] epoetin arturo/arturo-epbx, 10,000 Units, Subcutaneous, Weekly  famotidine, 20 mg, Oral,  Daily  heparin (porcine), 5,000 Units, Subcutaneous, Q12H  hydrALAZINE, 25 mg, Oral, Q8H  insulin detemir, 20 Units, Subcutaneous, Nightly  isosorbide mononitrate, 60 mg, Oral, Q24H  losartan, 50 mg, Oral, Q24H  pravastatin, 20 mg, Oral, Daily  ranolazine, 1,000 mg, Oral, BID  sevelamer, 2,400 mg, Oral, TID With Meals  sodium bicarbonate, 650 mg, Oral, TID  sodium chloride, 10 mL, Intravenous, Q12H           Assessment/Plan   ASSESSMENT / PLAN      Hypotension    CAD (coronary artery disease)    ESRF (end stage renal failure) (CMS/[facility])    Diabetes mellitus (CMS/[facility])    Cardiac arrest (CMS/[facility])    Syncope    1.end-stage renal disease currently on peritoneal dialysis every night.  Patient is doing pd with 2800 cc 5 cycles with last fill of 1000 cc.  His estimated dry weight is 84 kg.  Weight is increasing now peak 96 kg. Will try 4.25 % bag tonight for added UF. Tolerated PD last night without issues.      2.anemia chronic kidney disease-Hgb low. We will continue with Epogen 18575 units once a week     3.CKD/mineral bone disorder.  Patient will remain on calcitriol 1 mcg daily along with Renvela 2400 mg 3 times a day- last phos 9.5 check again      4.CAD/ HTN/ PEA- Cardiac cath 10/9/20 showed 100% occlusion left main . Had episode of PEA Saturday AM and CPR was administered x 4 minutes and patient received epinepherine x 2. Nifedipine ER, Toprol XL, losartan, and lasix are currently on hold. Troponins 2.0-1.9. Rec'd 1 liter NS following code and continues on NS at 125cc/hr. It is now stopped. bp is stable    5. ? Seizures- witnessed seizure x 2 post Code Blue- Ct scan of head was unremarkable.     6. Metabolic acidosis- bicarb better with po na bicarb              This document has been electronically signed by Mauro Saravia MD on October 13, 2020 09:49 CDT

## 2020-10-13 NOTE — PROGRESS NOTES
[facility]  INPATIENT PROGRESS NOTE    Length of Stay: 3  Date of Admission: 10/8/2020  Primary Care Physician: Elizabeth Burleson APRN    Subjective   Chief Complaint: No new complaints.    HPI: Patient is seen for follow-up.  He is doing well, tolerating prescribed diet, deconditioned and voices no new complaints.  He is on supplemental oxygen of 2 L nasal prongs with saturation in the mid to upper 90s.     Review of Systems   Constitutional: Positive for activity change and fatigue. Negative for appetite change, chills, diaphoresis and fever.   HENT: Negative for trouble swallowing and voice change.    Eyes: Negative for photophobia and visual disturbance.   Respiratory: Negative for cough, choking, chest tightness, shortness of breath, wheezing and stridor.    Cardiovascular: Negative for chest pain, palpitations and leg swelling.   Gastrointestinal: Negative for abdominal distention, abdominal pain, blood in stool, constipation, diarrhea, nausea and vomiting.   Endocrine: Negative for cold intolerance, heat intolerance, polydipsia, polyphagia and polyuria.   Genitourinary: Negative for decreased urine volume, difficulty urinating, dysuria, enuresis, flank pain, frequency, hematuria and urgency.   Musculoskeletal: Negative for arthralgias, gait problem, myalgias, neck pain and neck stiffness.   Skin: Negative for pallor, rash and wound.   Neurological: Negative for dizziness, tremors, seizures, syncope, facial asymmetry, speech difficulty, weakness, light-headedness, numbness and headaches.   Hematological: Does not bruise/bleed easily.   Psychiatric/Behavioral: Negative for agitation, behavioral problems and confusion.       Objective    Temp:  [97.3 °F (36.3 °C)-98.5 °F (36.9 °C)] 98.5 °F (36.9 °C)  Heart Rate:  [81-96] 85  Resp:  [16-19] 18  BP: (119-216)/() 148/78    Physical Exam  Vitals signs and nursing note reviewed.   Constitutional:          General: He is not in acute distress.     Appearance: He is well-developed. He is obese. He is not diaphoretic.   HENT:      Head: Normocephalic and atraumatic.   Eyes:      General: No scleral icterus.     Pupils: Pupils are equal, round, and reactive to light.   Neck:      Musculoskeletal: Normal range of motion and neck supple.      Thyroid: No thyromegaly.      Vascular: No JVD.   Cardiovascular:      Rate and Rhythm: Normal rate and regular rhythm.      Heart sounds: Normal heart sounds. No murmur. No friction rub. No gallop.    Pulmonary:      Effort: Pulmonary effort is normal.      Breath sounds: Normal breath sounds. No wheezing or rales.   Chest:      Chest wall: No tenderness.   Abdominal:      General: Bowel sounds are normal. There is no distension.      Palpations: Abdomen is soft. There is no mass.      Tenderness: There is no abdominal tenderness. There is no guarding or rebound.      Comments: Peritoneal dialysis catheter is in place and the site is clean and dry.   Musculoskeletal:         General: No tenderness or deformity.   Skin:     General: Skin is warm and dry.      Coloration: Skin is not pale.      Findings: No erythema or rash.   Neurological:      General: No focal deficit present.      Mental Status: He is alert and oriented to person, place, and time. Mental status is at baseline.      Cranial Nerves: No cranial nerve deficit.      Sensory: No sensory deficit.      Motor: No weakness or abnormal muscle tone.      Coordination: Coordination normal.      Deep Tendon Reflexes: Reflexes normal.   Psychiatric:         Mood and Affect: Mood normal.         Behavior: Behavior normal.         Thought Content: Thought content normal.         Judgment: Judgment normal.           Medication Review:    Current Facility-Administered Medications:   •  acetaminophen (TYLENOL) tablet 650 mg, 650 mg, Oral, Q4H PRN, 650 mg at 10/11/20 1108 **OR** [DISCONTINUED] acetaminophen (TYLENOL) 160 MG/5ML  solution 650 mg, 650 mg, Oral, Q4H PRN **OR** acetaminophen (TYLENOL) suppository 650 mg, 650 mg, Rectal, Q4H PRN, Valentín Lewis MD  •  acetaminophen (TYLENOL) tablet 650 mg, 650 mg, Oral, Q4H PRN, Valentín Lewis MD  •  aspirin chewable tablet 81 mg, 81 mg, Oral, Daily, Zahida Lewis MD, 81 mg at 10/13/20 0812  •  bisacodyl (DULCOLAX) EC tablet 5 mg, 5 mg, Oral, Daily PRN, Valentín Lewis MD  •  calcitriol (ROCALTROL) capsule 1 mcg, 1 mcg, Oral, Daily, Valentín Lewis MD, 1 mcg at 10/13/20 0811  •  carvedilol (COREG) tablet 6.25 mg, 6.25 mg, Oral, BID With Meals, Gibran Garza MD, 6.25 mg at 10/13/20 0815  •  clopidogrel (PLAVIX) tablet 75 mg, 75 mg, Oral, Daily, Valentín Lewis MD, 75 mg at 10/13/20 0812  •  Delflex-LC/1.5% Dextrose (DIANEAL) CAPD 12,200 mL, 12,200 mL, Intraperitoneal, PRN, Jose E Fuentes MD  •  Delflex-LC/2.5% Dextrose (DIANEAL) CAPD 5,000 mL, 5,000 mL, Intraperitoneal, PRN, Valentín Lewis MD  •  Delflex-LC/2.5% Dextrose (DIANEAL) CAPD 5,000 mL, 5,000 mL, Intraperitoneal, PRN, Mauro Saravia MD, 5,000 mL at 10/12/20 1556  •  [START ON 10/17/2020] epoetin arturo-epbx (RETACRIT) injection 10,000 Units, 10,000 Units, Subcutaneous, Weekly, Mauro Saravia MD  •  famotidine (PEPCID) tablet 20 mg, 20 mg, Oral, Daily, Brendan Moon MD, 20 mg at 10/13/20 0811  •  heparin (porcine) 5000 UNIT/ML injection 5,000 Units, 5,000 Units, Subcutaneous, Q12H, Brendan Moon MD, 5,000 Units at 10/13/20 0812  •  hydrALAZINE (APRESOLINE) tablet 25 mg, 25 mg, Oral, Q8H, Gibran Garza MD, 25 mg at 10/13/20 0537  •  influenza vac split quad (FLUZONE,FLUARIX,AFLURIA,FLULAVAL) injection 0.5 mL, 0.5 mL, Intramuscular, During Hospitalization, Valentín Lewis MD  •  insulin detemir (LEVEMIR) injection 20 Units, 20 Units, Subcutaneous, Nightly, Valentín Lewis MD, 20 Units at 10/12/20 2112  •  isosorbide mononitrate (IMDUR) 24 hr tablet 60 mg, 60 mg, Oral, Q24H, Brody Benavides MD, 60 mg at 10/13/20  0811  •  losartan (COZAAR) tablet 50 mg, 50 mg, Oral, Q24H, Gibran Garza MD, 50 mg at 10/13/20 0811  •  metoprolol tartrate (LOPRESSOR) injection 5 mg, 5 mg, Intravenous, Q6H PRN, Luana Zamudio MD, 5 mg at 10/12/20 2318  •  nitroglycerin (NITROSTAT) SL tablet 0.4 mg, 0.4 mg, Sublingual, Q5 Min PRN, Valentín Lewis MD  •  ondansetron (ZOFRAN) tablet 4 mg, 4 mg, Oral, Q6H PRN **OR** ondansetron (ZOFRAN) injection 4 mg, 4 mg, Intravenous, Q6H PRN, Valentín Lewis MD, 4 mg at 10/11/20 0824  •  pravastatin (PRAVACHOL) tablet 20 mg, 20 mg, Oral, Daily, Valentín Lewis MD, 20 mg at 10/13/20 0812  •  ranolazine (RANEXA) 12 hr tablet 1,000 mg, 1,000 mg, Oral, BID, Valentín Lewis MD, 1,000 mg at 10/13/20 0811  •  sevelamer (RENVELA) tablet 2,400 mg, 2,400 mg, Oral, TID With Meals, Valentín Lewis MD, 2,400 mg at 10/13/20 0811  •  sodium bicarbonate tablet 650 mg, 650 mg, Oral, TID, Mauro Saravia MD, 650 mg at 10/13/20 0811  •  sodium chloride 0.9 % flush 10 mL, 10 mL, Intravenous, Q12H, Valentín Lewis MD, 10 mL at 10/13/20 0812  •  sodium chloride 0.9 % flush 10 mL, 10 mL, Intravenous, PRN, Valentín Lewis MD, 10 mL at 10/11/20 2038    Results Review:  I have reviewed the labs, radiology results, and diagnostic studies.    Laboratory Data:   Results from last 7 days   Lab Units 10/13/20  0358 10/10/20  1316 10/10/20  0610  10/09/20  0647  10/08/20  1704   SODIUM mmol/L 134* 136 137   < > 140   < > 140   POTASSIUM mmol/L 3.8 4.1 3.8   < > 4.3   < > 3.8   CHLORIDE mmol/L 95* 95* 96*   < > 102   < > 98   CO2 mmol/L 24.0 19.0* 23.0   < > 19.0*   < > 25.0   BUN mg/dL 60* 68* 69*   < > 70*   < > 65*   CREATININE mg/dL 11.87* 13.33* 12.62*   < > 13.51*   < > 13.19*   GLUCOSE mg/dL 111* 174* 127*   < > 117*   < > 181*   CALCIUM mg/dL 8.3* 7.7* 8.3*   < > 8.2*   < > 8.6   BILIRUBIN mg/dL  --  0.3  --   --  0.4  --  0.3   ALK PHOS U/L  --  58  --   --  59  --  70   ALT (SGPT) U/L  --  31  --   --  10  --   11   AST (SGOT) U/L  --  39  --   --  17  --  13   ANION GAP mmol/L 15.0 22.0* 18.0*   < > 19.0*   < > 17.0*    < > = values in this interval not displayed.     Estimated Creatinine Clearance: 7.3 mL/min (A) (by C-G formula based on SCr of 11.87 mg/dL (H)).  Results from last 7 days   Lab Units 10/13/20  0358 10/11/20  0936 10/09/20  0647   MAGNESIUM mg/dL  --  1.8 2.1   PHOSPHORUS mg/dL 10.0*  --  9.5*         Results from last 7 days   Lab Units 10/13/20  0358 10/10/20  1316 10/10/20  0610 10/09/20  0647 10/08/20  2054   WBC 10*3/mm3 8.50 11.44* 8.57 8.71 9.95   HEMOGLOBIN g/dL 8.2* 8.6* 8.8* 9.2* 10.2*   HEMATOCRIT % 26.5* 27.9* 28.2* 29.5* 32.6*   PLATELETS 10*3/mm3 248 273 303 299 319     Results from last 7 days   Lab Units 10/09/20  1403 10/08/20  2054   INR  1.80* 1.05       Culture Data:   No results found for: BLOODCX  No results found for: URINECX  No results found for: RESPCX  No results found for: WOUNDCX  No results found for: STOOLCX  No components found for: BODYFLD    Radiology Data:   Imaging Results (Last 24 Hours)     ** No results found for the last 24 hours. **          I have reviewed the patient's current medications.     Assessment/Plan     Hospital Problem List:  Principal Problem:    Hypotension  Active Problems:    CAD (coronary artery disease)    ESRF (end stage renal failure) (CMS/[facility])    Diabetes mellitus (CMS/[facility])    Cardiac arrest (CMS/[facility])    Syncope    History of coronary artery disease status post cardiac arrest/syncope: Patient is clinically stable and remains  asymptomatic.  Cardiologist thinks patient may have had an episode of PEA.  Dr. Garza is following.  Continue guideline directed medical therapy.  Echocardiogram done 10/10/2020 showed:  · Left ventricular ejection fraction appears to be 41 - 45%. Left ventricular systolic function is mildly decreased.  · Left ventricular diastolic function is consistent with (grade II w/high LAP) pseudonormalization.  · Left  ventricular wall thickness is consistent with concentric hypertrophy.  · Mild mitral annular calcification is present.  · Mild mitral valve regurgitation is present.  · Estimated right ventricular systolic pressure from tricuspid regurgitation is normal (<35 mmHg).  · The left atrial cavity is moderately dilated.  · Mildly reduced right ventricular systolic function noted.    Diabetes mellitus: Stable.  Continue anti-glycemic with Accu-Cheks and sliding scale insulin.    End-stage renal disease: Patient is on peritoneal dialysis nightly.  Nephrologist is following.    Anemia of chronic inflammation: Hemoglobin is stable.  Continue weekly Epogen.    Hyponatremia (likely hypervolemic): Continue peritoneal dialysis, restrict free water and monitor BMP.    Questionable seizures: Noncontrast CT scan of the head was unremarkable and patient remains asymptomatic.  EEG did not show any evidence of epileptiform activity.     Deconditioning: Continue PT and OT.    Continue GI and DVT prophylaxis.    Transfer out of CCU/stepdown unit.      Discharge Planning: In progress.    Brendan Moon MD   10/13/20   10:38 CDT

## 2020-10-13 NOTE — THERAPY TREATMENT NOTE
Patient Name: Sumanth Robledo  : 1960    MRN: [id number]                              Today's Date: 10/13/2020     Daily Note  Admit Date: 10/8/2020    Visit Dx:     ICD-10-CM ICD-9-CM   1. Precordial pain  R07.2 786.51   2. Impaired mobility and ADLs  Z74.09 V49.89    Z78.9    3. Impaired functional mobility, balance, gait, and endurance  Z74.09 V49.89     Patient Active Problem List   Diagnosis   • Chest pain   • CAD (coronary artery disease)   • ESRF (end stage renal failure) (CMS/[facility])   • Hypertension   • Diabetes mellitus (CMS/[facility])   • Precordial pain   • Cardiac arrest (CMS/[facility])   • Hypotension   • Syncope     Past Medical History:   Diagnosis Date   • Chronic kidney disease, stage 3    • Coronary arteriosclerosis     eCABGx3 10/2014 LM 95% LAD diff 70% D1 70% OM2 70% OM3 70% & % USA STEMI      • End stage renal disease (CMS/HCC)    • Essential hypertension    • Heterozygous thalassemia     Thalassemia minor      • Hyperlipidemia    • Peripheral vascular disease (CMS/[facility])     WILSON RIGHT 0.97 LEFT 0.79, stent JADYN GARRIDO 2015      • Surgical follow-up care     Emergent CABG X 3     • Type 2 diabetes mellitus (CMS/HCC)      Past Surgical History:   Procedure Laterality Date   • AMPUTATION FINGER / THUMB Right     RIGHT middle finger   • CARDIAC CATHETERIZATION  10/11/2014    Distal left main critical stenosis of up to 95-99% with evidence of filling defect suggestive of a plaque. LAD and OMB 1 and 2 stenosis.A 100% occludeed RCA.   • CARDIAC CATHETERIZATION N/A 2020    Procedure: Left Heart Cath 2020 @ 9;  Surgeon: Gibran Garza MD;  Location: [facility] CATH INVASIVE LOCATION;  Service: Cardiology;  Laterality: N/A;   • CARDIAC CATHETERIZATION N/A 10/9/2020    Procedure: Left Heart Cath;  Surgeon: Gibran Garza MD;  Location: [facility] CATH INVASIVE LOCATION;  Service: Cardiology;  Laterality: N/A;   • CARDIAC SURGERY     • CORONARY ARTERY BYPASS GRAFT  10/11/2014    Emergent CABG X 3  LIMA->LAD SVG->OM SVG->PDA     General Information     Row Name 10/13/20 0908          Physical Therapy Time and Intention    Document Type  therapy note (daily note)  -KW     Mode of Treatment  individual therapy;physical therapy  -KW     Row Name 10/13/20 0908          General Information    Patient Profile Reviewed  yes  -KW     Row Name 10/13/20 0908          Cognition    Orientation Status (Cognition)  oriented x 4  -KW     Row Name 10/13/20 0908          Safety Issues, Functional Mobility    Impairments Affecting Function (Mobility)  strength;endurance/activity tolerance;balance  -KW       User Key  (r) = Recorded By, (t) = Taken By, (c) = Cosigned By    Initials Name Provider Type    KW Getachew Beck PT Physical Therapist        Mobility     Row Name 10/13/20 0908          Bed-Chair Transfer    Bed-Chair [place] (Transfers)  contact guard  -KW     Assistive Device (Bed-Chair Transfers)  walker, front-wheeled  -KW     Row Name 10/13/20 0908          Gait/Stairs (Locomotion)    [place] Level (Gait)  contact guard  -KW     Assistive Device (Gait)  walker, front-wheeled  -KW     Distance in Feet (Gait)  3' fwd/bwd, limited by lines this date  -KW     Comment (Gait/Stairs)  pt performed standing marching in place 2x1min  -KW       User Key  (r) = Recorded By, (t) = Taken By, (c) = Cosigned By    Initials Name Provider Type    KW Getachew Beck PT Physical Therapist        Obj/Interventions     Row Name 10/13/20 0914          Motor Skills    Therapeutic Exercise  hip;knee;ankle  -KW     Row Name 10/13/20 0914          Hip (Therapeutic Exercise)    Hip (Therapeutic Exercise)  strengthening exercise  -KW     Hip Strengthening (Therapeutic Exercise)  marching while seated;2 sets;10 repetitions;marching while standing  -KW     Row Name 10/13/20 0914          Knee (Therapeutic Exercise)    Knee (Therapeutic Exercise)  strengthening exercise  -KW     Knee Strengthening (Therapeutic Exercise)  LAQ (long arc  quad);2 sets;10 repetitions  -KW     Row Name 10/13/20 0914          Ankle (Therapeutic Exercise)    Ankle (Therapeutic Exercise)  AROM (active range of motion)  -KW     Ankle AROM (Therapeutic Exercise)  bilateral;dorsiflexion;plantarflexion x20  -KW     Row Name 10/13/20 0914          Balance    Balance Interventions  standing;dynamic reaching  -KW     Comment, Balance  Post sway present in static standing without support but good ankle strategy present  -KW       User Key  (r) = Recorded By, (t) = Taken By, (c) = Cosigned By    Initials Name Provider Type    KW Getachew Beck, PT Physical Therapist        Goals/Plan     Row Name 10/13/20 0908          Bed Mobility Goal 1 (PT)    Activity/Assistive Device (Bed Mobility Goal 1, PT)  sit to supine;supine to sit  -KW     [place] Level/Cues Needed (Bed Mobility Goal 1, PT)  modified independence  -KW     Time Frame (Bed Mobility Goal 1, PT)  3 days  -KW     Strategies/Barriers (Bed Mobility Goal 1, PT)  bed flat, no hand rails  -KW     Progress/Outcomes (Bed Mobility Goal 1, PT)  goal not met  -KW     Row Name 10/13/20 0908          Transfer Goal 1 (PT)    Activity/Assistive Device (Transfer Goal 1, PT)  sit-to-stand/stand-to-sit;bed-to-chair/chair-to-bed LRAD PRN  -KW     [place] Level/Cues Needed (Transfer Goal 1, PT)  modified independence  -KW     Time Frame (Transfer Goal 1, PT)  3 days  -KW     Progress/Outcome (Transfer Goal 1, PT)  goal not met  -KW     Row Name 10/13/20 0908          Gait Training Goal 1 (PT)    Activity/Assistive Device (Gait Training Goal 1, PT)  gait (walking locomotion);assistive device use;decrease fall risk;increase endurance/gait distance LRAD PRN  -KW     [place] Level (Gait Training Goal 1, PT)  modified independence  -KW     Distance (Gait Training Goal 1, PT)  150ft or more  -KW     Time Frame (Gait Training Goal 1, PT)  5 days  -KW     Progress/Outcome (Gait Training Goal 1, PT)  goal not met  -KW     Row Name  "10/13/20 0908          Stairs Goal 1 (PT)    Activity/Assistive Device (Stairs Goal 1, PT)  ascending stairs;descending stairs;using handrail, left  -KW     [place] Level/Cues Needed (Stairs Goal 1, PT)  standby assist  -KW     Number of Stairs (Stairs Goal 1, PT)  2 or more  -KW     Time Frame (Stairs Goal 1, PT)  by discharge  -KW     Progress/Outcome (Stairs Goal 1, PT)  goal not met  -KW       User Key  (r) = Recorded By, (t) = Taken By, (c) = Cosigned By    Initials Name Provider Type    KW Getachew Beck, PT Physical Therapist        Clinical Impression     Row Name 10/13/20 0908          Pain Scale: Numbers Pre/Post-Treatment    Pretreatment Pain Rating  9/10  -KW     Posttreatment Pain Rating  0/10 - no pain  -KW     Pain Location - Side  Right  -KW     Pain Location  foot  -KW     Pre/Posttreatment Pain Comment  Pt legs were \"jerking\" last night and he states he kicked the foot of the bed  -KW     Pain Intervention(s)  Ambulation/increased activity;Repositioned  -KW     Row Name 10/13/20 0908          Plan of Care Review    Plan of Care Reviewed With  patient  -KW     Row Name 10/13/20 0908          Therapy Assessment/Plan (PT)    Rehab Potential (PT)  good, to achieve stated therapy goals  -KW     Criteria for Skilled Interventions Met (PT)  yes;skilled treatment is necessary  -KW     Row Name 10/13/20 0908          Vital Signs    Pre Systolic BP Rehab  148  -KW     Pre Treatment Diastolic BP  78  -KW     Post Systolic BP Rehab  143  -KW     Post Treatment Diastolic BP  73  -KW     Pretreatment Heart Rate (beats/min)  84  -KW     Posttreatment Heart Rate (beats/min)  85  -KW     Pre SpO2 (%)  98  -KW     O2 Delivery Pre Treatment  supplemental O2 3.5L  -KW     Intra SpO2 (%)  94  -KW     O2 Delivery Intra Treatment  supplemental O2 RN decreased pt to 1L per MD request  -KW     Post SpO2 (%)  96  -KW     O2 Delivery Post Treatment  supplemental O2 1L  -KW     Pre Patient Position  Sitting  -KW     " Recovery Time  Pt's O2 fluctuated between 87% and 99% during standing activities but recovered in less than 15s with PLB  -KW     Row Name 10/13/20 0908          Positioning and Restraints    Pre-Treatment Position  sitting in chair/recliner  -KW     Post Treatment Position  chair  -KW     In Chair  reclined;notified nsg;call light within reach;encouraged to call for assist  -KW       User Key  (r) = Recorded By, (t) = Taken By, (c) = Cosigned By    Initials Name Provider Type    Getachew James PT Physical Therapist        Outcome Measures     Row Name 10/13/20 0908          How much help from another person do you currently need...    Turning from your back to your side while in flat bed without using bedrails?  3  -KW     Moving from lying on back to sitting on the side of a flat bed without bedrails?  3  -KW     Moving to and from a bed to a chair (including a wheelchair)?  3  -KW     Standing up from a chair using your arms (e.g., wheelchair, bedside chair)?  3  -KW     Climbing 3-5 steps with a railing?  3  -KW     To walk in hospital room?  3  -KW     AM-PAC 6 Clicks Score (PT)  18  -KW     Row Name 10/13/20 0908          Functional Assessment    Outcome Measure Options  AM-PAC 6 Clicks Basic Mobility (PT)  -KW       User Key  (r) = Recorded By, (t) = Taken By, (c) = Cosigned By    Initials Name Provider Type    Getachew James PT Physical Therapist        Physical Therapy Education                 Title: PT OT SLP Therapies (In Progress)     Topic: Physical Therapy (In Progress)     Point: Mobility training (Done)     Learning Progress Summary           Patient Acceptance, E, VU by JERAMY at 10/13/2020 0940    Comment: PT POC and prognosis, PLB    Acceptance, E, VU by JERAMY1 at 10/12/2020 1431    Comment: Role of PT, POC, use of gait belt                   Point: Home exercise program (Not Started)     Learner Progress:  Not documented in this visit.          Point: Body mechanics (Not Started)     Learner  Progress:  Not documented in this visit.          Point: Precautions (Done)     Learning Progress Summary           Patient Acceptance, E, VU by KW at 10/13/2020 0940    Comment: PT POC and prognosis, PLB    Acceptance, E, VU by KW1 at 10/12/2020 1431    Comment: Role of PT, POC, use of gait belt                               User Key     Initials Effective Dates Name Provider Type Discipline    KW 08/09/20 -  Jaquelin Gabriel PT Physical Therapist PT     08/09/20 -  Getachew Beck PT Physical Therapist PT              PT Recommendation and Plan     Plan of Care Reviewed With: patient  Outcome Summary: PT tx complete. Pt pleasant and agreeable to PT. Pt initially on 3.5 L O2 but RN decreased to 1L per MD request during PT tx. Pt performed seated and standing therex, excellent participation and motivation. CGA for sit<>stand transfer with RW. Pt ambulated 3' fwd/bwd with FWW and CGA, limited by lines. Able to perform standing marching and dynamic reaching for a total standing duration of 7 min. Pt's O2 fluctuated between 87% and 99% during standing activities but recovered in less than 15s with PLB. No goals met. Recomend home with assist upon d/c from facility. Continue skilled PT.     Time Calculation:   PT Charges     Row Name 10/13/20 0943             Time Calculation    Start Time  0908  -KW      Stop Time  0939  -KW      Time Calculation (min)  31 min  -KW      PT Received On  10/13/20  -KW         Time Calculation- PT    Total Timed Code Minutes- PT  31 minute(s)  -KW         Timed Charges    39704 - PT Therapeutic Exercise Minutes  15  -KW      45981 - PT Therapeutic Activity Minutes  16  -KW        User Key  (r) = Recorded By, (t) = Taken By, (c) = Cosigned By    Initials Name Provider Type    KW Getachew Beck PT Physical Therapist        Therapy Charges for Today     Code Description Service Date Service Provider Modifiers Qty    44403505041 HC PT THER PROC EA 15 MIN 10/13/2020 Getachew Beck PT GP 1     [id number]  PT THERAPEUTIC ACT EA 15 MIN 10/13/2020 Getachew Beck, PT GP 1          PT G-Codes  Outcome Measure Options: AM-PAC 6 Clicks Basic Mobility (PT)  AM-PAC 6 Clicks Score (PT): 18  AM-PAC 6 Clicks Score (OT): 18    Getachew Beck PT  10/13/2020

## 2020-10-13 NOTE — PLAN OF CARE
Problem: Adult Inpatient Plan of Care  Goal: Plan of Care Review  Flowsheets  Taken 10/13/2020 1308  Plan of Care Reviewed With: patient  Outcome Summary:   OT tx completed   Pt rec'd seated in chair. Pt instructed in LBD. Pt (D) for donning socks and mod A for donning pants (required assist fully donning over bilat LE). Pt instructed in various activities to increase endurance, strength, standing balance, and mobility to benefit ADL and fxnl transfers. Pt CGA for sit<>Stand and dynamic  standing tasks. No goals met on this date   Cont OT POC  Taken 10/13/2020 1039  Plan of Care Reviewed With: patient

## 2020-10-13 NOTE — PLAN OF CARE
Goal Outcome Evaluation:  Plan of Care Reviewed With: patient, spouse  Progress: improving  Outcome Summary: pt a/ox4. pt home meds restarted this shift. pt b/p elevated thru night one dose of lopressor IV given. pt vital signs stable at this time. will contiue to monitor.

## 2020-10-13 NOTE — PROGRESS NOTES
Discharge Planning Assessment  [facility]     Patient Name: Sumanth Robledo  MRN: [id number]  Today's Date: 10/13/2020    Admit Date: 10/8/2020    Discharge Needs Assessment     Row Name 10/13/20 1253       Living Environment    Lives With  spouse    Name(s) of Who Lives With Patient  Arthur Robledo (spouse)    Current Living Arrangements  home/apartment/condo Information on face sheet reviewed with spouse.    Primary Care Provided by  self    Provides Primary Care For  no one    Family Caregiver if Needed  spouse    Family Caregiver Names  Arthur Robledo (spouse)    Quality of Family Relationships  helpful;involved    Able to Return to Prior Arrangements  yes       Resource/Environmental Concerns    Resource/Environmental Concerns  none    Transportation Concerns  car, none       Transition Planning    Patient/Family Anticipates Transition to  home with family    Patient/Family Anticipated Services at Transition  none    Transportation Anticipated  family or friend will provide;car, drives self       Discharge Needs Assessment    Readmission Within the Last 30 Days  no previous admission in last 30 days    Current Outpatient/Agency/Support Group  outpatient peritoneal dialysis Patient is followed by [facility] for hemodialysis. Patient currently performs peritoneal dialysis. Per spouse, patient is independent. She assists patients as needed with PD.    Equipment Currently Used at Home  glucometer;other (see comments) PD supplies.    Concerns to be Addressed  denies needs/concerns at this time    Anticipated Changes Related to Illness  none    Equipment Needed After Discharge  walker, rolling    Discharge Facility/Level of Care Needs  home with home health    Current Discharge Risk  chronically ill;physical impairment        Discharge Plan     Row Name 10/13/20 1257       Plan    Plan  home with     Plan Comments  LACE assessment complete. Plan is for patient to return home with support/assistance from his  spouse. PT/OT ordered. Patient may benefit from HH and DME: rolling walker at d/c. Spouse did not have a HH preference at this time but voiced that patient would be agreeable to HH if need is idenetified at d/c. SANTOS left a note for MD requesting HH for PT/OT and DME: rolling walker. Disposition pending patient's therapy progress. Goal is for patient to reutrn home. CM will continue to follow.....Jessica VUONG    Row Name 10/13/20 3083       Plan    Plan Comments  SANTOS attemptd to call patient's spouse, Arthur Robledo, #[phone]. No answer. Voicemail left with request for a return call to complete LACE assessment...Jessica VUONG        Continued Care and Services - Admitted Since 10/8/2020    Coordination has not been started for this encounter.       Expected Discharge Date and Time     Expected Discharge Date Expected Discharge Time    Oct 16, 2020         Demographic Summary     Row Name 10/13/20 1250       General Information    Admission Type  inpatient    Arrived From  emergency department    Referral Source  high risk screening LACE score 10    General Information Comments  SANTOS completed patient's LACE assessment with patient's spouse via telephone.       Contact Information    Contact Information Obtained for          Functional Status     Row Name 10/13/20 1258       Functional Status    Usual Activity Tolerance  good    Functional Status Comments  Patient is independent with ADL's @ baseline.       Functional Status, IADL    Medications  independent Pharmacy, [facility], and prescription coverage confirmed.    Meal Preparation  assistive person    Housekeeping  assistive person    Laundry  assistive person    Shopping  assistive person    IADL Comments  Patient's spouse assists with performing homemaking services.       Employment/    Employment Status  disabled        Psychosocial    No documentation.       Abuse/Neglect    No documentation.       Legal    No documentation.        Substance Abuse    No documentation.       Patient Forms    No documentation.           CAROLANN Morse

## 2020-10-13 NOTE — PLAN OF CARE
Problem: Adult Inpatient Plan of Care  Goal: Plan of Care Review  Outcome: Ongoing, Progressing  Flowsheets (Taken 10/13/2020 1505)  Progress: no change  Plan of Care Reviewed With: patient  Outcome Summary: transfer from CCU   Goal Outcome Evaluation:  Plan of Care Reviewed With: patient  Progress: no change  Outcome Summary: transfer from CCU

## 2020-10-13 NOTE — PAYOR COMM NOTE
"Continued Stay Review  Auth # [id number]  Cara Holder RN, [facility]  [phone] phone  [phone] fax            Sumanth Gar (60 y.o. Male)     Date of Birth Social Security Number Address Home Phone MRN    1960  [address] [place] [address] [phone] [id number]    Buddhism Marital Status          Muslim        Admission Date Admission Type Admitting Provider Attending Provider Department, Room/Bed    10/8/20 Emergency Isael Miranda MD Stewart-Hubbard, Luana Shields MD [facility] CRITICAL CARE STEPDOWN, 07/A    Discharge Date Discharge Disposition Discharge Destination                       Attending Provider: Luana Zamudio MD    Allergies: No Known Allergies    Isolation: None   Infection: None   Code Status: CPR    Ht: 170.2 cm (67.01\")   Wt: 96.1 kg (211 lb 14.4 oz)    Admission Cmt: None   Principal Problem: Hypotension [I95.9]                 Active Insurance as of 10/8/2020     Primary Coverage     Payor Plan Insurance Group Employer/Plan Group    [employer]HE[employer] BLUE CROSS ANTHEM BLUE CROSS BLUE SHIELD PPO [id number]     Payor Plan Address Payor Plan Phone Number Payor Plan Fax Number Effective Dates    [address] [phone]  9/1/2012 - None Entered    [facility] [id number]       Subscriber Name Subscriber Birth Date Member ID       SUMANTH GAR 1960 [id number]           Secondary Coverage     Payor Plan Insurance Group Employer/Plan Group    MEDICARE MEDICARE A & B      Payor Plan Address Payor Plan Phone Number Payor Plan Fax Number Effective Dates    [address] [phone]  3/1/2019 - None Entered    Formerly [facility] [id number]       Subscriber Name Subscriber Birth Date Member ID       SUMANTH GAR 1960 [id number]                 Emergency Contacts      (Rel.) Home Phone Work Phone Mobile Phone    LISA GAR (Spouse) [phone] -- [phone]            Vital Signs (last day)    "    Date/Time   Temp   Temp src   Pulse   Resp   BP   Patient Position   SpO2    10/13/20 0900   --   --   85   --   148/78   --   98    10/13/20 0800   98.5 (36.9)   Oral   91   --   143/80   --   90    10/13/20 0700   --   --   83   --   141/77   --   99    10/13/20 0600   --   --   84   --   143/80   --   98    10/13/20 0500   --   --   82   --   136/76   --   99    10/13/20 0400   97.8 (36.6)   Oral   83   18   128/72   Lying   98    10/13/20 0300   --   --   82   --   122/90   --   98    10/13/20 0200   --   --   81   --   119/62   --   99    10/13/20 0100   --   --   85   --   141/92   --   98    10/13/20 0000   97.8 (36.6)   Oral   83   --   173/77   --   98    10/12/20 2345   --   --   84   --   162/86   --   97    10/12/20 2332   --   --   81   --   145/76   --   98    10/12/20 2300   --   --   91   --   (!) 216/107   --   97    10/12/20 2236   --   --   91   --   178/91   --   97    10/12/20 2200   --   --   89   --   174/100   --   98    10/12/20 2100   --   --   86   --   149/98   --   98    10/12/20 2035   --   --   87   --   142/92   --   97    10/12/20 2000   97.9 (36.6)   Oral   90   18   (!) 199/108   Lying   96    10/12/20 1909   --   --   91   --   (!) 194/96   --   98    10/12/20 1900   --   --   90   --   (!) 203/95   --   98    10/12/20 1835   --   --   91   --   (!) 185/102   --   95    10/12/20 1834   --   --   91   --   --   --   92    10/12/20 1830   --   --   92   --   (!) 191/108   --   94    10/12/20 1800   --   --   88   19   (!) 178/109   Lying   96    10/12/20 1700   97.3 (36.3)   Temporal   88   17   158/94   Lying   95    10/12/20 1600   --   --   86   19   153/86   Lying   94    10/12/20 1500   --   --   86   18   140/84   Lying   96    10/12/20 1400   --   --   87   16   135/82   Lying   95    10/12/20 1355   --   --   91   --   137/77   --   94    10/12/20 1340   --   --   87   --   137/79   --   95    10/12/20 1300   --   --   90   16   140/85   Lying   95    10/12/20 1215   --    --   93   --   135/80   --   92    10/12/20 1205   --   --   96   --   137/81   --   90    10/12/20 1200   97.5 (36.4)   Temporal   91   18   137/82   Lying   94    10/12/20 1155   --   --   90   --   132/78   --   93    10/12/20 1100   --   --   91   16   144/68   Lying   90    10/12/20 1000   --   --   86   16   130/96   Lying   94    10/12/20 0905   --   --   --   --   130/79   --   --    10/12/20 0900   --   --   85   17   130/79   Lying   96    10/12/20 0800   97.4 (36.3)   Temporal   83   19   125/76   Lying   96    10/12/20 0700   --   --   84   18   126/73   Lying   96    10/12/20 0524   --   --   80   --   --   --   97    10/12/20 0501   --   --   81   15   127/73   --   97    10/12/20 0401   --   --   80   19   119/75   --   95    10/12/20 0301   --   --   80   19   114/68   --   97    10/12/20 0201   --   --   76   16   114/80   --   96    10/12/20 0101   --   --   79   17   111/65   --   95    10/12/20 0002   --   --   77   18   130/80   --   --              Current Facility-Administered Medications   Medication Dose Route Frequency Provider Last Rate Last Dose   • acetaminophen (TYLENOL) tablet 650 mg  650 mg Oral Q4H PRN Valentín Lewis MD   650 mg at 10/11/20 1108    Or   • acetaminophen (TYLENOL) suppository 650 mg  650 mg Rectal Q4H PRN Valentín Lewis MD       • acetaminophen (TYLENOL) tablet 650 mg  650 mg Oral Q4H PRN Valentín Lewis MD       • aspirin chewable tablet 81 mg  81 mg Oral Daily Zahida Lewis MD   81 mg at 10/13/20 0812   • bisacodyl (DULCOLAX) EC tablet 5 mg  5 mg Oral Daily PRN Valentín Lewis MD       • calcitriol (ROCALTROL) capsule 1 mcg  1 mcg Oral Daily Valentín Lewis MD   1 mcg at 10/13/20 0811   • carvedilol (COREG) tablet 6.25 mg  6.25 mg Oral BID With Meals Gibran Garza MD   6.25 mg at 10/13/20 0815   • clopidogrel (PLAVIX) tablet 75 mg  75 mg Oral Daily Valentín Lewis MD   75 mg at 10/13/20 0812   • Delflex-LC/1.5% Dextrose (DIANEAL) CAPD 12,200 mL  12,200 mL  Intraperitoneal PRN Jose E Fuentes MD       • Delflex-LC/2.5% Dextrose (DIANEAL) CAPD 5,000 mL  5,000 mL Intraperitoneal PRN Valentín Lewis MD       • Delflex-LC/2.5% Dextrose (DIANEAL) CAPD 5,000 mL  5,000 mL Intraperitoneal PRN Mauro Saravia MD   5,000 mL at 10/12/20 1556   • [START ON 10/17/2020] epoetin arturo-epbx (RETACRIT) injection 10,000 Units  10,000 Units Subcutaneous Weekly Mauro Saravia MD       • famotidine (PEPCID) tablet 20 mg  20 mg Oral Daily Brendan Moon MD   20 mg at 10/13/20 0811   • heparin (porcine) 5000 UNIT/ML injection 5,000 Units  5,000 Units Subcutaneous Q12H Brendan Moon MD   5,000 Units at 10/13/20 0812   • hydrALAZINE (APRESOLINE) tablet 25 mg  25 mg Oral Q8H Gibran Garza MD   25 mg at 10/13/20 0537   • influenza vac split quad (FLUZONE,FLUARIX,AFLURIA,FLULAVAL) injection 0.5 mL  0.5 mL Intramuscular During Hospitalization Valentín Lewis MD       • insulin detemir (LEVEMIR) injection 20 Units  20 Units Subcutaneous Nightly Valentín Lewis MD   20 Units at 10/12/20 2112   • isosorbide mononitrate (IMDUR) 24 hr tablet 60 mg  60 mg Oral Q24H Brody Benavides MD   60 mg at 10/13/20 0811   • losartan (COZAAR) tablet 50 mg  50 mg Oral Q24H Gibran Garza MD   50 mg at 10/13/20 0811   • metoprolol tartrate (LOPRESSOR) injection 5 mg  5 mg Intravenous Q6H PRN Luana Zamudio MD   5 mg at 10/12/20 2318   • nitroglycerin (NITROSTAT) SL tablet 0.4 mg  0.4 mg Sublingual Q5 Min PRN Valentín Lewis MD       • ondansetron (ZOFRAN) tablet 4 mg  4 mg Oral Q6H PRN Valentín Lewis MD        Or   • ondansetron (ZOFRAN) injection 4 mg  4 mg Intravenous Q6H PRN Valentín Lewis MD   4 mg at 10/11/20 0824   • pravastatin (PRAVACHOL) tablet 20 mg  20 mg Oral Daily Valentín Lewis MD   20 mg at 10/13/20 0812   • ranolazine (RANEXA) 12 hr tablet 1,000 mg  1,000 mg Oral BID Valentín Lewis MD   1,000 mg at 10/13/20 0811   • sevelamer (RENVELA) tablet 2,400 mg   2,400 mg Oral TID With Meals Valentín Lewis MD   2,400 mg at 10/13/20 0811   • sodium bicarbonate tablet 650 mg  650 mg Oral TID Mauro Saravia MD   650 mg at 10/13/20 0811   • sodium chloride 0.9 % flush 10 mL  10 mL Intravenous Q12H Valentín Lewis MD   10 mL at 10/13/20 0812   • sodium chloride 0.9 % flush 10 mL  10 mL Intravenous PRN Valentín Lewis MD   10 mL at 10/11/20 2038     Lab Results (most recent)     Procedure Component Value Units Date/Time    Phosphorus [[id number]]  (Abnormal) Collected: 10/13/20 0358    Specimen: Blood Updated: 10/13/20 0523     Phosphorus 10.0 mg/dL     Basic Metabolic Panel [[id number]]  (Abnormal) Collected: 10/13/20 0358    Specimen: Blood Updated: 10/13/20 0523     Glucose 111 mg/dL      BUN 60 mg/dL      Creatinine 11.87 mg/dL      Sodium 134 mmol/L      Potassium 3.8 mmol/L      Chloride 95 mmol/L      CO2 24.0 mmol/L      Calcium 8.3 mg/dL      eGFR  African Amer 5 mL/min/1.73      Comment: <15 Indicative of kidney failure.        eGFR Non  Amer --     Comment: <15 Indicative of kidney failure.        BUN/Creatinine Ratio 5.1     Anion Gap 15.0 mmol/L     Narrative:      GFR Normal >60  Chronic Kidney Disease <60  Kidney Failure <15      CBC & Differential [[id number]]  (Abnormal) Collected: 10/13/20 0358    Specimen: Blood Updated: 10/13/20 0510    Narrative:      The following orders were created for panel order CBC & Differential.  Procedure                               Abnormality         Status                     ---------                               -----------         ------                     CBC Auto Differential[[id number]]        Abnormal            Final result                 Please view results for these tests on the individual orders.    CBC Auto Differential [[id number]]  (Abnormal) Collected: 10/13/20 0358    Specimen: Blood Updated: 10/13/20 0510     WBC 8.50 10*3/mm3      RBC 3.38 10*6/mm3      Hemoglobin 8.2 g/dL      Hematocrit 26.5 %      MCV  78.4 fL      MCH 24.3 pg      MCHC 30.9 g/dL      RDW 14.7 %      RDW-SD 41.0 fl      MPV 11.6 fL      Platelets 248 10*3/mm3      Neutrophil % 64.8 %      Lymphocyte % 16.9 %      Monocyte % 13.2 %      Eosinophil % 2.5 %      Basophil % 0.5 %      Immature Grans % 2.1 %      Neutrophils, Absolute 5.51 10*3/mm3      Lymphocytes, Absolute 1.44 10*3/mm3      Monocytes, Absolute 1.12 10*3/mm3      Eosinophils, Absolute 0.21 10*3/mm3      Basophils, Absolute 0.04 10*3/mm3      Immature Grans, Absolute 0.18 10*3/mm3      nRBC 0.2 /100 WBC     POC Glucose Once [[id number]]  (Abnormal) Collected: 10/12/20 2111    Specimen: Blood Updated: 10/12/20 2206     Glucose 212 mg/dL     BNP [[id number]]  (Abnormal) Collected: 10/11/20 0936    Specimen: Blood Updated: 10/11/20 1001     proBNP 32,967.0 pg/mL     Narrative:      Among patients with dyspnea, NT-proBNP is highly sensitive for the detection of acute congestive heart failure. In addition NT-proBNP of <300 pg/ml effectively rules out acute congestive heart failure with 99% negative predictive value.    Results may be falsely decreased if patient taking Biotin.      Magnesium [[id number]]  (Normal) Collected: 10/11/20 0936    Specimen: Blood Updated: 10/11/20 0957     Magnesium 1.8 mg/dL     Troponin [[id number]]  (Abnormal) Collected: 10/10/20 1316    Specimen: Blood Updated: 10/10/20 1358     Troponin T 1.940 ng/mL     Narrative:      Troponin T Reference Range:  <= 0.03 ng/mL-   Negative for AMI  >0.03 ng/mL-     Abnormal for myocardial necrosis.  Clinicians would have to utilize clinical acumen, EKG, Troponin and serial changes to determine if it is an Acute Myocardial Infarction or myocardial injury due to an underlying chronic condition.       Results may be falsely decreased if patient taking Biotin.      Comprehensive Metabolic Panel [[id number]]  (Abnormal) Collected: 10/10/20 1316    Specimen: Blood Updated: 10/10/20 1355     Glucose 174 mg/dL      BUN 68 mg/dL       Creatinine 13.33 mg/dL      Sodium 136 mmol/L      Potassium 4.1 mmol/L      Chloride 95 mmol/L      CO2 19.0 mmol/L      Calcium 7.7 mg/dL      Total Protein 7.1 g/dL      Albumin 3.10 g/dL      ALT (SGPT) 31 U/L      AST (SGOT) 39 U/L      Alkaline Phosphatase 58 U/L      Total Bilirubin 0.3 mg/dL      eGFR Non  Amer --     Comment: <15 Indicative of kidney failure.        eGFR  African Amer 5 mL/min/1.73      Comment: <15 Indicative of kidney failure.        Globulin 4.0 gm/dL      A/G Ratio 0.8 g/dL      BUN/Creatinine Ratio 5.1     Anion Gap 22.0 mmol/L     Narrative:      GFR Normal >60  Chronic Kidney Disease <60  Kidney Failure <15      CBC & Differential [[id number]]  (Abnormal) Collected: 10/10/20 1316    Specimen: Blood Updated: 10/10/20 1329    Narrative:      The following orders were created for panel order CBC & Differential.  Procedure                               Abnormality         Status                     ---------                               -----------         ------                     CBC Auto Differential[[id number]]        Abnormal            Final result                 Please view results for these tests on the individual orders.    CBC Auto Differential [[id number]]  (Abnormal) Collected: 10/10/20 1316    Specimen: Blood Updated: 10/10/20 1329     WBC 11.44 10*3/mm3      RBC 3.44 10*6/mm3      Hemoglobin 8.6 g/dL      Hematocrit 27.9 %      MCV 81.1 fL      MCH 25.0 pg      MCHC 30.8 g/dL      RDW 15.0 %      RDW-SD 43.3 fl      MPV 11.3 fL      Platelets 273 10*3/mm3      Neutrophil % 48.4 %      Lymphocyte % 37.9 %      Monocyte % 10.1 %      Eosinophil % 1.5 %      Basophil % 0.3 %      Immature Grans % 1.8 %      Neutrophils, Absolute 5.54 10*3/mm3      Lymphocytes, Absolute 4.34 10*3/mm3      Monocytes, Absolute 1.15 10*3/mm3      Eosinophils, Absolute 0.17 10*3/mm3      Basophils, Absolute 0.03 10*3/mm3      Immature Grans, Absolute 0.21 10*3/mm3      nRBC 0.0 /100 WBC      Basic Metabolic Panel [[id number]]  (Abnormal) Collected: 10/10/20 0610    Specimen: Blood Updated: 10/10/20 0706     Glucose 127 mg/dL      BUN 69 mg/dL      Creatinine 12.62 mg/dL      Sodium 137 mmol/L      Potassium 3.8 mmol/L      Chloride 96 mmol/L      CO2 23.0 mmol/L      Calcium 8.3 mg/dL      eGFR  African Amer 5 mL/min/1.73      Comment: <15 Indicative of kidney failure.        eGFR Non  Amer --     Comment: <15 Indicative of kidney failure.        BUN/Creatinine Ratio 5.5     Anion Gap 18.0 mmol/L     Narrative:      GFR Normal >60  Chronic Kidney Disease <60  Kidney Failure <15      POC Glucose Once [[id number]]  (Abnormal) Collected: 10/09/20 1634    Specimen: Blood Updated: 10/09/20 1652     Glucose 154 mg/dL     Protime-INR [[id number]]  (Abnormal) Collected: 10/09/20 1403    Specimen: Blood Updated: 10/09/20 1440     Protime 21.9 Seconds      INR 1.80    Narrative:      Therapeutic range for most indications is 2.0-3.0 INR,  or 2.5-3.5 for mechanical heart valves.    Hepatitis B Surface Antigen [[id number]]  (Normal) Collected: 10/08/20 2054    Specimen: Blood Updated: 10/09/20 1240     Hepatitis B Surface Ag Non-Reactive    Narrative:      Results may be falsely decreased if patient taking Biotin.      Comprehensive Metabolic Panel [[id number]]  (Abnormal) Collected: 10/09/20 0647    Specimen: Blood Updated: 10/09/20 0716     Glucose 117 mg/dL      BUN 70 mg/dL      Creatinine 13.51 mg/dL      Sodium 140 mmol/L      Potassium 4.3 mmol/L      Comment: Slight hemolysis detected by analyzer. Results may be affected.        Chloride 102 mmol/L      CO2 19.0 mmol/L      Calcium 8.2 mg/dL      Total Protein 7.3 g/dL      Albumin 3.00 g/dL      ALT (SGPT) 10 U/L      AST (SGOT) 17 U/L      Comment: Slight hemolysis detected by analyzer. Results may be affected.        Alkaline Phosphatase 59 U/L      Total Bilirubin 0.4 mg/dL      eGFR Non  Amer --     Comment: <15 Indicative of kidney failure.         eGFR  African Amer 5 mL/min/1.73      Comment: <15 Indicative of kidney failure.        Globulin 4.3 gm/dL      A/G Ratio 0.7 g/dL      BUN/Creatinine Ratio 5.2     Anion Gap 19.0 mmol/L     Narrative:      GFR Normal >60  Chronic Kidney Disease <60  Kidney Failure <15      Troponin [[id number]]  (Abnormal) Collected: 10/09/20 0647    Specimen: Blood Updated: 10/09/20 0714     Troponin T 2.020 ng/mL     Narrative:      Troponin T Reference Range:  <= 0.03 ng/mL-   Negative for AMI  >0.03 ng/mL-     Abnormal for myocardial necrosis.  Clinicians would have to utilize clinical acumen, EKG, Troponin and serial changes to determine if it is an Acute Myocardial Infarction or myocardial injury due to an underlying chronic condition.       Results may be falsely decreased if patient taking Biotin.      Magnesium [[id number]]  (Normal) Collected: 10/09/20 0647    Specimen: Blood Updated: 10/09/20 0712     Magnesium 2.1 mg/dL     Phosphorus [[id number]]  (Abnormal) Collected: 10/09/20 0647    Specimen: Blood Updated: 10/09/20 0712     Phosphorus 9.5 mg/dL     CBC (No Diff) [[id number]]  (Abnormal) Collected: 10/09/20 0647    Specimen: Blood Updated: 10/09/20 0650     WBC 8.71 10*3/mm3      RBC 3.78 10*6/mm3      Hemoglobin 9.2 g/dL      Hematocrit 29.5 %      MCV 78.0 fL      MCH 24.3 pg      MCHC 31.2 g/dL      RDW 14.4 %      RDW-SD 39.8 fl      MPV 11.3 fL      Platelets 299 10*3/mm3     COVID PRE-OP / PRE-PROCEDURE SCREENING ORDER (NO ISOLATION) - Swab, Nasopharynx [[id number]]  (Normal) Collected: 10/08/20 2321    Specimen: Swab from Nasopharynx Updated: 10/09/20 0547    Narrative:      The following orders were created for panel order COVID PRE-OP / PRE-PROCEDURE SCREENING ORDER (NO ISOLATION) - Swab, Nasopharynx.  Procedure                               Abnormality         Status                     ---------                               -----------         ------                     COVID-19, [facility]  IN-HOUS...[[id number]]  Normal              Final result                 Please view results for these tests on the individual orders.    COVID-19, [facility] IN-HOUSE, NP SWAB IN TRANSPORT MEDIA 8-10 HR TAT - Swab, Nasopharynx [[id number]]  (Normal) Collected: 10/08/20 2321    Specimen: Swab from Nasopharynx Updated: 10/09/20 0547     COVID19 Not Detected    Narrative:      Testing performed by Real Time RT-PCR  This test has not been approved by the [facility] but is authorized under the Emergency Use Act (EUA)    Fact sheet for providers: https://www.fda.gov/media/294736/download    Fact sheet for patients: https://www.fda.gov/media/506187/download        Protime-INR [[id number]]  (Normal) Collected: 10/08/20 2054    Specimen: Blood Updated: 10/09/20 0058     Protime 14.1 Seconds      INR 1.05    Narrative:      Therapeutic range for most indications is 2.0-3.0 INR,  or 2.5-3.5 for mechanical heart valves.    aPTT [[id number]]  (Normal) Collected: 10/08/20 2054    Specimen: Blood Updated: 10/09/20 0058     PTT 37.2 seconds     Narrative:      The recommended Heparin therapeutic range is 68-97 seconds.    [place] Draw [[id number]] Collected: 10/08/20 2054    Specimen: Blood Updated: 10/08/20 2245    Narrative:      The following orders were created for panel order [place] Draw.  Procedure                               Abnormality         Status                     ---------                               -----------         ------                     Light Blue Top[[id number]]                                   Final result               Green Top (Gel)[[id number]]                                  Final result               Lavender Top[[id number]]                                     Final result               Gold Top - SST[[id number]]                                   Final result                 Please view results for these tests on the individual orders.    Green Top (Gel) [[id number]] Collected: 10/08/20 2138    Specimen: Blood  Updated: 10/08/20 2245     Extra Tube Hold for add-ons.     Comment: Auto resulted.       BNP [[id number]]  (Abnormal) Collected: 10/08/20 2138    Specimen: Blood Updated: 10/08/20 2228     proBNP 40,839.0 pg/mL     Narrative:      Among patients with dyspnea, NT-proBNP is highly sensitive for the detection of acute congestive heart failure. In addition NT-proBNP of <300 pg/ml effectively rules out acute congestive heart failure with 99% negative predictive value.    Results may be falsely decreased if patient taking Biotin.      Light Blue Top [[id number]] Collected: 10/08/20 2054    Specimen: Blood Updated: 10/08/20 2200     Extra Tube hold for add-on     Comment: Auto resulted       Lavender Top [[id number]] Collected: 10/08/20 2054    Specimen: Blood Updated: 10/08/20 2200     Extra Tube hold for add-on     Comment: Auto resulted       Gold Top - SST [[id number]] Collected: 10/08/20 2054    Specimen: Blood Updated: 10/08/20 2200     Extra Tube Hold for add-ons.     Comment: Auto resulted.       D-dimer, Quantitative [[id number]]  (Abnormal) Collected: 10/08/20 2054    Specimen: Blood Updated: 10/08/20 2131     D-Dimer, Quantitative 903 ng/mL (FEU)     Narrative:      Dimer values <500 ng/ml FEU are FDA approved as aid in diagnosis of deep venous thrombosis and pulmonary embolism.  This test should not be used in an exclusion strategy with pretest probability alone.    A recent guideline regarding diagnosis for pulmonary thromboembolism recommends an adjusted exclusion criterion of age x 10 ng/ml FEU for patients >50 years of age ([employer]ley Intern Med 2015; 163: 701-711).      Lactic Acid, Plasma [[id number]]  (Normal) Collected: 10/08/20 2054    Specimen: Blood Updated: 10/08/20 2118     Lactate 1.1 mmol/L           Imaging Results (Most Recent)     Procedure Component Value Units Date/Time    XR Chest Post CVA Port [[id number]] Collected: 10/10/20 1537     Updated: 10/10/20 1600    Narrative:      PROCEDURE: XR CHEST POST  CVA PORT    VIEWS:Single    INDICATION: Venous catheter placement    COMPARISON: CXR: 10/8/2020    FINDINGS:       - lines/tubes: Right-sided central venous catheter terminates  at the level of the cavoatrial junction. Median sternotomy wires  again noted.    - cardiac: Mild cardiomegaly is similar to previous study    - mediastinum: Contour unchanged.     - lungs: Mild Central vascular and interstitial prominence and  indistinctness, concerning for CHF.     - pleura: No evidence of  fluid.      - osseous: Unremarkable for age.      Impression:      New right-sided central venous catheter terminates at the  cavoatrial junction, with no other significant interval change  since the previous examination of 10/8/2020      Electronically signed by:  Jazzy Pillai MD  10/10/2020 3:59 PM  CDT Workstation: [id number]    CT Head Without Contrast [[id number]] Collected: 10/10/20 1358     Updated: 10/10/20 1447    Narrative:      PROCEDURE: CT HEAD WO CONTRAST    INDICATION:  Fall, pain    COMPARISON:  None    TECHNIQUE:  CT head, without iv contrast.       This exam was performed according to our departmental  dose-optimization program, which includes automated exposure  control, adjustment of the mA and/or kV according to patient size  and/or use of iterative reconstruction technique.  DLP is 923.9    FINDINGS:    The degree of cerebral atrophy is within normal limits for age.    There is preservation of the gray-white matter differentiation.      No focal parenchymal lesions.    There are mild age related degenerative cortical and deep white  matter changes.    There is no evidence of a hemorrhage or intracranial mass.    There is no midline shift.    The ventricles are normal in size and configuration.    The brain stem, cerebellum, globes, paranasal sinuses, and  osseous structures are within normal limits. It should be noted  that vessels and the dural venous sinuses are hyperattenuating  compared to their usual  appearance but this may be due to  hemoconcentration. No definite hemorrhage is identified, and this  hyperdense appearance is symmetric along the tentorium  bilaterally as well as the falx. Veins in the extra-axial spaces  are unusually prominent as well.        Impression:      1. Hyperdense appearance of both venous and arterial structures,  without obvious intracranial hemorrhage. This may be due to  hemoconcentration/dehydration. Clinical correlation needed. No  intracranial abnormality identified.  2. Small left periorbital scalp hematoma    If pain or symptoms persist beyond reasonable expectations, an  MRI examination is suggested as is deemed clinically appropriate.    [facility] requested to arrange critical complex with a shunt is  ordering clinician at 3:25 PM EST on 10/10/2020. Findings  discussed with Dr. Lewis at 3:40 PM EST on 10/10/2020    Electronically signed by:  Jazzy Pillai MD  10/10/2020 2:46 PM  CDT Workstation: [id number]    XR Chest 2 View [[id number]] Collected: 10/08/20 2122     Updated: 10/08/20 2140    Narrative:        CHEST X-RAY 2 view on  10/8/2020     CLINICAL INDICATION: Elevated troponin, weakness, shortness of  breath    COMPARISON: 10/29/2014    FINDINGS: The patient is status post median sternotomy and  probable CABG. Cardiomegaly is noted. Mild vascular congestion is  noted. There is mild elevation of the right hemidiaphragm. The  lungs are otherwise clear.      Impression:      Cardiomegaly with mild vascular congestion.    Electronically signed by:  Alek Conklin  10/8/2020 9:38 PM  CDT Workstation: [id number]           Physician Progress Notes (most recent note)      Mauro Saravia MD at 10/13/20 0949          [facility]  [address] [place] [address]. [address]  T - [phone]  F - [phone]     Progress Note          PATIENT  DEMOGRAPHICS   PATIENT NAME: Sumanth Robledo                      PHYSICIAN: Mauro Saravia MD  : 1960  MRN:  "[id number]   LOS: 3 days    Patient Care Team:  Elizabeth Burleson APRN as PCP - General (Family Medicine)  Subjective   SUBJECTIVE   OOB to chair, bp is stable now (not low). Had 1.2LUF overnight tolerated the procedure well        Objective   OBJECTIVE   Vital Signs  Temp:  [97.3 °F (36.3 °C)-98.5 °F (36.9 °C)] 98.5 °F (36.9 °C)  Heart Rate:  [81-96] 85  Resp:  [16-19] 18  BP: (119-216)/() 148/78    Flowsheet Rows      First Filed Value   Admission Height  170.2 cm (67\") Documented at 10/08/2020 2035   Admission Weight  81.5 kg (179 lb 9.6 oz) Documented at 10/08/2020 2046           I/O last 3 completed shifts:  In: 1080 [P.O.:1080]  Out: 263 [Other:263]    PHYSICAL EXAM    Physical Exam  Constitutional:       General: He is not in acute distress.  HENT:      Head:      Comments: Hematoma scalp- frontal area     Mouth/Throat:      Mouth: Mucous membranes are moist.   Cardiovascular:      Rate and Rhythm: Normal rate and regular rhythm.   Pulmonary:      Effort: No respiratory distress.      Breath sounds: No wheezing.   Abdominal:      General: There is distension.      Palpations: Abdomen is soft.   Musculoskeletal:         General: Swelling present.      Comments: Trace ryann LE edema   Skin:     General: Skin is warm and dry.   Neurological:      General: No focal deficit present.      Mental Status: He is alert and oriented to person, place, and time.   Psychiatric:         Mood and Affect: Mood normal.         Behavior: Behavior normal.         RESULTS   Results Review:    Results from last 7 days   Lab Units 10/13/20  0358 10/10/20  1316 10/10/20  0610  10/09/20  0647  10/08/20  1704   SODIUM mmol/L 134* 136 137   < > 140   < > 140   POTASSIUM mmol/L 3.8 4.1 3.8   < > 4.3   < > 3.8   CHLORIDE mmol/L 95* 95* 96*   < > 102   < > 98   CO2 mmol/L 24.0 19.0* 23.0   < > 19.0*   < > 25.0   BUN mg/dL 60* 68* 69*   < > 70*   < > 65*   CREATININE mg/dL 11.87* 13.33* 12.62*   < > 13.51*   < > 13.19*   CALCIUM " mg/dL 8.3* 7.7* 8.3*   < > 8.2*   < > 8.6   BILIRUBIN mg/dL  --  0.3  --   --  0.4  --  0.3   ALK PHOS U/L  --  58  --   --  59  --  70   ALT (SGPT) U/L  --  31  --   --  10  --  11   AST (SGOT) U/L  --  39  --   --  17  --  13   GLUCOSE mg/dL 111* 174* 127*   < > 117*   < > 181*    < > = values in this interval not displayed.       Estimated Creatinine Clearance: 7.3 mL/min (A) (by C-G formula based on SCr of 11.87 mg/dL (H)).    Results from last 7 days   Lab Units 10/13/20  0358 10/11/20  0936 10/09/20  0647   MAGNESIUM mg/dL  --  1.8 2.1   PHOSPHORUS mg/dL 10.0*  --  9.5*             Results from last 7 days   Lab Units 10/13/20  0358 10/10/20  1316 10/10/20  0610 10/09/20  0647 10/08/20  2054   WBC 10*3/mm3 8.50 11.44* 8.57 8.71 9.95   HEMOGLOBIN g/dL 8.2* 8.6* 8.8* 9.2* 10.2*   PLATELETS 10*3/mm3 248 273 303 299 319       Results from last 7 days   Lab Units 10/09/20  1403 10/08/20  2054   INR  1.80* 1.05         Imaging Results (Last 24 Hours)     ** No results found for the last 24 hours. **      Heart cath 10/9/20  Procedure performed:  1.  Left heart catheterization with iliofemoral arteriogram and Angio-Seal closure device.  2.  Attempted unsuccessful PTCA of the 100% occluded left main .  3.  Selective cannulation of the left internal mammary artery.  4.  Selective cannulation of the saphenous vein graft to the right coronary artery and to the obtuse marginal branch.     MEDICATIONS    aspirin, 81 mg, Oral, Daily  calcitriol, 1 mcg, Oral, Daily  carvedilol, 6.25 mg, Oral, BID With Meals  clopidogrel, 75 mg, Oral, Daily  [START ON 10/17/2020] epoetin arturo/arturo-epbx, 10,000 Units, Subcutaneous, Weekly  famotidine, 20 mg, Oral, Daily  heparin (porcine), 5,000 Units, Subcutaneous, Q12H  hydrALAZINE, 25 mg, Oral, Q8H  insulin detemir, 20 Units, Subcutaneous, Nightly  isosorbide mononitrate, 60 mg, Oral, Q24H  losartan, 50 mg, Oral, Q24H  pravastatin, 20 mg, Oral, Daily  ranolazine, 1,000 mg, Oral,  BID  sevelamer, 2,400 mg, Oral, TID With Meals  sodium bicarbonate, 650 mg, Oral, TID  sodium chloride, 10 mL, Intravenous, Q12H           Assessment/Plan   ASSESSMENT / PLAN      Hypotension    CAD (coronary artery disease)    ESRF (end stage renal failure) (CMS/[facility])    Diabetes mellitus (CMS/[facility])    Cardiac arrest (CMS/[facility])    Syncope    1.end-stage renal disease currently on peritoneal dialysis every night.  Patient is doing pd with 2800 cc 5 cycles with last fill of 1000 cc.  His estimated dry weight is 84 kg.  Weight is increasing now peak 96 kg. Will try 4.25 % bag tonight for added UF. Tolerated PD last night without issues.      2.anemia chronic kidney disease-Hgb low. We will continue with Epogen 62362 units once a week     3.CKD/mineral bone disorder.  Patient will remain on calcitriol 1 mcg daily along with Renvela 2400 mg 3 times a day- last phos 9.5 check again      4.CAD/ HTN/ PEA- Cardiac cath 10/9/20 showed 100% occlusion left main . Had episode of PEA Saturday AM and CPR was administered x 4 minutes and patient received epinepherine x 2. Nifedipine ER, Toprol XL, losartan, and lasix are currently on hold. Troponins 2.0-1.9. Rec'd 1 liter NS following code and continues on NS at 125cc/hr. It is now stopped. bp is stable    5. ? Seizures- witnessed seizure x 2 post Code Blue- Ct scan of head was unremarkable.     6. Metabolic acidosis- bicarb better with po na bicarb              This document has been electronically signed by Mauro Saravia MD on October 13, 2020 09:49 CDT           Electronically signed by Mauro Saravia MD at 10/13/20 0951          Consult Notes (most recent note)      Mauro Saravia MD at 10/09/20 1255      Consult Orders    1. Inpatient Nephrology Consult [[id number]] ordered by Gibran Garza MD at 10/09/20 0915               [facility]  [address] [place] [address]. [address]  T - [phone]  F - [phone]     Consultation         PATIENT   DEMOGRAPHICS   PATIENT NAME: Sumanth Robledo                      PHYSICIAN: Mauro Saravia MD  : 1960  MRN: [id number]    Subjective   SUBJECTIVE   Referring Provider: Dr Hammond  Reason for Consultation: esrd  History of present illness:      Mr. Robledo is a 60-year-old gentleman with a history of end-stage renal disease currently on peritoneal dialysis.  he is on hemodialysis since 2018.  Patient has a left upper extremity AV fistula also. His peritoneal dialysis regimen is 2800 mL with 5 cycles.  Last fill is 1000 cc.  His estimated dry weight is 84 kg.    Patient has a significant history of coronary artery disease with coronary artery bypass grafting in .  Patient has a recent cardiac cath in 2020 and unable to intervene because of advanced atherosclerotic disease.  He came to see Dr. Hammond with substernal chest pain and found to have elevated troponin.  Patient is therefore admitted and plan to do left heart catheterization today.  He denies any chest pain at present    Past Medical History:   Diagnosis Date   • Chronic kidney disease, stage 3    • Coronary arteriosclerosis     eCABGx3 10/2014 LM 95% LAD diff 70% D1 70% OM2 70% OM3 70% & % USA STEMI      • End stage renal disease (CMS/[facility])    • Essential hypertension    • Heterozygous thalassemia     Thalassemia minor      • Hyperlipidemia    • Peripheral vascular disease (CMS/[facility])     WILSON RIGHT 0.97 LEFT 0.79, stent JADYN ZELAYA 2015      • Surgical follow-up care     Emergent CABG X 3     • Type 2 diabetes mellitus (CMS/[facility])      Past Surgical History:   Procedure Laterality Date   • AMPUTATION FINGER / THUMB Right     RIGHT middle finger   • CARDIAC CATHETERIZATION  10/11/2014    Distal left main critical stenosis of up to 95-99% with evidence of filling defect suggestive of a plaque. LAD and OMB 1 and 2 stenosis.A 100% occludeed RCA.   • CARDIAC CATHETERIZATION N/A 2020    Procedure: Left Heart Cath 2020 @  "9;  Surgeon: Gibran Garza MD;  Location: [facility] CATH INVASIVE LOCATION;  Service: Cardiology;  Laterality: N/A;   • CARDIAC SURGERY     • CORONARY ARTERY BYPASS GRAFT  10/11/2014    Emergent CABG X 3 LIMA->LAD SVG->OM SVG->PDA     Family History   Problem Relation Age of Onset   • Coronary artery disease Other    • Heart disease Other      Social History     Tobacco Use   • Smoking status: Never Smoker   • Smokeless tobacco: Never Used   Substance Use Topics   • Alcohol use: Not Currently     Frequency: Never   • Drug use: Not on file     Allergies:  Patient has no known allergies.     REVIEW OF SYSTEMS    Review of Systems   Constitutional: Negative for chills and fever.   Respiratory: Negative for chest tightness and shortness of breath.    Cardiovascular: Negative for chest pain and leg swelling.   Gastrointestinal: Negative for abdominal pain, diarrhea and nausea.   Genitourinary: Negative for dysuria, flank pain and hematuria.   Neurological: Negative for dizziness, syncope and weakness.       Objective   OBJECTIVE   Vital Signs  Temp:  [97.3 °F (36.3 °C)-98.4 °F (36.9 °C)] 98.4 °F (36.9 °C)  Heart Rate:  [73-88] 77  Resp:  [16-18] 16  BP: (120-177)/(68-95) 120/68    Flowsheet Rows      First Filed Value   Admission Height  170.2 cm (67\") Documented at 10/08/2020 2035   Admission Weight  81.5 kg (179 lb 9.6 oz) Documented at 10/08/2020 2046           No intake/output data recorded.    PHYSICAL EXAM    Physical Exam  Vitals signs reviewed.   Constitutional:       General: He is not in acute distress.     Appearance: He is well-developed.   Eyes:      General:         Right eye: No discharge.   Neck:      Vascular: JVD present.   Cardiovascular:      Rate and Rhythm: Normal rate and regular rhythm.      Heart sounds: Normal heart sounds, S1 normal and S2 normal. No murmur.   Pulmonary:      Effort: Pulmonary effort is normal. No respiratory distress.      Breath sounds: No rales.   Abdominal:      General: " Bowel sounds are normal. There is no distension.      Palpations: Abdomen is soft.      Tenderness: There is no abdominal tenderness.   Skin:     Coloration: Skin is not pale.      Findings: No erythema.   Neurological:      Mental Status: He is alert and oriented to person, place, and time.      Sensory: No sensory deficit.      Motor: No abnormal muscle tone.         RESULTS   Results Review:    Results from last 7 days   Lab Units 10/09/20  0647 10/08/20  2138 10/08/20  1704   SODIUM mmol/L 140 140 140   POTASSIUM mmol/L 4.3 3.8 3.8   CHLORIDE mmol/L 102 99 98   CO2 mmol/L 19.0* 24.0 25.0   BUN mg/dL 70* 66* 65*   CREATININE mg/dL 13.51* 13.08* 13.19*   CALCIUM mg/dL 8.2* 8.2* 8.6   BILIRUBIN mg/dL 0.4  --  0.3   ALK PHOS U/L 59  --  70   ALT (SGPT) U/L 10  --  11   AST (SGOT) U/L 17  --  13   GLUCOSE mg/dL 117* 254* 181*       Estimated Creatinine Clearance: 6.1 mL/min (A) (by C-G formula based on SCr of 13.51 mg/dL (H)).    Results from last 7 days   Lab Units 10/09/20  0647   MAGNESIUM mg/dL 2.1   PHOSPHORUS mg/dL 9.5*             Results from last 7 days   Lab Units 10/09/20  0647 10/08/20  2054 10/08/20  1704   WBC 10*3/mm3 8.71 9.95 11.11*   HEMOGLOBIN g/dL 9.2* 10.2* 10.1*   PLATELETS 10*3/mm3 299 319 324       Results from last 7 days   Lab Units 10/08/20  2054   INR  1.05        MEDICATIONS    calcitriol, 1 mcg, Oral, Daily  clopidogrel, 75 mg, Oral, Daily  [START ON 10/10/2020] epoetin arturo/arturo-epbx, 6,000 Units, Subcutaneous, Weekly  furosemide, 80 mg, Oral, BID  [START ON 10/10/2020] gabapentin, 100 mg, Oral, TID  gentamicin, , Topical, Q8H  hydrALAZINE, 50 mg, Oral, TID  insulin detemir, 20 Units, Subcutaneous, Nightly  isosorbide mononitrate, 30 mg, Oral, Daily  [START ON 10/10/2020] losartan, 50 mg, Oral, Daily  metoprolol succinate XL, 100 mg, Oral, Q24H  NIFEdipine XL, 60 mg, Oral, Q24H  pravastatin, 20 mg, Oral, Daily  ranolazine, 1,000 mg, Oral, BID  sevelamer, 2,400 mg, Oral, TID With  Meals  sodium chloride, 10 mL, Intravenous, Q12H      sodium chloride, 100 mL/hr      Medications Prior to Admission   Medication Sig Dispense Refill Last Dose   • aspirin 81 MG tablet Take 1 tablet by mouth Daily. 30 tablet 0 10/9/2020 at Unknown time   • calcitriol (ROCALTROL) 0.5 MCG capsule Take 0.5 mcg by mouth 2 (Two) Times a Day.   10/9/2020 at Unknown time   • cinacalcet (SENSIPAR) 30 MG tablet Take 30 mg by mouth Daily.   10/9/2020 at Unknown time   • clopidogrel (PLAVIX) 75 MG tablet Take 75 mg by mouth Daily.   10/9/2020 at Unknown time   • Dulaglutide (TRULICITY) 1.5 MG/0.5ML solution pen-injector Inject 10 mg under the skin into the appropriate area as directed 1 (One) Time Per Week.   10/9/2020 at Unknown time   • Evolocumab (REPATHA SC) Inject 140 mg under the skin into the appropriate area as directed Every 14 (Fourteen) Days.   10/9/2020 at Unknown time   • furosemide (LASIX) 80 MG tablet Take 80 mg by mouth 2 (Two) Times a Day.      • gabapentin (NEURONTIN) 100 MG capsule Take 100 mg by mouth 3 (Three) Times a Day.      • gentamicin (GARAMYCIN) 0.1 % cream Apply 1 application topically to the appropriate area as directed 3 (Three) Times a Day.   10/9/2020 at Unknown time   • hydrALAZINE (APRESOLINE) 50 MG tablet Take 50 mg by mouth 3 (Three) Times a Day.      • Insulin Glargine, 2 Unit Dial, (TOUJEO MAX SOLOSTAR) 300 UNIT/ML solution pen-injector injection Inject 28 Units under the skin into the appropriate area as directed Every Night.   10/8/2020 at Unknown time   • isosorbide mononitrate (IMDUR) 30 MG 24 hr tablet Take 30 mg by mouth Daily.   10/9/2020 at Unknown time   • losartan (COZAAR) 50 MG tablet Take 50 mg by mouth Daily.      • Metoprolol Succinate 100 MG capsule extended-release 24 hour sprinkle Take 200 mg by mouth Daily.   10/9/2020 at Unknown time   • NIFEdipine XL (PROCARDIA XL) 60 MG 24 hr tablet Take 60 mg by mouth Daily.      • nitroglycerin (NITROSTAT) 0.4 MG SL tablet 1 under  the tongue as needed for angina, may repeat q5mins for up three doses 100 tablet 11 10/9/2020 at Unknown time   • pravastatin (PRAVACHOL) 20 MG tablet Take 20 mg by mouth Daily.   10/9/2020 at Unknown time   • ranolazine (RANEXA) 500 MG 12 hr tablet Take 1 tablet by mouth 2 (Two) Times a Day. (Patient taking differently: Take 1,000 mg by mouth 2 (Two) Times a Day.) 60 tablet 0 10/9/2020 at Unknown time   • sevelamer (RENVELA) 800 MG tablet Take 800 mg by mouth 3 (Three) Times a Day With Meals. 3 tablets three times a day with meals, plus 3 tablets once daily with snack      • albuterol sulfate  (90 Base) MCG/ACT inhaler Inhale 2 puffs Every 4 (Four) Hours As Needed for Wheezing.   Unknown at Unknown time     Assessment/Plan   ASSESSMENT / PLAN      Chest pain    CAD (coronary artery disease)    ESRF (end stage renal failure) (CMS/[facility])    Hypertension    Diabetes mellitus (CMS/[facility])    Precordial pain    1.end-stage renal disease currently on peritoneal dialysis every night.  Patient is doing pd with 2800 cc 5 cycles with last fill of 1000 cc.  His estimated dry weight is 84 kg.  Patient is close to his dry weight.  We will arrange peritoneal dialysis tonight    2.anemia chronic kidney disease we will continue with Epogen 6000 units once a week    3.CKD/mineral bone disorder.  Patient will remain on calcitriol 1 mcg daily along with Renvela 2400 mg 3 times a day    4.coronary artery disease with history of coronary artery bypass grafting before no chest pain,  elevated troponin plan to undergo heart cath today.  We will plan on do an extra exchange today and start his peritoneal dialysis earlier    Thank you for the referral will continue to follow the patient during his hospital stay         I discussed the patients findings and my recommendations with patient, nursing staff and primary care team         This document has been electronically signed by Mauro Saravia MD on October 9, 2020 12:55 CDT              Electronically signed by Mauro Saravia MD at 10/09/20 1785

## 2020-10-14 LAB
GLUCOSE BLDC GLUCOMTR-MCNC: 103 MG/DL (ref 70–130)
GLUCOSE BLDC GLUCOMTR-MCNC: 130 MG/DL (ref 70–130)
GLUCOSE BLDC GLUCOMTR-MCNC: 140 MG/DL (ref 70–130)
GLUCOSE BLDC GLUCOMTR-MCNC: 257 MG/DL (ref 70–130)
PHOSPHATE SERPL-MCNC: 9.9 MG/DL (ref 2.5–4.5)
WHOLE BLOOD HOLD SPECIMEN: NORMAL

## 2020-10-14 PROCEDURE — 82962 GLUCOSE BLOOD TEST: CPT

## 2020-10-14 PROCEDURE — 25010000002 HEPARIN (PORCINE) PER 1000 UNITS: Performed by: INTERNAL MEDICINE

## 2020-10-14 PROCEDURE — 84100 ASSAY OF PHOSPHORUS: CPT | Performed by: INTERNAL MEDICINE

## 2020-10-14 PROCEDURE — 97530 THERAPEUTIC ACTIVITIES: CPT

## 2020-10-14 PROCEDURE — 97110 THERAPEUTIC EXERCISES: CPT

## 2020-10-14 PROCEDURE — 63710000001 INSULIN DETEMIR PER 5 UNITS: Performed by: INTERNAL MEDICINE

## 2020-10-14 RX ORDER — DEXTROSE MONOHYDRATE, SODIUM CHLORIDE, SODIUM LACTATE, CALCIUM CHLORIDE, MAGNESIUM CHLORIDE 2.5; 538; 448; 18.4; 5.08 G/100ML; MG/100ML; MG/100ML; MG/100ML; MG/100ML
5000 SOLUTION INTRAPERITONEAL AS NEEDED
Status: DISCONTINUED | OUTPATIENT
Start: 2020-10-14 | End: 2020-10-20 | Stop reason: HOSPADM

## 2020-10-14 RX ORDER — LANTHANUM CARBONATE 500 MG/1
500 TABLET, CHEWABLE ORAL
Status: DISCONTINUED | OUTPATIENT
Start: 2020-10-14 | End: 2020-10-20 | Stop reason: HOSPADM

## 2020-10-14 RX ORDER — DEXTROSE MONOHYDRATE, SODIUM CHLORIDE, SODIUM LACTATE, CALCIUM CHLORIDE, MAGNESIUM CHLORIDE 4.25; 538; 448; 18.4; 5.08 G/100ML; MG/100ML; MG/100ML; MG/100ML; MG/100ML
5000 SOLUTION INTRAPERITONEAL AS NEEDED
Status: DISCONTINUED | OUTPATIENT
Start: 2020-10-14 | End: 2020-10-20 | Stop reason: HOSPADM

## 2020-10-14 RX ORDER — GABAPENTIN 100 MG/1
100 CAPSULE ORAL NIGHTLY
Status: DISCONTINUED | OUTPATIENT
Start: 2020-10-14 | End: 2020-10-18

## 2020-10-14 RX ADMIN — GABAPENTIN 100 MG: 100 CAPSULE ORAL at 08:18

## 2020-10-14 RX ADMIN — SEVELAMER CARBONATE 2400 MG: 800 TABLET, FILM COATED ORAL at 17:04

## 2020-10-14 RX ADMIN — CLOPIDOGREL BISULFATE 75 MG: 75 TABLET ORAL at 08:18

## 2020-10-14 RX ADMIN — LANTHANUM CARBONATE 500 MG: 500 TABLET, CHEWABLE ORAL at 14:23

## 2020-10-14 RX ADMIN — HEPARIN SODIUM 5000 UNITS: 5000 INJECTION INTRAVENOUS; SUBCUTANEOUS at 08:19

## 2020-10-14 RX ADMIN — CARVEDILOL 6.25 MG: 6.25 TABLET, FILM COATED ORAL at 08:18

## 2020-10-14 RX ADMIN — FAMOTIDINE 20 MG: 20 TABLET, FILM COATED ORAL at 08:18

## 2020-10-14 RX ADMIN — CALCITRIOL 1 MCG: 0.25 CAPSULE ORAL at 08:18

## 2020-10-14 RX ADMIN — GABAPENTIN 100 MG: 100 CAPSULE ORAL at 21:05

## 2020-10-14 RX ADMIN — INSULIN DETEMIR 20 UNITS: 100 INJECTION, SOLUTION SUBCUTANEOUS at 21:05

## 2020-10-14 RX ADMIN — ASPIRIN 81 MG: 81 TABLET, CHEWABLE ORAL at 08:17

## 2020-10-14 RX ADMIN — CARVEDILOL 6.25 MG: 6.25 TABLET, FILM COATED ORAL at 17:04

## 2020-10-14 RX ADMIN — SODIUM BICARBONATE 650 MG: 650 TABLET ORAL at 21:05

## 2020-10-14 RX ADMIN — PRAVASTATIN SODIUM 20 MG: 20 TABLET ORAL at 08:18

## 2020-10-14 RX ADMIN — SEVELAMER CARBONATE 2400 MG: 800 TABLET, FILM COATED ORAL at 08:18

## 2020-10-14 RX ADMIN — SODIUM BICARBONATE 650 MG: 650 TABLET ORAL at 17:04

## 2020-10-14 RX ADMIN — ISOSORBIDE MONONITRATE 60 MG: 60 TABLET, EXTENDED RELEASE ORAL at 08:18

## 2020-10-14 RX ADMIN — RANOLAZINE 1000 MG: 500 TABLET, FILM COATED, EXTENDED RELEASE ORAL at 08:18

## 2020-10-14 RX ADMIN — SEVELAMER CARBONATE 2400 MG: 800 TABLET, FILM COATED ORAL at 11:46

## 2020-10-14 RX ADMIN — LANTHANUM CARBONATE 500 MG: 500 TABLET, CHEWABLE ORAL at 17:04

## 2020-10-14 RX ADMIN — SODIUM BICARBONATE 650 MG: 650 TABLET ORAL at 08:18

## 2020-10-14 RX ADMIN — HYDRALAZINE HYDROCHLORIDE 25 MG: 25 TABLET, FILM COATED ORAL at 05:46

## 2020-10-14 RX ADMIN — SODIUM CHLORIDE, PRESERVATIVE FREE 10 ML: 5 INJECTION INTRAVENOUS at 21:06

## 2020-10-14 RX ADMIN — HYDRALAZINE HYDROCHLORIDE 25 MG: 25 TABLET, FILM COATED ORAL at 14:23

## 2020-10-14 RX ADMIN — LOSARTAN POTASSIUM 50 MG: 50 TABLET, FILM COATED ORAL at 08:18

## 2020-10-14 RX ADMIN — HEPARIN SODIUM 5000 UNITS: 5000 INJECTION INTRAVENOUS; SUBCUTANEOUS at 21:05

## 2020-10-14 RX ADMIN — HYDRALAZINE HYDROCHLORIDE 25 MG: 25 TABLET, FILM COATED ORAL at 21:05

## 2020-10-14 RX ADMIN — SODIUM CHLORIDE, PRESERVATIVE FREE 10 ML: 5 INJECTION INTRAVENOUS at 08:19

## 2020-10-14 NOTE — PLAN OF CARE
Goal Outcome Evaluation:  Plan of Care Reviewed With: patient  Progress: improving  Outcome Summary: VSS. PD completed.

## 2020-10-14 NOTE — PLAN OF CARE
Problem: Adult Inpatient Plan of Care  Goal: Plan of Care Review  Recent Flowsheet Documentation  Taken 10/14/2020 1014 by Rosemarie Lam PTA  Plan of Care Reviewed With: patient  Outcome Summary: pt sup>sit with min assist, sit>sup with SBA, pt sat @ EOB ~8 minutes with CGA (secondary to jerkiness). pt would benefit from 24/7 care & coontinued PT services @ D/C

## 2020-10-14 NOTE — SIGNIFICANT NOTE
10/14/20 1243   OTHER   Discipline occupational therapy assistant   Pt in supine  pt stated  something causing him to shake  pt denied oob secondary to shaking and scared of falling  pt denied in supine ex

## 2020-10-14 NOTE — CONSULTS
Adult Nutrition  Assessment    Patient Name:  Sumanth Robledo  YOB: 1960  MRN: [id number]  Admit Date:  10/8/2020    Assessment Date:  10/14/2020    Comments:  Pt reports poor appetite since admit.  Reports wt loss but was unsure of amount or time frame.  Voiced food preferences.  Pt reports having nausea/vomiting.  Pepcid, PRN Zofran prescribed.  Blood glucose is elevated.  Levlemir insulin prescribed.  BUN, Creat, Phos are elevated consistent with dx ESRD.  Renvela prescribed.  Pt receives dialysis.  Nephrology following.  Will add Renal diet restriction, honor food preferences and make recommendations as appropriate.    Reason for Assessment     Row Name 10/14/20 1508          Reason for Assessment    Reason For Assessment  other (see comments) Length of Stay             Labs/Tests/Procedures/Meds     Row Name 10/14/20 1509          Labs/Procedures/Meds    Lab Results Reviewed  reviewed, pertinent        Medications    Pertinent Medications Reviewed  reviewed, pertinent           Estimated/Assessed Needs     Row Name 10/14/20 1510          Calculation Measurements    Weight Used For Calculations  74.2 kg (163 lb 9.3 oz)        Estimated/Assessed Needs    Additional Documentation  Calorie Requirements (Group);Fluid Requirements (Group);[place]-St. Jeor Equation (Group);Protein Requirements (Group)        Calorie Requirements    Estimated Calorie Requirement (kcal/day)  1963     Estimated Calorie Need Method  [place]-St Jeor        [place]-St. Jeor Equation    RMR ([place]-St. Jeor Equation)  1510.752        Protein Requirements    Weight Used For Protein Calculations  74.2 kg (163 lb 9.3 oz)     Est Protein Requirement Amount (gms/kg)  1.2 gm protein     Estimated Protein Requirements (gms/day)  89.04        Fluid Requirements    Fluid Requirements (mL/day)  2226     RDA Method (mL)  2226         Nutrition Prescription Ordered     Row Name 10/14/20 1514          Nutrition Prescription PO     Current PO Diet  Regular     Common Modifiers  Cardiac;Consistent Carbohydrate                 Electronically signed by:  Lucía Connelly RD  10/14/20 15:16 CDT

## 2020-10-14 NOTE — PROGRESS NOTES
[facility]  INPATIENT PROGRESS NOTE    Length of Stay: 4  Date of Admission: 10/8/2020  Primary Care Physician: Elizabeth Burleson APRN    Subjective   Chief Complaint: Tremors   HPI:  Patient seen with wife at bedside. Not feeling well today. Says he believe Ranexa is causing tremors-generalized, mostly upper body. He thinks this was happening prior to this admission but it has progressed as tremors are more frequent. No seizure activity. No change in mental status per wife.   Remains very weak. Sitting up on side of bed with therapy but not able to ambulate. Discussed possible SNF upon discharge.   No chest pain, shortness of breath, palpitations.   Tolerating diet.   No fever, chills, cough, congestion.     Review of Systems     All pertinent negatives and positives are as above. All other systems have been reviewed and are negative unless otherwise stated.     Objective    Temp:  [97 °F (36.1 °C)-98.1 °F (36.7 °C)] 98.1 °F (36.7 °C)  Heart Rate:  [84-95] 90  Resp:  [16-18] 18  BP: (128-162)/(60-80) 141/73    Physical Exam  Constitutional:       Appearance: Normal appearance.   HENT:      Head: Normocephalic.   Cardiovascular:      Rate and Rhythm: Normal rate and regular rhythm.      Pulses: Normal pulses.      Heart sounds: Normal heart sounds.   Pulmonary:      Effort: Pulmonary effort is normal.      Breath sounds: Normal breath sounds.   Abdominal:      General: Bowel sounds are normal.      Palpations: Abdomen is soft.   Skin:     General: Skin is warm and dry.   Neurological:      General: No focal deficit present.      Mental Status: He is alert.      Comments: Occasional generalized myoclonic movement of bilateral upper extremities. No observed resting or intentional tremor. No focal weakness.    Psychiatric:         Mood and Affect: Mood normal.             Results Review:  I have reviewed the labs, radiology results, and diagnostic  studies.    Laboratory Data:   Results from last 7 days   Lab Units 10/13/20  0358 10/10/20  1316 10/10/20  0610  10/09/20  0647  10/08/20  1704   SODIUM mmol/L 134* 136 137   < > 140   < > 140   POTASSIUM mmol/L 3.8 4.1 3.8   < > 4.3   < > 3.8   CHLORIDE mmol/L 95* 95* 96*   < > 102   < > 98   CO2 mmol/L 24.0 19.0* 23.0   < > 19.0*   < > 25.0   BUN mg/dL 60* 68* 69*   < > 70*   < > 65*   CREATININE mg/dL 11.87* 13.33* 12.62*   < > 13.51*   < > 13.19*   GLUCOSE mg/dL 111* 174* 127*   < > 117*   < > 181*   CALCIUM mg/dL 8.3* 7.7* 8.3*   < > 8.2*   < > 8.6   BILIRUBIN mg/dL  --  0.3  --   --  0.4  --  0.3   ALK PHOS U/L  --  58  --   --  59  --  70   ALT (SGPT) U/L  --  31  --   --  10  --  11   AST (SGOT) U/L  --  39  --   --  17  --  13   ANION GAP mmol/L 15.0 22.0* 18.0*   < > 19.0*   < > 17.0*    < > = values in this interval not displayed.     Estimated Creatinine Clearance: 7.3 mL/min (A) (by C-G formula based on SCr of 11.87 mg/dL (H)).  Results from last 7 days   Lab Units 10/14/20  0512 10/13/20  0358 10/11/20  0936 10/09/20  0647   MAGNESIUM mg/dL  --  1.8 1.8 2.1   PHOSPHORUS mg/dL 9.9* 10.0*  --  9.5*         Results from last 7 days   Lab Units 10/13/20  0358 10/10/20  1316 10/10/20  0610 10/09/20  0647 10/08/20  2054   WBC 10*3/mm3 8.50 11.44* 8.57 8.71 9.95   HEMOGLOBIN g/dL 8.2* 8.6* 8.8* 9.2* 10.2*   HEMATOCRIT % 26.5* 27.9* 28.2* 29.5* 32.6*   PLATELETS 10*3/mm3 248 273 303 299 319     Results from last 7 days   Lab Units 10/09/20  1403 10/08/20  2054   INR  1.80* 1.05       Culture Data:   No results found for: BLOODCX  No results found for: URINECX  No results found for: RESPCX  No results found for: WOUNDCX  No results found for: STOOLCX  No components found for: BODYFLD    Radiology Data:   Imaging Results (Last 24 Hours)     ** No results found for the last 24 hours. **          I have reviewed the patient's current medications.     Assessment/Plan     Principal Problem:    Hypotension  Active  Problems:    CAD (coronary artery disease)- See below. Continue ASA, BB, plavix, ARB, nitrates, statin therapy.     ESRF (end stage renal failure) (CMS/[facility])-on nightly PD, followed by Nephrology. Noted changes for PD tonight.     Anemia of CKD-H&H remains low but stable. Continue weekly epogen.     Diabetes mellitus (CMS/[facility])-Overall controlled. Continue basal insulin with SSI and accuchecks.     Cardiac arrest (CMS/[facility])-PEA status post CPR with epinephrine 10/10 with subsequent ROSC in setting of known underlying CAD with 100% occlusion of left main. Ongoing medical management as above. Await further recommendations from Dr. Garza.     CHF-acute on chronic systolic dysfunction with EF 41-45%. Current negative balance with ongoing PD. Continue BB, ARB.     Myoclonus-Unsure of etiology. Patient requests DC ranexa. Will hold further dosing for tomorrow and observe. Previous EEG negative. Recheck CMP plus Mg, Phos, TSH. Need for further evaluation pending clinical course.     Seizure- Witnessed x2 post cardiac arrest. CT head unremarkable. EEG without observed epileptiform activity 10/12. Thought likely due to metabolic derangements. No recurrence.       Discharge Planning: I expect patient to be discharged in 2-3 days to likely SNF. Case mgmt consult placed.     Radha Campbell MD

## 2020-10-14 NOTE — PROGRESS NOTES
Cardiology Progress Note     LOS: 3 days   Patient Care Team:  Elizabeth Burleson APRN as PCP - General (Family Medicine)    Subjective:    Chart reviewed. Patient seen and examined. Patient denies any chest pain, shortness of breath, or palpitation.  Patient underwent coronary angiogram and unsuccessful PTCA of the left main coronary artery.  Patient has had episodes of confusion.  Patient is on supplemental 2 L nasal cannula oxygen with a saturation of mid 90%.  Patient's hemoglobin has dropped to 8.2.      Objective:  Temp:  [97 °F (36.1 °C)-98.5 °F (36.9 °C)] 97 °F (36.1 °C)  Heart Rate:  [81-91] 88  Resp:  [16-18] 16  BP: (119-216)/() 137/68    Intake/Output Summary (Last 24 hours) at 10/13/2020 2003  Last data filed at 10/13/2020 0801  Gross per 24 hour   Intake 120 ml   Output 1213 ml   Net -1093 ml       Physical Exam:   General Appearance:    Alert, oriented, cooperative, in no acute distress.   Head:    Normocephalic, atraumatic, without obvious abnormality   Eyes:             ZULEMA. Lids and lashes normal, conjunctivae and sclerae normal, no icterus, no pallor.   Ears:    Ears appear intact with no abnormalities noted.   Throat:   Mucous membranes pink and moist.   Neck:  Supple, trachea midline, no carotid bruit, no organomegaly or JVD.   Lungs:    Clear to auscultation and percussion.  Respirations regular, even and unlabored. No wheezes, rales, or rhonchi.    Heart:    Regular rhythm and normal rate, normal S1 and S2, no      murmur, no gallop, no rub, no click.   Abdomen:    Soft, nontender, nondistended, no guarding, no rebound tenderness. Normal bowel sounds in all four quadrants, no masses, liver and spleen nonpalpable.    Genitalia:    Deferred.   Extremities:   Moves all extremities well, no edema, no cyanosis, no       redness, no clubbing.   Pulses:   Pulses palpable and equal bilaterally.   Skin:   Moist and warm. No bleeding, bruising or rash.   Neurologic/Psychiatric:   Alert and  oriented to person, place, and time.  Motor, power and tone in upper and lower extremities are grossly intact.  No focal neurological deficits. Normal cognitive function. No psychomotor reaction or tangential thought. No depression, homicidal ideations and suicidal ideations.          Results Review:    Results from last 7 days   Lab Units 10/13/20  0358 10/10/20  1316   SODIUM mmol/L 134* 136   POTASSIUM mmol/L 3.8 4.1   CHLORIDE mmol/L 95* 95*   CO2 mmol/L 24.0 19.0*   BUN mg/dL 60* 68*   CREATININE mg/dL 11.87* 13.33*   CALCIUM mg/dL 8.3* 7.7*   BILIRUBIN mg/dL  --  0.3   ALK PHOS U/L  --  58   ALT (SGPT) U/L  --  31   AST (SGOT) U/L  --  39   GLUCOSE mg/dL 111* 174*     Results from last 7 days   Lab Units 10/10/20  1316 10/09/20  0647 10/08/20  2138 10/08/20  1704   CK TOTAL U/L  --   --   --  289*   TROPONIN T ng/mL 1.940* 2.020* 1.930* 2.050*         Results from last 7 days   Lab Units 10/13/20  0358   WBC 10*3/mm3 8.50   HEMOGLOBIN g/dL 8.2*   HEMATOCRIT % 26.5*   PLATELETS 10*3/mm3 248     Results from last 7 days   Lab Units 10/09/20  1403 10/08/20  2054   INR  1.80* 1.05   APTT seconds  --  37.2     Results from last 7 days   Lab Units 10/13/20  0358   MAGNESIUM mg/dL 1.8                   ECHO:  Results for orders placed during the hospital encounter of 10/08/20   Adult Transthoracic Echo Complete W/ Cont if Necessary Per Protocol    Narrative · Left ventricular ejection fraction appears to be 41 - 45%. Left   ventricular systolic function is mildly decreased.  · Left ventricular diastolic function is consistent with (grade II w/high   LAP) pseudonormalization.  · Left ventricular wall thickness is consistent with concentric   hypertrophy.  · Mild mitral annular calcification is present.  · Mild mitral valve regurgitation is present.  · Estimated right ventricular systolic pressure from tricuspid   regurgitation is normal (<35 mmHg).  · The left atrial cavity is moderately dilated.  · Mildly reduced  right ventricular systolic function noted.          ECG 12 Lead   Final Result   Test Reason : Syncope   Blood Pressure : **/** mmHG   Vent. Rate : 074 BPM     Atrial Rate : 074 BPM      P-R Int : 178 ms          QRS Dur : 112 ms       QT Int : 448 ms       P-R-T Axes : 025 033 193 degrees      QTc Int : 497 ms      Normal sinus rhythm   Incomplete left bundle branch block   ST &  T wave abnormality, consider inferior ischemia   ST &  T wave abnormality, consider anterolateral ischemia   Prolonged QT   Abnormal ECG   When compared with ECG of 08-OCT-2020 20:44,   No significant change was found   Confirmed by EUGENIO FLETCHER ([id number]) on 10/10/2020 2:58:17 PM      Referred By:             Confirmed By:EUGENIO FLETCHER      ECG 12 Lead   ED Interpretation   Abnormal   Musa Saenz MD     10/8/2020 10:43 PM   ECG 12 Lead         Date/Time: 10/8/2020 9:04 PM   Performed by: Musa Saenz MD   Authorized by: Musa Saenz MD    Interpreted by physician   Rhythm: sinus rhythm   Rate: normal   T depression: I, II, aVF, aVL, V3, V4, V5 and V6   Clinical impression: abnormal ECG         Final Result   Test Reason : abnormal lab   Blood Pressure : **/** mmHG   Vent. Rate : 091 BPM     Atrial Rate : 091 BPM      P-R Int : 134 ms          QRS Dur : 102 ms       QT Int : 368 ms       P-R-T Axes : 042 026 197 degrees      QTc Int : 452 ms      Normal sinus rhythm   Possible Left atrial enlargement   ST &  T wave abnormality, consider inferior ischemia   ST &  T wave abnormality, consider anterolateral ischemia   Abnormal ECG   When compared with ECG of 29-OCT-2014 09:12,   No significant change was found   Confirmed by LUDMILA HAGER ([id number]) on 10/9/2020 12:16:27 PM      Referred By:  HCELSIE           Confirmed By:LUDMILA HAGER      SCANNED EKG   Final Result      SCANNED EKG   Final Result      SCANNED EKG   Final Result      SCANNED EKG   Final Result      SCANNED EKG   Final Result      SCANNED EKG   Final Result      SCANNED EKG    Final Result           Medication Review:   Current Facility-Administered Medications   Medication Dose Route Frequency Provider Last Rate Last Dose   • acetaminophen (TYLENOL) tablet 650 mg  650 mg Oral Q4H PRN Brendan Moon MD   650 mg at 10/11/20 1108    Or   • acetaminophen (TYLENOL) suppository 650 mg  650 mg Rectal Q4H PRN Brendan Moon MD       • acetaminophen (TYLENOL) tablet 650 mg  650 mg Oral Q4H PRN Brendan Moon MD       • aspirin chewable tablet 81 mg  81 mg Oral Daily Brendan Moon MD   81 mg at 10/13/20 0812   • bisacodyl (DULCOLAX) EC tablet 5 mg  5 mg Oral Daily PRN Brendan Moon MD       • calcitriol (ROCALTROL) capsule 1 mcg  1 mcg Oral Daily Brendan Moon MD   1 mcg at 10/13/20 0811   • carvedilol (COREG) tablet 6.25 mg  6.25 mg Oral BID With Meals Brendan Moon MD   6.25 mg at 10/13/20 1747   • clopidogrel (PLAVIX) tablet 75 mg  75 mg Oral Daily Brendan Moon MD   75 mg at 10/13/20 0812   • Delflex-LC/1.5% Dextrose (DIANEAL) CAPD 12,200 mL  12,200 mL Intraperitoneal PRN Brendan Moon MD       • Delflex-LC/2.5% Dextrose (DIANEAL) CAPD 5,000 mL  5,000 mL Intraperitoneal PRN Brendan Moon MD       • Delflex-LC/2.5% Dextrose (DIANEAL) CAPD 5,000 mL  5,000 mL Intraperitoneal PRN Brendan Moon MD   5,000 mL at 10/12/20 1556   • Delflex-LC/2.5% Dextrose (DIANEAL) CAPD 5,000 mL  5,000 mL Intraperitoneal PRN Mauro Saravia MD   5,000 mL at 10/13/20 1545   • [START ON 10/17/2020] epoetin arturo-epbx (RETACRIT) injection 10,000 Units  10,000 Units Subcutaneous Weekly Brendan Moon MD       • famotidine (PEPCID) tablet 20 mg  20 mg Oral Daily Brendan Moon MD   20 mg at 10/13/20 0811   • gabapentin (NEURONTIN) capsule 100 mg  100 mg Oral TID Brendan Moon MD   100 mg at 10/13/20 1747   • heparin (porcine) 5000 UNIT/ML injection 5,000 Units  5,000 Units Subcutaneous Q12H Brendan Moon MD   5,000 Units at 10/13/20 0812    • hydrALAZINE (APRESOLINE) tablet 25 mg  25 mg Oral Q8H Brendan Moon MD   25 mg at 10/13/20 1351   • influenza vac split quad (FLUZONE,FLUARIX,AFLURIA,FLULAVAL) injection 0.5 mL  0.5 mL Intramuscular During Hospitalization Brendan Moon MD       • insulin detemir (LEVEMIR) injection 20 Units  20 Units Subcutaneous Nightly Brendan Moon MD   20 Units at 10/12/20 2112   • isosorbide mononitrate (IMDUR) 24 hr tablet 60 mg  60 mg Oral Q24H Brendan Moon MD   60 mg at 10/13/20 0811   • losartan (COZAAR) tablet 50 mg  50 mg Oral Q24H Brendan Moon MD   50 mg at 10/13/20 0811   • metoprolol tartrate (LOPRESSOR) injection 5 mg  5 mg Intravenous Q6H PRN Brendan Moon MD   5 mg at 10/12/20 2318   • nitroglycerin (NITROSTAT) SL tablet 0.4 mg  0.4 mg Sublingual Q5 Min PRN Brendan Moon MD       • ondansetron (ZOFRAN) tablet 4 mg  4 mg Oral Q6H PRN Brendan Moon MD        Or   • ondansetron (ZOFRAN) injection 4 mg  4 mg Intravenous Q6H PRN Brendan Moon MD   4 mg at 10/11/20 0824   • pravastatin (PRAVACHOL) tablet 20 mg  20 mg Oral Daily Brendan Moon MD   20 mg at 10/13/20 0812   • ranolazine (RANEXA) 12 hr tablet 1,000 mg  1,000 mg Oral BID Brendan Moon MD   1,000 mg at 10/13/20 0811   • sevelamer (RENVELA) tablet 2,400 mg  2,400 mg Oral TID With Meals Brendan Moon MD   2,400 mg at 10/13/20 1747   • sodium bicarbonate tablet 650 mg  650 mg Oral TID Brendan Moon MD   650 mg at 10/13/20 1840   • sodium chloride 0.9 % flush 10 mL  10 mL Intravenous Q12H Brendan Moon MD   10 mL at 10/13/20 0812   • sodium chloride 0.9 % flush 10 mL  10 mL Intravenous PRN Brendan Moon MD   10 mL at 10/11/20 2038       Assessment and Plan:      Hypotension    CAD (coronary artery disease)    ESRF (end stage renal failure) (CMS/HCC)    Diabetes mellitus (CMS/HCC)    Cardiac arrest (CMS/[facility])    Syncope  1.  Atherosclerotic coronary artery disease.   Patient had undergone attempted PTCA of the 100% occlusion of the left main coronary artery chronic total occlusion.  Patient has not had any further recurrent symptoms of chest pain and is currently on Ranexa 1000 mg twice a day.  Patient at the present time would be continued on medical management.    2.  Left ventricular systolic dysfunction with an ejection fraction of 45%.  Clinically at the present time patient is euvolemic and not in congestive heart failure.  Patient has been counseled to decrease her salt intake.    3.  Arterial hypertension.  Patient blood pressure is currently stable.  Patient will be continued on the present dose of the antihypertensive medication.  Patient would be continued on the present dose of the Coreg and the hydralazine.    4.  End-stage renal disease on peritoneal dialysis.  Patient has been followed by nephrology.    5.  Episodes of pulseless electrical activity.  Patient telemetry monitor has not revealed of any evidence of any arrhythmia.    The above plan of management were discussed with the patient and the family.            Electronically signed by Gibran Garza MD, 10/13/20, 8:03 PM CDT.      Time: Spent on face-to-face interaction 20 minutes    Dictated utilizing Dragon dictation.

## 2020-10-14 NOTE — DISCHARGE PLACEMENT REQUEST
"Sumanth Robledo (60 y.o. Male)     Date of Birth Social Security Number Address Home Phone MRN    1960  [address] [place] [address] [phone] [id number]    Jainism Marital Status          Congregation        Admission Date Admission Type Admitting Provider Attending Provider Department, Room/Bed    10/8/20 Emergency Isael Miranda MD Stewart-Hubbard, Takasha Latoya Renee, MD [address], 304/1    Discharge Date Discharge Disposition Discharge Destination                       Attending Provider: Luana Zamudio MD    Allergies: No Known Allergies    Isolation: None   Infection: None   Code Status: CPR    Ht: 170.2 cm (67.01\")   Wt: 94.9 kg (209 lb 3.2 oz)    Admission Cmt: None   Principal Problem: Hypotension [I95.9]                 Active Insurance as of 10/8/2020     Primary Coverage     Payor Plan Insurance Group Employer/Plan Group    ANTHEM BLUE CROSS ANTHEM BLUE CROSS BLUE SHIELD PPO [id number]     Payor Plan Address Payor Plan Phone Number Payor Plan Fax Number Effective Dates    [address] [phone]  9/1/2012 - None Entered    [facility] [id number]       Subscriber Name Subscriber Birth Date Member ID       SUMANTH ROBLEDO 1960 [id number]           Secondary Coverage     Payor Plan Insurance Group Employer/Plan Group    MEDICARE MEDICARE A & B      Payor Plan Address Payor Plan Phone Number Payor Plan Fax Number Effective Dates    [address] [phone]  3/1/2019 - None Entered    [facility] [id number]       Subscriber Name Subscriber Birth Date Member ID       SUMANTH ROBLEDO 1960 [id number]                 Emergency Contacts      (Rel.) Home Phone Work Phone Mobile Phone    ROBLEDOLISA (Spouse) [phone] -- [phone]            Insurance Information                ANTHEM BLUE CROSS/ANTHEM BLUE CROSS BLUE SHIELD PPO Phone: [phone]    Subscriber: Sumanth Robledo " Subscriber#: [id number]    Group#: [id number] Precert#:         MEDICARE/MEDICARE A & B Phone: [phone]    Subscriber: Sumanth Robledo Subscriber#: [id number]    Group#:  Precert#:              History & Physical      Luana Zamudio MD at 10/09/20 0206          .        [facility] Admission      Date of Admission: 10/8/2020  Patient was seen and evaluated on 10/8/2020.  However note was written after midnight    Primary Care Physician: Elizabeth Burleson APRN      Chief Complaint: **Chest pain with elevated labs*    HPI:*  Patient is a 60-year-old male who presents to the [facility] emergency room with complaints of chest pain.  He was seen at his cardiology office today and had blood work drawn.  The cardiologist called the ER stating that the patient needed to come to the ED due to abnormal test results of and the patient was unsure of what test was abnormal.  Noted in the emergency room to have a troponin of 2.1.  He has been having chest pain off and on for the past 2 years he has a history of coronary artery disease with CABG in 2014.  He has had no stents since then.  His last cath was in February 2020 4 by Dr. Garza   (with unsuccessful PTCA of the chronic total occlusion of the left main and selective cannulation of the left internal mammary artery and of the saphenous vein graft to the obtuse marginal branch.  There is 100% occlusion of the left main 100% occlusion of the proximal right coronary artery 100% occlusion of the second obtuse marginal after branch patent LIMA to the LAD patent saphenous vein graft to the obtuse marginal branch #1.)    The emergency room patient was found to have a troponin of 2.1 at age of 13 and a BNP of 41,000.  He was started on a heparin drip.  Nitroglycerin and aspirin was given prior in the ER.    Concurrent Medical History:  has a past medical history of Chronic kidney  disease, stage 3, Coronary arteriosclerosis, End stage renal disease (CMS/HCC), Essential hypertension, Heterozygous thalassemia, Hyperlipidemia, Peripheral vascular disease (CMS/[facility]), Surgical follow-up care, and Type 2 diabetes mellitus (CMS/[facility]).    Past Surgical History:  has a past surgical history that includes Amputation finger / thumb (Right); Coronary artery bypass graft (10/11/2014); Cardiac catheterization (10/11/2014); Cardiac catheterization (N/A, 2/28/2020); and Cardiac surgery.    Family History: family history includes Coronary artery disease in an other family member; Heart disease in an other family member. **    Social History:  reports that he has never smoked. He has never used smokeless tobacco. He reports previous alcohol use.    Allergies: No Known Allergies    Medications:   Prior to Admission medications    Medication Sig Start Date End Date Taking? Authorizing Provider   amLODIPine (NORVASC) 10 MG tablet Take 10 mg by mouth 3 (Three) Times a Day.   Yes Lynne Castillo MD   aspirin 81 MG tablet Take 1 tablet by mouth Daily.   Yes Darlene Dickey APRN   calcitriol (ROCALTROL) 0.5 MCG capsule Take 0.5 mcg by mouth 2 (Two) Times a Day.   Yes Lynne Castillo MD   cinacalcet (SENSIPAR) 30 MG tablet Take 30 mg by mouth Daily.   Yes Lynne Castillo MD   clopidogrel (PLAVIX) 75 MG tablet Take 75 mg by mouth Daily.   Yes Lynne Castillo MD   Dulaglutide (TRULICITY) 1.5 MG/0.5ML solution pen-injector Inject 10 mg under the skin into the appropriate area as directed 1 (One) Time Per Week.   Yes Lynne Castillo MD   Evolocumab (REPATHA SC) Inject 140 mg under the skin into the appropriate area as directed Every 14 (Fourteen) Days.   Yes Lynne Castillo MD   gabapentin (NEURONTIN) 300 MG capsule Take 500 mg by mouth 2 (Two) Times a Day.   Yes Lynne Castillo MD   gentamicin (GARAMYCIN) 0.1 % cream Apply 1 application topically to the appropriate area as  directed 3 (Three) Times a Day.   Yes Lynne Castillo MD   hydrALAZINE (APRESOLINE) 25 MG tablet Take 25 mg by mouth 3 (Three) Times a Day.   Yes Lynne Castillo MD   Insulin Glargine, 2 Unit Dial, (TOUJEO MAX SOLOSTAR) 300 UNIT/ML solution pen-injector injection Inject 28 Units under the skin into the appropriate area as directed Every Night.   Yes Lynne Castillo MD   isosorbide mononitrate (IMDUR) 30 MG 24 hr tablet Take 30 mg by mouth Daily.   Yes Lynne Castillo MD   losartan (COZAAR) 100 MG tablet Take 100 mg by mouth Daily.   Yes Lynne Castillo MD   Metoprolol Succinate 100 MG capsule extended-release 24 hour sprinkle Take 200 mg by mouth Daily.   Yes Lynne Castillo MD   nitroglycerin (NITROSTAT) 0.4 MG SL tablet 1 under the tongue as needed for angina, may repeat q5mins for up three doses 2/29/20  Yes Darlene Dickey APRN   pravastatin (PRAVACHOL) 20 MG tablet Take 20 mg by mouth Daily.   Yes Lynne Castillo MD   ranolazine (RANEXA) 500 MG 12 hr tablet Take 1 tablet by mouth 2 (Two) Times a Day.  Patient taking differently: Take 1,000 mg by mouth 2 (Two) Times a Day. 2/29/20  Yes Darlene Dickey APRN   albuterol sulfate  (90 Base) MCG/ACT inhaler Inhale 2 puffs Every 4 (Four) Hours As Needed for Wheezing.    ProviderLynne MD       Review of Systems:  Review of Systems   12 point review of systems obtained pertinent positives and negatives noted above in HPI otherwise complete ROS is negative except as mentioned above.    Physical Exam:   Temp:  [97.3 °F (36.3 °C)] 97.3 °F (36.3 °C)  Heart Rate:  [74-88] 74  Resp:  [16-18] 16  BP: (139-177)/(76-94) 152/78  Physical Exam  Vitals signs and nursing note reviewed.   Constitutional:       Appearance: Normal appearance.   HENT:      Head: Normocephalic and atraumatic.      Nose: Nose normal.      Mouth/Throat:      Pharynx: Oropharynx is clear.   Eyes:      Extraocular Movements: Extraocular movements  intact.      Conjunctiva/sclera: Conjunctivae normal.   Neck:      Musculoskeletal: No neck rigidity.   Cardiovascular:      Rate and Rhythm: Normal rate and regular rhythm.      Pulses: Normal pulses.   Pulmonary:      Breath sounds: Normal breath sounds.   Abdominal:      General: Bowel sounds are normal.      Palpations: Abdomen is soft.   Musculoskeletal:         General: No tenderness or deformity.   Skin:     General: Skin is warm and dry.   Neurological:      General: No focal deficit present.      Mental Status: He is alert.      Cranial Nerves: No cranial nerve deficit.   Psychiatric:         Mood and Affect: Mood normal.         Behavior: Behavior normal.         Thought Content: Thought content normal.         Judgment: Judgment normal.           Results Reviewed:  I have personally reviewed current lab, radiology, and data and agree with results.  Lab Results (last 24 hours)     Procedure Component Value Units Date/Time    Protime-INR [[id number]]  (Normal) Collected: 10/08/20 2054    Specimen: Blood Updated: 10/09/20 0058     Protime 14.1 Seconds      INR 1.05    Narrative:      Therapeutic range for most indications is 2.0-3.0 INR,  or 2.5-3.5 for mechanical heart valves.    aPTT [[id number]]  (Normal) Collected: 10/08/20 2054    Specimen: Blood Updated: 10/09/20 0058     PTT 37.2 seconds     Narrative:      The recommended Heparin therapeutic range is 68-97 seconds.    COVID PRE-OP / PRE-PROCEDURE SCREENING ORDER (NO ISOLATION) - Swab, Nasopharynx [[id number]] Collected: 10/08/20 2321    Specimen: Swab from Nasopharynx Updated: 10/08/20 2328    Narrative:      The following orders were created for panel order COVID PRE-OP / PRE-PROCEDURE SCREENING ORDER (NO ISOLATION) - Swab, Nasopharynx.  Procedure                               Abnormality         Status                     ---------                               -----------         ------                     COVID-19, [facility] M[facility] IN-HOUS...[[id number]]                       In process                   Please view results for these tests on the individual orders.    COVID-19, [facility] IN-HOUSE, NP SWAB IN TRANSPORT MEDIA 8-10 HR TAT - Swab, Nasopharynx [[id number]] Collected: 10/08/20 2321    Specimen: Swab from Nasopharynx Updated: 10/08/20 2328    [place] Draw [[id number]] Collected: 10/08/20 2054    Specimen: Blood Updated: 10/08/20 2245    Narrative:      The following orders were created for panel order [place] Draw.  Procedure                               Abnormality         Status                     ---------                               -----------         ------                     Light Blue Top[[id number]]                                   Final result               Green Top (Gel)[[id number]]                                  Final result               Lavender Top[[id number]]                                     Final result               Gold Top - SST[[id number]]                                   Final result                 Please view results for these tests on the individual orders.    Green Top (Gel) [[id number]] Collected: 10/08/20 2138    Specimen: Blood Updated: 10/08/20 2245     Extra Tube Hold for add-ons.     Comment: Auto resulted.       BNP [[id number]]  (Abnormal) Collected: 10/08/20 2138    Specimen: Blood Updated: 10/08/20 2228     proBNP 40,839.0 pg/mL     Narrative:      Among patients with dyspnea, NT-proBNP is highly sensitive for the detection of acute congestive heart failure. In addition NT-proBNP of <300 pg/ml effectively rules out acute congestive heart failure with 99% negative predictive value.    Results may be falsely decreased if patient taking Biotin.      Troponin [[id number]]  (Abnormal) Collected: 10/08/20 2138    Specimen: Blood Updated: 10/08/20 2223     Troponin T 1.930 ng/mL     Narrative:      Troponin T Reference Range:  <= 0.03 ng/mL-   Negative for AMI  >0.03 ng/mL-     Abnormal for myocardial necrosis.  Clinicians would have  to utilize clinical acumen, EKG, Troponin and serial changes to determine if it is an Acute Myocardial Infarction or myocardial injury due to an underlying chronic condition.       Results may be falsely decreased if patient taking Biotin.      Basic Metabolic Panel [[id number]]  (Abnormal) Collected: 10/08/20 2138    Specimen: Blood Updated: 10/08/20 2219     Glucose 254 mg/dL      BUN 66 mg/dL      Creatinine 13.08 mg/dL      Sodium 140 mmol/L      Potassium 3.8 mmol/L      Chloride 99 mmol/L      CO2 24.0 mmol/L      Calcium 8.2 mg/dL      eGFR  African Amer 5 mL/min/1.73      Comment: <15 Indicative of kidney failure.        eGFR Non  Amer --     Comment: <15 Indicative of kidney failure.        BUN/Creatinine Ratio 5.0     Anion Gap 17.0 mmol/L     Narrative:      GFR Normal >60  Chronic Kidney Disease <60  Kidney Failure <15      Light Blue Top [[id number]] Collected: 10/08/20 2054    Specimen: Blood Updated: 10/08/20 2200     Extra Tube hold for add-on     Comment: Auto resulted       Lavender Top [[id number]] Collected: 10/08/20 2054    Specimen: Blood Updated: 10/08/20 2200     Extra Tube hold for add-on     Comment: Auto resulted       Gold Top - SST [[id number]] Collected: 10/08/20 2054    Specimen: Blood Updated: 10/08/20 2200     Extra Tube Hold for add-ons.     Comment: Auto resulted.       D-dimer, Quantitative [[id number]]  (Abnormal) Collected: 10/08/20 2054    Specimen: Blood Updated: 10/08/20 2131     D-Dimer, Quantitative 903 ng/mL (FEU)     Narrative:      Dimer values <500 ng/ml FEU are FDA approved as aid in diagnosis of deep venous thrombosis and pulmonary embolism.  This test should not be used in an exclusion strategy with pretest probability alone.    A recent guideline regarding diagnosis for pulmonary thromboembolism recommends an adjusted exclusion criterion of age x 10 ng/ml FEU for patients >50 years of age (Shirley Intern Med 2015; 163: 701-711).      Lactic Acid, Plasma [[id number]]   (Normal) Collected: 10/08/20 2054    Specimen: Blood Updated: 10/08/20 2118     Lactate 1.1 mmol/L     CBC & Differential [[id number]]  (Abnormal) Collected: 10/08/20 2054    Specimen: Blood Updated: 10/08/20 2103    Narrative:      The following orders were created for panel order CBC & Differential.  Procedure                               Abnormality         Status                     ---------                               -----------         ------                     CBC Auto Differential[[id number]]        Abnormal            Final result                 Please view results for these tests on the individual orders.    CBC Auto Differential [[id number]]  (Abnormal) Collected: 10/08/20 2054    Specimen: Blood Updated: 10/08/20 2103     WBC 9.95 10*3/mm3      RBC 4.13 10*6/mm3      Hemoglobin 10.2 g/dL      Hematocrit 32.6 %      MCV 78.9 fL      MCH 24.7 pg      MCHC 31.3 g/dL      RDW 14.8 %      RDW-SD 41.6 fl      MPV 11.2 fL      Platelets 319 10*3/mm3      Neutrophil % 62.7 %      Lymphocyte % 22.7 %      Monocyte % 10.8 %      Eosinophil % 1.9 %      Basophil % 0.4 %      Immature Grans % 1.5 %      Neutrophils, Absolute 6.24 10*3/mm3      Lymphocytes, Absolute 2.26 10*3/mm3      Monocytes, Absolute 1.07 10*3/mm3      Eosinophils, Absolute 0.19 10*3/mm3      Basophils, Absolute 0.04 10*3/mm3      Immature Grans, Absolute 0.15 10*3/mm3      nRBC 0.2 /100 WBC         Imaging Results (Last 24 Hours)     Procedure Component Value Units Date/Time    XR Chest 2 View [[id number]] Collected: 10/08/20 2122     Updated: 10/08/20 2140    Narrative:        CHEST X-RAY 2 view on  10/8/2020     CLINICAL INDICATION: Elevated troponin, weakness, shortness of  breath    COMPARISON: 10/29/2014    FINDINGS: The patient is status post median sternotomy and  probable CABG. Cardiomegaly is noted. Mild vascular congestion is  noted. There is mild elevation of the right hemidiaphragm. The  lungs are otherwise clear.      Impression:       Cardiomegaly with mild vascular congestion.    Electronically signed by:  Alek Rubin  10/8/2020 9:38 PM  CDT Workstation: [id number]            Assessment:    Active Hospital Problems    Diagnosis   • Chest pain     Added automatically from request for surgery [id number]               Plan:*  Chest pain patient was sent from Dr. Garza's office with an elevated troponin.  He is been placed on a heparin drip placed in stepdown as needed nitroglycerin patient is already on Ranexa and large amounts of Imdur.  Placed on Dr. Garza's list n.p.o. after midnight    End-stage renal failure on peritoneal dialysis will inform nephrology in the a.m.    Hypertension continue antihypertensive medications    Diabetes sliding scale insulin Accu-Cheks    I discussed the patient's findings and my recommendations with: *Patient and family at the bedside    Luana Zamudio MD                  Electronically signed by Luana Zamudio MD at 10/09/20 0216         Current Facility-Administered Medications   Medication Dose Route Frequency Provider Last Rate Last Dose   • acetaminophen (TYLENOL) tablet 650 mg  650 mg Oral Q4H PRN Brendan Moon MD   650 mg at 10/11/20 1108    Or   • acetaminophen (TYLENOL) suppository 650 mg  650 mg Rectal Q4H PRN Brendan Moon MD       • acetaminophen (TYLENOL) tablet 650 mg  650 mg Oral Q4H PRN Brendan Moon MD       • aspirin chewable tablet 81 mg  81 mg Oral Daily Brendan Moon MD   81 mg at 10/14/20 0817   • bisacodyl (DULCOLAX) EC tablet 5 mg  5 mg Oral Daily PRN Brendan Moon MD       • calcitriol (ROCALTROL) capsule 1 mcg  1 mcg Oral Daily Brendan Moon MD   1 mcg at 10/14/20 0818   • carvedilol (COREG) tablet 6.25 mg  6.25 mg Oral BID With Meals Brendan Moon MD   6.25 mg at 10/14/20 0818   • clopidogrel (PLAVIX) tablet 75 mg  75 mg Oral Daily Brendan Moon MD   75 mg at 10/14/20 0818   • Delflex-LC/1.5%  Dextrose (DIANEAL) CAPD 12,200 mL  12,200 mL Intraperitoneal PRN Brendan Moon MD       • Delflex-LC/2.5% Dextrose (DIANEAL) CAPD 5,000 mL  5,000 mL Intraperitoneal PRN Brendan Moon MD       • Delflex-LC/2.5% Dextrose (DIANEAL) CAPD 5,000 mL  5,000 mL Intraperitoneal PRN Brendan Moon MD   5,000 mL at 10/12/20 1556   • Delflex-LC/2.5% Dextrose (DIANEAL) CAPD 5,000 mL  5,000 mL Intraperitoneal PRN Mauro Saravia MD   5,000 mL at 10/13/20 1545   • Delflex-LC/2.5% Dextrose (DIANEAL) CAPD 5,000 mL  5,000 mL Intraperitoneal PRN Mauro Saravia MD       • Delflex-LC/4.25% Dextrose (DIANEAL) CAPD 5,000 mL  5,000 mL Intraperitoneal PRN Mauro Saravia MD       • [START ON 10/17/2020] epoetin arturo-epbx (RETACRIT) injection 10,000 Units  10,000 Units Subcutaneous Weekly Brendan Moon MD       • famotidine (PEPCID) tablet 20 mg  20 mg Oral Daily Brendan Moon MD   20 mg at 10/14/20 0818   • gabapentin (NEURONTIN) capsule 100 mg  100 mg Oral Nightly Mauro Saravia MD       • heparin (porcine) 5000 UNIT/ML injection 5,000 Units  5,000 Units Subcutaneous Q12H Brendan Moon MD   5,000 Units at 10/14/20 0819   • hydrALAZINE (APRESOLINE) tablet 25 mg  25 mg Oral Q8H Brendan Moon MD   25 mg at 10/14/20 1423   • influenza vac split quad (FLUZONE,FLUARIX,AFLURIA,FLULAVAL) injection 0.5 mL  0.5 mL Intramuscular During Hospitalization Brendan Moon MD       • insulin detemir (LEVEMIR) injection 20 Units  20 Units Subcutaneous Nightly Brendan Moon MD   20 Units at 10/13/20 2120   • isosorbide mononitrate (IMDUR) 24 hr tablet 60 mg  60 mg Oral Q24H Brendan Moon MD   60 mg at 10/14/20 0818   • lanthanum (FOSRENOL) chewable tablet 500 mg  500 mg Oral TID With Meals Mauro Saravia MD   500 mg at 10/14/20 1423   • losartan (COZAAR) tablet 50 mg  50 mg Oral Q24H Brendan Moon MD   50 mg at 10/14/20 0818   • metoprolol tartrate (LOPRESSOR) injection 5 mg  5 mg Intravenous Q6H  "PRN Brendan Moon MD   5 mg at 10/12/20 2318   • nitroglycerin (NITROSTAT) SL tablet 0.4 mg  0.4 mg Sublingual Q5 Min PRN Brendan Moon MD       • ondansetron (ZOFRAN) tablet 4 mg  4 mg Oral Q6H PRN Brendan Moon MD        Or   • ondansetron (ZOFRAN) injection 4 mg  4 mg Intravenous Q6H PRN Brendan Moon MD   4 mg at 10/11/20 0824   • pravastatin (PRAVACHOL) tablet 20 mg  20 mg Oral Daily Brendan Moon MD   20 mg at 10/14/20 0818   • sevelamer (RENVELA) tablet 2,400 mg  2,400 mg Oral TID With Meals Brendan Moon MD   2,400 mg at 10/14/20 1146   • sodium bicarbonate tablet 650 mg  650 mg Oral TID Brendan Moon MD   650 mg at 10/14/20 0818   • sodium chloride 0.9 % flush 10 mL  10 mL Intravenous Q12H Brendan Moon MD   10 mL at 10/14/20 0819   • sodium chloride 0.9 % flush 10 mL  10 mL Intravenous PRN Brendan Moon MD   10 mL at 10/11/20 2038        Physical Therapy Notes (most recent note)      Rosemarie Lam, PTA at 10/14/20 1256  Version 1 of 1         Acute Care - Physical Therapy Treatment Note  [facility]     Patient Name: Sumanth Robledo  : 1960  MRN: [id number]  Today's Date: 10/14/2020   Onset of Illness/Injury or Date of Surgery: 10/08/20       PT Assessment (last 12 hours)      PT Evaluation and Treatment     Row Name 10/14/20 1014          Physical Therapy Time and Intention    Subjective Information  complains of;dizziness;nausea/vomiting \"jerking\"  -TA     Document Type  therapy note (daily note)  -TA     Mode of Treatment  individual therapy;physical therapy  -TA     Row Name 10/14/20 1014          General Information    Patient Profile Reviewed  yes  -TA     Row Name 10/14/20 1014          Cognition    Orientation Status (Cognition)  oriented x 4  -TA     Personal Safety Interventions  fall prevention program maintained;gait belt;nonskid shoes/slippers when out of bed;supervised activity  -MADELYN     Row Name 10/14/20 1014          Pain " Scale: Numbers Pre/Post-Treatment    Pretreatment Pain Rating  0/10 - no pain  -TA     Posttreatment Pain Rating  0/10 - no pain  -TA     Row Name 10/14/20 1014          Bed Mobility    Bed Mobility  scooting/bridging  -TA     Scooting/Bridging [place] (Bed Mobility)  dependent (less than 25% patient effort);2 person assist with drawsheet  -TA     Supine-Sit [place] (Bed Mobility)  minimum assist (75% patient effort)  -TA     Sit-Supine [place] (Bed Mobility)  supervision  -TA     Assistive Device (Bed Mobility)  bed rails;head of bed elevated;draw sheet  -TA     Comment (Bed Mobility)  pt sat @ EOB ~8 minutes with CGA.  pt c/o dizziness & N&V   -TA     Row Name 10/14/20 1014          Safety Issues, Functional Mobility    Impairments Affecting Function (Mobility)  strength;endurance/activity tolerance;balance  -TA     Row Name 10/14/20 1014          Hip (Therapeutic Exercise)    Hip (Therapeutic Exercise)  isometric exercises;AROM (active range of motion)  -TA     Hip AROM (Therapeutic Exercise)  bilateral;flexion;sitting;10 repetitions  -TA     Hip Isometrics (Therapeutic Exercise)  bilateral;gluteal sets;10 repetitions;5 repetitions;supine  -TA     Row Name 10/14/20 1014          Knee (Therapeutic Exercise)    Knee (Therapeutic Exercise)  AROM (active range of motion);isometric exercises  -TA     Knee AROM (Therapeutic Exercise)  LAQ (long arc quad);bilateral;sitting;10 repetitions  -TA     Knee Isometrics (Therapeutic Exercise)  bilateral;quad sets;supine;10 repetitions;5 repetitions  -TA     Row Name 10/14/20 1014          Ankle (Therapeutic Exercise)    Ankle (Therapeutic Exercise)  AROM (active range of motion)  -TA     Ankle AROM (Therapeutic Exercise)  bilateral;dorsiflexion;plantarflexion;10 repetitions;5 repetitions  -TA     Row Name 10/14/20 1014          Plan of Care Review    Plan of Care Reviewed With  patient  -TA     Outcome Summary  pt sup>sit with min assist, sit>sup with SBA, pt sat  @ EOB ~8 minutes with CGA (secondary to jerkiness). pt would benefit from 24/7 care & coontinued PT services @ D/C  -TA     Row Name 10/14/20 1014          Vital Signs    Pre Systolic BP Rehab  137  -TA     Pre Treatment Diastolic BP  69  -TA     Intra Systolic BP Rehab  139  -TA     Intra Treatment Diastolic BP  74  -TA     Post Systolic BP Rehab  138  -TA     Post Treatment Diastolic BP  74  -TA     Pretreatment Heart Rate (beats/min)  98  -TA     Posttreatment Heart Rate (beats/min)  93  -TA     Pre SpO2 (%)  97  -TA     O2 Delivery Pre Treatment  supplemental O2  -TA     Post SpO2 (%)  97  -TA     O2 Delivery Post Treatment  supplemental O2  -TA     Pre Patient Position  Supine  -TA     Intra Patient Position  Sitting  -TA     Post Patient Position  Supine  -TA     Row Name 10/14/20 1014          Bed Mobility Goal 1 (PT)    Activity/Assistive Device (Bed Mobility Goal 1, PT)  sit to supine;supine to sit  -TA     [place] Level/Cues Needed (Bed Mobility Goal 1, PT)  modified independence  -TA     Time Frame (Bed Mobility Goal 1, PT)  3 days  -TA     Strategies/Barriers (Bed Mobility Goal 1, PT)  bed flat, no hand rails  -TA     Progress/Outcomes (Bed Mobility Goal 1, PT)  goal not met  -TA     Row Name 10/14/20 1014          Transfer Goal 1 (PT)    Activity/Assistive Device (Transfer Goal 1, PT)  sit-to-stand/stand-to-sit;bed-to-chair/chair-to-bed LRAD PRN  -TA     [place] Level/Cues Needed (Transfer Goal 1, PT)  modified independence  -TA     Time Frame (Transfer Goal 1, PT)  3 days  -TA     Progress/Outcome (Transfer Goal 1, PT)  goal not met  -TA     Row Name 10/14/20 1014          Gait Training Goal 1 (PT)    Activity/Assistive Device (Gait Training Goal 1, PT)  gait (walking locomotion);assistive device use;decrease fall risk;increase endurance/gait distance LRAD PRN  -TA     [place] Level (Gait Training Goal 1, PT)  modified independence  -TA     Distance (Gait Training Goal 1, PT)  150ft or  "more  -TA     Time Frame (Gait Training Goal 1, PT)  5 days  -TA     Progress/Outcome (Gait Training Goal 1, PT)  goal not met  -TA     Row Name 10/14/20 1014          Stairs Goal 1 (PT)    Activity/Assistive Device (Stairs Goal 1, PT)  ascending stairs;descending stairs;using handrail, left  -TA     [place] Level/Cues Needed (Stairs Goal 1, PT)  standby assist  -TA     Number of Stairs (Stairs Goal 1, PT)  2 or more  -TA     Time Frame (Stairs Goal 1, PT)  by discharge  -TA     Progress/Outcome (Stairs Goal 1, PT)  goal not met  -TA     Row Name 10/14/20 1014          Positioning and Restraints    Pre-Treatment Position  in bed  -TA     Post Treatment Position  bed  -TA     In Bed  supine;call light within reach;exit alarm on  -TA     Row Name 10/14/20 1014          Therapy Assessment/Plan (PT)    Rehab Potential (PT)  good, to achieve stated therapy goals  -TA     Criteria for Skilled Interventions Met (PT)  yes;skilled treatment is necessary  -TA     Comment, Therapy Assessment/Plan (PT)  continue  -TA     Row Name 10/14/20 1014          Progress Summary (PT)    Progress Toward Functional Goals (PT)  progress toward functional goals is fair  -TA     Barriers to Overall Progress (PT)  \"jerkiness\"  -TA       User Key  (r) = Recorded By, (t) = Taken By, (c) = Cosigned By    Initials Name Provider Type    Rosemarie Morales, PTA Physical Therapy Assistant        Physical Therapy Education                 Title: PT OT SLP Therapies (In Progress)     Topic: Physical Therapy (In Progress)     Point: Mobility training (Done)     Learning Progress Summary           Patient Acceptance, E, VU by KW at 10/13/2020 0940    Comment: PT POC and prognosis, PLB    Acceptance, E, VU by KW1 at 10/12/2020 1431    Comment: Role of PT, POC, use of gait belt                   Point: Home exercise program (Not Started)     Learner Progress:  Not documented in this visit.          Point: Body mechanics (Not Started)     Learner " "Progress:  Not documented in this visit.          Point: Precautions (Done)     Learning Progress Summary           Patient Acceptance, E, VU by KW at 10/13/2020 0940    Comment: PT POC and prognosis, PLB    Acceptance, E, VU by KW1 at 10/12/2020 1431    Comment: Role of PT, POC, use of gait belt                               User Key     Initials Effective Dates Name Provider Type Discipline    KW 08/09/20 -  Jaquelin Gabriel, PT Physical Therapist PT    KW 08/09/20 -  Getachew Beck PT Physical Therapist PT              PT Recommendation and Plan  Anticipated Discharge Disposition (PT): home with assist, home with home health  Therapy Frequency (PT): other (see comments)(6-11x/wk)  Progress Summary (PT)  Progress Toward Functional Goals (PT): progress toward functional goals is fair  Barriers to Overall Progress (PT): \"jerkiness\"  Plan of Care Reviewed With: patient  Outcome Summary: pt sup>sit with min assist, sit>sup with SBA, pt sat @ EOB ~8 minutes with CGA (secondary to jerkiness). pt would benefit from 24/7 care & coontinued PT services @ D/C  Outcome Measures     Row Name 10/14/20 1200 10/13/20 1039 10/12/20 1147       How much help from another person do you currently need...    Turning from your back to your side while in flat bed without using bedrails?  3  -TA  --  --    Moving from lying on back to sitting on the side of a flat bed without bedrails?  3  -TA  --  --    Moving to and from a bed to a chair (including a wheelchair)?  3  -TA  --  --    Standing up from a chair using your arms (e.g., wheelchair, bedside chair)?  3  -TA  --  --    Climbing 3-5 steps with a railing?  3  -TA  --  --    To walk in hospital room?  3  -TA  --  --    AM-PAC 6 Clicks Score (PT)  18  -TA  --  --       How much help from another is currently needed...    Putting on and taking off regular lower body clothing?  --  2  -AS  2  -KO    Bathing (including washing, rinsing, and drying)  --  3  -AS  3  -KO    Toileting (which " includes using toilet bed pan or urinal)  --  3  -AS  3  -KO    Putting on and taking off regular upper body clothing  --  3  -AS  3  -KO    Taking care of personal grooming (such as brushing teeth)  --  3  -AS  3  -KO    Eating meals  --  4  -AS  4  -KO    AM-PAC 6 Clicks Score (OT)  --  18  -AS  18  -KO       Functional Assessment    Outcome Measure Options  AM-PAC 6 Clicks Basic Mobility (PT)  -TA  AM-PAC 6 Clicks Daily Activity (OT)  -AS  AM-PAC 6 Clicks Daily Activity (OT)  -KO      User Key  (r) = Recorded By, (t) = Taken By, (c) = Cosigned By    Initials Name Provider Type    Rosemarie Morales PTA Physical Therapy Assistant    AS Noemi Madrid, OT Occupational Therapist    Tiana Quarles OT Occupational Therapist           Time Calculation:   PT Charges     Row Name 10/14/20 1255             Time Calculation    Start Time  1014  -TA      Stop Time  1047  -TA      Time Calculation (min)  33 min  -TA      PT Received On  10/14/20  -TA         Time Calculation- PT    Total Timed Code Minutes- PT  33 minute(s)  -TA        User Key  (r) = Recorded By, (t) = Taken By, (c) = Cosigned By    Initials Name Provider Type    Rosemarie Morales PTA Physical Therapy Assistant        Therapy Charges for Today     Code Description Service Date Service Provider Modifiers Qty    58078003238 HC PT THERAPEUTIC ACT EA 15 MIN 10/14/2020 Rosemarie Lam PTA GP 1    88976731879 HC PT THER PROC EA 15 MIN 10/14/2020 Rosemarie Lam PTA GP 1          PT G-Codes  Outcome Measure Options: AM-PAC 6 Clicks Basic Mobility (PT)  AM-PAC 6 Clicks Score (PT): 18  AM-PAC 6 Clicks Score (OT): 18    Rosemarie Lam PTA  10/14/2020      Electronically signed by Rosemarie Lam PTA at 10/14/20 1256          Occupational Therapy Notes (most recent note)      Linnea Armijo, HU/L at 10/14/20 1243             10/14/20 1243   OTHER   Discipline occupational therapy assistant   Pt in supine  pt stated  something causing him to shake   pt denied oob secondary to shaking and scared of falling  pt denied in supine ex     Electronically signed by Linnea Armijo, MAYTE/L at 10/14/20 6515

## 2020-10-14 NOTE — THERAPY TREATMENT NOTE
"Acute Care - Physical Therapy Treatment Note  [facility]     Patient Name: Sumanth Robledo  : 1960  MRN: [id number]  Today's Date: 10/14/2020   Onset of Illness/Injury or Date of Surgery: 10/08/20       PT Assessment (last 12 hours)      PT Evaluation and Treatment     Row Name 10/14/20 1014          Physical Therapy Time and Intention    Subjective Information  complains of;dizziness;nausea/vomiting \"jerking\"  -TA     Document Type  therapy note (daily note)  -TA     Mode of Treatment  individual therapy;physical therapy  -TA     Row Name 10/14/20 1014          General Information    Patient Profile Reviewed  yes  -TA     Row Name 10/14/20 1014          Cognition    Orientation Status (Cognition)  oriented x 4  -TA     Personal Safety Interventions  fall prevention program maintained;gait belt;nonskid shoes/slippers when out of bed;supervised activity  -TA     Row Name 10/14/20 1014          Pain Scale: Numbers Pre/Post-Treatment    Pretreatment Pain Rating  0/10 - no pain  -TA     Posttreatment Pain Rating  0/10 - no pain  -TA     Row Name 10/14/20 1014          Bed Mobility    Bed Mobility  scooting/bridging  -TA     Scooting/Bridging [place] (Bed Mobility)  dependent (less than 25% patient effort);2 person assist with drawsheet  -TA     Supine-Sit [place] (Bed Mobility)  minimum assist (75% patient effort)  -TA     Sit-Supine [place] (Bed Mobility)  supervision  -TA     Assistive Device (Bed Mobility)  bed rails;head of bed elevated;draw sheet  -TA     Comment (Bed Mobility)  pt sat @ EOB ~8 minutes with CGA.  pt c/o dizziness & N&V   -TA     Row Name 10/14/20 1014          Safety Issues, Functional Mobility    Impairments Affecting Function (Mobility)  strength;endurance/activity tolerance;balance  -TA     Row Name 10/14/20 1014          Hip (Therapeutic Exercise)    Hip (Therapeutic Exercise)  isometric exercises;AROM (active range of motion)  -TA     Hip AROM (Therapeutic " Exercise)  bilateral;flexion;sitting;10 repetitions  -TA     Hip Isometrics (Therapeutic Exercise)  bilateral;gluteal sets;10 repetitions;5 repetitions;supine  -TA     Row Name 10/14/20 1014          Knee (Therapeutic Exercise)    Knee (Therapeutic Exercise)  AROM (active range of motion);isometric exercises  -TA     Knee AROM (Therapeutic Exercise)  LAQ (long arc quad);bilateral;sitting;10 repetitions  -TA     Knee Isometrics (Therapeutic Exercise)  bilateral;quad sets;supine;10 repetitions;5 repetitions  -TA     Row Name 10/14/20 1014          Ankle (Therapeutic Exercise)    Ankle (Therapeutic Exercise)  AROM (active range of motion)  -TA     Ankle AROM (Therapeutic Exercise)  bilateral;dorsiflexion;plantarflexion;10 repetitions;5 repetitions  -TA     Row Name 10/14/20 1014          Plan of Care Review    Plan of Care Reviewed With  patient  -TA     Outcome Summary  pt sup>sit with min assist, sit>sup with SBA, pt sat @ EOB ~8 minutes with CGA (secondary to jerkiness). pt would benefit from 24/7 care & coontinued PT services @ D/C  -TA     Row Name 10/14/20 1014          Vital Signs    Pre Systolic BP Rehab  137  -TA     Pre Treatment Diastolic BP  69  -TA     Intra Systolic BP Rehab  139  -TA     Intra Treatment Diastolic BP  74  -TA     Post Systolic BP Rehab  138  -TA     Post Treatment Diastolic BP  74  -TA     Pretreatment Heart Rate (beats/min)  98  -TA     Posttreatment Heart Rate (beats/min)  93  -TA     Pre SpO2 (%)  97  -TA     O2 Delivery Pre Treatment  supplemental O2  -TA     Post SpO2 (%)  97  -TA     O2 Delivery Post Treatment  supplemental O2  -TA     Pre Patient Position  Supine  -TA     Intra Patient Position  Sitting  -TA     Post Patient Position  Supine  -TA     Row Name 10/14/20 1014          Bed Mobility Goal 1 (PT)    Activity/Assistive Device (Bed Mobility Goal 1, PT)  sit to supine;supine to sit  -TA     [place] Level/Cues Needed (Bed Mobility Goal 1, PT)  modified independence  -TA      Time Frame (Bed Mobility Goal 1, PT)  3 days  -TA     Strategies/Barriers (Bed Mobility Goal 1, PT)  bed flat, no hand rails  -TA     Progress/Outcomes (Bed Mobility Goal 1, PT)  goal not met  -TA     Row Name 10/14/20 1014          Transfer Goal 1 (PT)    Activity/Assistive Device (Transfer Goal 1, PT)  sit-to-stand/stand-to-sit;bed-to-chair/chair-to-bed LRAD PRN  -TA     [place] Level/Cues Needed (Transfer Goal 1, PT)  modified independence  -TA     Time Frame (Transfer Goal 1, PT)  3 days  -TA     Progress/Outcome (Transfer Goal 1, PT)  goal not met  -TA     Row Name 10/14/20 1014          Gait Training Goal 1 (PT)    Activity/Assistive Device (Gait Training Goal 1, PT)  gait (walking locomotion);assistive device use;decrease fall risk;increase endurance/gait distance LRAD PRN  -TA     [place] Level (Gait Training Goal 1, PT)  modified independence  -TA     Distance (Gait Training Goal 1, PT)  150ft or more  -TA     Time Frame (Gait Training Goal 1, PT)  5 days  -TA     Progress/Outcome (Gait Training Goal 1, PT)  goal not met  -TA     Row Name 10/14/20 1014          Stairs Goal 1 (PT)    Activity/Assistive Device (Stairs Goal 1, PT)  ascending stairs;descending stairs;using handrail, left  -TA     [place] Level/Cues Needed (Stairs Goal 1, PT)  standby assist  -TA     Number of Stairs (Stairs Goal 1, PT)  2 or more  -TA     Time Frame (Stairs Goal 1, PT)  by discharge  -TA     Progress/Outcome (Stairs Goal 1, PT)  goal not met  -TA     Row Name 10/14/20 1014          Positioning and Restraints    Pre-Treatment Position  in bed  -TA     Post Treatment Position  bed  -TA     In Bed  supine;call light within reach;exit alarm on  -TA     Row Name 10/14/20 1014          Therapy Assessment/Plan (PT)    Rehab Potential (PT)  good, to achieve stated therapy goals  -TA     Criteria for Skilled Interventions Met (PT)  yes;skilled treatment is necessary  -TA     Comment, Therapy Assessment/Plan (PT)   "continue  -TA     Row Name 10/14/20 1014          Progress Summary (PT)    Progress Toward Functional Goals (PT)  progress toward functional goals is fair  -TA     Barriers to Overall Progress (PT)  \"jerkiness\"  -TA       User Key  (r) = Recorded By, (t) = Taken By, (c) = Cosigned By    Initials Name Provider Type    TA Rosemarie Lam, PTA Physical Therapy Assistant        Physical Therapy Education                 Title: PT OT SLP Therapies (In Progress)     Topic: Physical Therapy (In Progress)     Point: Mobility training (Done)     Learning Progress Summary           Patient Acceptance, E, VU by  at 10/13/2020 0940    Comment: PT POC and prognosis, PLB    Acceptance, E, VU by [facility] at 10/12/2020 1431    Comment: Role of PT, POC, use of gait belt                   Point: Home exercise program (Not Started)     Learner Progress:  Not documented in this visit.          Point: Body mechanics (Not Started)     Learner Progress:  Not documented in this visit.          Point: Precautions (Done)     Learning Progress Summary           Patient Acceptance, E, VU by  at 10/13/2020 0940    Comment: PT POC and prognosis, PLB    Acceptance, E, VU by [facility] at 10/12/2020 1431    Comment: Role of PT, POC, use of gait belt                               User Key     Initials Effective Dates Name Provider Type Discipline    [facility] 08/09/20 -  Jaquelin Gabriel, PT Physical Therapist PT     08/09/20 -  Getachew Beck PT Physical Therapist PT              PT Recommendation and Plan  Anticipated Discharge Disposition (PT): home with assist, home with home health  Therapy Frequency (PT): other (see comments)(6-11x/wk)  Progress Summary (PT)  Progress Toward Functional Goals (PT): progress toward functional goals is fair  Barriers to Overall Progress (PT): \"jerkiness\"  Plan of Care Reviewed With: patient  Outcome Summary: pt sup>sit with min assist, sit>sup with SBA, pt sat @ EOB ~8 minutes with CGA (secondary to jerkiness). pt would " benefit from 24/7 care & coontinued PT services @ D/C  Outcome Measures     Row Name 10/14/20 1200 10/13/20 1039 10/12/20 1147       How much help from another person do you currently need...    Turning from your back to your side while in flat bed without using bedrails?  3  -TA  --  --    Moving from lying on back to sitting on the side of a flat bed without bedrails?  3  -TA  --  --    Moving to and from a bed to a chair (including a wheelchair)?  3  -TA  --  --    Standing up from a chair using your arms (e.g., wheelchair, bedside chair)?  3  -TA  --  --    Climbing 3-5 steps with a railing?  3  -TA  --  --    To walk in hospital room?  3  -TA  --  --    AM-PAC 6 Clicks Score (PT)  18  -TA  --  --       How much help from another is currently needed...    Putting on and taking off regular lower body clothing?  --  2  -AS  2  -KO    Bathing (including washing, rinsing, and drying)  --  3  -AS  3  -KO    Toileting (which includes using toilet bed pan or urinal)  --  3  -AS  3  -KO    Putting on and taking off regular upper body clothing  --  3  -AS  3  -KO    Taking care of personal grooming (such as brushing teeth)  --  3  -AS  3  -KO    Eating meals  --  4  -AS  4  -KO    AM-PAC 6 Clicks Score (OT)  --  18  -AS  18  -KO       Functional Assessment    Outcome Measure Options  AM-PAC 6 Clicks Basic Mobility (PT)  -TA  AM-PAC 6 Clicks Daily Activity (OT)  -AS  AM-PAC 6 Clicks Daily Activity (OT)  -KO      User Key  (r) = Recorded By, (t) = Taken By, (c) = Cosigned By    Initials Name Provider Type    Rosemarie Morales PTA Physical Therapy Assistant    AS Noemi Madrid, OT Occupational Therapist    Tiana Quarles, WAYNE Occupational Therapist           Time Calculation:   PT Charges     Row Name 10/14/20 1255             Time Calculation    Start Time  1014  -TA      Stop Time  1047  -TA      Time Calculation (min)  33 min  -TA      PT Received On  10/14/20  -TA         Time Calculation- PT    Total Timed Code  Minutes- PT  33 minute(s)  -MADELYN        User Key  (r) = Recorded By, (t) = Taken By, (c) = Cosigned By    Initials Name Provider Type    Rosemarie Morales PTA Physical Therapy Assistant        Therapy Charges for Today     Code Description Service Date Service Provider Modifiers Qty    32806405128 HC PT THERAPEUTIC ACT EA 15 MIN 10/14/2020 Rosemarie Lam PTA GP 1    64568837934 HC PT THER PROC EA 15 MIN 10/14/2020 Rosemarie Lam PTA GP 1          PT G-Codes  Outcome Measure Options: AM-PAC 6 Clicks Basic Mobility (PT)  AM-PAC 6 Clicks Score (PT): 18  AM-PAC 6 Clicks Score (OT): 18    Rosemarie Lam PTA  10/14/2020

## 2020-10-14 NOTE — NURSING NOTE
[employer] initiated per policy and procedure without difficulty. Changed dsg, gentamicin cream applied from home. ( not on hospital formulary). Tubing secured to abdomen.

## 2020-10-14 NOTE — PLAN OF CARE
Problem: Adult Inpatient Plan of Care  Goal: Plan of Care Review  Outcome: Ongoing, Progressing  Flowsheets (Taken 10/14/2020 3013)  Progress: no change  Plan of Care Reviewed With: patient  Outcome Summary: no change   Goal Outcome Evaluation:  Plan of Care Reviewed With: patient  Progress: no change  Outcome Summary: no change

## 2020-10-14 NOTE — PLAN OF CARE
Problem: Adult Inpatient Plan of Care  Goal: Plan of Care Review  Flowsheets (Taken 10/14/2020 8055)  Progress: no change  Plan of Care Reviewed With: patient  Outcome Summary: Initital assessment.  Will add Renal diet restriction, honor food preferences, and make recommendations as appropriate.  Encourage intake.

## 2020-10-14 NOTE — PROGRESS NOTES
"[facility]  [address] [place] [address]. [address]  T - [phone]  F - [phone]     Progress Note          PATIENT  DEMOGRAPHICS   PATIENT NAME: Sumanth Robledo                      PHYSICIAN: Mauro Saravia MD  : 1960  MRN: [id number]   LOS: 4 days    Patient Care Team:  Elizabeth Burleson APRN as PCP - General (Family Medicine)  Subjective   SUBJECTIVE   Now on regular floor, has tremors       Objective   OBJECTIVE   Vital Signs  Temp:  [97 °F (36.1 °C)-98.1 °F (36.7 °C)] 98.1 °F (36.7 °C)  Heart Rate:  [84-95] 90  Resp:  [16-18] 18  BP: (128-162)/(60-80) 141/73    Flowsheet Rows      First Filed Value   Admission Height  170.2 cm (67\") Documented at 10/08/2020 2035   Admission Weight  81.5 kg (179 lb 9.6 oz) Documented at 10/08/2020 2046           I/O last 3 completed shifts:  In: 360 [P.O.:360]  Out: 2960 [Other:2960]    PHYSICAL EXAM    Physical Exam  Constitutional:       General: He is not in acute distress.  HENT:      Head:      Comments: Hematoma scalp- frontal area     Mouth/Throat:      Mouth: Mucous membranes are moist.   Cardiovascular:      Rate and Rhythm: Normal rate and regular rhythm.   Pulmonary:      Effort: No respiratory distress.      Breath sounds: No wheezing.   Abdominal:      General: There is distension.      Palpations: Abdomen is soft.   Musculoskeletal:         General: Swelling present.      Comments: Trace ryann LE edema   Skin:     General: Skin is warm and dry.   Neurological:      General: No focal deficit present.      Mental Status: He is alert and oriented to person, place, and time.   Psychiatric:         Mood and Affect: Mood normal.         Behavior: Behavior normal.         RESULTS   Results Review:    Results from last 7 days   Lab Units 10/13/20  0358 10/10/20  1316 10/10/20  0610  10/09/20  0647  10/08/20  1704   SODIUM mmol/L 134* 136 137   < > 140   < > 140   POTASSIUM mmol/L 3.8 4.1 3.8   < > 4.3   < > 3.8   CHLORIDE " mmol/L 95* 95* 96*   < > 102   < > 98   CO2 mmol/L 24.0 19.0* 23.0   < > 19.0*   < > 25.0   BUN mg/dL 60* 68* 69*   < > 70*   < > 65*   CREATININE mg/dL 11.87* 13.33* 12.62*   < > 13.51*   < > 13.19*   CALCIUM mg/dL 8.3* 7.7* 8.3*   < > 8.2*   < > 8.6   BILIRUBIN mg/dL  --  0.3  --   --  0.4  --  0.3   ALK PHOS U/L  --  58  --   --  59  --  70   ALT (SGPT) U/L  --  31  --   --  10  --  11   AST (SGOT) U/L  --  39  --   --  17  --  13   GLUCOSE mg/dL 111* 174* 127*   < > 117*   < > 181*    < > = values in this interval not displayed.       Estimated Creatinine Clearance: 7.3 mL/min (A) (by C-G formula based on SCr of 11.87 mg/dL (H)).    Results from last 7 days   Lab Units 10/14/20  0512 10/13/20  0358 10/11/20  0936 10/09/20  0647   MAGNESIUM mg/dL  --  1.8 1.8 2.1   PHOSPHORUS mg/dL 9.9* 10.0*  --  9.5*             Results from last 7 days   Lab Units 10/13/20  0358 10/10/20  1316 10/10/20  0610 10/09/20  0647 10/08/20  2054   WBC 10*3/mm3 8.50 11.44* 8.57 8.71 9.95   HEMOGLOBIN g/dL 8.2* 8.6* 8.8* 9.2* 10.2*   PLATELETS 10*3/mm3 248 273 303 299 319       Results from last 7 days   Lab Units 10/09/20  1403 10/08/20  2054   INR  1.80* 1.05         Imaging Results (Last 24 Hours)     ** No results found for the last 24 hours. **      Heart cath 10/9/20  Procedure performed:  1.  Left heart catheterization with iliofemoral arteriogram and Angio-Seal closure device.  2.  Attempted unsuccessful PTCA of the 100% occluded left main .  3.  Selective cannulation of the left internal mammary artery.  4.  Selective cannulation of the saphenous vein graft to the right coronary artery and to the obtuse marginal branch.     MEDICATIONS    aspirin, 81 mg, Oral, Daily  calcitriol, 1 mcg, Oral, Daily  carvedilol, 6.25 mg, Oral, BID With Meals  clopidogrel, 75 mg, Oral, Daily  [START ON 10/17/2020] epoetin arturo/arturo-epbx, 10,000 Units, Subcutaneous, Weekly  famotidine, 20 mg, Oral, Daily  gabapentin, 100 mg, Oral, TID  heparin  (porcine), 5,000 Units, Subcutaneous, Q12H  hydrALAZINE, 25 mg, Oral, Q8H  insulin detemir, 20 Units, Subcutaneous, Nightly  isosorbide mononitrate, 60 mg, Oral, Q24H  losartan, 50 mg, Oral, Q24H  pravastatin, 20 mg, Oral, Daily  ranolazine, 1,000 mg, Oral, BID  sevelamer, 2,400 mg, Oral, TID With Meals  sodium bicarbonate, 650 mg, Oral, TID  sodium chloride, 10 mL, Intravenous, Q12H           Assessment/Plan   ASSESSMENT / PLAN      Hypotension    CAD (coronary artery disease)    ESRF (end stage renal failure) (CMS/[facility])    Diabetes mellitus (CMS/[facility])    Cardiac arrest (CMS/[facility])    Syncope    1.end-stage renal disease currently on peritoneal dialysis every night.  Patient is doing pd with 2800 cc 5 cycles with last fill of 1000 cc.  His estimated dry weight is 84 kg.  Weight is increasing now peak 96 kg. Will try 4.25 % one bag tonight for added UF. Tolerated PD last night without issues and has lost 2.5 lbs     2.anemia chronic kidney disease-Hgb low. We will continue with Epogen 06295 units once a week     3.CKD/mineral bone disorder.  Patient will remain on calcitriol 1 mcg daily along with Renvela 2400 mg 3 times a day- phos still high and add fosrenol     4.CAD/ HTN/ PEA- Cardiac cath 10/9/20 showed 100% occlusion left main . Had episode of PEA Saturday AM and CPR was administered x 4 minutes and patient received epinepherine x 2. Nifedipine ER, Toprol XL, losartan, and lasix are currently on hold. Troponins 2.0-1.9.  bp is stable    5. ? Seizures- witnessed seizure x 2 post Code Blue- Ct scan of head was unremarkable.     6. Metabolic acidosis- bicarb better with po na bicarb    7. Tremors lower gabapentin to hs only. ?may lower ranexa              This document has been electronically signed by Mauro Saravia MD on October 14, 2020 12:17 CDT

## 2020-10-15 LAB
ALBUMIN SERPL-MCNC: 3.3 G/DL (ref 3.5–5.2)
ALBUMIN/GLOB SERPL: 0.7 G/DL
ALP SERPL-CCNC: 64 U/L (ref 39–117)
ALT SERPL W P-5'-P-CCNC: 11 U/L (ref 1–41)
ANION GAP SERPL CALCULATED.3IONS-SCNC: 15 MMOL/L (ref 5–15)
AST SERPL-CCNC: 11 U/L (ref 1–40)
BASOPHILS # BLD AUTO: 0.04 10*3/MM3 (ref 0–0.2)
BASOPHILS NFR BLD AUTO: 0.3 % (ref 0–1.5)
BILIRUB SERPL-MCNC: 0.3 MG/DL (ref 0–1.2)
BUN SERPL-MCNC: 57 MG/DL (ref 8–23)
BUN/CREAT SERPL: 4.7 (ref 7–25)
CALCIUM SPEC-SCNC: 9.3 MG/DL (ref 8.6–10.5)
CHLORIDE SERPL-SCNC: 94 MMOL/L (ref 98–107)
CO2 SERPL-SCNC: 27 MMOL/L (ref 22–29)
CREAT SERPL-MCNC: 12.02 MG/DL (ref 0.76–1.27)
DEPRECATED RDW RBC AUTO: 40.1 FL (ref 37–54)
EOSINOPHIL # BLD AUTO: 0.25 10*3/MM3 (ref 0–0.4)
EOSINOPHIL NFR BLD AUTO: 2.1 % (ref 0.3–6.2)
ERYTHROCYTE [DISTWIDTH] IN BLOOD BY AUTOMATED COUNT: 14.9 % (ref 12.3–15.4)
GFR SERPL CREATININE-BSD FRML MDRD: 5 ML/MIN/1.73
GFR SERPL CREATININE-BSD FRML MDRD: ABNORMAL ML/MIN/{1.73_M2}
GLOBULIN UR ELPH-MCNC: 4.7 GM/DL
GLUCOSE BLDC GLUCOMTR-MCNC: 125 MG/DL (ref 70–130)
GLUCOSE BLDC GLUCOMTR-MCNC: 204 MG/DL (ref 70–130)
GLUCOSE BLDC GLUCOMTR-MCNC: 77 MG/DL (ref 70–130)
GLUCOSE SERPL-MCNC: 76 MG/DL (ref 65–99)
HCT VFR BLD AUTO: 26.5 % (ref 37.5–51)
HGB BLD-MCNC: 8.5 G/DL (ref 13–17.7)
IMM GRANULOCYTES # BLD AUTO: 0.13 10*3/MM3 (ref 0–0.05)
IMM GRANULOCYTES NFR BLD AUTO: 1.1 % (ref 0–0.5)
LYMPHOCYTES # BLD AUTO: 1.82 10*3/MM3 (ref 0.7–3.1)
LYMPHOCYTES NFR BLD AUTO: 15 % (ref 19.6–45.3)
MAGNESIUM SERPL-MCNC: 2 MG/DL (ref 1.6–2.4)
MCH RBC QN AUTO: 24.7 PG (ref 26.6–33)
MCHC RBC AUTO-ENTMCNC: 32.1 G/DL (ref 31.5–35.7)
MCV RBC AUTO: 77 FL (ref 79–97)
MONOCYTES # BLD AUTO: 1.71 10*3/MM3 (ref 0.1–0.9)
MONOCYTES NFR BLD AUTO: 14.1 % (ref 5–12)
NEUTROPHILS NFR BLD AUTO: 67.4 % (ref 42.7–76)
NEUTROPHILS NFR BLD AUTO: 8.17 10*3/MM3 (ref 1.7–7)
NRBC BLD AUTO-RTO: 0.2 /100 WBC (ref 0–0.2)
PHOSPHATE SERPL-MCNC: 9.4 MG/DL (ref 2.5–4.5)
PLATELET # BLD AUTO: 277 10*3/MM3 (ref 140–450)
PMV BLD AUTO: 11.7 FL (ref 6–12)
POTASSIUM SERPL-SCNC: 3.7 MMOL/L (ref 3.5–5.2)
PROT SERPL-MCNC: 8 G/DL (ref 6–8.5)
RBC # BLD AUTO: 3.44 10*6/MM3 (ref 4.14–5.8)
SODIUM SERPL-SCNC: 136 MMOL/L (ref 136–145)
TSH SERPL DL<=0.05 MIU/L-ACNC: 1.77 UIU/ML (ref 0.27–4.2)
WBC # BLD AUTO: 12.12 10*3/MM3 (ref 3.4–10.8)

## 2020-10-15 PROCEDURE — 82962 GLUCOSE BLOOD TEST: CPT

## 2020-10-15 PROCEDURE — 63710000001 INSULIN DETEMIR PER 5 UNITS: Performed by: INTERNAL MEDICINE

## 2020-10-15 PROCEDURE — 80053 COMPREHEN METABOLIC PANEL: CPT | Performed by: FAMILY MEDICINE

## 2020-10-15 PROCEDURE — 85025 COMPLETE CBC W/AUTO DIFF WBC: CPT | Performed by: FAMILY MEDICINE

## 2020-10-15 PROCEDURE — 97530 THERAPEUTIC ACTIVITIES: CPT

## 2020-10-15 PROCEDURE — 97110 THERAPEUTIC EXERCISES: CPT

## 2020-10-15 PROCEDURE — 25010000002 HEPARIN (PORCINE) PER 1000 UNITS: Performed by: INTERNAL MEDICINE

## 2020-10-15 PROCEDURE — 84443 ASSAY THYROID STIM HORMONE: CPT | Performed by: FAMILY MEDICINE

## 2020-10-15 PROCEDURE — 83735 ASSAY OF MAGNESIUM: CPT | Performed by: FAMILY MEDICINE

## 2020-10-15 PROCEDURE — 25010000002 EPOETIN ALFA-EPBX 10000 UNIT/ML SOLUTION: Performed by: INTERNAL MEDICINE

## 2020-10-15 PROCEDURE — 84100 ASSAY OF PHOSPHORUS: CPT | Performed by: FAMILY MEDICINE

## 2020-10-15 PROCEDURE — 25010000002 ONDANSETRON PER 1 MG: Performed by: INTERNAL MEDICINE

## 2020-10-15 RX ADMIN — GABAPENTIN 100 MG: 100 CAPSULE ORAL at 21:37

## 2020-10-15 RX ADMIN — LOSARTAN POTASSIUM 50 MG: 50 TABLET, FILM COATED ORAL at 09:37

## 2020-10-15 RX ADMIN — HYDRALAZINE HYDROCHLORIDE 25 MG: 25 TABLET, FILM COATED ORAL at 13:33

## 2020-10-15 RX ADMIN — HYDRALAZINE HYDROCHLORIDE 25 MG: 25 TABLET, FILM COATED ORAL at 05:33

## 2020-10-15 RX ADMIN — ONDANSETRON HYDROCHLORIDE 4 MG: 2 INJECTION, SOLUTION INTRAMUSCULAR; INTRAVENOUS at 00:23

## 2020-10-15 RX ADMIN — SODIUM CHLORIDE, PRESERVATIVE FREE 10 ML: 5 INJECTION INTRAVENOUS at 21:37

## 2020-10-15 RX ADMIN — EPOETIN ALFA-EPBX 10000 UNITS: 10000 INJECTION, SOLUTION INTRAVENOUS; SUBCUTANEOUS at 12:21

## 2020-10-15 RX ADMIN — LANTHANUM CARBONATE 500 MG: 500 TABLET, CHEWABLE ORAL at 18:11

## 2020-10-15 RX ADMIN — CLOPIDOGREL BISULFATE 75 MG: 75 TABLET ORAL at 09:37

## 2020-10-15 RX ADMIN — SODIUM BICARBONATE 650 MG: 650 TABLET ORAL at 09:37

## 2020-10-15 RX ADMIN — SEVELAMER CARBONATE 2400 MG: 800 TABLET, FILM COATED ORAL at 18:11

## 2020-10-15 RX ADMIN — SEVELAMER CARBONATE 2400 MG: 800 TABLET, FILM COATED ORAL at 09:37

## 2020-10-15 RX ADMIN — HEPARIN SODIUM 5000 UNITS: 5000 INJECTION INTRAVENOUS; SUBCUTANEOUS at 21:37

## 2020-10-15 RX ADMIN — CARVEDILOL 6.25 MG: 6.25 TABLET, FILM COATED ORAL at 18:11

## 2020-10-15 RX ADMIN — INSULIN DETEMIR 20 UNITS: 100 INJECTION, SOLUTION SUBCUTANEOUS at 21:37

## 2020-10-15 RX ADMIN — PRAVASTATIN SODIUM 20 MG: 20 TABLET ORAL at 09:38

## 2020-10-15 RX ADMIN — ONDANSETRON HYDROCHLORIDE 4 MG: 2 INJECTION, SOLUTION INTRAMUSCULAR; INTRAVENOUS at 09:26

## 2020-10-15 RX ADMIN — SODIUM CHLORIDE, PRESERVATIVE FREE 10 ML: 5 INJECTION INTRAVENOUS at 09:38

## 2020-10-15 RX ADMIN — LANTHANUM CARBONATE 500 MG: 500 TABLET, CHEWABLE ORAL at 12:21

## 2020-10-15 RX ADMIN — HEPARIN SODIUM 5000 UNITS: 5000 INJECTION INTRAVENOUS; SUBCUTANEOUS at 09:36

## 2020-10-15 RX ADMIN — HYDRALAZINE HYDROCHLORIDE 25 MG: 25 TABLET, FILM COATED ORAL at 21:37

## 2020-10-15 RX ADMIN — LANTHANUM CARBONATE 500 MG: 500 TABLET, CHEWABLE ORAL at 09:37

## 2020-10-15 RX ADMIN — ISOSORBIDE MONONITRATE 60 MG: 60 TABLET, EXTENDED RELEASE ORAL at 09:37

## 2020-10-15 RX ADMIN — FAMOTIDINE 20 MG: 20 TABLET, FILM COATED ORAL at 09:38

## 2020-10-15 RX ADMIN — ASPIRIN 81 MG: 81 TABLET, CHEWABLE ORAL at 09:38

## 2020-10-15 RX ADMIN — CALCITRIOL 1 MCG: 0.25 CAPSULE ORAL at 09:36

## 2020-10-15 RX ADMIN — SEVELAMER CARBONATE 2400 MG: 800 TABLET, FILM COATED ORAL at 12:21

## 2020-10-15 RX ADMIN — CARVEDILOL 6.25 MG: 6.25 TABLET, FILM COATED ORAL at 09:37

## 2020-10-15 NOTE — PAYOR COMM NOTE
"    Madina Bustos RN [facility]  [phone]    Phone  [phone]     Fax  Cont. Stay Review    Sumanth Gar (60 y.o. Male)     Date of Birth Social Security Number Address Home Phone MRN    1960  [address] [place] [address] [phone] [id number]    [employer] Marital Status          Anabaptist        Admission Date Admission Type Admitting Provider Attending Provider Department, Room/Bed    10/8/20 Emergency Isael Miranda MD Stewart-Hubbard, Luana Shields MD [address], 304/1    Discharge Date Discharge Disposition Discharge Destination                       Attending Provider: Luana Zamudio MD    Allergies: No Known Allergies    Isolation: None   Infection: None   Code Status: CPR    Ht: 170.2 cm (67.01\")   Wt: 93.3 kg (205 lb 9.6 oz)    Admission Cmt: None   Principal Problem: Hypotension [I95.9]                 Active Insurance as of 10/8/2020     Primary Coverage     Payor Plan Insurance Group Employer/Plan Group    [employer]HE[employer] BLUE CROSS ANTHEM BLUE CROSS BLUE SHIELD PPO [id number]     Payor Plan Address Payor Plan Phone Number Payor Plan Fax Number Effective Dates    [address] [phone]  9/1/2012 - None Entered    [facility] [id number]       Subscriber Name Subscriber Birth Date Member ID       SUMANTH GAR 1960 [id number]           Secondary Coverage     Payor Plan Insurance Group Employer/Plan Group    MEDICARE MEDICARE A & B      Payor Plan Address Payor Plan Phone Number Payor Plan Fax Number Effective Dates    [address] [phone]  3/1/2019 - None Entered    [facility] [id number]       Subscriber Name Subscriber Birth Date Member ID       SUMANTH GAR 1960 [id number]                 Emergency Contacts      (Rel.) Home Phone Work Phone Mobile Phone    LISA GAR (Spouse) [phone] -- [phone]            Vital Signs (last day)     " Date/Time   Temp   Temp src   Pulse   Resp   BP   Patient Position   SpO2    10/15/20 0725   --   --   94   --   --   --   --    10/15/20 0722   97.6 (36.4)   Temporal   94   16   165/76   Lying   98    10/15/20 0320   97.3 (36.3)   Temporal   95   16   146/74   Lying   93    10/15/20 0041   --   --   97   --   --   --   --    10/14/20 2308   97.5 (36.4)   Temporal   90   16   142/67   Lying   94    10/14/20 2011   97.3 (36.3)   Axillary   88   18   145/67   Lying   97    10/14/20 1723   --   --   88   --   --   --   --    10/14/20 1525   98 (36.7)   Temporal   88   18   136/76   Lying   99    10/14/20 1112   98.1 (36.7)   Temporal   90   18   141/73   Lying   92    10/14/20 0954   --   --   91   --   --   --   --    10/14/20 0813   97 (36.1)   Temporal   95   18   162/80   Lying   99    10/14/20 0325   97.6 (36.4)   Temporal   95   16   128/70   Lying   98    10/14/20 0045   --   --   90   --   --   --   --              Oxygen Therapy (last day)     Date/Time   SpO2   Device (Oxygen Therapy)   Flow (L/min)   Oxygen Concentration (%)   ETCO2 (mmHg)    10/15/20 0722   98   nasal cannula   2   --   --    10/15/20 0320   93   nasal cannula   2   --   --    10/14/20 2308   94   nasal cannula   2   --   --    10/14/20 2011   97   nasal cannula   2   --   --    10/14/20 1525   99   nasal cannula   2   --   --    10/14/20 1112   92   nasal cannula   2   --   --    10/14/20 0813   99   nasal cannula   2   --   --    10/14/20 0325   98   nasal cannula   2   --   --              Current Facility-Administered Medications   Medication Dose Route Frequency Provider Last Rate Last Dose   • acetaminophen (TYLENOL) tablet 650 mg  650 mg Oral Q4H PRN Brendan Moon MD   650 mg at 10/11/20 1108    Or   • acetaminophen (TYLENOL) suppository 650 mg  650 mg Rectal Q4H PRN Brendan Moon MD       • acetaminophen (TYLENOL) tablet 650 mg  650 mg Oral Q4H PRN Brendan Moon MD       • aspirin chewable tablet 81 mg  81 mg Oral  Daily Brendan Moon MD   81 mg at 10/14/20 0817   • bisacodyl (DULCOLAX) EC tablet 5 mg  5 mg Oral Daily PRN Brendan Moon MD       • calcitriol (ROCALTROL) capsule 1 mcg  1 mcg Oral Daily Brendan Moon MD   1 mcg at 10/14/20 0818   • carvedilol (COREG) tablet 6.25 mg  6.25 mg Oral BID With Meals Brendan Moon MD   6.25 mg at 10/14/20 1704   • clopidogrel (PLAVIX) tablet 75 mg  75 mg Oral Daily Brendan Moon MD   75 mg at 10/14/20 0818   • Delflex-LC/1.5% Dextrose (DIANEAL) CAPD 12,200 mL  12,200 mL Intraperitoneal PRN Brendan Moon MD       • Delflex-LC/2.5% Dextrose (DIANEAL) CAPD 5,000 mL  5,000 mL Intraperitoneal PRN Brendan Moon MD       • Delflex-LC/2.5% Dextrose (DIANEAL) CAPD 5,000 mL  5,000 mL Intraperitoneal PRN Brendan Moon MD   5,000 mL at 10/12/20 1556   • Delflex-LC/2.5% Dextrose (DIANEAL) CAPD 5,000 mL  5,000 mL Intraperitoneal PRN Mauro Saravia MD   5,000 mL at 10/13/20 1545   • Delflex-LC/2.5% Dextrose (DIANEAL) CAPD 5,000 mL  5,000 mL Intraperitoneal PRN Mauro Saravia MD       • Delflex-LC/4.25% Dextrose (DIANEAL) CAPD 5,000 mL  5,000 mL Intraperitoneal PRN Mauro Saravia MD       • [START ON 10/17/2020] epoetin arturo-epbx (RETACRIT) injection 10,000 Units  10,000 Units Subcutaneous Weekly Brendan Moon MD       • famotidine (PEPCID) tablet 20 mg  20 mg Oral Daily Brendan Moon MD   20 mg at 10/14/20 0818   • gabapentin (NEURONTIN) capsule 100 mg  100 mg Oral Nightly Mauor Saravia MD   100 mg at 10/14/20 2105   • heparin (porcine) 5000 UNIT/ML injection 5,000 Units  5,000 Units Subcutaneous Q12H Brendan Moon MD   5,000 Units at 10/14/20 2105   • hydrALAZINE (APRESOLINE) tablet 25 mg  25 mg Oral Q8H Brendan Moon MD   25 mg at 10/15/20 0533   • influenza vac split quad (FLUZONE,FLUARIX,AFLURIA,FLULAVAL) injection 0.5 mL  0.5 mL Intramuscular During Hospitalization Brendan Moon MD       • insulin detemir (LEVEMIR)  injection 20 Units  20 Units Subcutaneous Nightly Brendan Moon MD   20 Units at 10/14/20 2105   • isosorbide mononitrate (IMDUR) 24 hr tablet 60 mg  60 mg Oral Q24H Brendan Moon MD   60 mg at 10/14/20 0818   • lanthanum (FOSRENOL) chewable tablet 500 mg  500 mg Oral TID With Meals Mauro Saravia MD   500 mg at 10/14/20 1704   • losartan (COZAAR) tablet 50 mg  50 mg Oral Q24H Brendan Moon MD   50 mg at 10/14/20 0818   • metoprolol tartrate (LOPRESSOR) injection 5 mg  5 mg Intravenous Q6H PRN Brendan Moon MD   5 mg at 10/12/20 2318   • nitroglycerin (NITROSTAT) SL tablet 0.4 mg  0.4 mg Sublingual Q5 Min PRN Brendan Moon MD       • ondansetron (ZOFRAN) tablet 4 mg  4 mg Oral Q6H PRN Brendan Moon MD        Or   • ondansetron (ZOFRAN) injection 4 mg  4 mg Intravenous Q6H PRN Brendan Moon MD   4 mg at 10/15/20 0023   • pravastatin (PRAVACHOL) tablet 20 mg  20 mg Oral Daily Brendan Moon MD   20 mg at 10/14/20 0818   • sevelamer (RENVELA) tablet 2,400 mg  2,400 mg Oral TID With Meals Brendan Moon MD   2,400 mg at 10/14/20 1704   • sodium bicarbonate tablet 650 mg  650 mg Oral TID Brendan Moon MD   650 mg at 10/14/20 2105   • sodium chloride 0.9 % flush 10 mL  10 mL Intravenous Q12H Brendan Moon MD   10 mL at 10/14/20 2106   • sodium chloride 0.9 % flush 10 mL  10 mL Intravenous PRN Brendan Moon MD   10 mL at 10/11/20 2038        Physician Progress Notes (last 48 hours) (Notes from 10/13/20 0837 through 10/15/20 0837)      Gibran Garza MD at 10/14/20 1624          Cardiology Progress Note     LOS: 5 days   Patient Care Team:  Elizabeth Burleson APRN as PCP - General (Family Medicine)    Subjective:     Chart reviewed. Patient seen and examined. Patient has not complained of having any symptoms of chest pain.  Patient complains of having severe tremor and unsteady gait.  Patient denies any weakness.  Review of the record indicate patient  has been initiated on Ranexa.  Patient complains of having generalized fatigue and weakness.  Patient telemetry monitor has not revealed of any further recurrence of bradycardia arrhythmia or tachyarrhythmia.  Patient's hemoglobin is on the low side.      Objective:  Temp:  [97 °F (36.1 °C)-98.1 °F (36.7 °C)] 97.6 °F (36.4 °C)  Heart Rate:  [88-97] 94  Resp:  [16-18] 16  BP: (136-165)/(67-80) 165/76    Intake/Output Summary (Last 24 hours) at 10/15/2020 0724  Last data filed at 10/15/2020 0706  Gross per 24 hour   Intake 600 ml   Output 2456 ml   Net -1856 ml       Physical Exam:   General Appearance:    Alert, oriented, cooperative, in no acute distress.   Head:    Normocephalic, atraumatic, without obvious abnormality   Eyes:             ZULEMA. Lids and lashes normal, conjunctivae and sclerae normal, no icterus, no pallor.   Ears:    Ears appear intact with no abnormalities noted.   Throat:   Mucous membranes pink and moist.   Neck:  Supple, trachea midline, no carotid bruit, no organomegaly or JVD.   Lungs:    Clear to auscultation and percussion.  Respirations regular, even and unlabored. No wheezes, rales, or rhonchi.    Heart:    Regular rhythm and normal rate, normal S1 and S2, no      murmur, no gallop, no rub, no click.   Abdomen:    Soft, nontender, nondistended, no guarding, no rebound tenderness. Normal bowel sounds in all four quadrants, no masses, liver and spleen nonpalpable.    Genitalia:    Deferred.   Extremities:   Moves all extremities well, no edema, no cyanosis, no       redness, no clubbing.   Pulses:   Pulses palpable and equal bilaterally.   Skin:   Moist and warm. No bleeding, bruising or rash.   Neurologic/Psychiatric:   Alert and oriented to person, place, and time.  Motor, power and tone in upper and lower extremities are grossly intact.  No focal neurological deficits. Normal cognitive function. No psychomotor reaction or tangential thought. No depression, homicidal ideations and  suicidal ideations.          Results Review:    Results from last 7 days   Lab Units 10/13/20  0358 10/10/20  1316   SODIUM mmol/L 134* 136   POTASSIUM mmol/L 3.8 4.1   CHLORIDE mmol/L 95* 95*   CO2 mmol/L 24.0 19.0*   BUN mg/dL 60* 68*   CREATININE mg/dL 11.87* 13.33*   CALCIUM mg/dL 8.3* 7.7*   BILIRUBIN mg/dL  --  0.3   ALK PHOS U/L  --  58   ALT (SGPT) U/L  --  31   AST (SGOT) U/L  --  39   GLUCOSE mg/dL 111* 174*     Results from last 7 days   Lab Units 10/10/20  1316 10/09/20  0647 10/08/20  2138 10/08/20  1704   CK TOTAL U/L  --   --   --  289*   TROPONIN T ng/mL 1.940* 2.020* 1.930* 2.050*         Results from last 7 days   Lab Units 10/13/20  0358   WBC 10*3/mm3 8.50   HEMOGLOBIN g/dL 8.2*   HEMATOCRIT % 26.5*   PLATELETS 10*3/mm3 248     Results from last 7 days   Lab Units 10/09/20  1403 10/08/20  2054   INR  1.80* 1.05   APTT seconds  --  37.2     Results from last 7 days   Lab Units 10/13/20  0358   MAGNESIUM mg/dL 1.8                   ECHO:  Results for orders placed during the hospital encounter of 10/08/20   Adult Transthoracic Echo Complete W/ Cont if Necessary Per Protocol    Narrative · Left ventricular ejection fraction appears to be 41 - 45%. Left   ventricular systolic function is mildly decreased.  · Left ventricular diastolic function is consistent with (grade II w/high   LAP) pseudonormalization.  · Left ventricular wall thickness is consistent with concentric   hypertrophy.  · Mild mitral annular calcification is present.  · Mild mitral valve regurgitation is present.  · Estimated right ventricular systolic pressure from tricuspid   regurgitation is normal (<35 mmHg).  · The left atrial cavity is moderately dilated.  · Mildly reduced right ventricular systolic function noted.          ECG 12 Lead   Final Result   Test Reason : Syncope   Blood Pressure : **/** mmHG   Vent. Rate : 074 BPM     Atrial Rate : 074 BPM      P-R Int : 178 ms          QRS Dur : 112 ms       QT Int : 448 ms        P-R-T Axes : 025 033 193 degrees      QTc Int : 497 ms      Normal sinus rhythm   Incomplete left bundle branch block   ST &  T wave abnormality, consider inferior ischemia   ST &  T wave abnormality, consider anterolateral ischemia   Prolonged QT   Abnormal ECG   When compared with ECG of 08-OCT-2020 20:44,   No significant change was found   Confirmed by EUGENIO FLETCHER ([id number]) on 10/10/2020 2:58:17 PM      Referred By:             Confirmed By:EUGENIO FLETCHER      ECG 12 Lead   ED Interpretation   Abnormal   Musa Saenz MD     10/8/2020 10:43 PM   ECG 12 Lead         Date/Time: 10/8/2020 9:04 PM   Performed by: Musa Saenz MD   Authorized by: Musa Saenz MD    Interpreted by physician   Rhythm: sinus rhythm   Rate: normal   T depression: I, II, aVF, aVL, V3, V4, V5 and V6   Clinical impression: abnormal ECG         Final Result   Test Reason : abnormal lab   Blood Pressure : **/** mmHG   Vent. Rate : 091 BPM     Atrial Rate : 091 BPM      P-R Int : 134 ms          QRS Dur : 102 ms       QT Int : 368 ms       P-R-T Axes : 042 026 197 degrees      QTc Int : 452 ms      Normal sinus rhythm   Possible Left atrial enlargement   ST &  T wave abnormality, consider inferior ischemia   ST &  T wave abnormality, consider anterolateral ischemia   Abnormal ECG   When compared with ECG of 29-OCT-2014 09:12,   No significant change was found   Confirmed by LUDMILA HAGER ([id number]) on 10/9/2020 12:16:27 PM      Referred By:  CHELSIE           Confirmed By:LUDMILA HAGER      SCANNED EKG   Final Result      SCANNED EKG   Final Result      SCANNED EKG   Final Result      SCANNED EKG   Final Result      SCANNED EKG   Final Result      SCANNED EKG   Final Result      SCANNED EKG   Final Result           Medication Review:   Current Facility-Administered Medications   Medication Dose Route Frequency Provider Last Rate Last Dose   • acetaminophen (TYLENOL) tablet 650 mg  650 mg Oral Q4H PRN Brendan Moon MD   650 mg at 10/11/20  1108    Or   • acetaminophen (TYLENOL) suppository 650 mg  650 mg Rectal Q4H PRN Brendan Moon MD       • acetaminophen (TYLENOL) tablet 650 mg  650 mg Oral Q4H PRN Brendan Moon MD       • aspirin chewable tablet 81 mg  81 mg Oral Daily Brendan Moon MD   81 mg at 10/14/20 0817   • bisacodyl (DULCOLAX) EC tablet 5 mg  5 mg Oral Daily PRN Brendan Moon MD       • calcitriol (ROCALTROL) capsule 1 mcg  1 mcg Oral Daily Brendan Moon MD   1 mcg at 10/14/20 0818   • carvedilol (COREG) tablet 6.25 mg  6.25 mg Oral BID With Meals Brendan Moon MD   6.25 mg at 10/14/20 1704   • clopidogrel (PLAVIX) tablet 75 mg  75 mg Oral Daily Brendan Moon MD   75 mg at 10/14/20 0818   • Delflex-LC/1.5% Dextrose (DIANEAL) CAPD 12,200 mL  12,200 mL Intraperitoneal PRN Brendan Moon MD       • Delflex-LC/2.5% Dextrose (DIANEAL) CAPD 5,000 mL  5,000 mL Intraperitoneal PRN Brendan Moon MD       • Delflex-LC/2.5% Dextrose (DIANEAL) CAPD 5,000 mL  5,000 mL Intraperitoneal PRN Brendan Moon MD   5,000 mL at 10/12/20 1556   • Delflex-LC/2.5% Dextrose (DIANEAL) CAPD 5,000 mL  5,000 mL Intraperitoneal PRN Mauro Saravia MD   5,000 mL at 10/13/20 1545   • Delflex-LC/2.5% Dextrose (DIANEAL) CAPD 5,000 mL  5,000 mL Intraperitoneal PRN Mauro Saravia MD       • Delflex-LC/4.25% Dextrose (DIANEAL) CAPD 5,000 mL  5,000 mL Intraperitoneal PRN Mauro Saravia MD       • [START ON 10/17/2020] epoetin arturo-epbx (RETACRIT) injection 10,000 Units  10,000 Units Subcutaneous Weekly Brendan Moon MD       • famotidine (PEPCID) tablet 20 mg  20 mg Oral Daily Brendan Moon MD   20 mg at 10/14/20 0818   • gabapentin (NEURONTIN) capsule 100 mg  100 mg Oral Nightly Maruo Saravia MD   100 mg at 10/14/20 2105   • heparin (porcine) 5000 UNIT/ML injection 5,000 Units  5,000 Units Subcutaneous Q12H Brendan Moon MD   5,000 Units at 10/14/20 2105   • hydrALAZINE (APRESOLINE) tablet 25 mg  25 mg  Oral Q8H Brendan Moon MD   25 mg at 10/15/20 0533   • influenza vac split quad (FLUZONE,FLUARIX,AFLURIA,FLULAVAL) injection 0.5 mL  0.5 mL Intramuscular During Hospitalization Brendan Moon MD       • insulin detemir (LEVEMIR) injection 20 Units  20 Units Subcutaneous Nightly Brendan Moon MD   20 Units at 10/14/20 2105   • isosorbide mononitrate (IMDUR) 24 hr tablet 60 mg  60 mg Oral Q24H Brendan Moon MD   60 mg at 10/14/20 0818   • lanthanum (FOSRENOL) chewable tablet 500 mg  500 mg Oral TID With Meals Mauro Saravia MD   500 mg at 10/14/20 1704   • losartan (COZAAR) tablet 50 mg  50 mg Oral Q24H Brendan Moon MD   50 mg at 10/14/20 0818   • metoprolol tartrate (LOPRESSOR) injection 5 mg  5 mg Intravenous Q6H PRN Brendan Moon MD   5 mg at 10/12/20 2318   • nitroglycerin (NITROSTAT) SL tablet 0.4 mg  0.4 mg Sublingual Q5 Min PRN Brendan Moon MD       • ondansetron (ZOFRAN) tablet 4 mg  4 mg Oral Q6H PRN Brendan Moon MD        Or   • ondansetron (ZOFRAN) injection 4 mg  4 mg Intravenous Q6H PRN Brendan Moon MD   4 mg at 10/15/20 0023   • pravastatin (PRAVACHOL) tablet 20 mg  20 mg Oral Daily Brendan Moon MD   20 mg at 10/14/20 0818   • sevelamer (RENVELA) tablet 2,400 mg  2,400 mg Oral TID With Meals Brendan Moon MD   2,400 mg at 10/14/20 1704   • sodium bicarbonate tablet 650 mg  650 mg Oral TID Brendan Moon MD   650 mg at 10/14/20 2105   • sodium chloride 0.9 % flush 10 mL  10 mL Intravenous Q12H Brendan Moon MD   10 mL at 10/14/20 2106   • sodium chloride 0.9 % flush 10 mL  10 mL Intravenous PRN Brendan Moon MD   10 mL at 10/11/20 2038       Assessment and Plan:      Hypotension    CAD (coronary artery disease)    ESRF (end stage renal failure) (CMS/[facility])    Diabetes mellitus (CMS/[facility])    Cardiac arrest (CMS/[facility])    Syncope  1.  Atherosclerotic coronary artery disease.  Patient has had previous history of coronary artery  bypass grafting.  Patient coronary angiogram done had revealed a patent LIMA to the LAD and a patent saphenous vein graft to the obtuse marginal branch and to the right coronary artery.  Patient has 100% occlusion of the native right coronary artery and left main.  Patient left main chronic total occlusion percutaneous intervention was unsuccessful and patient was recommended medical management.  Patient has not had any further recurrent symptoms or chest pain.  Patient Ranexa would be stopped completely as the patient is currently not having chest pain.  Patient symptoms of tremors could be secondary to Ranexa.  Have discussed with the patient and the family.    2.  S/p cardiopulmonary arrest.  Patient had pulseless electrical activity.  Patient has stable rhythm on telemetry monitor.  Patient did not complain of having any symptoms of lightheaded dizziness or near syncope.  Patient has been started back on the carvedilol which would be increased as blood pressure tolerates.  Patient echocardiogram done had revealed preserved left ventricular systolic function.    3.  Arterial hypertension.  Patient would be continued on the hydralazine, losartan and the carvedilol.    4.  Hyperlipidemia.  Patient has been counseled on low-fat low-cholesterol diet and to continue with the present dose of the pravastatin.    5.  End-stage renal disease on peritoneal dialysis is noted.  Patient has been followed by nephrology.    6.  Generalized fatigue weakness with episodes of tremors.  Patient Ranexa would be stopped and patient symptom complex would be followed.    The above plan of management were discussed with the patient and the wife and the nursing staff.            Electronically signed by Gibran Garza MD, 10/15/20, 7:24 AM CDT.      Time: Time spent on face-to-face interaction 20 minutes    Dictated utilizing Dragon dictation.       Electronically signed by Gibran Garza MD at 10/15/20 0738     Radha Campbell MD  at 10/14/20 1228              [facility] Medicine Services  INPATIENT PROGRESS NOTE    Length of Stay: 4  Date of Admission: 10/8/2020  Primary Care Physician: Elizabeth Burleson APRN    Subjective   Chief Complaint: Tremors   HPI:  Patient seen with wife at bedside. Not feeling well today. Says he believe Ranexa is causing tremors-generalized, mostly upper body. He thinks this was happening prior to this admission but it has progressed as tremors are more frequent. No seizure activity. No change in mental status per wife.   Remains very weak. Sitting up on side of bed with therapy but not able to ambulate. Discussed possible SNF upon discharge.   No chest pain, shortness of breath, palpitations.   Tolerating diet.   No fever, chills, cough, congestion.     Review of Systems     All pertinent negatives and positives are as above. All other systems have been reviewed and are negative unless otherwise stated.     Objective    Temp:  [97 °F (36.1 °C)-98.1 °F (36.7 °C)] 98.1 °F (36.7 °C)  Heart Rate:  [84-95] 90  Resp:  [16-18] 18  BP: (128-162)/(60-80) 141/73    Physical Exam  Constitutional:       Appearance: Normal appearance.   HENT:      Head: Normocephalic.   Cardiovascular:      Rate and Rhythm: Normal rate and regular rhythm.      Pulses: Normal pulses.      Heart sounds: Normal heart sounds.   Pulmonary:      Effort: Pulmonary effort is normal.      Breath sounds: Normal breath sounds.   Abdominal:      General: Bowel sounds are normal.      Palpations: Abdomen is soft.   Skin:     General: Skin is warm and dry.   Neurological:      General: No focal deficit present.      Mental Status: He is alert.      Comments: Occasional generalized myoclonic movement of bilateral upper extremities. No observed resting or intentional tremor. No focal weakness.    Psychiatric:         Mood and Affect: Mood normal.             Results Review:  I have reviewed the labs, radiology  results, and diagnostic studies.    Laboratory Data:   Results from last 7 days   Lab Units 10/13/20  0358 10/10/20  1316 10/10/20  0610  10/09/20  0647  10/08/20  1704   SODIUM mmol/L 134* 136 137   < > 140   < > 140   POTASSIUM mmol/L 3.8 4.1 3.8   < > 4.3   < > 3.8   CHLORIDE mmol/L 95* 95* 96*   < > 102   < > 98   CO2 mmol/L 24.0 19.0* 23.0   < > 19.0*   < > 25.0   BUN mg/dL 60* 68* 69*   < > 70*   < > 65*   CREATININE mg/dL 11.87* 13.33* 12.62*   < > 13.51*   < > 13.19*   GLUCOSE mg/dL 111* 174* 127*   < > 117*   < > 181*   CALCIUM mg/dL 8.3* 7.7* 8.3*   < > 8.2*   < > 8.6   BILIRUBIN mg/dL  --  0.3  --   --  0.4  --  0.3   ALK PHOS U/L  --  58  --   --  59  --  70   ALT (SGPT) U/L  --  31  --   --  10  --  11   AST (SGOT) U/L  --  39  --   --  17  --  13   ANION GAP mmol/L 15.0 22.0* 18.0*   < > 19.0*   < > 17.0*    < > = values in this interval not displayed.     Estimated Creatinine Clearance: 7.3 mL/min (A) (by C-G formula based on SCr of 11.87 mg/dL (H)).  Results from last 7 days   Lab Units 10/14/20  0512 10/13/20  0358 10/11/20  0936 10/09/20  0647   MAGNESIUM mg/dL  --  1.8 1.8 2.1   PHOSPHORUS mg/dL 9.9* 10.0*  --  9.5*         Results from last 7 days   Lab Units 10/13/20  0358 10/10/20  1316 10/10/20  0610 10/09/20  0647 10/08/20  2054   WBC 10*3/mm3 8.50 11.44* 8.57 8.71 9.95   HEMOGLOBIN g/dL 8.2* 8.6* 8.8* 9.2* 10.2*   HEMATOCRIT % 26.5* 27.9* 28.2* 29.5* 32.6*   PLATELETS 10*3/mm3 248 273 303 299 319     Results from last 7 days   Lab Units 10/09/20  1403 10/08/20  2054   INR  1.80* 1.05       Culture Data:   No results found for: BLOODCX  No results found for: URINECX  No results found for: RESPCX  No results found for: WOUNDCX  No results found for: STOOLCX  No components found for: BODYFLD    Radiology Data:   Imaging Results (Last 24 Hours)     ** No results found for the last 24 hours. **          I have reviewed the patient's current medications.     Assessment/Plan     Principal  Problem:    Hypotension  Active Problems:    CAD (coronary artery disease)- See below. Continue ASA, BB, plavix, ARB, nitrates, statin therapy.     ESRF (end stage renal failure) (CMS/[facility])-on nightly PD, followed by Nephrology. Noted changes for PD tonight.     Anemia of CKD-H&H remains low but stable. Continue weekly epogen.     Diabetes mellitus (CMS/[facility])-Overall controlled. Continue basal insulin with SSI and accuchecks.     Cardiac arrest (CMS/[facility])-PEA status post CPR with epinephrine 10/10 with subsequent ROSC in setting of known underlying CAD with 100% occlusion of left main. Ongoing medical management as above. Await further recommendations from Dr. Garza.     CHF-acute on chronic systolic dysfunction with EF 41-45%. Current negative balance with ongoing PD. Continue BB, ARB.     Myoclonus-Unsure of etiology. Patient requests DC ranexa. Will hold further dosing for tomorrow and observe. Previous EEG negative. Recheck CMP plus Mg, Phos, TSH. Need for further evaluation pending clinical course.     Seizure- Witnessed x2 post cardiac arrest. CT head unremarkable. EEG without observed epileptiform activity 10/12. Thought likely due to metabolic derangements. No recurrence.       Discharge Planning: I expect patient to be discharged in 2-3 days to likely SNF. Case mgmt consult placed.     aRdha Campbell MD    Electronically signed by Radha Campbell MD at 10/14/20 2154     Mauro Saravia MD at 10/14/20 1211          [facility]  [address] [place] [address]. [address]  T - [phone]  F - [phone]     Progress Note          PATIENT  DEMOGRAPHICS   PATIENT NAME: Sumanth Mcfarland Robledo                      PHYSICIAN: Mauro Saravia MD  : 1960  MRN: [id number]   LOS: 4 days    Patient Care Team:  Elizabeth Burleson APRN as PCP - General (Family Medicine)  Subjective   SUBJECTIVE   Now on regular floor, has tremors       Objective   OBJECTIVE   Vital Signs  Temp:  [97 °F  "(36.1 °C)-98.1 °F (36.7 °C)] 98.1 °F (36.7 °C)  Heart Rate:  [84-95] 90  Resp:  [16-18] 18  BP: (128-162)/(60-80) 141/73    Flowsheet Rows      First Filed Value   Admission Height  170.2 cm (67\") Documented at 10/08/2020 2035   Admission Weight  81.5 kg (179 lb 9.6 oz) Documented at 10/08/2020 2046           I/O last 3 completed shifts:  In: 360 [P.O.:360]  Out: 2960 [Other:2960]    PHYSICAL EXAM    Physical Exam  Constitutional:       General: He is not in acute distress.  HENT:      Head:      Comments: Hematoma scalp- frontal area     Mouth/Throat:      Mouth: Mucous membranes are moist.   Cardiovascular:      Rate and Rhythm: Normal rate and regular rhythm.   Pulmonary:      Effort: No respiratory distress.      Breath sounds: No wheezing.   Abdominal:      General: There is distension.      Palpations: Abdomen is soft.   Musculoskeletal:         General: Swelling present.      Comments: Trace ryann LE edema   Skin:     General: Skin is warm and dry.   Neurological:      General: No focal deficit present.      Mental Status: He is alert and oriented to person, place, and time.   Psychiatric:         Mood and Affect: Mood normal.         Behavior: Behavior normal.         RESULTS   Results Review:    Results from last 7 days   Lab Units 10/13/20  0358 10/10/20  1316 10/10/20  0610  10/09/20  0647  10/08/20  1704   SODIUM mmol/L 134* 136 137   < > 140   < > 140   POTASSIUM mmol/L 3.8 4.1 3.8   < > 4.3   < > 3.8   CHLORIDE mmol/L 95* 95* 96*   < > 102   < > 98   CO2 mmol/L 24.0 19.0* 23.0   < > 19.0*   < > 25.0   BUN mg/dL 60* 68* 69*   < > 70*   < > 65*   CREATININE mg/dL 11.87* 13.33* 12.62*   < > 13.51*   < > 13.19*   CALCIUM mg/dL 8.3* 7.7* 8.3*   < > 8.2*   < > 8.6   BILIRUBIN mg/dL  --  0.3  --   --  0.4  --  0.3   ALK PHOS U/L  --  58  --   --  59  --  70   ALT (SGPT) U/L  --  31  --   --  10  --  11   AST (SGOT) U/L  --  39  --   --  17  --  13   GLUCOSE mg/dL 111* 174* 127*   < > 117*   < > 181*    < > = " values in this interval not displayed.       Estimated Creatinine Clearance: 7.3 mL/min (A) (by C-G formula based on SCr of 11.87 mg/dL (H)).    Results from last 7 days   Lab Units 10/14/20  0512 10/13/20  0358 10/11/20  0936 10/09/20  0647   MAGNESIUM mg/dL  --  1.8 1.8 2.1   PHOSPHORUS mg/dL 9.9* 10.0*  --  9.5*             Results from last 7 days   Lab Units 10/13/20  0358 10/10/20  1316 10/10/20  0610 10/09/20  0647 10/08/20  2054   WBC 10*3/mm3 8.50 11.44* 8.57 8.71 9.95   HEMOGLOBIN g/dL 8.2* 8.6* 8.8* 9.2* 10.2*   PLATELETS 10*3/mm3 248 273 303 299 319       Results from last 7 days   Lab Units 10/09/20  1403 10/08/20  2054   INR  1.80* 1.05         Imaging Results (Last 24 Hours)     ** No results found for the last 24 hours. **      Heart cath 10/9/20  Procedure performed:  1.  Left heart catheterization with iliofemoral arteriogram and Angio-Seal closure device.  2.  Attempted unsuccessful PTCA of the 100% occluded left main .  3.  Selective cannulation of the left internal mammary artery.  4.  Selective cannulation of the saphenous vein graft to the right coronary artery and to the obtuse marginal branch.     MEDICATIONS    aspirin, 81 mg, Oral, Daily  calcitriol, 1 mcg, Oral, Daily  carvedilol, 6.25 mg, Oral, BID With Meals  clopidogrel, 75 mg, Oral, Daily  [START ON 10/17/2020] epoetin arturo/arturo-epbx, 10,000 Units, Subcutaneous, Weekly  famotidine, 20 mg, Oral, Daily  gabapentin, 100 mg, Oral, TID  heparin (porcine), 5,000 Units, Subcutaneous, Q12H  hydrALAZINE, 25 mg, Oral, Q8H  insulin detemir, 20 Units, Subcutaneous, Nightly  isosorbide mononitrate, 60 mg, Oral, Q24H  losartan, 50 mg, Oral, Q24H  pravastatin, 20 mg, Oral, Daily  ranolazine, 1,000 mg, Oral, BID  sevelamer, 2,400 mg, Oral, TID With Meals  sodium bicarbonate, 650 mg, Oral, TID  sodium chloride, 10 mL, Intravenous, Q12H           Assessment/Plan   ASSESSMENT / PLAN      Hypotension    CAD (coronary artery disease)    ESRF (end stage  renal failure) (CMS/[facility])    Diabetes mellitus (CMS/[facility])    Cardiac arrest (CMS/[facility])    Syncope    1.end-stage renal disease currently on peritoneal dialysis every night.  Patient is doing pd with 2800 cc 5 cycles with last fill of 1000 cc.  His estimated dry weight is 84 kg.  Weight is increasing now peak 96 kg. Will try 4.25 % one bag tonight for added UF. Tolerated PD last night without issues and has lost 2.5 lbs     2.anemia chronic kidney disease-Hgb low. We will continue with Epogen 60689 units once a week     3.CKD/mineral bone disorder.  Patient will remain on calcitriol 1 mcg daily along with Renvela 2400 mg 3 times a day- phos still high and add fosrenol     4.CAD/ HTN/ PEA- Cardiac cath 10/9/20 showed 100% occlusion left main . Had episode of PEA Saturday AM and CPR was administered x 4 minutes and patient received epinepherine x 2. Nifedipine ER, Toprol XL, losartan, and lasix are currently on hold. Troponins 2.0-1.9.  bp is stable    5. ? Seizures- witnessed seizure x 2 post Code Blue- Ct scan of head was unremarkable.     6. Metabolic acidosis- bicarb better with po na bicarb    7. Tremors lower gabapentin to hs only. ?may lower ranexa              This document has been electronically signed by Mauro Saravia MD on October 14, 2020 12:17 CDT           Electronically signed by Mauro Saravia MD at 10/14/20 1221     Brendan Moon MD at 10/13/20 1038              [facility] Medicine Services  INPATIENT PROGRESS NOTE    Length of Stay: 3  Date of Admission: 10/8/2020  Primary Care Physician: Elizabeth Burleson APRN    Subjective   Chief Complaint: No new complaints.    HPI: Patient is seen for follow-up.  He is doing well, tolerating prescribed diet, deconditioned and voices no new complaints.  He is on supplemental oxygen of 2 L nasal prongs with saturation in the mid to upper 90s.     Review of Systems   Constitutional: Positive for activity change and  fatigue. Negative for appetite change, chills, diaphoresis and fever.   HENT: Negative for trouble swallowing and voice change.    Eyes: Negative for photophobia and visual disturbance.   Respiratory: Negative for cough, choking, chest tightness, shortness of breath, wheezing and stridor.    Cardiovascular: Negative for chest pain, palpitations and leg swelling.   Gastrointestinal: Negative for abdominal distention, abdominal pain, blood in stool, constipation, diarrhea, nausea and vomiting.   Endocrine: Negative for cold intolerance, heat intolerance, polydipsia, polyphagia and polyuria.   Genitourinary: Negative for decreased urine volume, difficulty urinating, dysuria, enuresis, flank pain, frequency, hematuria and urgency.   Musculoskeletal: Negative for arthralgias, gait problem, myalgias, neck pain and neck stiffness.   Skin: Negative for pallor, rash and wound.   Neurological: Negative for dizziness, tremors, seizures, syncope, facial asymmetry, speech difficulty, weakness, light-headedness, numbness and headaches.   Hematological: Does not bruise/bleed easily.   Psychiatric/Behavioral: Negative for agitation, behavioral problems and confusion.       Objective    Temp:  [97.3 °F (36.3 °C)-98.5 °F (36.9 °C)] 98.5 °F (36.9 °C)  Heart Rate:  [81-96] 85  Resp:  [16-19] 18  BP: (119-216)/() 148/78    Physical Exam  Vitals signs and nursing note reviewed.   Constitutional:       General: He is not in acute distress.     Appearance: He is well-developed. He is obese. He is not diaphoretic.   HENT:      Head: Normocephalic and atraumatic.   Eyes:      General: No scleral icterus.     Pupils: Pupils are equal, round, and reactive to light.   Neck:      Musculoskeletal: Normal range of motion and neck supple.      Thyroid: No thyromegaly.      Vascular: No JVD.   Cardiovascular:      Rate and Rhythm: Normal rate and regular rhythm.      Heart sounds: Normal heart sounds. No murmur. No friction rub. No gallop.     Pulmonary:      Effort: Pulmonary effort is normal.      Breath sounds: Normal breath sounds. No wheezing or rales.   Chest:      Chest wall: No tenderness.   Abdominal:      General: Bowel sounds are normal. There is no distension.      Palpations: Abdomen is soft. There is no mass.      Tenderness: There is no abdominal tenderness. There is no guarding or rebound.      Comments: Peritoneal dialysis catheter is in place and the site is clean and dry.   Musculoskeletal:         General: No tenderness or deformity.   Skin:     General: Skin is warm and dry.      Coloration: Skin is not pale.      Findings: No erythema or rash.   Neurological:      General: No focal deficit present.      Mental Status: He is alert and oriented to person, place, and time. Mental status is at baseline.      Cranial Nerves: No cranial nerve deficit.      Sensory: No sensory deficit.      Motor: No weakness or abnormal muscle tone.      Coordination: Coordination normal.      Deep Tendon Reflexes: Reflexes normal.   Psychiatric:         Mood and Affect: Mood normal.         Behavior: Behavior normal.         Thought Content: Thought content normal.         Judgment: Judgment normal.           Medication Review:    Current Facility-Administered Medications:   •  acetaminophen (TYLENOL) tablet 650 mg, 650 mg, Oral, Q4H PRN, 650 mg at 10/11/20 1108 **OR** [DISCONTINUED] acetaminophen (TYLENOL) 160 MG/5ML solution 650 mg, 650 mg, Oral, Q4H PRN **OR** acetaminophen (TYLENOL) suppository 650 mg, 650 mg, Rectal, Q4H PRN, Valentín Lewis MD  •  acetaminophen (TYLENOL) tablet 650 mg, 650 mg, Oral, Q4H PRN, Valentín Lewis MD  •  aspirin chewable tablet 81 mg, 81 mg, Oral, Daily, Zahida Lewis MD, 81 mg at 10/13/20 0812  •  bisacodyl (DULCOLAX) EC tablet 5 mg, 5 mg, Oral, Daily PRN, Valentín Lewis MD  •  calcitriol (ROCALTROL) capsule 1 mcg, 1 mcg, Oral, Daily, Valentín Lewis MD, 1 mcg at 10/13/20 0811  •  carvedilol (COREG) tablet 6.25 mg,  6.25 mg, Oral, BID With Meals, Gibran Garza MD, 6.25 mg at 10/13/20 0815  •  clopidogrel (PLAVIX) tablet 75 mg, 75 mg, Oral, Daily, Valentín Lewis MD, 75 mg at 10/13/20 0812  •  Delflex-LC/1.5% Dextrose (DIANEAL) CAPD 12,200 mL, 12,200 mL, Intraperitoneal, PRN, Jose E Fuentes MD  •  Delflex-LC/2.5% Dextrose (DIANEAL) CAPD 5,000 mL, 5,000 mL, Intraperitoneal, PRN, Valentín Lewis MD  •  Delflex-LC/2.5% Dextrose (DIANEAL) CAPD 5,000 mL, 5,000 mL, Intraperitoneal, PRN, Mauro Saravia MD, 5,000 mL at 10/12/20 1556  •  [START ON 10/17/2020] epoetin arturo-epbx (RETACRIT) injection 10,000 Units, 10,000 Units, Subcutaneous, Weekly, Mauro Saravia MD  •  famotidine (PEPCID) tablet 20 mg, 20 mg, Oral, Daily, Brendan Moon MD, 20 mg at 10/13/20 0811  •  heparin (porcine) 5000 UNIT/ML injection 5,000 Units, 5,000 Units, Subcutaneous, Q12H, Brendan Moon MD, 5,000 Units at 10/13/20 0812  •  hydrALAZINE (APRESOLINE) tablet 25 mg, 25 mg, Oral, Q8H, Gibran Garza MD, 25 mg at 10/13/20 0537  •  influenza vac split quad (FLUZONE,FLUARIX,AFLURIA,FLULAVAL) injection 0.5 mL, 0.5 mL, Intramuscular, During Hospitalization, Valentín Lewis MD  •  insulin detemir (LEVEMIR) injection 20 Units, 20 Units, Subcutaneous, Nightly, Valentín Lewis MD, 20 Units at 10/12/20 2112  •  isosorbide mononitrate (IMDUR) 24 hr tablet 60 mg, 60 mg, Oral, Q24H, Brody Benavides MD, 60 mg at 10/13/20 0811  •  losartan (COZAAR) tablet 50 mg, 50 mg, Oral, Q24H, Gibran Garza MD, 50 mg at 10/13/20 0811  •  metoprolol tartrate (LOPRESSOR) injection 5 mg, 5 mg, Intravenous, Q6H PRN, Luana Zamudio MD, 5 mg at 10/12/20 2318  •  nitroglycerin (NITROSTAT) SL tablet 0.4 mg, 0.4 mg, Sublingual, Q5 Min PRN, Valentín Lewis MD  •  ondansetron (ZOFRAN) tablet 4 mg, 4 mg, Oral, Q6H PRN **OR** ondansetron (ZOFRAN) injection 4 mg, 4 mg, Intravenous, Q6H PRN, Valentín Lewis MD, 4 mg at 10/11/20 0824  •  pravastatin  (PRAVACHOL) tablet 20 mg, 20 mg, Oral, Daily, Valentín Lewis MD, 20 mg at 10/13/20 0812  •  ranolazine (RANEXA) 12 hr tablet 1,000 mg, 1,000 mg, Oral, BID, Valentín Lewis MD, 1,000 mg at 10/13/20 0811  •  sevelamer (RENVELA) tablet 2,400 mg, 2,400 mg, Oral, TID With Meals, Valentín Lewis MD, 2,400 mg at 10/13/20 0811  •  sodium bicarbonate tablet 650 mg, 650 mg, Oral, TID, Mauro Saravia MD, 650 mg at 10/13/20 0811  •  sodium chloride 0.9 % flush 10 mL, 10 mL, Intravenous, Q12H, Valentín Lewis MD, 10 mL at 10/13/20 0812  •  sodium chloride 0.9 % flush 10 mL, 10 mL, Intravenous, PRN, Valentín Lewis MD, 10 mL at 10/11/20 2038    Results Review:  I have reviewed the labs, radiology results, and diagnostic studies.    Laboratory Data:   Results from last 7 days   Lab Units 10/13/20  0358 10/10/20  1316 10/10/20  0610  10/09/20  0647  10/08/20  1704   SODIUM mmol/L 134* 136 137   < > 140   < > 140   POTASSIUM mmol/L 3.8 4.1 3.8   < > 4.3   < > 3.8   CHLORIDE mmol/L 95* 95* 96*   < > 102   < > 98   CO2 mmol/L 24.0 19.0* 23.0   < > 19.0*   < > 25.0   BUN mg/dL 60* 68* 69*   < > 70*   < > 65*   CREATININE mg/dL 11.87* 13.33* 12.62*   < > 13.51*   < > 13.19*   GLUCOSE mg/dL 111* 174* 127*   < > 117*   < > 181*   CALCIUM mg/dL 8.3* 7.7* 8.3*   < > 8.2*   < > 8.6   BILIRUBIN mg/dL  --  0.3  --   --  0.4  --  0.3   ALK PHOS U/L  --  58  --   --  59  --  70   ALT (SGPT) U/L  --  31  --   --  10  --  11   AST (SGOT) U/L  --  39  --   --  17  --  13   ANION GAP mmol/L 15.0 22.0* 18.0*   < > 19.0*   < > 17.0*    < > = values in this interval not displayed.     Estimated Creatinine Clearance: 7.3 mL/min (A) (by C-G formula based on SCr of 11.87 mg/dL (H)).  Results from last 7 days   Lab Units 10/13/20  0358 10/11/20  0936 10/09/20  0647   MAGNESIUM mg/dL  --  1.8 2.1   PHOSPHORUS mg/dL 10.0*  --  9.5*         Results from last 7 days   Lab Units 10/13/20  0358 10/10/20  1316 10/10/20  0610 10/09/20  0647 10/08/20  2054   WBC  10*3/mm3 8.50 11.44* 8.57 8.71 9.95   HEMOGLOBIN g/dL 8.2* 8.6* 8.8* 9.2* 10.2*   HEMATOCRIT % 26.5* 27.9* 28.2* 29.5* 32.6*   PLATELETS 10*3/mm3 248 273 303 299 319     Results from last 7 days   Lab Units 10/09/20  1403 10/08/20  2054   INR  1.80* 1.05       Culture Data:   No results found for: BLOODCX  No results found for: URINECX  No results found for: RESPCX  No results found for: WOUNDCX  No results found for: STOOLCX  No components found for: BODYFLD    Radiology Data:   Imaging Results (Last 24 Hours)     ** No results found for the last 24 hours. **          I have reviewed the patient's current medications.     Assessment/Plan     Hospital Problem List:  Principal Problem:    Hypotension  Active Problems:    CAD (coronary artery disease)    ESRF (end stage renal failure) (CMS/[facility])    Diabetes mellitus (CMS/[facility])    Cardiac arrest (CMS/[facility])    Syncope    History of coronary artery disease status post cardiac arrest/syncope: Patient is clinically stable and remains  asymptomatic.  Cardiologist thinks patient may have had an episode of PEA.  Dr. Garza is following.  Continue guideline directed medical therapy.  Echocardiogram done 10/10/2020 showed:  · Left ventricular ejection fraction appears to be 41 - 45%. Left ventricular systolic function is mildly decreased.  · Left ventricular diastolic function is consistent with (grade II w/high LAP) pseudonormalization.  · Left ventricular wall thickness is consistent with concentric hypertrophy.  · Mild mitral annular calcification is present.  · Mild mitral valve regurgitation is present.  · Estimated right ventricular systolic pressure from tricuspid regurgitation is normal (<35 mmHg).  · The left atrial cavity is moderately dilated.  · Mildly reduced right ventricular systolic function noted.    Diabetes mellitus: Stable.  Continue anti-glycemic with Accu-Cheks and sliding scale insulin.    End-stage renal disease: Patient is on peritoneal dialysis nightly.   "Nephrologist is following.    Anemia of chronic inflammation: Hemoglobin is stable.  Continue weekly Epogen.    Hyponatremia (likely hypervolemic): Continue peritoneal dialysis, restrict free water and monitor BMP.    Questionable seizures: Noncontrast CT scan of the head was unremarkable and patient remains asymptomatic.  EEG did not show any evidence of epileptiform activity.     Deconditioning: Continue PT and OT.    Continue GI and DVT prophylaxis.    Transfer out of CCU/stepdown unit.      Discharge Planning: In progress.    Brendan Moon MD   10/13/20   10:38 CDT          Electronically signed by Brendan Moon MD at 10/13/20 1042     Mauro Saravia MD at 10/13/20 0949          [facility]  [address] [place] [address]. [address]  T - [phone]  F - [phone]     Progress Note          PATIENT  DEMOGRAPHICS   PATIENT NAME: Sumanth Robledo                      PHYSICIAN: Mauro Saravia MD  : 1960  MRN: [id number]   LOS: 3 days    Patient Care Team:  Elizabeth Burleson APRN as PCP - General (Family Medicine)  Subjective   SUBJECTIVE   OOB to chair, bp is stable now (not low). Had 1.2LUF overnight tolerated the procedure well        Objective   OBJECTIVE   Vital Signs  Temp:  [97.3 °F (36.3 °C)-98.5 °F (36.9 °C)] 98.5 °F (36.9 °C)  Heart Rate:  [81-96] 85  Resp:  [16-19] 18  BP: (119-216)/() 148/78    Flowsheet Rows      First Filed Value   Admission Height  170.2 cm (67\") Documented at 10/08/2020 2035   Admission Weight  81.5 kg (179 lb 9.6 oz) Documented at 10/08/2020 2046           I/O last 3 completed shifts:  In: 1080 [P.O.:1080]  Out: 263 [Other:263]    PHYSICAL EXAM    Physical Exam  Constitutional:       General: He is not in acute distress.  HENT:      Head:      Comments: Hematoma scalp- frontal area     Mouth/Throat:      Mouth: Mucous membranes are moist.   Cardiovascular:      Rate and Rhythm: Normal rate and regular rhythm.   Pulmonary:     "  Effort: No respiratory distress.      Breath sounds: No wheezing.   Abdominal:      General: There is distension.      Palpations: Abdomen is soft.   Musculoskeletal:         General: Swelling present.      Comments: Trace ryann LE edema   Skin:     General: Skin is warm and dry.   Neurological:      General: No focal deficit present.      Mental Status: He is alert and oriented to person, place, and time.   Psychiatric:         Mood and Affect: Mood normal.         Behavior: Behavior normal.         RESULTS   Results Review:    Results from last 7 days   Lab Units 10/13/20  0358 10/10/20  1316 10/10/20  0610  10/09/20  0647  10/08/20  1704   SODIUM mmol/L 134* 136 137   < > 140   < > 140   POTASSIUM mmol/L 3.8 4.1 3.8   < > 4.3   < > 3.8   CHLORIDE mmol/L 95* 95* 96*   < > 102   < > 98   CO2 mmol/L 24.0 19.0* 23.0   < > 19.0*   < > 25.0   BUN mg/dL 60* 68* 69*   < > 70*   < > 65*   CREATININE mg/dL 11.87* 13.33* 12.62*   < > 13.51*   < > 13.19*   CALCIUM mg/dL 8.3* 7.7* 8.3*   < > 8.2*   < > 8.6   BILIRUBIN mg/dL  --  0.3  --   --  0.4  --  0.3   ALK PHOS U/L  --  58  --   --  59  --  70   ALT (SGPT) U/L  --  31  --   --  10  --  11   AST (SGOT) U/L  --  39  --   --  17  --  13   GLUCOSE mg/dL 111* 174* 127*   < > 117*   < > 181*    < > = values in this interval not displayed.       Estimated Creatinine Clearance: 7.3 mL/min (A) (by C-G formula based on SCr of 11.87 mg/dL (H)).    Results from last 7 days   Lab Units 10/13/20  0358 10/11/20  0936 10/09/20  0647   MAGNESIUM mg/dL  --  1.8 2.1   PHOSPHORUS mg/dL 10.0*  --  9.5*             Results from last 7 days   Lab Units 10/13/20  0358 10/10/20  1316 10/10/20  0610 10/09/20  0647 10/08/20  2054   WBC 10*3/mm3 8.50 11.44* 8.57 8.71 9.95   HEMOGLOBIN g/dL 8.2* 8.6* 8.8* 9.2* 10.2*   PLATELETS 10*3/mm3 248 273 303 299 319       Results from last 7 days   Lab Units 10/09/20  1403 10/08/20  2054   INR  1.80* 1.05         Imaging Results (Last 24 Hours)     ** No  results found for the last 24 hours. **      Heart cath 10/9/20  Procedure performed:  1.  Left heart catheterization with iliofemoral arteriogram and Angio-Seal closure device.  2.  Attempted unsuccessful PTCA of the 100% occluded left main .  3.  Selective cannulation of the left internal mammary artery.  4.  Selective cannulation of the saphenous vein graft to the right coronary artery and to the obtuse marginal branch.     MEDICATIONS    aspirin, 81 mg, Oral, Daily  calcitriol, 1 mcg, Oral, Daily  carvedilol, 6.25 mg, Oral, BID With Meals  clopidogrel, 75 mg, Oral, Daily  [START ON 10/17/2020] epoetin arturo/arturo-epbx, 10,000 Units, Subcutaneous, Weekly  famotidine, 20 mg, Oral, Daily  heparin (porcine), 5,000 Units, Subcutaneous, Q12H  hydrALAZINE, 25 mg, Oral, Q8H  insulin detemir, 20 Units, Subcutaneous, Nightly  isosorbide mononitrate, 60 mg, Oral, Q24H  losartan, 50 mg, Oral, Q24H  pravastatin, 20 mg, Oral, Daily  ranolazine, 1,000 mg, Oral, BID  sevelamer, 2,400 mg, Oral, TID With Meals  sodium bicarbonate, 650 mg, Oral, TID  sodium chloride, 10 mL, Intravenous, Q12H           Assessment/Plan   ASSESSMENT / PLAN      Hypotension    CAD (coronary artery disease)    ESRF (end stage renal failure) (CMS/[facility])    Diabetes mellitus (CMS/[facility])    Cardiac arrest (CMS/[facility])    Syncope    1.end-stage renal disease currently on peritoneal dialysis every night.  Patient is doing pd with 2800 cc 5 cycles with last fill of 1000 cc.  His estimated dry weight is 84 kg.  Weight is increasing now peak 96 kg. Will try 4.25 % bag tonight for added UF. Tolerated PD last night without issues.      2.anemia chronic kidney disease-Hgb low. We will continue with Epogen 96565 units once a week     3.CKD/mineral bone disorder.  Patient will remain on calcitriol 1 mcg daily along with Renvela 2400 mg 3 times a day- last phos 9.5 check again      4.CAD/ HTN/ PEA- Cardiac cath 10/9/20 showed 100% occlusion left main . Had episode of  PEA Saturday AM and CPR was administered x 4 minutes and patient received epinepherine x 2. Nifedipine ER, Toprol XL, losartan, and lasix are currently on hold. Troponins 2.0-1.9. Rec'd 1 liter NS following code and continues on NS at 125cc/hr. It is now stopped. bp is stable    5. ? Seizures- witnessed seizure x 2 post Code Blue- Ct scan of head was unremarkable.     6. Metabolic acidosis- bicarb better with po na bicarb              This document has been electronically signed by Mauro Saravia MD on October 13, 2020 09:49 CDT           Electronically signed by Mauro Saravia MD at 10/13/20 0951       Medical Student Notes (last 24 hours) (Notes from 10/14/20 0837 through 10/15/20 0837)    No notes of this type exist for this encounter.         Consult Notes (last 24 hours) (Notes from 10/14/20 0837 through 10/15/20 0837)    No notes of this type exist for this encounter.

## 2020-10-15 NOTE — DISCHARGE PLACEMENT REQUEST
"Sumanth Gar (60 y.o. Male)     Date of Birth Social Security Number Address Home Phone MRN    1960  [address] [place] [address] [phone] [id number]    Gnosticism Marital Status          Oriental orthodox        Admission Date Admission Type Admitting Provider Attending Provider Department, Room/Bed    10/8/20 Emergency Isael Miranda MD Stewart-Hubbard, Takasha Latoya Renee, MD [address], 304/1    Discharge Date Discharge Disposition Discharge Destination                       Attending Provider: Luana Zamudio MD    Allergies: No Known Allergies    Isolation: None   Infection: None   Code Status: CPR    Ht: 170.2 cm (67.01\")   Wt: 93.3 kg (205 lb 9.6 oz)    Admission Cmt: None   Principal Problem: Hypotension [I95.9]                 Active Insurance as of 10/8/2020     Primary Coverage     Payor Plan Insurance Group Employer/Plan Group    [employer] BLUE [facility] BLUE [facility] PPO [id number]     Payor Plan Address Payor Plan Phone Number Payor Plan Fax Number Effective Dates    [address] [phone]  9/1/2012 - None Entered    [facility] [id number]       Subscriber Name Subscriber Birth Date Member ID       SUMANTH GAR 1960 [id number]           Secondary Coverage     Payor Plan Insurance Group Employer/Plan Group    MEDICARE MEDICARE A & B      Payor Plan Address Payor Plan Phone Number Payor Plan Fax Number Effective Dates    [address] [phone]  3/1/2019 - None Entered    [facility] [id number]       Subscriber Name Subscriber Birth Date Member ID       SUMANTH GAR 1960 [id number]                 Emergency Contacts      (Rel.) Home Phone Work Phone Mobile Phone    LISA GAR (Spouse) [phone] -- [phone]               Physician Progress Notes (last 24 hours) (Notes from 10/14/20 1103 through 10/15/20 1103)      Gibran Garza MD at 10/14/20 1624          "     Cardiology Progress Note     LOS: 5 days   Patient Care Team:  Elizabeth Burleson APRN as PCP - General (Family Medicine)    Subjective:     Chart reviewed. Patient seen and examined. Patient has not complained of having any symptoms of chest pain.  Patient complains of having severe tremor and unsteady gait.  Patient denies any weakness.  Review of the record indicate patient has been initiated on Ranexa.  Patient complains of having generalized fatigue and weakness.  Patient telemetry monitor has not revealed of any further recurrence of bradycardia arrhythmia or tachyarrhythmia.  Patient's hemoglobin is on the low side.      Objective:  Temp:  [97 °F (36.1 °C)-98.1 °F (36.7 °C)] 97.6 °F (36.4 °C)  Heart Rate:  [88-97] 94  Resp:  [16-18] 16  BP: (136-165)/(67-80) 165/76    Intake/Output Summary (Last 24 hours) at 10/15/2020 0724  Last data filed at 10/15/2020 0706  Gross per 24 hour   Intake 600 ml   Output 2456 ml   Net -1856 ml       Physical Exam:   General Appearance:    Alert, oriented, cooperative, in no acute distress.   Head:    Normocephalic, atraumatic, without obvious abnormality   Eyes:             ZULEMA. Lids and lashes normal, conjunctivae and sclerae normal, no icterus, no pallor.   Ears:    Ears appear intact with no abnormalities noted.   Throat:   Mucous membranes pink and moist.   Neck:  Supple, trachea midline, no carotid bruit, no organomegaly or JVD.   Lungs:    Clear to auscultation and percussion.  Respirations regular, even and unlabored. No wheezes, rales, or rhonchi.    Heart:    Regular rhythm and normal rate, normal S1 and S2, no      murmur, no gallop, no rub, no click.   Abdomen:    Soft, nontender, nondistended, no guarding, no rebound tenderness. Normal bowel sounds in all four quadrants, no masses, liver and spleen nonpalpable.    Genitalia:    Deferred.   Extremities:   Moves all extremities well, no edema, no cyanosis, no       redness, no clubbing.   Pulses:   Pulses  palpable and equal bilaterally.   Skin:   Moist and warm. No bleeding, bruising or rash.   Neurologic/Psychiatric:   Alert and oriented to person, place, and time.  Motor, power and tone in upper and lower extremities are grossly intact.  No focal neurological deficits. Normal cognitive function. No psychomotor reaction or tangential thought. No depression, homicidal ideations and suicidal ideations.          Results Review:    Results from last 7 days   Lab Units 10/13/20  0358 10/10/20  1316   SODIUM mmol/L 134* 136   POTASSIUM mmol/L 3.8 4.1   CHLORIDE mmol/L 95* 95*   CO2 mmol/L 24.0 19.0*   BUN mg/dL 60* 68*   CREATININE mg/dL 11.87* 13.33*   CALCIUM mg/dL 8.3* 7.7*   BILIRUBIN mg/dL  --  0.3   ALK PHOS U/L  --  58   ALT (SGPT) U/L  --  31   AST (SGOT) U/L  --  39   GLUCOSE mg/dL 111* 174*     Results from last 7 days   Lab Units 10/10/20  1316 10/09/20  0647 10/08/20  2138 10/08/20  1704   CK TOTAL U/L  --   --   --  289*   TROPONIN T ng/mL 1.940* 2.020* 1.930* 2.050*         Results from last 7 days   Lab Units 10/13/20  0358   WBC 10*3/mm3 8.50   HEMOGLOBIN g/dL 8.2*   HEMATOCRIT % 26.5*   PLATELETS 10*3/mm3 248     Results from last 7 days   Lab Units 10/09/20  1403 10/08/20  2054   INR  1.80* 1.05   APTT seconds  --  37.2     Results from last 7 days   Lab Units 10/13/20  0358   MAGNESIUM mg/dL 1.8                   ECHO:  Results for orders placed during the hospital encounter of 10/08/20   Adult Transthoracic Echo Complete W/ Cont if Necessary Per Protocol    Narrative · Left ventricular ejection fraction appears to be 41 - 45%. Left   ventricular systolic function is mildly decreased.  · Left ventricular diastolic function is consistent with (grade II w/high   LAP) pseudonormalization.  · Left ventricular wall thickness is consistent with concentric   hypertrophy.  · Mild mitral annular calcification is present.  · Mild mitral valve regurgitation is present.  · Estimated right ventricular systolic  pressure from tricuspid   regurgitation is normal (<35 mmHg).  · The left atrial cavity is moderately dilated.  · Mildly reduced right ventricular systolic function noted.          ECG 12 Lead   Final Result   Test Reason : Syncope   Blood Pressure : **/** mmHG   Vent. Rate : 074 BPM     Atrial Rate : 074 BPM      P-R Int : 178 ms          QRS Dur : 112 ms       QT Int : 448 ms       P-R-T Axes : 025 033 193 degrees      QTc Int : 497 ms      Normal sinus rhythm   Incomplete left bundle branch block   ST &  T wave abnormality, consider inferior ischemia   ST &  T wave abnormality, consider anterolateral ischemia   Prolonged QT   Abnormal ECG   When compared with ECG of 08-OCT-2020 20:44,   No significant change was found   Confirmed by EUGENIO FLETCHER ([id number]) on 10/10/2020 2:58:17 PM      Referred By:             Confirmed By:EUGENIO FLETCHER      ECG 12 Lead   ED Interpretation   Abnormal   Musa Saenz MD     10/8/2020 10:43 PM   ECG 12 Lead         Date/Time: 10/8/2020 9:04 PM   Performed by: Musa Saenz MD   Authorized by: Musa Saenz MD    Interpreted by physician   Rhythm: sinus rhythm   Rate: normal   T depression: I, II, aVF, aVL, V3, V4, V5 and V6   Clinical impression: abnormal ECG         Final Result   Test Reason : abnormal lab   Blood Pressure : **/** mmHG   Vent. Rate : 091 BPM     Atrial Rate : 091 BPM      P-R Int : 134 ms          QRS Dur : 102 ms       QT Int : 368 ms       P-R-T Axes : 042 026 197 degrees      QTc Int : 452 ms      Normal sinus rhythm   Possible Left atrial enlargement   ST &  T wave abnormality, consider inferior ischemia   ST &  T wave abnormality, consider anterolateral ischemia   Abnormal ECG   When compared with ECG of 29-OCT-2014 09:12,   No significant change was found   Confirmed by LUDMILA HAGER ([id number]) on 10/9/2020 12:16:27 PM      Referred By:  CHELSIE           Confirmed By:LUDMILA HAGER      SCANNED EKG   Final Result      SCANNED EKG   Final Result      SCANNED EKG    Final Result      SCANNED EKG   Final Result      SCANNED EKG   Final Result      SCANNED EKG   Final Result      SCANNED EKG   Final Result           Medication Review:   Current Facility-Administered Medications   Medication Dose Route Frequency Provider Last Rate Last Dose   • acetaminophen (TYLENOL) tablet 650 mg  650 mg Oral Q4H PRN Brendan Moon MD   650 mg at 10/11/20 1108    Or   • acetaminophen (TYLENOL) suppository 650 mg  650 mg Rectal Q4H PRN Brendan Moon MD       • acetaminophen (TYLENOL) tablet 650 mg  650 mg Oral Q4H PRN Brendan Moon MD       • aspirin chewable tablet 81 mg  81 mg Oral Daily Brendan Moon MD   81 mg at 10/14/20 0817   • bisacodyl (DULCOLAX) EC tablet 5 mg  5 mg Oral Daily PRN Brendan Moon MD       • calcitriol (ROCALTROL) capsule 1 mcg  1 mcg Oral Daily Brendan Moon MD   1 mcg at 10/14/20 0818   • carvedilol (COREG) tablet 6.25 mg  6.25 mg Oral BID With Meals Brendan Moon MD   6.25 mg at 10/14/20 1704   • clopidogrel (PLAVIX) tablet 75 mg  75 mg Oral Daily Brendan Moon MD   75 mg at 10/14/20 0818   • Delflex-LC/1.5% Dextrose (DIANEAL) CAPD 12,200 mL  12,200 mL Intraperitoneal PRN Brendan Moon MD       • Delflex-LC/2.5% Dextrose (DIANEAL) CAPD 5,000 mL  5,000 mL Intraperitoneal PRN Brendan Moon MD       • Delflex-LC/2.5% Dextrose (DIANEAL) CAPD 5,000 mL  5,000 mL Intraperitoneal PRN Brendan Moon MD   5,000 mL at 10/12/20 1556   • Delflex-LC/2.5% Dextrose (DIANEAL) CAPD 5,000 mL  5,000 mL Intraperitoneal PRN Mauro Saravia MD   5,000 mL at 10/13/20 1545   • Delflex-LC/2.5% Dextrose (DIANEAL) CAPD 5,000 mL  5,000 mL Intraperitoneal PRN Mauro Saravia MD       • Delflex-LC/4.25% Dextrose (DIANEAL) CAPD 5,000 mL  5,000 mL Intraperitoneal PRN Mauro Saravia MD       • [START ON 10/17/2020] epoetin arturo-epbx (RETACRIT) injection 10,000 Units  10,000 Units Subcutaneous Weekly EchenduBrendan MD       • famotidine  (PEPCID) tablet 20 mg  20 mg Oral Daily Brendan Moon MD   20 mg at 10/14/20 0818   • gabapentin (NEURONTIN) capsule 100 mg  100 mg Oral Nightly Mauro Saravia MD   100 mg at 10/14/20 2105   • heparin (porcine) 5000 UNIT/ML injection 5,000 Units  5,000 Units Subcutaneous Q12H Brendan Moon MD   5,000 Units at 10/14/20 2105   • hydrALAZINE (APRESOLINE) tablet 25 mg  25 mg Oral Q8H Brendan Moon MD   25 mg at 10/15/20 0533   • influenza vac split quad (FLUZONE,FLUARIX,AFLURIA,FLULAVAL) injection 0.5 mL  0.5 mL Intramuscular During Hospitalization Brendan Moon MD       • insulin detemir (LEVEMIR) injection 20 Units  20 Units Subcutaneous Nightly Brendan Moon MD   20 Units at 10/14/20 2105   • isosorbide mononitrate (IMDUR) 24 hr tablet 60 mg  60 mg Oral Q24H Brendan Moon MD   60 mg at 10/14/20 0818   • lanthanum (FOSRENOL) chewable tablet 500 mg  500 mg Oral TID With Meals Mauro Saravia MD   500 mg at 10/14/20 1704   • losartan (COZAAR) tablet 50 mg  50 mg Oral Q24H Brendan Moon MD   50 mg at 10/14/20 0818   • metoprolol tartrate (LOPRESSOR) injection 5 mg  5 mg Intravenous Q6H PRN Brendan Moon MD   5 mg at 10/12/20 2318   • nitroglycerin (NITROSTAT) SL tablet 0.4 mg  0.4 mg Sublingual Q5 Min PRN Brendan Moon MD       • ondansetron (ZOFRAN) tablet 4 mg  4 mg Oral Q6H PRN Brendan Moon MD        Or   • ondansetron (ZOFRAN) injection 4 mg  4 mg Intravenous Q6H PRN Brendan Moon MD   4 mg at 10/15/20 0023   • pravastatin (PRAVACHOL) tablet 20 mg  20 mg Oral Daily Brendan Moon MD   20 mg at 10/14/20 0818   • sevelamer (RENVELA) tablet 2,400 mg  2,400 mg Oral TID With Meals Brendan Moon MD   2,400 mg at 10/14/20 1704   • sodium bicarbonate tablet 650 mg  650 mg Oral TID Brendan Moon MD   650 mg at 10/14/20 2105   • sodium chloride 0.9 % flush 10 mL  10 mL Intravenous Q12H Brendan Moon MD   10 mL at 10/14/20 2106   • sodium  chloride 0.9 % flush 10 mL  10 mL Intravenous PRN Brendan Moon MD   10 mL at 10/11/20 2038       Assessment and Plan:      Hypotension    CAD (coronary artery disease)    ESRF (end stage renal failure) (CMS/Formerly [facility])    Diabetes mellitus (CMS/[facility])    Cardiac arrest (CMS/[facility])    Syncope  1.  Atherosclerotic coronary artery disease.  Patient has had previous history of coronary artery bypass grafting.  Patient coronary angiogram done had revealed a patent LIMA to the LAD and a patent saphenous vein graft to the obtuse marginal branch and to the right coronary artery.  Patient has 100% occlusion of the native right coronary artery and left main.  Patient left main chronic total occlusion percutaneous intervention was unsuccessful and patient was recommended medical management.  Patient has not had any further recurrent symptoms or chest pain.  Patient Ranexa would be stopped completely as the patient is currently not having chest pain.  Patient symptoms of tremors could be secondary to Ranexa.  Have discussed with the patient and the family.    2.  S/p cardiopulmonary arrest.  Patient had pulseless electrical activity.  Patient has stable rhythm on telemetry monitor.  Patient did not complain of having any symptoms of lightheaded dizziness or near syncope.  Patient has been started back on the carvedilol which would be increased as blood pressure tolerates.  Patient echocardiogram done had revealed preserved left ventricular systolic function.    3.  Arterial hypertension.  Patient would be continued on the hydralazine, losartan and the carvedilol.    4.  Hyperlipidemia.  Patient has been counseled on low-fat low-cholesterol diet and to continue with the present dose of the pravastatin.    5.  End-stage renal disease on peritoneal dialysis is noted.  Patient has been followed by nephrology.    6.  Generalized fatigue weakness with episodes of tremors.  Patient Ranexa would be stopped and patient symptom complex would  be followed.    The above plan of management were discussed with the patient and the wife and the nursing staff.            Electronically signed by Gibran Garza MD, 10/15/20, 7:24 AM CDT.      Time: Time spent on face-to-face interaction 20 minutes    Dictated utilizing Dragon dictation.       Electronically signed by Gibran Garza MD at 10/15/20 0778     Radha Campbell MD at 10/14/20 1228              [facility]  INPATIENT PROGRESS NOTE    Length of Stay: 4  Date of Admission: 10/8/2020  Primary Care Physician: Elizabeth Burleson APRN    Subjective   Chief Complaint: Tremors   HPI:  Patient seen with wife at bedside. Not feeling well today. Says he believe Ranexa is causing tremors-generalized, mostly upper body. He thinks this was happening prior to this admission but it has progressed as tremors are more frequent. No seizure activity. No change in mental status per wife.   Remains very weak. Sitting up on side of bed with therapy but not able to ambulate. Discussed possible SNF upon discharge.   No chest pain, shortness of breath, palpitations.   Tolerating diet.   No fever, chills, cough, congestion.     Review of Systems     All pertinent negatives and positives are as above. All other systems have been reviewed and are negative unless otherwise stated.     Objective    Temp:  [97 °F (36.1 °C)-98.1 °F (36.7 °C)] 98.1 °F (36.7 °C)  Heart Rate:  [84-95] 90  Resp:  [16-18] 18  BP: (128-162)/(60-80) 141/73    Physical Exam  Constitutional:       Appearance: Normal appearance.   HENT:      Head: Normocephalic.   Cardiovascular:      Rate and Rhythm: Normal rate and regular rhythm.      Pulses: Normal pulses.      Heart sounds: Normal heart sounds.   Pulmonary:      Effort: Pulmonary effort is normal.      Breath sounds: Normal breath sounds.   Abdominal:      General: Bowel sounds are normal.      Palpations: Abdomen is soft.   Skin:     General:  Skin is warm and dry.   Neurological:      General: No focal deficit present.      Mental Status: He is alert.      Comments: Occasional generalized myoclonic movement of bilateral upper extremities. No observed resting or intentional tremor. No focal weakness.    Psychiatric:         Mood and Affect: Mood normal.             Results Review:  I have reviewed the labs, radiology results, and diagnostic studies.    Laboratory Data:   Results from last 7 days   Lab Units 10/13/20  0358 10/10/20  1316 10/10/20  0610  10/09/20  0647  10/08/20  1704   SODIUM mmol/L 134* 136 137   < > 140   < > 140   POTASSIUM mmol/L 3.8 4.1 3.8   < > 4.3   < > 3.8   CHLORIDE mmol/L 95* 95* 96*   < > 102   < > 98   CO2 mmol/L 24.0 19.0* 23.0   < > 19.0*   < > 25.0   BUN mg/dL 60* 68* 69*   < > 70*   < > 65*   CREATININE mg/dL 11.87* 13.33* 12.62*   < > 13.51*   < > 13.19*   GLUCOSE mg/dL 111* 174* 127*   < > 117*   < > 181*   CALCIUM mg/dL 8.3* 7.7* 8.3*   < > 8.2*   < > 8.6   BILIRUBIN mg/dL  --  0.3  --   --  0.4  --  0.3   ALK PHOS U/L  --  58  --   --  59  --  70   ALT (SGPT) U/L  --  31  --   --  10  --  11   AST (SGOT) U/L  --  39  --   --  17  --  13   ANION GAP mmol/L 15.0 22.0* 18.0*   < > 19.0*   < > 17.0*    < > = values in this interval not displayed.     Estimated Creatinine Clearance: 7.3 mL/min (A) (by C-G formula based on SCr of 11.87 mg/dL (H)).  Results from last 7 days   Lab Units 10/14/20  0512 10/13/20  0358 10/11/20  0936 10/09/20  0647   MAGNESIUM mg/dL  --  1.8 1.8 2.1   PHOSPHORUS mg/dL 9.9* 10.0*  --  9.5*         Results from last 7 days   Lab Units 10/13/20  0358 10/10/20  1316 10/10/20  0610 10/09/20  0647 10/08/20  2054   WBC 10*3/mm3 8.50 11.44* 8.57 8.71 9.95   HEMOGLOBIN g/dL 8.2* 8.6* 8.8* 9.2* 10.2*   HEMATOCRIT % 26.5* 27.9* 28.2* 29.5* 32.6*   PLATELETS 10*3/mm3 248 273 303 299 319     Results from last 7 days   Lab Units 10/09/20  1403 10/08/20  2054   INR  1.80* 1.05       Culture Data:   No results  found for: BLOODCX  No results found for: URINECX  No results found for: RESPCX  No results found for: WOUNDCX  No results found for: STOOLCX  No components found for: BODYFLD    Radiology Data:   Imaging Results (Last 24 Hours)     ** No results found for the last 24 hours. **          I have reviewed the patient's current medications.     Assessment/Plan     Principal Problem:    Hypotension  Active Problems:    CAD (coronary artery disease)- See below. Continue ASA, BB, plavix, ARB, nitrates, statin therapy.     ESRF (end stage renal failure) (CMS/[facility])-on nightly PD, followed by Nephrology. Noted changes for PD tonight.     Anemia of CKD-H&H remains low but stable. Continue weekly epogen.     Diabetes mellitus (CMS/[facility])-Overall controlled. Continue basal insulin with SSI and accuchecks.     Cardiac arrest (CMS/[facility])-PEA status post CPR with epinephrine 10/10 with subsequent ROSC in setting of known underlying CAD with 100% occlusion of left main. Ongoing medical management as above. Await further recommendations from Dr. Garza.     CHF-acute on chronic systolic dysfunction with EF 41-45%. Current negative balance with ongoing PD. Continue BB, ARB.     Myoclonus-Unsure of etiology. Patient requests DC ranexa. Will hold further dosing for tomorrow and observe. Previous EEG negative. Recheck CMP plus Mg, Phos, TSH. Need for further evaluation pending clinical course.     Seizure- Witnessed x2 post cardiac arrest. CT head unremarkable. EEG without observed epileptiform activity 10/12. Thought likely due to metabolic derangements. No recurrence.       Discharge Planning: I expect patient to be discharged in 2-3 days to likely SNF. Case mgmt consult placed.     Radha Campbell MD    Electronically signed by Radha Campbell MD at 10/14/20 1436     Mauro Saravia MD at 10/14/20 1217          [facility]  [address] [place] [address]. [id number]  T  [phone]  F - [phone]     Progress  "Note          PATIENT  DEMOGRAPHICS   PATIENT NAME: Sumanth Robledo                      PHYSICIAN: Mauro Saravia MD  : 1960  MRN: [id number]   LOS: 4 days    Patient Care Team:  Elizabeth Burleson APRN as PCP - General (Family Medicine)  Subjective   SUBJECTIVE   Now on regular floor, has tremors       Objective   OBJECTIVE   Vital Signs  Temp:  [97 °F (36.1 °C)-98.1 °F (36.7 °C)] 98.1 °F (36.7 °C)  Heart Rate:  [84-95] 90  Resp:  [16-18] 18  BP: (128-162)/(60-80) 141/73    Flowsheet Rows      First Filed Value   Admission Height  170.2 cm (67\") Documented at 10/08/2020 2035   Admission Weight  81.5 kg (179 lb 9.6 oz) Documented at 10/08/2020 2046           I/O last 3 completed shifts:  In: 360 [P.O.:360]  Out: 2960 [Other:2960]    PHYSICAL EXAM    Physical Exam  Constitutional:       General: He is not in acute distress.  HENT:      Head:      Comments: Hematoma scalp- frontal area     Mouth/Throat:      Mouth: Mucous membranes are moist.   Cardiovascular:      Rate and Rhythm: Normal rate and regular rhythm.   Pulmonary:      Effort: No respiratory distress.      Breath sounds: No wheezing.   Abdominal:      General: There is distension.      Palpations: Abdomen is soft.   Musculoskeletal:         General: Swelling present.      Comments: Trace ryann LE edema   Skin:     General: Skin is warm and dry.   Neurological:      General: No focal deficit present.      Mental Status: He is alert and oriented to person, place, and time.   Psychiatric:         Mood and Affect: Mood normal.         Behavior: Behavior normal.         RESULTS   Results Review:    Results from last 7 days   Lab Units 10/13/20  0358 10/10/20  1316 10/10/20  0610  10/09/20  0647  10/08/20  1704   SODIUM mmol/L 134* 136 137   < > 140   < > 140   POTASSIUM mmol/L 3.8 4.1 3.8   < > 4.3   < > 3.8   CHLORIDE mmol/L 95* 95* 96*   < > 102   < > 98   CO2 mmol/L 24.0 19.0* 23.0   < > 19.0*   < > 25.0   BUN mg/dL 60* 68* 69*   < > 70*   < > 65* "   CREATININE mg/dL 11.87* 13.33* 12.62*   < > 13.51*   < > 13.19*   CALCIUM mg/dL 8.3* 7.7* 8.3*   < > 8.2*   < > 8.6   BILIRUBIN mg/dL  --  0.3  --   --  0.4  --  0.3   ALK PHOS U/L  --  58  --   --  59  --  70   ALT (SGPT) U/L  --  31  --   --  10  --  11   AST (SGOT) U/L  --  39  --   --  17  --  13   GLUCOSE mg/dL 111* 174* 127*   < > 117*   < > 181*    < > = values in this interval not displayed.       Estimated Creatinine Clearance: 7.3 mL/min (A) (by C-G formula based on SCr of 11.87 mg/dL (H)).    Results from last 7 days   Lab Units 10/14/20  0512 10/13/20  0358 10/11/20  0936 10/09/20  0647   MAGNESIUM mg/dL  --  1.8 1.8 2.1   PHOSPHORUS mg/dL 9.9* 10.0*  --  9.5*             Results from last 7 days   Lab Units 10/13/20  0358 10/10/20  1316 10/10/20  0610 10/09/20  0647 10/08/20  2054   WBC 10*3/mm3 8.50 11.44* 8.57 8.71 9.95   HEMOGLOBIN g/dL 8.2* 8.6* 8.8* 9.2* 10.2*   PLATELETS 10*3/mm3 248 273 303 299 319       Results from last 7 days   Lab Units 10/09/20  1403 10/08/20  2054   INR  1.80* 1.05         Imaging Results (Last 24 Hours)     ** No results found for the last 24 hours. **      Heart cath 10/9/20  Procedure performed:  1.  Left heart catheterization with iliofemoral arteriogram and Angio-Seal closure device.  2.  Attempted unsuccessful PTCA of the 100% occluded left main .  3.  Selective cannulation of the left internal mammary artery.  4.  Selective cannulation of the saphenous vein graft to the right coronary artery and to the obtuse marginal branch.     MEDICATIONS    aspirin, 81 mg, Oral, Daily  calcitriol, 1 mcg, Oral, Daily  carvedilol, 6.25 mg, Oral, BID With Meals  clopidogrel, 75 mg, Oral, Daily  [START ON 10/17/2020] epoetin arturo/arturo-epbx, 10,000 Units, Subcutaneous, Weekly  famotidine, 20 mg, Oral, Daily  gabapentin, 100 mg, Oral, TID  heparin (porcine), 5,000 Units, Subcutaneous, Q12H  hydrALAZINE, 25 mg, Oral, Q8H  insulin detemir, 20 Units, Subcutaneous, Nightly  isosorbide  mononitrate, 60 mg, Oral, Q24H  losartan, 50 mg, Oral, Q24H  pravastatin, 20 mg, Oral, Daily  ranolazine, 1,000 mg, Oral, BID  sevelamer, 2,400 mg, Oral, TID With Meals  sodium bicarbonate, 650 mg, Oral, TID  sodium chloride, 10 mL, Intravenous, Q12H           Assessment/Plan   ASSESSMENT / PLAN      Hypotension    CAD (coronary artery disease)    ESRF (end stage renal failure) (CMS/[facility])    Diabetes mellitus (CMS/[facility])    Cardiac arrest (CMS/[facility])    Syncope    1.end-stage renal disease currently on peritoneal dialysis every night.  Patient is doing pd with 2800 cc 5 cycles with last fill of 1000 cc.  His estimated dry weight is 84 kg.  Weight is increasing now peak 96 kg. Will try 4.25 % one bag tonight for added UF. Tolerated PD last night without issues and has lost 2.5 lbs     2.anemia chronic kidney disease-Hgb low. We will continue with Epogen 04866 units once a week     3.CKD/mineral bone disorder.  Patient will remain on calcitriol 1 mcg daily along with Renvela 2400 mg 3 times a day- phos still high and add fosrenol     4.CAD/ HTN/ PEA- Cardiac cath 10/9/20 showed 100% occlusion left main . Had episode of PEA Saturday AM and CPR was administered x 4 minutes and patient received epinepherine x 2. Nifedipine ER, Toprol XL, losartan, and lasix are currently on hold. Troponins 2.0-1.9.  bp is stable    5. ? Seizures- witnessed seizure x 2 post Code Blue- Ct scan of head was unremarkable.     6. Metabolic acidosis- bicarb better with po na bicarb    7. Tremors lower gabapentin to hs only. ?may lower ranexa              This document has been electronically signed by Mauro Saravia MD on October 14, 2020 12:17 CDT           Electronically signed by Mauro Saravia MD at 10/14/20 1221

## 2020-10-15 NOTE — PLAN OF CARE
Goal Outcome Evaluation:  Pt worked with therapy today. Continues to have jerking spells but pt reports less so than yesterday. Able to sit in chair today.

## 2020-10-15 NOTE — PROGRESS NOTES
Cardiology Progress Note     LOS: 5 days   Patient Care Team:  Elizabeth Burleson APRN as PCP - General (Family Medicine)    Subjective:     Chart reviewed. Patient seen and examined. Patient has not complained of having any symptoms of chest pain.  Patient complains of having severe tremor and unsteady gait.  Patient denies any weakness.  Review of the record indicate patient has been initiated on Ranexa.  Patient complains of having generalized fatigue and weakness.  Patient telemetry monitor has not revealed of any further recurrence of bradycardia arrhythmia or tachyarrhythmia.  Patient's hemoglobin is on the low side.      Objective:  Temp:  [97 °F (36.1 °C)-98.1 °F (36.7 °C)] 97.6 °F (36.4 °C)  Heart Rate:  [88-97] 94  Resp:  [16-18] 16  BP: (136-165)/(67-80) 165/76    Intake/Output Summary (Last 24 hours) at 10/15/2020 0724  Last data filed at 10/15/2020 0706  Gross per 24 hour   Intake 600 ml   Output 2456 ml   Net -1856 ml       Physical Exam:   General Appearance:    Alert, oriented, cooperative, in no acute distress.   Head:    Normocephalic, atraumatic, without obvious abnormality   Eyes:             ZULEMA. Lids and lashes normal, conjunctivae and sclerae normal, no icterus, no pallor.   Ears:    Ears appear intact with no abnormalities noted.   Throat:   Mucous membranes pink and moist.   Neck:  Supple, trachea midline, no carotid bruit, no organomegaly or JVD.   Lungs:    Clear to auscultation and percussion.  Respirations regular, even and unlabored. No wheezes, rales, or rhonchi.    Heart:    Regular rhythm and normal rate, normal S1 and S2, no      murmur, no gallop, no rub, no click.   Abdomen:    Soft, nontender, nondistended, no guarding, no rebound tenderness. Normal bowel sounds in all four quadrants, no masses, liver and spleen nonpalpable.    Genitalia:    Deferred.   Extremities:   Moves all extremities well, no edema, no cyanosis, no       redness, no clubbing.   Pulses:   Pulses palpable  and equal bilaterally.   Skin:   Moist and warm. No bleeding, bruising or rash.   Neurologic/Psychiatric:   Alert and oriented to person, place, and time.  Motor, power and tone in upper and lower extremities are grossly intact.  No focal neurological deficits. Normal cognitive function. No psychomotor reaction or tangential thought. No depression, homicidal ideations and suicidal ideations.          Results Review:    Results from last 7 days   Lab Units 10/13/20  0358 10/10/20  1316   SODIUM mmol/L 134* 136   POTASSIUM mmol/L 3.8 4.1   CHLORIDE mmol/L 95* 95*   CO2 mmol/L 24.0 19.0*   BUN mg/dL 60* 68*   CREATININE mg/dL 11.87* 13.33*   CALCIUM mg/dL 8.3* 7.7*   BILIRUBIN mg/dL  --  0.3   ALK PHOS U/L  --  58   ALT (SGPT) U/L  --  31   AST (SGOT) U/L  --  39   GLUCOSE mg/dL 111* 174*     Results from last 7 days   Lab Units 10/10/20  1316 10/09/20  0647 10/08/20  2138 10/08/20  1704   CK TOTAL U/L  --   --   --  289*   TROPONIN T ng/mL 1.940* 2.020* 1.930* 2.050*         Results from last 7 days   Lab Units 10/13/20  0358   WBC 10*3/mm3 8.50   HEMOGLOBIN g/dL 8.2*   HEMATOCRIT % 26.5*   PLATELETS 10*3/mm3 248     Results from last 7 days   Lab Units 10/09/20  1403 10/08/20  2054   INR  1.80* 1.05   APTT seconds  --  37.2     Results from last 7 days   Lab Units 10/13/20  0358   MAGNESIUM mg/dL 1.8                   ECHO:  Results for orders placed during the hospital encounter of 10/08/20   Adult Transthoracic Echo Complete W/ Cont if Necessary Per Protocol    Narrative · Left ventricular ejection fraction appears to be 41 - 45%. Left   ventricular systolic function is mildly decreased.  · Left ventricular diastolic function is consistent with (grade II w/high   LAP) pseudonormalization.  · Left ventricular wall thickness is consistent with concentric   hypertrophy.  · Mild mitral annular calcification is present.  · Mild mitral valve regurgitation is present.  · Estimated right ventricular systolic pressure from  tricuspid   regurgitation is normal (<35 mmHg).  · The left atrial cavity is moderately dilated.  · Mildly reduced right ventricular systolic function noted.          ECG 12 Lead   Final Result   Test Reason : Syncope   Blood Pressure : **/** mmHG   Vent. Rate : 074 BPM     Atrial Rate : 074 BPM      P-R Int : 178 ms          QRS Dur : 112 ms       QT Int : 448 ms       P-R-T Axes : 025 033 193 degrees      QTc Int : 497 ms      Normal sinus rhythm   Incomplete left bundle branch block   ST &  T wave abnormality, consider inferior ischemia   ST &  T wave abnormality, consider anterolateral ischemia   Prolonged QT   Abnormal ECG   When compared with ECG of 08-OCT-2020 20:44,   No significant change was found   Confirmed by EUEGNIO FLETCHER ([id number]) on 10/10/2020 2:58:17 PM      Referred By:             Confirmed By:EUGENIO FLETCHER      ECG 12 Lead   ED Interpretation   Abnormal   Musa Saenz MD     10/8/2020 10:43 PM   ECG 12 Lead         Date/Time: 10/8/2020 9:04 PM   Performed by: Musa Saenz MD   Authorized by: Musa Saenz MD    Interpreted by physician   Rhythm: sinus rhythm   Rate: normal   T depression: I, II, aVF, aVL, V3, V4, V5 and V6   Clinical impression: abnormal ECG         Final Result   Test Reason : abnormal lab   Blood Pressure : **/** mmHG   Vent. Rate : 091 BPM     Atrial Rate : 091 BPM      P-R Int : 134 ms          QRS Dur : 102 ms       QT Int : 368 ms       P-R-T Axes : 042 026 197 degrees      QTc Int : 452 ms      Normal sinus rhythm   Possible Left atrial enlargement   ST &  T wave abnormality, consider inferior ischemia   ST &  T wave abnormality, consider anterolateral ischemia   Abnormal ECG   When compared with ECG of 29-OCT-2014 09:12,   No significant change was found   Confirmed by LUDMILA HAGER ([id number]) on 10/9/2020 12:16:27 PM      Referred By:  CHELSIE           Confirmed By:LUDMILA HAGER      SCANNED EKG   Final Result      SCANNED EKG   Final Result      SCANNED EKG   Final  Result      SCANNED EKG   Final Result      SCANNED EKG   Final Result      SCANNED EKG   Final Result      SCANNED EKG   Final Result           Medication Review:   Current Facility-Administered Medications   Medication Dose Route Frequency Provider Last Rate Last Dose   • acetaminophen (TYLENOL) tablet 650 mg  650 mg Oral Q4H PRN Brendan Moon MD   650 mg at 10/11/20 1108    Or   • acetaminophen (TYLENOL) suppository 650 mg  650 mg Rectal Q4H PRN Brendan Moon MD       • acetaminophen (TYLENOL) tablet 650 mg  650 mg Oral Q4H PRN Brendan Moon MD       • aspirin chewable tablet 81 mg  81 mg Oral Daily Brendan Moon MD   81 mg at 10/14/20 0817   • bisacodyl (DULCOLAX) EC tablet 5 mg  5 mg Oral Daily PRN Brendan Moon MD       • calcitriol (ROCALTROL) capsule 1 mcg  1 mcg Oral Daily Brendan Moon MD   1 mcg at 10/14/20 0818   • carvedilol (COREG) tablet 6.25 mg  6.25 mg Oral BID With Meals Brendan Moon MD   6.25 mg at 10/14/20 1704   • clopidogrel (PLAVIX) tablet 75 mg  75 mg Oral Daily Brendan Moon MD   75 mg at 10/14/20 0818   • Delflex-LC/1.5% Dextrose (DIANEAL) CAPD 12,200 mL  12,200 mL Intraperitoneal PRN Brendan Moon MD       • Delflex-LC/2.5% Dextrose (DIANEAL) CAPD 5,000 mL  5,000 mL Intraperitoneal PRN Brendan Moon MD       • Delflex-LC/2.5% Dextrose (DIANEAL) CAPD 5,000 mL  5,000 mL Intraperitoneal PRN Brendan Moon MD   5,000 mL at 10/12/20 1556   • Delflex-LC/2.5% Dextrose (DIANEAL) CAPD 5,000 mL  5,000 mL Intraperitoneal PRN Mauro Saravia MD   5,000 mL at 10/13/20 1545   • Delflex-LC/2.5% Dextrose (DIANEAL) CAPD 5,000 mL  5,000 mL Intraperitoneal PRN Mauro Saravia MD       • Delflex-LC/4.25% Dextrose (DIANEAL) CAPD 5,000 mL  5,000 mL Intraperitoneal PRN Mauro Saravia MD       • [START ON 10/17/2020] epoetin arturo-epbx (RETACRIT) injection 10,000 Units  10,000 Units Subcutaneous Weekly EchenduBrendan MD       • famotidine  (PEPCID) tablet 20 mg  20 mg Oral Daily Brendan Moon MD   20 mg at 10/14/20 0818   • gabapentin (NEURONTIN) capsule 100 mg  100 mg Oral Nightly Mauro Saravia MD   100 mg at 10/14/20 2105   • heparin (porcine) 5000 UNIT/ML injection 5,000 Units  5,000 Units Subcutaneous Q12H Brendan Moon MD   5,000 Units at 10/14/20 2105   • hydrALAZINE (APRESOLINE) tablet 25 mg  25 mg Oral Q8H Brendan Moon MD   25 mg at 10/15/20 0533   • influenza vac split quad (FLUZONE,FLUARIX,AFLURIA,FLULAVAL) injection 0.5 mL  0.5 mL Intramuscular During Hospitalization Brendan Moon MD       • insulin detemir (LEVEMIR) injection 20 Units  20 Units Subcutaneous Nightly Brendan Moon MD   20 Units at 10/14/20 2105   • isosorbide mononitrate (IMDUR) 24 hr tablet 60 mg  60 mg Oral Q24H Brendan Moon MD   60 mg at 10/14/20 0818   • lanthanum (FOSRENOL) chewable tablet 500 mg  500 mg Oral TID With Meals Mauro Saravia MD   500 mg at 10/14/20 1704   • losartan (COZAAR) tablet 50 mg  50 mg Oral Q24H Brendan Moon MD   50 mg at 10/14/20 0818   • metoprolol tartrate (LOPRESSOR) injection 5 mg  5 mg Intravenous Q6H PRN Brendan Moon MD   5 mg at 10/12/20 2318   • nitroglycerin (NITROSTAT) SL tablet 0.4 mg  0.4 mg Sublingual Q5 Min PRN Brendan Moon MD       • ondansetron (ZOFRAN) tablet 4 mg  4 mg Oral Q6H PRN Brendan Moon MD        Or   • ondansetron (ZOFRAN) injection 4 mg  4 mg Intravenous Q6H PRN Brendan Moon MD   4 mg at 10/15/20 0023   • pravastatin (PRAVACHOL) tablet 20 mg  20 mg Oral Daily Brendan Moon MD   20 mg at 10/14/20 0818   • sevelamer (RENVELA) tablet 2,400 mg  2,400 mg Oral TID With Meals Brendan Moon MD   2,400 mg at 10/14/20 1704   • sodium bicarbonate tablet 650 mg  650 mg Oral TID Brendan Moon MD   650 mg at 10/14/20 2105   • sodium chloride 0.9 % flush 10 mL  10 mL Intravenous Q12H Brendan Moon MD   10 mL at 10/14/20 2106   • sodium  chloride 0.9 % flush 10 mL  10 mL Intravenous PRN Brendan Moon MD   10 mL at 10/11/20 2038       Assessment and Plan:      Hypotension    CAD (coronary artery disease)    ESRF (end stage renal failure) (CMS/[facility])    Diabetes mellitus (CMS/[facility])    Cardiac arrest (CMS/[facility])    Syncope  1.  Atherosclerotic coronary artery disease.  Patient has had previous history of coronary artery bypass grafting.  Patient coronary angiogram done had revealed a patent LIMA to the LAD and a patent saphenous vein graft to the obtuse marginal branch and to the right coronary artery.  Patient has 100% occlusion of the native right coronary artery and left main.  Patient left main chronic total occlusion percutaneous intervention was unsuccessful and patient was recommended medical management.  Patient has not had any further recurrent symptoms or chest pain.  Patient Ranexa would be stopped completely as the patient is currently not having chest pain.  Patient symptoms of tremors could be secondary to Ranexa.  Have discussed with the patient and the family.    2.  S/p cardiopulmonary arrest.  Patient had pulseless electrical activity.  Patient has stable rhythm on telemetry monitor.  Patient did not complain of having any symptoms of lightheaded dizziness or near syncope.  Patient has been started back on the carvedilol which would be increased as blood pressure tolerates.  Patient echocardiogram done had revealed preserved left ventricular systolic function.    3.  Arterial hypertension.  Patient would be continued on the hydralazine, losartan and the carvedilol.    4.  Hyperlipidemia.  Patient has been counseled on low-fat low-cholesterol diet and to continue with the present dose of the pravastatin.    5.  End-stage renal disease on peritoneal dialysis is noted.  Patient has been followed by nephrology.    6.  Generalized fatigue weakness with episodes of tremors.  Patient Ranexa would be stopped and patient symptom complex would  be followed.    The above plan of management were discussed with the patient and the wife and the nursing staff.            Electronically signed by Gibran Garza MD, 10/15/20, 7:24 AM CDT.      Time: Time spent on face-to-face interaction 20 minutes    Dictated utilizing Dragon dictation.

## 2020-10-15 NOTE — PROGRESS NOTES
[facility] Medicine Services  INPATIENT PROGRESS NOTE    Length of Stay: 5  Date of Admission: 10/8/2020  Primary Care Physician: Elizabeth Burleson APRN    Subjective   Chief Complaint:  Chest pain  HPI:  Patient states that he is feeling better today than yesterday.  No specific complaints.    Review of Systems   Constitutional: Negative for appetite change, chills, fatigue and fever.   Respiratory: Negative for chest tightness and shortness of breath.    Cardiovascular: Negative for chest pain, palpitations and leg swelling.   Gastrointestinal: Negative for abdominal pain, constipation, diarrhea, nausea and vomiting.   Skin: Negative for wound.   Neurological: Positive for tremors (better today). Negative for dizziness, weakness, light-headedness, numbness and headaches.     All pertinent negatives and positives are as above. All other systems have been reviewed and are negative unless otherwise stated.     Objective    Temp:  [96.6 °F (35.9 °C)-97.6 °F (36.4 °C)] 96.6 °F (35.9 °C)  Heart Rate:  [88-97] 90  Resp:  [16-18] 18  BP: (142-165)/(67-77) 146/77    Physical Exam  Vitals signs reviewed.   Constitutional:       Appearance: He is well-developed.   HENT:      Head: Normocephalic and atraumatic.   Eyes:      Pupils: Pupils are equal, round, and reactive to light.   Neck:      Musculoskeletal: Normal range of motion and neck supple.   Cardiovascular:      Rate and Rhythm: Normal rate and regular rhythm.      Heart sounds: Normal heart sounds. No murmur. No friction rub. No gallop.    Pulmonary:      Effort: Pulmonary effort is normal. No respiratory distress.      Breath sounds: Normal breath sounds. No wheezing or rales.   Chest:      Chest wall: No tenderness.   Abdominal:      General: Bowel sounds are normal. There is no distension.      Palpations: Abdomen is soft.      Tenderness: There is no abdominal tenderness.   Psychiatric:         Behavior: Behavior  normal.       Results Review:  I have reviewed the labs, radiology results, and diagnostic studies.    Laboratory Data:   Results from last 7 days   Lab Units 10/15/20  0640 10/13/20  0358 10/10/20  1316  10/09/20  0647   SODIUM mmol/L 136 134* 136   < > 140   POTASSIUM mmol/L 3.7 3.8 4.1   < > 4.3   CHLORIDE mmol/L 94* 95* 95*   < > 102   CO2 mmol/L 27.0 24.0 19.0*   < > 19.0*   BUN mg/dL 57* 60* 68*   < > 70*   CREATININE mg/dL 12.02* 11.87* 13.33*   < > 13.51*   GLUCOSE mg/dL 76 111* 174*   < > 117*   CALCIUM mg/dL 9.3 8.3* 7.7*   < > 8.2*   BILIRUBIN mg/dL 0.3  --  0.3  --  0.4   ALK PHOS U/L 64  --  58  --  59   ALT (SGPT) U/L 11  --  31  --  10   AST (SGOT) U/L 11  --  39  --  17   ANION GAP mmol/L 15.0 15.0 22.0*   < > 19.0*    < > = values in this interval not displayed.     Estimated Creatinine Clearance: 7.1 mL/min (A) (by C-G formula based on SCr of 12.02 mg/dL (H)).  Results from last 7 days   Lab Units 10/15/20  0640 10/14/20  0512 10/13/20  0358 10/11/20  0936   MAGNESIUM mg/dL 2.0  --  1.8 1.8   PHOSPHORUS mg/dL 9.4* 9.9* 10.0*  --          Results from last 7 days   Lab Units 10/15/20  0640 10/13/20  0358 10/10/20  1316 10/10/20  0610 10/09/20  0647   WBC 10*3/mm3 12.12* 8.50 11.44* 8.57 8.71   HEMOGLOBIN g/dL 8.5* 8.2* 8.6* 8.8* 9.2*   HEMATOCRIT % 26.5* 26.5* 27.9* 28.2* 29.5*   PLATELETS 10*3/mm3 277 248 273 303 299     Results from last 7 days   Lab Units 10/09/20  1403 10/08/20  2054   INR  1.80* 1.05       Culture Data:   No results found for: BLOODCX  No results found for: URINECX  No results found for: RESPCX  No results found for: WOUNDCX  No results found for: STOOLCX  No components found for: BODYFLD    Radiology Data:   Imaging Results (Last 24 Hours)     ** No results found for the last 24 hours. **          I have reviewed the patient's current medications.     Assessment/Plan     Active Hospital Problems    Diagnosis   • **Hypotension   • Cardiac arrest (CMS/H[facility])   • Syncope   •  Diabetes mellitus (CMS/[facility])   • ESRF (end stage renal failure) (CMS/[facility])   • CAD (coronary artery disease)       Plan:    1.  Coronary artery disease: No symptoms.  Continue aspirin, beta-blocker, Plavix, ARB, nitrate, and statin.  2.  End-stage renal failure: Nephrology following.  Continue with peritoneal dialysis.  3.  Diabetes mellitus: Continue sliding scale and long-acting insulin.  Reasonably controlled.  4.  Status post cardiac arrest: PEA.  5.  Myoclonus: Better today per patient.  Ranexa discontinued.  6.  Seizure: Patient had 2 witnessed seizures after his cardiac arrest.  Work-up thus far has been negative.  Likely metabolic in etiology      The patient was evaluated during the global COVID-19 pandemic, and the diagnosis was suspected/considered upon their initial presentation.  Evaluation, treatment, and testing were consistent with current guidelines for patients who present with complaints or symptoms that may be related to COVID-19.    Discharge Planning: I expect patient to be discharged to home with 24/7 care in 2-3 days.        This document has been electronically signed by Randolph Mcconnell MD on October 15, 2020 16:15 CDT

## 2020-10-15 NOTE — PLAN OF CARE
"  Problem: Adult Inpatient Plan of Care  Goal: Plan of Care Review  Recent Flowsheet Documentation  Taken 10/15/2020 0947 by Rosemarie Lam PTA  Plan of Care Reviewed With: patient  Outcome Summary: pt sitting in chair on arrival. pt performed sit<>stand x 3 with RW & min assist of 1. pt performed marching in place with standing. pt continues to experience \"jerkiness\". pt will require 24/7 care & continued pT services @ D/C     "

## 2020-10-15 NOTE — THERAPY TREATMENT NOTE
Acute Care - Occupational Therapy Treatment Note  [facility]     Patient Name: Sumanth Robledo  : 1960  MRN: [id number]  Today's Date: 10/15/2020  Onset of Illness/Injury or Date of Surgery: 10/08/20  Date of Referral to OT: 10/12/20  Referring Physician: Red BARKSDALE MD    Admit Date: 10/8/2020       ICD-10-CM ICD-9-CM   1. Precordial pain  R07.2 786.51   2. Impaired mobility and ADLs  Z74.09 V49.89    Z78.9    3. Impaired functional mobility, balance, gait, and endurance  Z74.09 V49.89     Patient Active Problem List   Diagnosis   • Chest pain   • CAD (coronary artery disease)   • ESRF (end stage renal failure) (CMS/[facility])   • Hypertension   • Diabetes mellitus (CMS/[facility])   • Precordial pain   • Cardiac arrest (CMS/[facility])   • Hypotension   • Syncope     Past Medical History:   Diagnosis Date   • Chronic kidney disease, stage 3    • Coronary arteriosclerosis     eCABGx3 10/2014 LM 95% LAD diff 70% D1 70% OM2 70% OM3 70% & % USA STEMI      • End stage renal disease (CMS/[facility])    • Essential hypertension    • Heterozygous thalassemia     Thalassemia minor      • Hyperlipidemia    • Peripheral vascular disease (CMS/[facility])     WILSON RIGHT 0.97 LEFT 0.79, stent JADYN ZELAYA 2015      • Surgical follow-up care     Emergent CABG X 3     • Type 2 diabetes mellitus (CMS/[facility])      Past Surgical History:   Procedure Laterality Date   • AMPUTATION FINGER / THUMB Right     RIGHT middle finger   • CARDIAC CATHETERIZATION  10/11/2014    Distal left main critical stenosis of up to 95-99% with evidence of filling defect suggestive of a plaque. LAD and OMB 1 and 2 stenosis.A 100% occludeed RCA.   • CARDIAC CATHETERIZATION N/A 2020    Procedure: Left Heart Cath 2020 @ 9;  Surgeon: Girban Garza MD;  Location: [facility] INVASIVE LOCATION;  Service: Cardiology;  Laterality: N/A;   • CARDIAC CATHETERIZATION N/A 10/9/2020    Procedure: Left Heart Cath;  Surgeon: Gibran Garza MD;  Location: [facility] CATH  INVASIVE LOCATION;  Service: Cardiology;  Laterality: N/A;   • CARDIAC SURGERY     • CORONARY ARTERY BYPASS GRAFT  10/11/2014    Emergent CABG X 3 LIMA->LAD SVG->OM SVG->PDA          OT ASSESSMENT FLOWSHEET (last 12 hours)      OT Evaluation and Treatment     Row Name 10/15/20 0835                   OT Time and Intention    Subjective Information  no complaints  -LM        Document Type  therapy note (daily note)  -LM        Mode of Treatment  individual therapy;occupational therapy  -LM        Patient Effort  good  -LM           Living Environment    Current Living Arrangements  home/apartment/condo  -LM           Cognition    Affect/Mental Status (Cognitive)  WFL  -LM        Orientation Status (Cognition)  oriented x 4  -LM        Follows Commands (Cognition)  WFL  -LM        Personal Safety Interventions  fall prevention program maintained  -LM           Pain Scale: Numbers Pre/Post-Treatment    Pretreatment Pain Rating  0/10 - no pain  -LM        Posttreatment Pain Rating  0/10 - no pain  -LM           Bed Mobility    Bed Mobility  bed mobility (all) activities;supine-sit  -LM        All Activities, [place] (Bed Mobility)  moderate assist (50% patient effort)  -LM           Functional Mobility    Functional Mobility- Ind. Level  --  -LM        Functional Mobility- Device  --  -LM           Transfer Assessment/Treatment    Transfers  sit-stand transfer;stand-sit transfer  -LM           Transfers    Bed-Chair [place] (Transfers)  contact guard  -LM        Assistive Device (Bed-Chair Transfers)  walker, front-wheeled  -LM        Sit-Stand [place] (Transfers)  contact guard  -LM        Stand-Sit [place] (Transfers)  contact guard;verbal cues  -LM           Sit-Stand Transfer    Assistive Device (Sit-Stand Transfers)  walker, front-wheeled  -LM           Stand-Sit Transfer    Assistive Device (Stand-Sit Transfers)  walker, front-wheeled  -LM           Safety Issues, Functional Mobility     Impairments Affecting Function (Mobility)  strength;endurance/activity tolerance;balance  -LM           Motor Skills    Therapeutic Exercise  shoulder;elbow/forearm;wrist 2 lb wt with close sba due to pt jerking  pt may benefit   -LM        Additional Documentation  -- cuff weight   -LM           Balance    Balance Assessment  sitting static balance;sitting dynamic balance;sit to stand dynamic balance  -LM           Lower Body Dressing Assessment/Training    [place] Level (Lower Body Dressing)  --  -LM        Position (Lower Body Dressing)  --  -LM           Plan of Care Review    Plan of Care Reviewed With  patient  -LM        Progress  no change  -LM           OT Goals    Bed Mobility Goal Selection (OT)  bed mobility, OT goal 1  -LM        Transfer Goal Selection (OT)  transfer, OT goal 1  -LM        Bathing Goal Selection (OT)  bathing, OT goal 1  -LM        Dressing Goal Selection (OT)  dressing, OT goal 1  -LM        Toileting Goal Selection (OT)  toileting, OT goal 1  -LM        Grooming Goal Selection (OT)  grooming, OT goal 1  -LM        Activity Tolerance Goal Selection (OT)  activity tolerance, OT goal 1  -LM           Bed Mobility Goal 1 (OT)    Activity/Assistive Device (Bed Mobility Goal 1, OT)  bed mobility activities, all  -LM        [place] Level/Cues Needed (Bed Mobility Goal 1, OT)  independent  -LM        Time Frame (Bed Mobility Goal 1, OT)  long term goal (LTG);by discharge  -LM        Progress/Outcomes (Bed Mobility Goal 1, OT)  goal not met  -LM           Transfer Goal 1 (OT)    Activity/Assistive Device (Transfer Goal 1, OT)  sit-to-stand/stand-to-sit;bed-to-chair/chair-to-bed;toilet  -LM        [place] Level/Cues Needed (Transfer Goal 1, OT)  independent  -LM        Time Frame (Transfer Goal 1, OT)  long term goal (LTG);by discharge  -LM        Progress/Outcome (Transfer Goal 1, OT)  goal not met  -LM           Bathing Goal 1 (OT)    Activity/Device (Bathing Goal 1, OT)   lower body bathing  -LM        [place] Level/Cues Needed (Bathing Goal 1, OT)  independent  -LM        Time Frame (Bathing Goal 1, OT)  long term goal (LTG);by discharge  -LM        Progress/Outcomes (Bathing Goal 1, OT)  goal not met  -LM           Dressing Goal 1 (OT)    Activity/Device (Dressing Goal 1, OT)  lower body dressing  -LM        [place]/Cues Needed (Dressing Goal 1, OT)  independent  -LM        Time Frame (Dressing Goal 1, OT)  long term goal (LTG);by discharge  -LM        Progress/Outcome (Dressing Goal 1, OT)  goal not met  -LM           Toileting Goal 1 (OT)    Activity/Device (Toileting Goal 1, OT)  toileting skills, all  -LM        [place] Level/Cues Needed (Toileting Goal 1, OT)  independent  -LM        Time Frame (Toileting Goal 1, OT)  long term goal (LTG);by discharge  -LM        Progress/Outcome (Toileting Goal 1, OT)  goal not met  -LM           Grooming Goal 1 (OT)    Activity/Device (Grooming Goal 1, OT)  grooming skills, all standing at sink  -LM        [place] (Grooming Goal 1, OT)  independent  -LM        Time Frame (Grooming Goal 1, OT)  long term goal (LTG);by discharge  -LM        Progress/Outcome (Grooming Goal 1, OT)  goal not met  -LM            Activity Tolerance Goal 1 (OT)    Activity Level (Endurance Goal 1, OT)  15 min activity  -LM        Progress/Outcome (Activity Tolerance Goal 1, OT)  goal not met  -LM           Therapy Plan Review/Discharge Plan (OT)    Anticipated Discharge Disposition (OT)  home with assist;home with home health  -LM          User Key  (r) = Recorded By, (t) = Taken By, (c) = Cosigned By    Initials Name Effective Dates    LM Linnea Armijo COTA/DONI 03/07/18 -          Occupational Therapy Education                 Title: PT OT SLP Therapies (In Progress)     Topic: Occupational Therapy (In Progress)     Point: ADL training (Done)     Description:   Instruct learner(s) on proper safety adaptation and remediation techniques during  self care or transfers.   Instruct in proper use of assistive devices.              Learning Progress Summary           Patient Acceptance, E, VU by AS at 10/13/2020 1307    Comment: endurance actvities, LBD, PLB   Significant Other Acceptance, E, VU by AS at 10/13/2020 1307    Comment: endurance actvities, LBD, PLB                   Point: Home exercise program (Done)     Description:   Instruct learner(s) on appropriate technique for monitoring, assisting and/or progressing therapeutic exercises/activities.              Learning Progress Summary           Patient Acceptance, E, VU by AS at 10/13/2020 1307    Comment: endurance actvities, LBD, PLB   Significant Other Acceptance, E, VU by AS at 10/13/2020 1307    Comment: endurance actvities, LBD, PLB                   Point: Precautions (Done)     Description:   Instruct learner(s) on prescribed precautions during self-care and functional transfers.              Learning Progress Summary           Patient Acceptance, E, VU by KO at 10/12/2020 1320    Comment: Educated pt on safety during ADLs and fall prevention                   Point: Body mechanics (Not Started)     Description:   Instruct learner(s) on proper positioning and spine alignment during self-care, functional mobility activities and/or exercises.              Learner Progress:  Not documented in this visit.                      User Key     Initials Effective Dates Name Provider Type Discipline    AS 07/05/20 -  Noemi Madrid OT Occupational Therapist OT    KO 08/09/20 -  Tiana Sifuentes OT Occupational Therapist OT                  OT Recommendation and Plan     Plan of Care Review  Plan of Care Reviewed With: patient  Progress: improving  Outcome Summary: pt perf well with OT  pt complained of cont jerking motions pt up in chair with chair alarm on  Plan of Care Reviewed With: patient  Outcome Summary: pt perf well with OT  pt complained of cont jerking motions pt up in chair with chair alarm  on    Outcome Measures     Row Name 10/15/20 1200 10/14/20 1200 10/13/20 1039       How much help from another person do you currently need...    Turning from your back to your side while in flat bed without using bedrails?  3  -TA  3  -TA  --    Moving from lying on back to sitting on the side of a flat bed without bedrails?  3  -TA  3  -TA  --    Moving to and from a bed to a chair (including a wheelchair)?  3  -TA  3  -TA  --    Standing up from a chair using your arms (e.g., wheelchair, bedside chair)?  3  -TA  3  -TA  --    Climbing 3-5 steps with a railing?  3  -TA  3  -TA  --    To walk in hospital room?  3  -TA  3  -TA  --    AM-PAC 6 Clicks Score (PT)  18  -TA  18  -TA  --       How much help from another is currently needed...    Putting on and taking off regular lower body clothing?  --  --  2  -AS    Bathing (including washing, rinsing, and drying)  --  --  3  -AS    Toileting (which includes using toilet bed pan or urinal)  --  --  3  -AS    Putting on and taking off regular upper body clothing  --  --  3  -AS    Taking care of personal grooming (such as brushing teeth)  --  --  3  -AS    Eating meals  --  --  4  -AS    AM-PAC 6 Clicks Score (OT)  --  --  18  -AS       Functional Assessment    Outcome Measure Options  AM-PAC 6 Clicks Basic Mobility (PT)  -TA  AM-PAC 6 Clicks Basic Mobility (PT)  -TA  AM-PAC 6 Clicks Daily Activity (OT)  -AS      User Key  (r) = Recorded By, (t) = Taken By, (c) = Cosigned By    Initials Name Provider Type    TA Rosemarie Lam PTA Physical Therapy Assistant    AS Noemi Madrid, OT Occupational Therapist          Time Calculation:   Time Calculation- OT     Row Name 10/15/20 1326             Time Calculation- OT    OT Start Time  0856  -LM      OT Stop Time  0935  -LM      OT Time Calculation (min)  39 min  -LM      Total Timed Code Minutes- OT  39 minute(s)  -LM      OT Received On  10/15/20  -LM        User Key  (r) = Recorded By, (t) = Taken By, (c) = Cosigned By     Initials Name Provider Type     Linnea Armijo COTA/L Occupational Therapy Assistant        Therapy Charges for Today     Code Description Service Date Service Provider Modifiers Qty    57637077288 HC OT THER SUPP EA 15 MIN 10/14/2020 Linnea Armijo COTA/L GO 1    11241445392 HC OT THERAPEUTIC ACT EA 15 MIN 10/15/2020 Linnea Armijo COTA/L GO 2    73279742617 HC OT THER PROC EA 15 MIN 10/15/2020 Linnea Armijo COTA/L GO 1               RADHA Aragon  10/15/2020

## 2020-10-15 NOTE — PROGRESS NOTES
"[facility]  [address] [place] [address]. [address]  T - [phone]  F - [phone]     Progress Note          PATIENT  DEMOGRAPHICS   PATIENT NAME: Sumanth Robledo                      PHYSICIAN: Jose E Fuentes MD  : 1960  MRN: [id number]   LOS: 5 days    Patient Care Team:  Elizabeth Burleson APRN as PCP - General (Family Medicine)  Subjective   SUBJECTIVE   No acute events overnight.  Denied any complaints. Still has tremor.          Objective   OBJECTIVE   Vital Signs  Temp:  [97.3 °F (36.3 °C)-98.1 °F (36.7 °C)] 97.6 °F (36.4 °C)  Heart Rate:  [88-97] 94  Resp:  [16-18] 16  BP: (136-165)/(67-76) 165/76    Flowsheet Rows      First Filed Value   Admission Height  170.2 cm (67\") Documented at 10/08/2020 2035   Admission Weight  81.5 kg (179 lb 9.6 oz) Documented at 10/08/2020 2046           I/O last 3 completed shifts:  In: 840 [P.O.:840]  Out: 1947 [Urine:200; Other:1747]    PHYSICAL EXAM    Physical Exam  Vitals signs reviewed.   Constitutional:       Appearance: Normal appearance.   Cardiovascular:      Rate and Rhythm: Normal rate and regular rhythm.      Heart sounds: No murmur. No gallop.    Pulmonary:      Effort: Pulmonary effort is normal. No respiratory distress.      Breath sounds: No wheezing, rhonchi or rales.   Abdominal:      General: Bowel sounds are normal. There is no distension.      Palpations: Abdomen is soft. There is no mass.      Tenderness: There is no abdominal tenderness.   Musculoskeletal:         General: No swelling.   Skin:     General: Skin is warm and dry.   Neurological:      Mental Status: He is alert.         RESULTS   Results Review:    Results from last 7 days   Lab Units 10/15/20  0640 10/13/20  0358 10/10/20  1316  10/09/20  0647   SODIUM mmol/L 136 134* 136   < > 140   POTASSIUM mmol/L 3.7 3.8 4.1   < > 4.3   CHLORIDE mmol/L 94* 95* 95*   < > 102   CO2 mmol/L 27.0 24.0 19.0*   < > 19.0*   BUN mg/dL 57* 60* 68*   < > 70* "   CREATININE mg/dL 12.02* 11.87* 13.33*   < > 13.51*   CALCIUM mg/dL 9.3 8.3* 7.7*   < > 8.2*   BILIRUBIN mg/dL 0.3  --  0.3  --  0.4   ALK PHOS U/L 64  --  58  --  59   ALT (SGPT) U/L 11  --  31  --  10   AST (SGOT) U/L 11  --  39  --  17   GLUCOSE mg/dL 76 111* 174*   < > 117*    < > = values in this interval not displayed.       Estimated Creatinine Clearance: 7.1 mL/min (A) (by C-G formula based on SCr of 12.02 mg/dL (H)).    Results from last 7 days   Lab Units 10/15/20  0640 10/14/20  0512 10/13/20  0358 10/11/20  0936   MAGNESIUM mg/dL 2.0  --  1.8 1.8   PHOSPHORUS mg/dL 9.4* 9.9* 10.0*  --              Results from last 7 days   Lab Units 10/15/20  0640 10/13/20  0358 10/10/20  1316 10/10/20  0610 10/09/20  0647   WBC 10*3/mm3 12.12* 8.50 11.44* 8.57 8.71   HEMOGLOBIN g/dL 8.5* 8.2* 8.6* 8.8* 9.2*   PLATELETS 10*3/mm3 277 248 273 303 299       Results from last 7 days   Lab Units 10/09/20  1403 10/08/20  2054   INR  1.80* 1.05         Imaging Results (Last 24 Hours)     ** No results found for the last 24 hours. **           MEDICATIONS    aspirin, 81 mg, Oral, Daily  calcitriol, 1 mcg, Oral, Daily  carvedilol, 6.25 mg, Oral, BID With Meals  clopidogrel, 75 mg, Oral, Daily  [START ON 10/17/2020] epoetin arturo/arturo-epbx, 10,000 Units, Subcutaneous, Weekly  famotidine, 20 mg, Oral, Daily  gabapentin, 100 mg, Oral, Nightly  heparin (porcine), 5,000 Units, Subcutaneous, Q12H  hydrALAZINE, 25 mg, Oral, Q8H  insulin detemir, 20 Units, Subcutaneous, Nightly  isosorbide mononitrate, 60 mg, Oral, Q24H  lanthanum, 500 mg, Oral, TID With Meals  losartan, 50 mg, Oral, Q24H  pravastatin, 20 mg, Oral, Daily  sevelamer, 2,400 mg, Oral, TID With Meals  sodium bicarbonate, 650 mg, Oral, TID  sodium chloride, 10 mL, Intravenous, Q12H      Assessment/Plan   ASSESSMENT / PLAN      Hypotension    CAD (coronary artery disease)    ESRF (end stage renal failure) (CMS/[facility])    Diabetes mellitus (CMS/[facility])    Cardiac arrest (CMS/[facility])     Syncope    1. End-stage renal disease on PD:  - Outpatient prescription: 2800 cc 5 cycles with last fill of 1000 cc.  His estimated dry weight is 84 kg.    - Weight is still high at 93.3 kg. Will do all 4.25% dextrose bags tonight. Seen and examined on PD. Has last fill dwelling.     2. Hypertension:  - Blood pressure is acceptable.     3. Anemia chronic kidney disease:  - Hemoglobin is low at 8.5. On Epogen 07039 units weekly. Will give additional 19305 units SQ x1 today.      4. Secondary hyperparathyroidism:  - Patient will remain on calcitriol 1 mcg daily along with Renvela 2400 mg 3 times a day. Added Fosrenol as phosphorus remained high.     5. CAD:  - Cardiac cath 10/9/20 showed 100% occlusion left main . Had episode of PEA Saturday AM and CPR was administered x 4 minutes and patient received epinepherine x 2.      6. S/p PEA arrest     7. ? Seizures:  - Witnessed seizure x 2 post Code Blue. CT scan of head was unremarkable.      8. Metabolic acidosis:  - Bicarbonate is better. Will discontinue oral bicarbonate.    9. Tremors:  - Lowered gabapentin to HS only. Off Ranexa per Dr. Garza.            This document has been electronically signed by Jose E Fuentes MD on October 15, 2020 10:09 CDT

## 2020-10-15 NOTE — DISCHARGE PLACEMENT REQUEST
"Suamnth Robledo (60 y.o. Male)     Date of Birth Social Security Number Address Home Phone MRN    1960  [address] [place] [address] [phone] [id number]    Shinto Marital Status          Confucianism        Admission Date Admission Type Admitting Provider Attending Provider Department, Room/Bed    10/8/20 Emergency Isael Miranda MD Stewart-Hubbard, Takasha Latoya Renee, MD [address], 304/1    Discharge Date Discharge Disposition Discharge Destination                       Attending Provider: Luana Zamudio MD    Allergies: No Known Allergies    Isolation: None   Infection: None   Code Status: CPR    Ht: 170.2 cm (67.01\")   Wt: 93.3 kg (205 lb 9.6 oz)    Admission Cmt: None   Principal Problem: Hypotension [I95.9]                 Active Insurance as of 10/8/2020     Primary Coverage     Payor Plan Insurance Group Employer/Plan Group    ANTHEM BLUE CROSS ANTHEM BLUE CROSS BLUE SHIELD PPO [id number]     Payor Plan Address Payor Plan Phone Number Payor Plan Fax Number Effective Dates    [address] [phone]  9/1/2012 - None Entered    [facility] [id number]       Subscriber Name Subscriber Birth Date Member ID       SUMANTH ROBLEDO 1960 [id number]           Secondary Coverage     Payor Plan Insurance Group Employer/Plan Group    MEDICARE MEDICARE A & B      Payor Plan Address Payor Plan Phone Number Payor Plan Fax Number Effective Dates    [address] [phone]  3/1/2019 - None Entered    [facility] [id number]       Subscriber Name Subscriber Birth Date Member ID       SUMANTH ROBLEDO 1960 [id number]                 Emergency Contacts      (Rel.) Home Phone Work Phone Mobile Phone    ROBLEDOLISA (Spouse) [phone] -- [phone]            Insurance Information                ANTHEM BLUE CROSS/ANTHEM BLUE CROSS BLUE SHIELD PPO Phone: [phone]    Subscriber: Sumanth Robledo " Subscriber#: [id number]    Group#: [id number] Precert#:         MEDICARE/MEDICARE A & B Phone: [phone]    Subscriber: Sumanth Robledo Subscriber#: [id number]    Group#:  Precert#:              History & Physical      Luana Zamudio MD at 10/09/20 0206          .        [facility] Medicine Admission      Date of Admission: 10/8/2020  Patient was seen and evaluated on 10/8/2020.  However note was written after midnight    Primary Care Physician: Elizabeth Burleson APRN      Chief Complaint: **Chest pain with elevated labs*    HPI:*  Patient is a 60-year-old male who presents to the [facility] emergency room with complaints of chest pain.  He was seen at his cardiology office today and had blood work drawn.  The cardiologist called the ER stating that the patient needed to come to the ED due to abnormal test results of and the patient was unsure of what test was abnormal.  Noted in the emergency room to have a troponin of 2.1.  He has been having chest pain off and on for the past 2 years he has a history of coronary artery disease with CABG in 2014.  He has had no stents since then.  His last cath was in February 2020 4 by Dr. Garza   (with unsuccessful PTCA of the chronic total occlusion of the left main and selective cannulation of the left internal mammary artery and of the saphenous vein graft to the obtuse marginal branch.  There is 100% occlusion of the left main 100% occlusion of the proximal right coronary artery 100% occlusion of the second obtuse marginal after branch patent LIMA to the LAD patent saphenous vein graft to the obtuse marginal branch #1.)    The emergency room patient was found to have a troponin of 2.1 at age of 13 and a BNP of 41,000.  He was started on a heparin drip.  Nitroglycerin and aspirin was given prior in the ER.    Concurrent Medical History:  has a past medical history of Chronic kidney  disease, stage 3, Coronary arteriosclerosis, End stage renal disease (CMS/HCC), Essential hypertension, Heterozygous thalassemia, Hyperlipidemia, Peripheral vascular disease (CMS/[facility]), Surgical follow-up care, and Type 2 diabetes mellitus (CMS/[facility]).    Past Surgical History:  has a past surgical history that includes Amputation finger / thumb (Right); Coronary artery bypass graft (10/11/2014); Cardiac catheterization (10/11/2014); Cardiac catheterization (N/A, 2/28/2020); and Cardiac surgery.    Family History: family history includes Coronary artery disease in an other family member; Heart disease in an other family member. **    Social History:  reports that he has never smoked. He has never used smokeless tobacco. He reports previous alcohol use.    Allergies: No Known Allergies    Medications:   Prior to Admission medications    Medication Sig Start Date End Date Taking? Authorizing Provider   amLODIPine (NORVASC) 10 MG tablet Take 10 mg by mouth 3 (Three) Times a Day.   Yes Lynne Castillo MD   aspirin 81 MG tablet Take 1 tablet by mouth Daily.   Yes Darlene Dickey APRN   calcitriol (ROCALTROL) 0.5 MCG capsule Take 0.5 mcg by mouth 2 (Two) Times a Day.   Yes Lynne Castillo MD   cinacalcet (SENSIPAR) 30 MG tablet Take 30 mg by mouth Daily.   Yes Lynne Castillo MD   clopidogrel (PLAVIX) 75 MG tablet Take 75 mg by mouth Daily.   Yes Lynne Castillo MD   Dulaglutide (TRULICITY) 1.5 MG/0.5ML solution pen-injector Inject 10 mg under the skin into the appropriate area as directed 1 (One) Time Per Week.   Yes Lynne Castillo MD   Evolocumab (REPATHA SC) Inject 140 mg under the skin into the appropriate area as directed Every 14 (Fourteen) Days.   Yes Lynne Castillo MD   gabapentin (NEURONTIN) 300 MG capsule Take 500 mg by mouth 2 (Two) Times a Day.   Yes Lynne Castillo MD   gentamicin (GARAMYCIN) 0.1 % cream Apply 1 application topically to the appropriate area as  directed 3 (Three) Times a Day.   Yes Lynne Castillo MD   hydrALAZINE (APRESOLINE) 25 MG tablet Take 25 mg by mouth 3 (Three) Times a Day.   Yes Lynne Castillo MD   Insulin Glargine, 2 Unit Dial, (TOUJEO MAX SOLOSTAR) 300 UNIT/ML solution pen-injector injection Inject 28 Units under the skin into the appropriate area as directed Every Night.   Yes Lynne Castillo MD   isosorbide mononitrate (IMDUR) 30 MG 24 hr tablet Take 30 mg by mouth Daily.   Yes Lynne Castillo MD   losartan (COZAAR) 100 MG tablet Take 100 mg by mouth Daily.   Yes Lynne Castillo MD   Metoprolol Succinate 100 MG capsule extended-release 24 hour sprinkle Take 200 mg by mouth Daily.   Yes Lynne Castillo MD   nitroglycerin (NITROSTAT) 0.4 MG SL tablet 1 under the tongue as needed for angina, may repeat q5mins for up three doses 2/29/20  Yes Darlene Dickey APRN   pravastatin (PRAVACHOL) 20 MG tablet Take 20 mg by mouth Daily.   Yes Lynne Castillo MD   ranolazine (RANEXA) 500 MG 12 hr tablet Take 1 tablet by mouth 2 (Two) Times a Day.  Patient taking differently: Take 1,000 mg by mouth 2 (Two) Times a Day. 2/29/20  Yes Darlene Dickey APRN   albuterol sulfate  (90 Base) MCG/ACT inhaler Inhale 2 puffs Every 4 (Four) Hours As Needed for Wheezing.    ProviderLynne MD       Review of Systems:  Review of Systems   12 point review of systems obtained pertinent positives and negatives noted above in HPI otherwise complete ROS is negative except as mentioned above.    Physical Exam:   Temp:  [97.3 °F (36.3 °C)] 97.3 °F (36.3 °C)  Heart Rate:  [74-88] 74  Resp:  [16-18] 16  BP: (139-177)/(76-94) 152/78  Physical Exam  Vitals signs and nursing note reviewed.   Constitutional:       Appearance: Normal appearance.   HENT:      Head: Normocephalic and atraumatic.      Nose: Nose normal.      Mouth/Throat:      Pharynx: Oropharynx is clear.   Eyes:      Extraocular Movements: Extraocular movements  intact.      Conjunctiva/sclera: Conjunctivae normal.   Neck:      Musculoskeletal: No neck rigidity.   Cardiovascular:      Rate and Rhythm: Normal rate and regular rhythm.      Pulses: Normal pulses.   Pulmonary:      Breath sounds: Normal breath sounds.   Abdominal:      General: Bowel sounds are normal.      Palpations: Abdomen is soft.   Musculoskeletal:         General: No tenderness or deformity.   Skin:     General: Skin is warm and dry.   Neurological:      General: No focal deficit present.      Mental Status: He is alert.      Cranial Nerves: No cranial nerve deficit.   Psychiatric:         Mood and Affect: Mood normal.         Behavior: Behavior normal.         Thought Content: Thought content normal.         Judgment: Judgment normal.           Results Reviewed:  I have personally reviewed current lab, radiology, and data and agree with results.  Lab Results (last 24 hours)     Procedure Component Value Units Date/Time    Protime-INR [[id number]]  (Normal) Collected: 10/08/20 2054    Specimen: Blood Updated: 10/09/20 0058     Protime 14.1 Seconds      INR 1.05    Narrative:      Therapeutic range for most indications is 2.0-3.0 INR,  or 2.5-3.5 for mechanical heart valves.    aPTT [[id number]]  (Normal) Collected: 10/08/20 2054    Specimen: Blood Updated: 10/09/20 0058     PTT 37.2 seconds     Narrative:      The recommended Heparin therapeutic range is 68-97 seconds.    COVID PRE-OP / PRE-PROCEDURE SCREENING ORDER (NO ISOLATION) - Swab, Nasopharynx [[id number]] Collected: 10/08/20 2321    Specimen: Swab from Nasopharynx Updated: 10/08/20 2328    Narrative:      The following orders were created for panel order COVID PRE-OP / PRE-PROCEDURE SCREENING ORDER (NO ISOLATION) - Swab, Nasopharynx.  Procedure                               Abnormality         Status                     ---------                               -----------         ------                     COVID-19, [facility] IN-HOUS...[[id number]]                       In process                   Please view results for these tests on the individual orders.    COVID-19, [facility] IN-HOUSE, NP SWAB IN TRANSPORT MEDIA 8-10 HR TAT - Swab, Nasopharynx [[id number]] Collected: 10/08/20 2321    Specimen: Swab from Nasopharynx Updated: 10/08/20 2328    [place] Draw [[id number]] Collected: 10/08/20 2054    Specimen: Blood Updated: 10/08/20 2245    Narrative:      The following orders were created for panel order [place] Draw.  Procedure                               Abnormality         Status                     ---------                               -----------         ------                     Light Blue Top[[id number]]                                   Final result               Green Top (Gel)[[id number]]                                  Final result               Lavender Top[[id number]]                                     Final result               Gold Top - SST[[id number]]                                   Final result                 Please view results for these tests on the individual orders.    Green Top (Gel) [[id number]] Collected: 10/08/20 2138    Specimen: Blood Updated: 10/08/20 2245     Extra Tube Hold for add-ons.     Comment: Auto resulted.       BNP [[id number]]  (Abnormal) Collected: 10/08/20 2138    Specimen: Blood Updated: 10/08/20 2228     proBNP 40,839.0 pg/mL     Narrative:      Among patients with dyspnea, NT-proBNP is highly sensitive for the detection of acute congestive heart failure. In addition NT-proBNP of <300 pg/ml effectively rules out acute congestive heart failure with 99% negative predictive value.    Results may be falsely decreased if patient taking Biotin.      Troponin [[id number]]  (Abnormal) Collected: 10/08/20 2138    Specimen: Blood Updated: 10/08/20 2223     Troponin T 1.930 ng/mL     Narrative:      Troponin T Reference Range:  <= 0.03 ng/mL-   Negative for AMI  >0.03 ng/mL-     Abnormal for myocardial necrosis.  Clinicians would have  to utilize clinical acumen, EKG, Troponin and serial changes to determine if it is an Acute Myocardial Infarction or myocardial injury due to an underlying chronic condition.       Results may be falsely decreased if patient taking Biotin.      Basic Metabolic Panel [[id number]]  (Abnormal) Collected: 10/08/20 2138    Specimen: Blood Updated: 10/08/20 2219     Glucose 254 mg/dL      BUN 66 mg/dL      Creatinine 13.08 mg/dL      Sodium 140 mmol/L      Potassium 3.8 mmol/L      Chloride 99 mmol/L      CO2 24.0 mmol/L      Calcium 8.2 mg/dL      eGFR  African Amer 5 mL/min/1.73      Comment: <15 Indicative of kidney failure.        eGFR Non  Amer --     Comment: <15 Indicative of kidney failure.        BUN/Creatinine Ratio 5.0     Anion Gap 17.0 mmol/L     Narrative:      GFR Normal >60  Chronic Kidney Disease <60  Kidney Failure <15      Light Blue Top [[id number]] Collected: 10/08/20 2054    Specimen: Blood Updated: 10/08/20 2200     Extra Tube hold for add-on     Comment: Auto resulted       Lavender Top [[id number]] Collected: 10/08/20 2054    Specimen: Blood Updated: 10/08/20 2200     Extra Tube hold for add-on     Comment: Auto resulted       Gold Top - SST [[id number]] Collected: 10/08/20 2054    Specimen: Blood Updated: 10/08/20 2200     Extra Tube Hold for add-ons.     Comment: Auto resulted.       D-dimer, Quantitative [[id number]]  (Abnormal) Collected: 10/08/20 2054    Specimen: Blood Updated: 10/08/20 2131     D-Dimer, Quantitative 903 ng/mL (FEU)     Narrative:      Dimer values <500 ng/ml FEU are FDA approved as aid in diagnosis of deep venous thrombosis and pulmonary embolism.  This test should not be used in an exclusion strategy with pretest probability alone.    A recent guideline regarding diagnosis for pulmonary thromboembolism recommends an adjusted exclusion criterion of age x 10 ng/ml FEU for patients >50 years of age (Shirley Intern Med 2015; 163: 701-711).      Lactic Acid, Plasma [[id number]]   (Normal) Collected: 10/08/20 2054    Specimen: Blood Updated: 10/08/20 2118     Lactate 1.1 mmol/L     CBC & Differential [[id number]]  (Abnormal) Collected: 10/08/20 2054    Specimen: Blood Updated: 10/08/20 2103    Narrative:      The following orders were created for panel order CBC & Differential.  Procedure                               Abnormality         Status                     ---------                               -----------         ------                     CBC Auto Differential[[id number]]        Abnormal            Final result                 Please view results for these tests on the individual orders.    CBC Auto Differential [[id number]]  (Abnormal) Collected: 10/08/20 2054    Specimen: Blood Updated: 10/08/20 2103     WBC 9.95 10*3/mm3      RBC 4.13 10*6/mm3      Hemoglobin 10.2 g/dL      Hematocrit 32.6 %      MCV 78.9 fL      MCH 24.7 pg      MCHC 31.3 g/dL      RDW 14.8 %      RDW-SD 41.6 fl      MPV 11.2 fL      Platelets 319 10*3/mm3      Neutrophil % 62.7 %      Lymphocyte % 22.7 %      Monocyte % 10.8 %      Eosinophil % 1.9 %      Basophil % 0.4 %      Immature Grans % 1.5 %      Neutrophils, Absolute 6.24 10*3/mm3      Lymphocytes, Absolute 2.26 10*3/mm3      Monocytes, Absolute 1.07 10*3/mm3      Eosinophils, Absolute 0.19 10*3/mm3      Basophils, Absolute 0.04 10*3/mm3      Immature Grans, Absolute 0.15 10*3/mm3      nRBC 0.2 /100 WBC         Imaging Results (Last 24 Hours)     Procedure Component Value Units Date/Time    XR Chest 2 View [[id number]] Collected: 10/08/20 2122     Updated: 10/08/20 2140    Narrative:        CHEST X-RAY 2 view on  10/8/2020     CLINICAL INDICATION: Elevated troponin, weakness, shortness of  breath    COMPARISON: 10/29/2014    FINDINGS: The patient is status post median sternotomy and  probable CABG. Cardiomegaly is noted. Mild vascular congestion is  noted. There is mild elevation of the right hemidiaphragm. The  lungs are otherwise clear.      Impression:       Cardiomegaly with mild vascular congestion.    Electronically signed by:  Alek Rubin  10/8/2020 9:38 PM  CDT Workstation: [id number]            Assessment:    Active Hospital Problems    Diagnosis   • Chest pain     Added automatically from request for surgery [id number]               Plan:*  Chest pain patient was sent from Dr. Garza's office with an elevated troponin.  He is been placed on a heparin drip placed in stepdown as needed nitroglycerin patient is already on Ranexa and large amounts of Imdur.  Placed on Dr. Garza's list n.p.o. after midnight    End-stage renal failure on peritoneal dialysis will inform nephrology in the a.m.    Hypertension continue antihypertensive medications    Diabetes sliding scale insulin Accu-Cheks    I discussed the patient's findings and my recommendations with: *Patient and family at the bedside    Luana Zamudio MD                  Electronically signed by Luana Zamudio MD at 10/09/20 0216         Current Facility-Administered Medications   Medication Dose Route Frequency Provider Last Rate Last Dose   • acetaminophen (TYLENOL) tablet 650 mg  650 mg Oral Q4H PRN Brendan Moon MD   650 mg at 10/11/20 1108    Or   • acetaminophen (TYLENOL) suppository 650 mg  650 mg Rectal Q4H PRN Brendan Moon MD       • acetaminophen (TYLENOL) tablet 650 mg  650 mg Oral Q4H PRN Brendan Moon MD       • aspirin chewable tablet 81 mg  81 mg Oral Daily Brendan Moon MD   81 mg at 10/14/20 0817   • bisacodyl (DULCOLAX) EC tablet 5 mg  5 mg Oral Daily PRN Brendan Moon MD       • calcitriol (ROCALTROL) capsule 1 mcg  1 mcg Oral Daily Brendan Moon MD   1 mcg at 10/14/20 0818   • carvedilol (COREG) tablet 6.25 mg  6.25 mg Oral BID With Meals Brendan Moon MD   6.25 mg at 10/14/20 1704   • clopidogrel (PLAVIX) tablet 75 mg  75 mg Oral Daily Brendan Moon MD   75 mg at 10/14/20 0818   • Delflex-LC/1.5%  Dextrose (DIANEAL) CAPD 12,200 mL  12,200 mL Intraperitoneal PRN Brendan Moon MD       • Delflex-LC/2.5% Dextrose (DIANEAL) CAPD 5,000 mL  5,000 mL Intraperitoneal PRN Brendan Moon MD       • Delflex-LC/2.5% Dextrose (DIANEAL) CAPD 5,000 mL  5,000 mL Intraperitoneal PRN Brendan Moon MD   5,000 mL at 10/12/20 1556   • Delflex-LC/2.5% Dextrose (DIANEAL) CAPD 5,000 mL  5,000 mL Intraperitoneal PRN Mauro Saravia MD   5,000 mL at 10/13/20 1545   • Delflex-LC/2.5% Dextrose (DIANEAL) CAPD 5,000 mL  5,000 mL Intraperitoneal PRN Mauro Saravia MD       • Delflex-LC/4.25% Dextrose (DIANEAL) CAPD 5,000 mL  5,000 mL Intraperitoneal PRN Mauro Saravia MD       • [START ON 10/17/2020] epoetin arturo-epbx (RETACRIT) injection 10,000 Units  10,000 Units Subcutaneous Weekly Brendan Moon MD       • famotidine (PEPCID) tablet 20 mg  20 mg Oral Daily Brendan Moon MD   20 mg at 10/14/20 0818   • gabapentin (NEURONTIN) capsule 100 mg  100 mg Oral Nightly Mauro Saravia MD   100 mg at 10/14/20 2105   • heparin (porcine) 5000 UNIT/ML injection 5,000 Units  5,000 Units Subcutaneous Q12H Brendan Moon MD   5,000 Units at 10/14/20 2105   • hydrALAZINE (APRESOLINE) tablet 25 mg  25 mg Oral Q8H Brendan Moon MD   25 mg at 10/15/20 0533   • influenza vac split quad (FLUZONE,FLUARIX,AFLURIA,FLULAVAL) injection 0.5 mL  0.5 mL Intramuscular During Hospitalization Brendan Moon MD       • insulin detemir (LEVEMIR) injection 20 Units  20 Units Subcutaneous Nightly Brendan Moon MD   20 Units at 10/14/20 2105   • isosorbide mononitrate (IMDUR) 24 hr tablet 60 mg  60 mg Oral Q24H Brendan Moon MD   60 mg at 10/14/20 0818   • lanthanum (FOSRENOL) chewable tablet 500 mg  500 mg Oral TID With Meals Mauro Saravia MD   500 mg at 10/14/20 1704   • losartan (COZAAR) tablet 50 mg  50 mg Oral Q24H Brendan Moon MD   50 mg at 10/14/20 0818   • metoprolol tartrate (LOPRESSOR) injection 5 mg  5  "mg Intravenous Q6H PRN Brendan Moon MD   5 mg at 10/12/20 2318   • nitroglycerin (NITROSTAT) SL tablet 0.4 mg  0.4 mg Sublingual Q5 Min PRN Brendan Moon MD       • ondansetron (ZOFRAN) tablet 4 mg  4 mg Oral Q6H PRN Brendan Moon MD        Or   • ondansetron (ZOFRAN) injection 4 mg  4 mg Intravenous Q6H PRN Brendan Moon MD   4 mg at 10/15/20 0023   • pravastatin (PRAVACHOL) tablet 20 mg  20 mg Oral Daily Brendan Moon MD   20 mg at 10/14/20 0818   • sevelamer (RENVELA) tablet 2,400 mg  2,400 mg Oral TID With Meals Brendan Moon MD   2,400 mg at 10/14/20 1704   • sodium bicarbonate tablet 650 mg  650 mg Oral TID Brendan Moon MD   650 mg at 10/14/20 2105   • sodium chloride 0.9 % flush 10 mL  10 mL Intravenous Q12H Brendan Moon MD   10 mL at 10/14/20 2106   • sodium chloride 0.9 % flush 10 mL  10 mL Intravenous PRN Brendan Moon MD   10 mL at 10/11/20 2038        Physical Therapy Notes (most recent note)      Rosemarie Lam, PTA at 10/14/20 1256  Version 1 of 1         Acute Care - Physical Therapy Treatment Note  [facility]     Patient Name: Sumanth Robledo  : 1960  MRN: [id number]  Today's Date: 10/14/2020   Onset of Illness/Injury or Date of Surgery: 10/08/20       PT Assessment (last 12 hours)      PT Evaluation and Treatment     Row Name 10/14/20 1014          Physical Therapy Time and Intention    Subjective Information  complains of;dizziness;nausea/vomiting \"jerking\"  -TA     Document Type  therapy note (daily note)  -TA     Mode of Treatment  individual therapy;physical therapy  -TA     Row Name 10/14/20 1014          General Information    Patient Profile Reviewed  yes  -TA     Row Name 10/14/20 1014          Cognition    Orientation Status (Cognition)  oriented x 4  -TA     Personal Safety Interventions  fall prevention program maintained;gait belt;nonskid shoes/slippers when out of bed;supervised activity  -TA     Row Name 10/14/20 " 1014          Pain Scale: Numbers Pre/Post-Treatment    Pretreatment Pain Rating  0/10 - no pain  -TA     Posttreatment Pain Rating  0/10 - no pain  -TA     Row Name 10/14/20 1014          Bed Mobility    Bed Mobility  scooting/bridging  -TA     Scooting/Bridging [place] (Bed Mobility)  dependent (less than 25% patient effort);2 person assist with drawsheet  -TA     Supine-Sit [place] (Bed Mobility)  minimum assist (75% patient effort)  -TA     Sit-Supine [place] (Bed Mobility)  supervision  -TA     Assistive Device (Bed Mobility)  bed rails;head of bed elevated;draw sheet  -TA     Comment (Bed Mobility)  pt sat @ EOB ~8 minutes with CGA.  pt c/o dizziness & N&V   -TA     Row Name 10/14/20 1014          Safety Issues, Functional Mobility    Impairments Affecting Function (Mobility)  strength;endurance/activity tolerance;balance  -TA     Row Name 10/14/20 1014          Hip (Therapeutic Exercise)    Hip (Therapeutic Exercise)  isometric exercises;AROM (active range of motion)  -TA     Hip AROM (Therapeutic Exercise)  bilateral;flexion;sitting;10 repetitions  -TA     Hip Isometrics (Therapeutic Exercise)  bilateral;gluteal sets;10 repetitions;5 repetitions;supine  -TA     Row Name 10/14/20 1014          Knee (Therapeutic Exercise)    Knee (Therapeutic Exercise)  AROM (active range of motion);isometric exercises  -TA     Knee AROM (Therapeutic Exercise)  LAQ (long arc quad);bilateral;sitting;10 repetitions  -TA     Knee Isometrics (Therapeutic Exercise)  bilateral;quad sets;supine;10 repetitions;5 repetitions  -TA     Row Name 10/14/20 1014          Ankle (Therapeutic Exercise)    Ankle (Therapeutic Exercise)  AROM (active range of motion)  -TA     Ankle AROM (Therapeutic Exercise)  bilateral;dorsiflexion;plantarflexion;10 repetitions;5 repetitions  -TA     Row Name 10/14/20 1014          Plan of Care Review    Plan of Care Reviewed With  patient  -TA     Outcome Summary  pt sup>sit with min assist,  sit>sup with SBA, pt sat @ EOB ~8 minutes with CGA (secondary to jerkiness). pt would benefit from 24/7 care & coontinued PT services @ D/C  -TA     Row Name 10/14/20 1014          Vital Signs    Pre Systolic BP Rehab  137  -TA     Pre Treatment Diastolic BP  69  -TA     Intra Systolic BP Rehab  139  -TA     Intra Treatment Diastolic BP  74  -TA     Post Systolic BP Rehab  138  -TA     Post Treatment Diastolic BP  74  -TA     Pretreatment Heart Rate (beats/min)  98  -TA     Posttreatment Heart Rate (beats/min)  93  -TA     Pre SpO2 (%)  97  -TA     O2 Delivery Pre Treatment  supplemental O2  -TA     Post SpO2 (%)  97  -TA     O2 Delivery Post Treatment  supplemental O2  -TA     Pre Patient Position  Supine  -TA     Intra Patient Position  Sitting  -TA     Post Patient Position  Supine  -TA     Row Name 10/14/20 1014          Bed Mobility Goal 1 (PT)    Activity/Assistive Device (Bed Mobility Goal 1, PT)  sit to supine;supine to sit  -TA     [place] Level/Cues Needed (Bed Mobility Goal 1, PT)  modified independence  -TA     Time Frame (Bed Mobility Goal 1, PT)  3 days  -TA     Strategies/Barriers (Bed Mobility Goal 1, PT)  bed flat, no hand rails  -TA     Progress/Outcomes (Bed Mobility Goal 1, PT)  goal not met  -TA     Row Name 10/14/20 1014          Transfer Goal 1 (PT)    Activity/Assistive Device (Transfer Goal 1, PT)  sit-to-stand/stand-to-sit;bed-to-chair/chair-to-bed LRAD PRN  -TA     [place] Level/Cues Needed (Transfer Goal 1, PT)  modified independence  -TA     Time Frame (Transfer Goal 1, PT)  3 days  -TA     Progress/Outcome (Transfer Goal 1, PT)  goal not met  -TA     Row Name 10/14/20 1014          Gait Training Goal 1 (PT)    Activity/Assistive Device (Gait Training Goal 1, PT)  gait (walking locomotion);assistive device use;decrease fall risk;increase endurance/gait distance LRAD PRN  -TA     [place] Level (Gait Training Goal 1, PT)  modified independence  -TA     Distance (Gait  "Training Goal 1, PT)  150ft or more  -TA     Time Frame (Gait Training Goal 1, PT)  5 days  -TA     Progress/Outcome (Gait Training Goal 1, PT)  goal not met  -TA     Row Name 10/14/20 1014          Stairs Goal 1 (PT)    Activity/Assistive Device (Stairs Goal 1, PT)  ascending stairs;descending stairs;using handrail, left  -TA     [place] Level/Cues Needed (Stairs Goal 1, PT)  standby assist  -TA     Number of Stairs (Stairs Goal 1, PT)  2 or more  -TA     Time Frame (Stairs Goal 1, PT)  by discharge  -TA     Progress/Outcome (Stairs Goal 1, PT)  goal not met  -TA     Row Name 10/14/20 1014          Positioning and Restraints    Pre-Treatment Position  in bed  -TA     Post Treatment Position  bed  -TA     In Bed  supine;call light within reach;exit alarm on  -TA     Row Name 10/14/20 1014          Therapy Assessment/Plan (PT)    Rehab Potential (PT)  good, to achieve stated therapy goals  -TA     Criteria for Skilled Interventions Met (PT)  yes;skilled treatment is necessary  -TA     Comment, Therapy Assessment/Plan (PT)  continue  -TA     Row Name 10/14/20 1014          Progress Summary (PT)    Progress Toward Functional Goals (PT)  progress toward functional goals is fair  -TA     Barriers to Overall Progress (PT)  \"jerkiness\"  -TA       User Key  (r) = Recorded By, (t) = Taken By, (c) = Cosigned By    Initials Name Provider Type    Rosemarie Morales, PTA Physical Therapy Assistant        Physical Therapy Education                 Title: PT OT SLP Therapies (In Progress)     Topic: Physical Therapy (In Progress)     Point: Mobility training (Done)     Learning Progress Summary           Patient Acceptance, E, VU by KW at 10/13/2020 0940    Comment: PT POC and prognosis, PLB    Acceptance, E, VU by KW1 at 10/12/2020 1431    Comment: Role of PT, POC, use of gait belt                   Point: Home exercise program (Not Started)     Learner Progress:  Not documented in this visit.          Point: Body mechanics " "(Not Started)     Learner Progress:  Not documented in this visit.          Point: Precautions (Done)     Learning Progress Summary           Patient Acceptance, E, VU by KW at 10/13/2020 0940    Comment: PT POC and prognosis, PLB    Acceptance, E, VU by KW1 at 10/12/2020 1431    Comment: Role of PT, POC, use of gait belt                               User Key     Initials Effective Dates Name Provider Type Discipline    [facility] 08/09/20 -  Jaquelin Gabriel, PT Physical Therapist PT    KW 08/09/20 -  Getachew Beck PT Physical Therapist PT              PT Recommendation and Plan  Anticipated Discharge Disposition (PT): home with assist, home with home health  Therapy Frequency (PT): other (see comments)(6-11x/wk)  Progress Summary (PT)  Progress Toward Functional Goals (PT): progress toward functional goals is fair  Barriers to Overall Progress (PT): \"jerkiness\"  Plan of Care Reviewed With: patient  Outcome Summary: pt sup>sit with min assist, sit>sup with SBA, pt sat @ EOB ~8 minutes with CGA (secondary to jerkiness). pt would benefit from 24/7 care & coontinued PT services @ D/C  Outcome Measures     Row Name 10/14/20 1200 10/13/20 1039 10/12/20 1147       How much help from another person do you currently need...    Turning from your back to your side while in flat bed without using bedrails?  3  -TA  --  --    Moving from lying on back to sitting on the side of a flat bed without bedrails?  3  -TA  --  --    Moving to and from a bed to a chair (including a wheelchair)?  3  -TA  --  --    Standing up from a chair using your arms (e.g., wheelchair, bedside chair)?  3  -TA  --  --    Climbing 3-5 steps with a railing?  3  -TA  --  --    To walk in hospital room?  3  -TA  --  --    AM-PAC 6 Clicks Score (PT)  18  -TA  --  --       How much help from another is currently needed...    Putting on and taking off regular lower body clothing?  --  2  -AS  2  -KO    Bathing (including washing, rinsing, and drying)  --  3  -AS  3 "  -KO    Toileting (which includes using toilet bed pan or urinal)  --  3  -AS  3  -KO    Putting on and taking off regular upper body clothing  --  3  -AS  3  -KO    Taking care of personal grooming (such as brushing teeth)  --  3  -AS  3  -KO    Eating meals  --  4  -AS  4  -KO    AM-PAC 6 Clicks Score (OT)  --  18  -AS  18  -KO       Functional Assessment    Outcome Measure Options  AM-PAC 6 Clicks Basic Mobility (PT)  -TA  AM-PAC 6 Clicks Daily Activity (OT)  -AS  AM-PAC 6 Clicks Daily Activity (OT)  -KO      User Key  (r) = Recorded By, (t) = Taken By, (c) = Cosigned By    Initials Name Provider Type    Rosemarie Morales PTA Physical Therapy Assistant    AS Noemi Madrid, OT Occupational Therapist    Tiana Quarles, OT Occupational Therapist           Time Calculation:   PT Charges     Row Name 10/14/20 1255             Time Calculation    Start Time  1014  -TA      Stop Time  1047  -TA      Time Calculation (min)  33 min  -TA      PT Received On  10/14/20  -TA         Time Calculation- PT    Total Timed Code Minutes- PT  33 minute(s)  -TA        User Key  (r) = Recorded By, (t) = Taken By, (c) = Cosigned By    Initials Name Provider Type    Rosemarie Morales PTA Physical Therapy Assistant        Therapy Charges for Today     Code Description Service Date Service Provider Modifiers Qty    15972981711 HC PT THERAPEUTIC ACT EA 15 MIN 10/14/2020 Rosemarie Lam PTA GP 1    73924098220 HC PT THER PROC EA 15 MIN 10/14/2020 Rosemarie Lam PTA GP 1          PT G-Codes  Outcome Measure Options: AM-PAC 6 Clicks Basic Mobility (PT)  AM-PAC 6 Clicks Score (PT): 18  AM-PAC 6 Clicks Score (OT): 18    Rosemarie Lam PTA  10/14/2020      Electronically signed by Rosemarie Lma PTA at 10/14/20 1256          Occupational Therapy Notes (most recent note)      Linnea Armijo R, HU/L at 10/14/20 1243             10/14/20 1243   OTHER   Discipline occupational therapy assistant   Pt in supine  pt stated   something causing him to shake  pt denied oob secondary to shaking and scared of falling  pt denied in supine ex     Electronically signed by Linnea Armijo, MAYTE/L at 10/14/20 7574

## 2020-10-15 NOTE — NURSING NOTE
CCPD initiated per policy and procedure without difficulties. Dsg changed , no tenderness noted, gent cream applied, tubing secured to abd.

## 2020-10-15 NOTE — PLAN OF CARE
Goal Outcome Evaluation:  Plan of Care Reviewed With: patient  Progress: no change  Outcome Summary: VSS. Nightly PD. MD aware of pt muscle jerking. Looking for SNF for discharge.

## 2020-10-15 NOTE — PLAN OF CARE
Problem: Adult Inpatient Plan of Care  Goal: Plan of Care Review  Outcome: Ongoing, Progressing  Flowsheets  Taken 10/15/2020 1437  Progress: improving  Plan of Care Reviewed With: patient  Outcome Summary: pt perf well with OT  pt complained of cont jerking motions pt up in chair with chair alarm on  Taken 10/15/2020 0835  Progress: no change  Plan of Care Reviewed With: patient

## 2020-10-15 NOTE — THERAPY TREATMENT NOTE
"Acute Care - Physical Therapy Treatment Note  [facility]     Patient Name: Sumanth Robledo  : 1960  MRN: [id number]  Today's Date: 10/15/2020   Onset of Illness/Injury or Date of Surgery: 10/08/20       PT Assessment (last 12 hours)      PT Evaluation and Treatment     Row Name 10/15/20 09          Physical Therapy Time and Intention    Subjective Information  no complaints  -TA     Document Type  therapy note (daily note)  -TA     Mode of Treatment  individual therapy;physical therapy  -TA     Patient Effort  good  -TA     Comment  pt continues to experience \"jerkiness\"  -TA     Row Name 10/15/20 0947          General Information    Patient Profile Reviewed  yes  -TA     Row Name 10/15/20 0947          Cognition    Orientation Status (Cognition)  oriented x 4  -TA     Personal Safety Interventions  fall prevention program maintained;gait belt;muscle strengthening facilitated;nonskid shoes/slippers when out of bed;supervised activity  -TA     Row Name 10/15/20 0947          Pain Scale: Numbers Pre/Post-Treatment    Pretreatment Pain Rating  0/10 - no pain  -TA     Posttreatment Pain Rating  0/10 - no pain  -TA     Row Name 10/15/20 0947          Bed Mobility    Bed Mobility  scooting/bridging  -TA     Supine-Sit [place] (Bed Mobility)  not tested  -TA     Sit-Supine [place] (Bed Mobility)  not tested  -TA     Assistive Device (Bed Mobility)  bed rails;head of bed elevated;draw sheet  -TA     Row Name 10/15/20 0947          Transfers    Comment (Transfers)  pt performed sit<>stand x 3 with RW & min assist of 1 &  marching in place x 3/15 in standing  -TA     Sit-Stand Walsh (Transfers)  minimum assist (75% patient effort)  -TA     Stand-Sit [place] (Transfers)  minimum assist (75% patient effort)  -TA     Row Name 10/15/20 0947          Sit-Stand Transfer    Assistive Device (Sit-Stand Transfers)  walker, front-wheeled  -TA     Row Name 10/15/20 0947          Stand-Sit Transfer " "   Assistive Device (Stand-Sit Transfers)  walker, front-wheeled  -TA     Row Name 10/15/20 0947          Gait/Stairs (Locomotion)    [place] Level (Gait)  not tested  -TA     Row Name 10/15/20 0947          Safety Issues, Functional Mobility    Impairments Affecting Function (Mobility)  strength;endurance/activity tolerance;balance  -TA     Row Name 10/15/20 0947          Hip (Therapeutic Exercise)    Hip (Therapeutic Exercise)  AROM (active range of motion)  -TA     Hip AROM (Therapeutic Exercise)  bilateral;flexion;sitting;10 repetitions;5 repetitions  -TA     Hip Isometrics (Therapeutic Exercise)  gluteal sets;bilateral;sitting;10 repetitions;5 repetitions  -TA     Row Name 10/15/20 0947          Knee (Therapeutic Exercise)    Knee (Therapeutic Exercise)  AROM (active range of motion);isometric exercises  -TA     Knee AROM (Therapeutic Exercise)  bilateral;LAQ (long arc quad);sitting;10 repetitions;5 repetitions  -TA     Knee Isometrics (Therapeutic Exercise)  bilateral;quad sets;sitting;10 repetitions;5 repetitions reclined  -TA     Row Name 10/15/20 0947          Ankle (Therapeutic Exercise)    Ankle (Therapeutic Exercise)  AROM (active range of motion)  -TA     Ankle AROM (Therapeutic Exercise)  bilateral;dorsiflexion;plantarflexion;sitting;10 repetitions;5 repetitions  -TA     Row Name 10/15/20 0947          Plan of Care Review    Plan of Care Reviewed With  patient  -TA     Outcome Summary  pt sitting in chair on arrival. pt performed sit<>stand x 3 with RW & min assist of 1. pt performed marching in place with standing. pt continues to experience \"jerkiness\". pt will require 24/7 care & continued pT services @ D/C  -TA     Row Name 10/15/20 0947          Vital Signs    Pre Systolic BP Rehab  157  -TA     Pre Treatment Diastolic BP  89  -TA     Post Systolic BP Rehab  151  -TA     Post Treatment Diastolic BP  77  -TA     Pretreatment Heart Rate (beats/min)  90  -TA     Intratreatment Heart Rate " (beats/min)  97  -TA     Posttreatment Heart Rate (beats/min)  97  -TA     Pre SpO2 (%)  95  -TA     O2 Delivery Pre Treatment  supplemental O2  -TA     Intra SpO2 (%)  93  -TA     O2 Delivery Intra Treatment  supplemental O2  -TA     Post SpO2 (%)  96  -TA     O2 Delivery Post Treatment  supplemental O2  -TA     Pre Patient Position  Sitting  -TA     Post Patient Position  Sitting  -TA     Row Name 10/15/20 0947          Bed Mobility Goal 1 (PT)    Activity/Assistive Device (Bed Mobility Goal 1, PT)  sit to supine;supine to sit  -TA     [place] Level/Cues Needed (Bed Mobility Goal 1, PT)  modified independence  -TA     Time Frame (Bed Mobility Goal 1, PT)  3 days  -TA     Strategies/Barriers (Bed Mobility Goal 1, PT)  bed flat, no hand rails  -TA     Progress/Outcomes (Bed Mobility Goal 1, PT)  goal not met  -TA     Row Name 10/15/20 0947          Transfer Goal 1 (PT)    Activity/Assistive Device (Transfer Goal 1, PT)  sit-to-stand/stand-to-sit;bed-to-chair/chair-to-bed LRAD PRN  -TA     [place] Level/Cues Needed (Transfer Goal 1, PT)  modified independence  -TA     Time Frame (Transfer Goal 1, PT)  3 days  -TA     Progress/Outcome (Transfer Goal 1, PT)  goal not met  -TA     Row Name 10/15/20 0947          Gait Training Goal 1 (PT)    Activity/Assistive Device (Gait Training Goal 1, PT)  gait (walking locomotion);assistive device use;decrease fall risk;increase endurance/gait distance LRAD PRN  -TA     [place] Level (Gait Training Goal 1, PT)  modified independence  -TA     Distance (Gait Training Goal 1, PT)  150ft or more  -TA     Time Frame (Gait Training Goal 1, PT)  5 days  -TA     Progress/Outcome (Gait Training Goal 1, PT)  goal not met  -TA     Row Name 10/15/20 0947          Stairs Goal 1 (PT)    Activity/Assistive Device (Stairs Goal 1, PT)  ascending stairs;descending stairs;using handrail, left  -TA     [place] Level/Cues Needed (Stairs Goal 1, PT)  standby assist  -TA     Number of  Stairs (Stairs Goal 1, PT)  2 or more  -TA     Time Frame (Stairs Goal 1, PT)  by discharge  -TA     Progress/Outcome (Stairs Goal 1, PT)  goal not met  -TA     Row Name 10/15/20 0947          Positioning and Restraints    Pre-Treatment Position  sitting in chair/recliner  -TA     Post Treatment Position  chair  -TA     In Chair  sitting;reclined;call light within reach;exit alarm on;with family/caregiver  -TA     Row Name 10/15/20 0947          Therapy Assessment/Plan (PT)    Rehab Potential (PT)  good, to achieve stated therapy goals  -TA     Criteria for Skilled Interventions Met (PT)  yes;skilled treatment is necessary  -TA     Comment, Therapy Assessment/Plan (PT)  continue  -TA     Row Name 10/15/20 0947          Progress Summary (PT)    Progress Toward Functional Goals (PT)  progress toward functional goals is fair  -TA       User Key  (r) = Recorded By, (t) = Taken By, (c) = Cosigned By    Initials Name Provider Type    Rosemarie Morales, PTA Physical Therapy Assistant        Physical Therapy Education                 Title: PT OT SLP Therapies (In Progress)     Topic: Physical Therapy (In Progress)     Point: Mobility training (Done)     Learning Progress Summary           Patient Acceptance, E, VU by KW at 10/13/2020 0940    Comment: PT POC and prognosis, PLB    Acceptance, E, VU by KW1 at 10/12/2020 1431    Comment: Role of PT, POC, use of gait belt                   Point: Home exercise program (Not Started)     Learner Progress:  Not documented in this visit.          Point: Body mechanics (Not Started)     Learner Progress:  Not documented in this visit.          Point: Precautions (Done)     Learning Progress Summary           Patient Acceptance, E, VU by KW at 10/13/2020 0940    Comment: PT POC and prognosis, PLB    Acceptance, E, VU by KW1 at 10/12/2020 1431    Comment: Role of PT, POC, use of gait belt                               User Key     Initials Effective Dates Name Provider Type  "Discipline    KW1 08/09/20 -  Jaquelin Gabriel, PT Physical Therapist PT    KW 08/09/20 -  Getachew Beck PT Physical Therapist PT              PT Recommendation and Plan  Anticipated Discharge Disposition (PT): home with assist, home with home health  Therapy Frequency (PT): other (see comments)(6-11x/wk)  Progress Summary (PT)  Progress Toward Functional Goals (PT): progress toward functional goals is fair  Barriers to Overall Progress (PT): \"jerkiness\"  Plan of Care Reviewed With: patient  Outcome Summary: pt sitting in chair on arrival. pt performed sit<>stand x 3 with RW & min assist of 1. pt performed marching in place with standing. pt continues to experience \"jerkiness\". pt will require 24/7 care & continued pT services @ D/C  Outcome Measures     Row Name 10/15/20 1200 10/14/20 1200 10/13/20 1039       How much help from another person do you currently need...    Turning from your back to your side while in flat bed without using bedrails?  3  -TA  3  -TA  --    Moving from lying on back to sitting on the side of a flat bed without bedrails?  3  -TA  3  -TA  --    Moving to and from a bed to a chair (including a wheelchair)?  3  -TA  3  -TA  --    Standing up from a chair using your arms (e.g., wheelchair, bedside chair)?  3  -TA  3  -TA  --    Climbing 3-5 steps with a railing?  3  -TA  3  -TA  --    To walk in hospital room?  3  -TA  3  -TA  --    AM-PAC 6 Clicks Score (PT)  18  -TA  18  -TA  --       How much help from another is currently needed...    Putting on and taking off regular lower body clothing?  --  --  2  -AS    Bathing (including washing, rinsing, and drying)  --  --  3  -AS    Toileting (which includes using toilet bed pan or urinal)  --  --  3  -AS    Putting on and taking off regular upper body clothing  --  --  3  -AS    Taking care of personal grooming (such as brushing teeth)  --  --  3  -AS    Eating meals  --  --  4  -AS    AM-PAC 6 Clicks Score (OT)  --  --  18  -AS       Functional " Assessment    Outcome Measure Options  AM-PAC 6 Clicks Basic Mobility (PT)  -TA  AM-PAC 6 Clicks Basic Mobility (PT)  -TA  AM-PAC 6 Clicks Daily Activity (OT)  -AS      User Key  (r) = Recorded By, (t) = Taken By, (c) = Cosigned By    Initials Name Provider Type    Rosemarie Morales PTA Physical Therapy Assistant    AS Noemi Madrid, OT Occupational Therapist           Time Calculation:   PT Charges     Row Name 10/15/20 1233             Time Calculation    Start Time  0947  -TA      Stop Time  1034  -TA      Time Calculation (min)  47 min  -TA      PT Received On  10/15/20  -TA         Time Calculation- PT    Total Timed Code Minutes- PT  47 minute(s)  -TA        User Key  (r) = Recorded By, (t) = Taken By, (c) = Cosigned By    Initials Name Provider Type    Rosemarie Morales PTA Physical Therapy Assistant        Therapy Charges for Today     Code Description Service Date Service Provider Modifiers Qty    72262299340 HC PT THERAPEUTIC ACT EA 15 MIN 10/14/2020 Rosemarie Lam, PTA GP 1    52007639037 HC PT THER PROC EA 15 MIN 10/14/2020 Rosemarie Lam, PTA GP 1    55184534507 HC PT THERAPEUTIC ACT EA 15 MIN 10/15/2020 Rosemarie Lam, JG GP 2    64274545209 HC PT THER PROC EA 15 MIN 10/15/2020 Rosemarie Lam, PTA GP 1          PT G-Codes  Outcome Measure Options: AM-PAC 6 Clicks Basic Mobility (PT)  AM-PAC 6 Clicks Score (PT): 18  AM-PAC 6 Clicks Score (OT): 18    Rosemarie Lam PTA  10/15/2020

## 2020-10-16 LAB
GLUCOSE BLDC GLUCOMTR-MCNC: 172 MG/DL (ref 70–130)
GLUCOSE BLDC GLUCOMTR-MCNC: 222 MG/DL (ref 70–130)
GLUCOSE BLDC GLUCOMTR-MCNC: 262 MG/DL (ref 70–130)
GLUCOSE BLDC GLUCOMTR-MCNC: 330 MG/DL (ref 70–130)

## 2020-10-16 PROCEDURE — 97530 THERAPEUTIC ACTIVITIES: CPT

## 2020-10-16 PROCEDURE — 97110 THERAPEUTIC EXERCISES: CPT

## 2020-10-16 PROCEDURE — 94760 N-INVAS EAR/PLS OXIMETRY 1: CPT

## 2020-10-16 PROCEDURE — 82962 GLUCOSE BLOOD TEST: CPT

## 2020-10-16 PROCEDURE — 97116 GAIT TRAINING THERAPY: CPT

## 2020-10-16 PROCEDURE — 94799 UNLISTED PULMONARY SVC/PX: CPT

## 2020-10-16 PROCEDURE — 63710000001 INSULIN DETEMIR PER 5 UNITS: Performed by: INTERNAL MEDICINE

## 2020-10-16 PROCEDURE — 25010000002 ONDANSETRON PER 1 MG: Performed by: INTERNAL MEDICINE

## 2020-10-16 PROCEDURE — 25010000002 HEPARIN (PORCINE) PER 1000 UNITS: Performed by: INTERNAL MEDICINE

## 2020-10-16 RX ADMIN — SODIUM CHLORIDE, PRESERVATIVE FREE 10 ML: 5 INJECTION INTRAVENOUS at 08:17

## 2020-10-16 RX ADMIN — CARVEDILOL 6.25 MG: 6.25 TABLET, FILM COATED ORAL at 08:17

## 2020-10-16 RX ADMIN — HEPARIN SODIUM 5000 UNITS: 5000 INJECTION INTRAVENOUS; SUBCUTANEOUS at 21:17

## 2020-10-16 RX ADMIN — GABAPENTIN 100 MG: 100 CAPSULE ORAL at 21:18

## 2020-10-16 RX ADMIN — LANTHANUM CARBONATE 500 MG: 500 TABLET, CHEWABLE ORAL at 11:57

## 2020-10-16 RX ADMIN — ONDANSETRON HYDROCHLORIDE 4 MG: 2 INJECTION, SOLUTION INTRAMUSCULAR; INTRAVENOUS at 12:01

## 2020-10-16 RX ADMIN — SODIUM CHLORIDE, PRESERVATIVE FREE 10 ML: 5 INJECTION INTRAVENOUS at 21:17

## 2020-10-16 RX ADMIN — HYDRALAZINE HYDROCHLORIDE 25 MG: 25 TABLET, FILM COATED ORAL at 06:08

## 2020-10-16 RX ADMIN — CARVEDILOL 6.25 MG: 6.25 TABLET, FILM COATED ORAL at 17:31

## 2020-10-16 RX ADMIN — LANTHANUM CARBONATE 500 MG: 500 TABLET, CHEWABLE ORAL at 08:18

## 2020-10-16 RX ADMIN — PRAVASTATIN SODIUM 20 MG: 20 TABLET ORAL at 08:17

## 2020-10-16 RX ADMIN — METOROPROLOL TARTRATE 5 MG: 5 INJECTION, SOLUTION INTRAVENOUS at 12:04

## 2020-10-16 RX ADMIN — HEPARIN SODIUM 5000 UNITS: 5000 INJECTION INTRAVENOUS; SUBCUTANEOUS at 08:18

## 2020-10-16 RX ADMIN — SEVELAMER CARBONATE 2400 MG: 800 TABLET, FILM COATED ORAL at 17:32

## 2020-10-16 RX ADMIN — CLOPIDOGREL BISULFATE 75 MG: 75 TABLET ORAL at 08:17

## 2020-10-16 RX ADMIN — CALCITRIOL 1 MCG: 0.25 CAPSULE ORAL at 08:17

## 2020-10-16 RX ADMIN — ASPIRIN 81 MG: 81 TABLET, CHEWABLE ORAL at 08:18

## 2020-10-16 RX ADMIN — SEVELAMER CARBONATE 2400 MG: 800 TABLET, FILM COATED ORAL at 08:18

## 2020-10-16 RX ADMIN — HYDRALAZINE HYDROCHLORIDE 25 MG: 25 TABLET, FILM COATED ORAL at 21:17

## 2020-10-16 RX ADMIN — LOSARTAN POTASSIUM 50 MG: 50 TABLET, FILM COATED ORAL at 08:18

## 2020-10-16 RX ADMIN — SEVELAMER CARBONATE 2400 MG: 800 TABLET, FILM COATED ORAL at 11:57

## 2020-10-16 RX ADMIN — HYDRALAZINE HYDROCHLORIDE 25 MG: 25 TABLET, FILM COATED ORAL at 13:16

## 2020-10-16 RX ADMIN — INSULIN DETEMIR 20 UNITS: 100 INJECTION, SOLUTION SUBCUTANEOUS at 21:17

## 2020-10-16 RX ADMIN — LANTHANUM CARBONATE 500 MG: 500 TABLET, CHEWABLE ORAL at 17:31

## 2020-10-16 RX ADMIN — ISOSORBIDE MONONITRATE 60 MG: 60 TABLET, EXTENDED RELEASE ORAL at 08:17

## 2020-10-16 RX ADMIN — FAMOTIDINE 20 MG: 20 TABLET, FILM COATED ORAL at 08:18

## 2020-10-16 NOTE — NURSING NOTE
CCPD initiated per policy with dressing change completed.  No s/s of infection such as drainage, redness, or tenderness.  Line secured to abd

## 2020-10-16 NOTE — PROGRESS NOTES
Cardiology Progress Note     LOS: 6 days   Patient Care Team:  Elizabeth Burleson APRN as PCP - General (Family Medicine)    Subjective:  Chart reviewed. Patient seen and examined. Patient denies any chest pain, shortness of breath, or palpitation.  Patient symptoms of tremors have improved.  Patient has not had any further recurrent symptoms of chest pain.  Patient's continues to have generalized weakness and fatigue.      Objective:  Temp:  [96.7 °F (35.9 °C)-97.8 °F (36.6 °C)] 97.7 °F (36.5 °C)  Heart Rate:  [] 91  Resp:  [16-18] 16  BP: (133-183)/(63-89) 165/89    Intake/Output Summary (Last 24 hours) at 10/16/2020 1809  Last data filed at 10/16/2020 0821  Gross per 24 hour   Intake 360 ml   Output 2134 ml   Net -1774 ml       Physical Exam:   General Appearance:    Alert, oriented, cooperative, in no acute distress.   Head:    Normocephalic, atraumatic, without obvious abnormality   Eyes:             ZULEMA. Lids and lashes normal, conjunctivae and sclerae normal, no icterus, no pallor.   Ears:    Ears appear intact with no abnormalities noted.   Throat:   Mucous membranes pink and moist.   Neck:  Supple, trachea midline, no carotid bruit, no organomegaly or JVD.   Lungs:    Clear to auscultation and percussion.  Respirations regular, even and unlabored. No wheezes, rales, or rhonchi.    Heart:    Regular rhythm and normal rate, normal S1 and S2, no      murmur, no gallop, no rub, no click.   Abdomen:    Soft, nontender, nondistended, no guarding, no rebound tenderness. Normal bowel sounds in all four quadrants, no masses, liver and spleen nonpalpable.    Genitalia:    Deferred.   Extremities:   Moves all extremities well, no edema, no cyanosis, no       redness, no clubbing.   Pulses:   Pulses palpable and equal bilaterally.   Skin:   Moist and warm. No bleeding, bruising or rash.   Neurologic/Psychiatric:   Alert and oriented to person, place, and time.  Motor, power and tone in upper and lower  extremities are grossly intact.  No focal neurological deficits. Normal cognitive function. No psychomotor reaction or tangential thought. No depression, homicidal ideations and suicidal ideations.          Results Review:    Results from last 7 days   Lab Units 10/15/20  0640   SODIUM mmol/L 136   POTASSIUM mmol/L 3.7   CHLORIDE mmol/L 94*   CO2 mmol/L 27.0   BUN mg/dL 57*   CREATININE mg/dL 12.02*   CALCIUM mg/dL 9.3   BILIRUBIN mg/dL 0.3   ALK PHOS U/L 64   ALT (SGPT) U/L 11   AST (SGOT) U/L 11   GLUCOSE mg/dL 76     Results from last 7 days   Lab Units 10/10/20  1316   TROPONIN T ng/mL 1.940*         Results from last 7 days   Lab Units 10/15/20  0640   WBC 10*3/mm3 12.12*   HEMOGLOBIN g/dL 8.5*   HEMATOCRIT % 26.5*   PLATELETS 10*3/mm3 277         Results from last 7 days   Lab Units 10/15/20  0640   MAGNESIUM mg/dL 2.0         Results from last 7 days   Lab Units 10/15/20  0640   TSH uIU/mL 1.770           ECHO:  Results for orders placed during the hospital encounter of 10/08/20   Adult Transthoracic Echo Complete W/ Cont if Necessary Per Protocol    Narrative · Left ventricular ejection fraction appears to be 41 - 45%. Left   ventricular systolic function is mildly decreased.  · Left ventricular diastolic function is consistent with (grade II w/high   LAP) pseudonormalization.  · Left ventricular wall thickness is consistent with concentric   hypertrophy.  · Mild mitral annular calcification is present.  · Mild mitral valve regurgitation is present.  · Estimated right ventricular systolic pressure from tricuspid   regurgitation is normal (<35 mmHg).  · The left atrial cavity is moderately dilated.  · Mildly reduced right ventricular systolic function noted.          ECG 12 Lead   Final Result   Test Reason : Syncope   Blood Pressure : **/** mmHG   Vent. Rate : 074 BPM     Atrial Rate : 074 BPM      P-R Int : 178 ms          QRS Dur : 112 ms       QT Int : 448 ms       P-R-T Axes : 025 033 193 degrees       QTc Int : 497 ms      Normal sinus rhythm   Incomplete left bundle branch block   ST &  T wave abnormality, consider inferior ischemia   ST &  T wave abnormality, consider anterolateral ischemia   Prolonged QT   Abnormal ECG   When compared with ECG of 08-OCT-2020 20:44,   No significant change was found   Confirmed by EUGENIO FLETCHER ([id number]) on 10/10/2020 2:58:17 PM      Referred By:             Confirmed By:EUGENIO FLETCHER      ECG 12 Lead   ED Interpretation   Abnormal   Musa Saenz MD     10/8/2020 10:43 PM   ECG 12 Lead         Date/Time: 10/8/2020 9:04 PM   Performed by: Musa Saenz MD   Authorized by: Musa Saenz MD    Interpreted by physician   Rhythm: sinus rhythm   Rate: normal   T depression: I, II, aVF, aVL, V3, V4, V5 and V6   Clinical impression: abnormal ECG         Final Result   Test Reason : abnormal lab   Blood Pressure : **/** mmHG   Vent. Rate : 091 BPM     Atrial Rate : 091 BPM      P-R Int : 134 ms          QRS Dur : 102 ms       QT Int : 368 ms       P-R-T Axes : 042 026 197 degrees      QTc Int : 452 ms      Normal sinus rhythm   Possible Left atrial enlargement   ST &  T wave abnormality, consider inferior ischemia   ST &  T wave abnormality, consider anterolateral ischemia   Abnormal ECG   When compared with ECG of 29-OCT-2014 09:12,   No significant change was found   Confirmed by LUDMILA HAGER ([id number]) on 10/9/2020 12:16:27 PM      Referred By:  CHELSIE           Confirmed By:LUDMILA HAGER      SCANNED EKG   Final Result      SCANNED EKG   Final Result      SCANNED EKG   Final Result      SCANNED EKG   Final Result      SCANNED EKG   Final Result      SCANNED EKG   Final Result      SCANNED EKG   Final Result           Medication Review:   Current Facility-Administered Medications   Medication Dose Route Frequency Provider Last Rate Last Dose   • acetaminophen (TYLENOL) tablet 650 mg  650 mg Oral Q4H PRN Brendan Moon MD   650 mg at 10/11/20 1108    Or   • acetaminophen (TYLENOL)  suppository 650 mg  650 mg Rectal Q4H PRN Brendan Moon MD       • acetaminophen (TYLENOL) tablet 650 mg  650 mg Oral Q4H PRN Brendan Moon MD       • aspirin chewable tablet 81 mg  81 mg Oral Daily Brendan Moon MD   81 mg at 10/16/20 0818   • bisacodyl (DULCOLAX) EC tablet 5 mg  5 mg Oral Daily PRN Brendan Moon MD       • calcitriol (ROCALTROL) capsule 1 mcg  1 mcg Oral Daily Brendan Moon MD   1 mcg at 10/16/20 0817   • carvedilol (COREG) tablet 6.25 mg  6.25 mg Oral BID With Meals Brendan Moon MD   6.25 mg at 10/16/20 1731   • clopidogrel (PLAVIX) tablet 75 mg  75 mg Oral Daily Brendan Moon MD   75 mg at 10/16/20 0817   • Delflex-LC/1.5% Dextrose (DIANEAL) CAPD 12,200 mL  12,200 mL Intraperitoneal PRN Brendan Moon MD       • Delflex-LC/2.5% Dextrose (DIANEAL) CAPD 5,000 mL  5,000 mL Intraperitoneal PRN Brendan Moon MD       • Delflex-LC/2.5% Dextrose (DIANEAL) CAPD 5,000 mL  5,000 mL Intraperitoneal PRN Brendan Moon MD   5,000 mL at 10/12/20 1556   • Delflex-LC/2.5% Dextrose (DIANEAL) CAPD 5,000 mL  5,000 mL Intraperitoneal PRN Mauro Saravia MD   5,000 mL at 10/13/20 1545   • Delflex-LC/2.5% Dextrose (DIANEAL) CAPD 5,000 mL  5,000 mL Intraperitoneal PRN Mauro Saravia MD       • Delflex-LC/4.25% Dextrose (DIANEAL) CAPD 5,000 mL  5,000 mL Intraperitoneal PRN Mauro Saravia MD       • [START ON 10/17/2020] epoetin arturo-epbx (RETACRIT) injection 10,000 Units  10,000 Units Subcutaneous Weekly Brendan Moon MD       • famotidine (PEPCID) tablet 20 mg  20 mg Oral Daily Brendan Moon MD   20 mg at 10/16/20 0818   • gabapentin (NEURONTIN) capsule 100 mg  100 mg Oral Nightly Mauro Saravia MD   100 mg at 10/15/20 2137   • heparin (porcine) 5000 UNIT/ML injection 5,000 Units  5,000 Units Subcutaneous Q12H Brendan Moon MD   5,000 Units at 10/16/20 0818   • hydrALAZINE (APRESOLINE) tablet 25 mg  25 mg Oral Q8H Brendan Moon MD   25  mg at 10/16/20 1316   • influenza vac split quad (FLUZONE,FLUARIX,AFLURIA,FLULAVAL) injection 0.5 mL  0.5 mL Intramuscular During Hospitalization Brendan Moon MD       • insulin detemir (LEVEMIR) injection 20 Units  20 Units Subcutaneous Nightly Brendan Moon MD   20 Units at 10/15/20 2137   • isosorbide mononitrate (IMDUR) 24 hr tablet 60 mg  60 mg Oral Q24H Brendan Moon MD   60 mg at 10/16/20 0817   • lanthanum (FOSRENOL) chewable tablet 500 mg  500 mg Oral TID With Meals Mauro Saravia MD   500 mg at 10/16/20 1731   • losartan (COZAAR) tablet 50 mg  50 mg Oral Q24H Brendan Moon MD   50 mg at 10/16/20 0818   • metoprolol tartrate (LOPRESSOR) injection 5 mg  5 mg Intravenous Q6H PRN Brendan Moon MD   5 mg at 10/16/20 1204   • nitroglycerin (NITROSTAT) SL tablet 0.4 mg  0.4 mg Sublingual Q5 Min PRN Brendan Moon MD       • ondansetron (ZOFRAN) tablet 4 mg  4 mg Oral Q6H PRN Brendan Moon MD        Or   • ondansetron (ZOFRAN) injection 4 mg  4 mg Intravenous Q6H PRN Brendan Moon MD   4 mg at 10/16/20 1201   • pravastatin (PRAVACHOL) tablet 20 mg  20 mg Oral Daily Brendan Moon MD   20 mg at 10/16/20 0817   • sevelamer (RENVELA) tablet 2,400 mg  2,400 mg Oral TID With Meals Brendan Moon MD   2,400 mg at 10/16/20 1732   • sodium chloride 0.9 % flush 10 mL  10 mL Intravenous Q12H Brendan Moon MD   10 mL at 10/16/20 0817   • sodium chloride 0.9 % flush 10 mL  10 mL Intravenous PRN Brendan Moon MD   10 mL at 10/11/20 2038       Assessment and Plan:      Hypotension    CAD (coronary artery disease)    ESRF (end stage renal failure) (CMS/[facility])    Diabetes mellitus (CMS/[facility])    Cardiac arrest (CMS/[facility])    Syncope  1.  Tremors.  Patient symptoms of tremors have improved after stopping the Ranexa.  Patient has not had any further recurrent symptoms of chest pain.    2.  Atherosclerotic coronary artery disease. Patient had 100% occlusion of the left  main coronary artery and 100% occlusion of the native right coronary artery with a patent saphenous vein graft to the right coronary artery obtuse marginal branch and a patent LIMA to the LAD.  Patient had an attempted PTCA of the chronic total occlusion of the left main coronary artery for a non-Q myocardial infarction and ongoing symptoms of chest pain.  Patient PTCA was unsuccessful.  Patient has not had any further recurrent symptoms or chest pain.  Patient was started on Ranexa.  Patient had episodes of tremors and Ranexa was stopped.    3.  Arterial hypertension.  Patient would be continued on the present dose of the hydralazine losartan and carvedilol.  Patient had not complained of having any symptoms of headache.    4.  Hypertensive heart disease with left ventricular systolic dysfunction with an ejection fraction of 45%.  Clinically at the present time patient is not in congestive heart failure.    5.  Pulseless electrical activity status post cardiopulmonary arrest.  Patient has not had any further recurrence of any arrhythmia.    6.  End-stage renal disease on peritoneal dialysis.  Patient has been followed by nephrology.    7.  Hyperlipidemia.  Patient has been counseled on low-fat low-cholesterol diet and to continue with the present dose of the pravastatin    The above plan of management were discussed with the patient and the family.            Electronically signed by Gibran Garza MD, 10/16/20, 6:09 PM CDT.      Time: Time spent on face-to-face interaction 20 minutes    Dictated utilizing Dragon dictation.

## 2020-10-16 NOTE — PROGRESS NOTES
[facility]  INPATIENT PROGRESS NOTE    Length of Stay: 6  Date of Admission: 10/8/2020  Primary Care Physician: Elizabeth Burleson APRN    Subjective   Chief Complaint:  Chest pain  HPI: Feels better today.  Tremors are continuing to improve.    Review of Systems   Constitutional: Negative for appetite change, chills, fatigue and fever.   Respiratory: Negative for chest tightness and shortness of breath.    Cardiovascular: Negative for chest pain, palpitations and leg swelling.   Gastrointestinal: Negative for abdominal pain, constipation, diarrhea, nausea and vomiting.   Skin: Negative for wound.   Neurological: Positive for tremors (better today). Negative for dizziness, weakness, light-headedness, numbness and headaches.     All pertinent negatives and positives are as above. All other systems have been reviewed and are negative unless otherwise stated.     Objective    Temp:  [96.7 °F (35.9 °C)-97.8 °F (36.6 °C)] 97.7 °F (36.5 °C)  Heart Rate:  [] 99  Resp:  [16-18] 16  BP: (133-183)/(63-89) 165/89    Physical Exam  Vitals signs reviewed.   Constitutional:       Appearance: He is well-developed.   HENT:      Head: Normocephalic and atraumatic.   Eyes:      Pupils: Pupils are equal, round, and reactive to light.   Neck:      Musculoskeletal: Normal range of motion and neck supple.   Cardiovascular:      Rate and Rhythm: Normal rate and regular rhythm.      Heart sounds: Normal heart sounds. No murmur. No friction rub. No gallop.    Pulmonary:      Effort: Pulmonary effort is normal. No respiratory distress.      Breath sounds: Normal breath sounds. No wheezing or rales.   Chest:      Chest wall: No tenderness.   Abdominal:      General: Bowel sounds are normal. There is no distension.      Palpations: Abdomen is soft.      Tenderness: There is no abdominal tenderness.   Psychiatric:         Behavior: Behavior normal.       Results Review:  I have  reviewed the labs, radiology results, and diagnostic studies.    Laboratory Data:   Results from last 7 days   Lab Units 10/15/20  0640 10/13/20  0358 10/10/20  1316   SODIUM mmol/L 136 134* 136   POTASSIUM mmol/L 3.7 3.8 4.1   CHLORIDE mmol/L 94* 95* 95*   CO2 mmol/L 27.0 24.0 19.0*   BUN mg/dL 57* 60* 68*   CREATININE mg/dL 12.02* 11.87* 13.33*   GLUCOSE mg/dL 76 111* 174*   CALCIUM mg/dL 9.3 8.3* 7.7*   BILIRUBIN mg/dL 0.3  --  0.3   ALK PHOS U/L 64  --  58   ALT (SGPT) U/L 11  --  31   AST (SGOT) U/L 11  --  39   ANION GAP mmol/L 15.0 15.0 22.0*     Estimated Creatinine Clearance: 7.1 mL/min (A) (by C-G formula based on SCr of 12.02 mg/dL (H)).  Results from last 7 days   Lab Units 10/15/20  0640 10/14/20  0512 10/13/20  0358 10/11/20  0936   MAGNESIUM mg/dL 2.0  --  1.8 1.8   PHOSPHORUS mg/dL 9.4* 9.9* 10.0*  --          Results from last 7 days   Lab Units 10/15/20  0640 10/13/20  0358 10/10/20  1316 10/10/20  0610   WBC 10*3/mm3 12.12* 8.50 11.44* 8.57   HEMOGLOBIN g/dL 8.5* 8.2* 8.6* 8.8*   HEMATOCRIT % 26.5* 26.5* 27.9* 28.2*   PLATELETS 10*3/mm3 277 248 273 303     Results from last 7 days   Lab Units 10/09/20  1403   INR  1.80*       Culture Data:   No results found for: BLOODCX  No results found for: URINECX  No results found for: RESPCX  No results found for: WOUNDCX  No results found for: STOOLCX  No components found for: BODYFLD    Radiology Data:   Imaging Results (Last 24 Hours)     ** No results found for the last 24 hours. **          I have reviewed the patient's current medications.     Assessment/Plan     Active Hospital Problems    Diagnosis   • **Hypotension   • Cardiac arrest (CMS/[facility])   • Syncope   • Diabetes mellitus (CMS/[facility])   • ESRF (end stage renal failure) (CMS/[facility])   • CAD (coronary artery disease)       Plan:    1.  Coronary artery disease: No symptoms.  Continue aspirin, beta-blocker, Plavix, ARB, nitrate, and statin.  2.  End-stage renal failure: Nephrology following.  Continue  with peritoneal dialysis.  3.  Diabetes mellitus: Continue sliding scale and long-acting insulin.  Reasonably controlled.  4.  Status post cardiac arrest: PEA.  No significant neurologic deficit.  5.  Myoclonus: Better again today per patient.  Ranexa discontinued.  6.  Seizure: Patient had 2 witnessed seizures after his cardiac arrest.  None since then.  Work-up thus far has been negative.  Likely metabolic in etiology      The patient was evaluated during the global COVID-19 pandemic, and the diagnosis was suspected/considered upon their initial presentation.  Evaluation, treatment, and testing were consistent with current guidelines for patients who present with complaints or symptoms that may be related to COVID-19.    Discharge Planning: I expect patient to be discharged to home with 24/7 care in 1-2 days.        This document has been electronically signed by Randolph Mcconnell MD on October 16, 2020 13:33 CDT

## 2020-10-16 NOTE — THERAPY TREATMENT NOTE
Acute Care - Occupational Therapy Treatment Note  [facility]     Patient Name: Sumanth Robledo  : 1960  MRN: [id number]  Today's Date: 10/16/2020  Onset of Illness/Injury or Date of Surgery: 10/08/20  Date of Referral to OT: 10/12/20  Referring Physician: Red BARKSDALE MD    Admit Date: 10/8/2020       ICD-10-CM ICD-9-CM   1. Precordial pain  R07.2 786.51   2. Impaired mobility and ADLs  Z74.09 V49.89    Z78.9    3. Impaired functional mobility, balance, gait, and endurance  Z74.09 V49.89     Patient Active Problem List   Diagnosis   • Chest pain   • CAD (coronary artery disease)   • ESRF (end stage renal failure) (CMS/[facility])   • Hypertension   • Diabetes mellitus (CMS/[facility])   • Precordial pain   • Cardiac arrest (CMS/[facility])   • Hypotension   • Syncope     Past Medical History:   Diagnosis Date   • Chronic kidney disease, stage 3    • Coronary arteriosclerosis     eCABGx3 10/2014 LM 95% LAD diff 70% D1 70% OM2 70% OM3 70% & % USA STEMI      • End stage renal disease (CMS/[facility])    • Essential hypertension    • Heterozygous thalassemia     Thalassemia minor      • Hyperlipidemia    • Peripheral vascular disease (CMS/[facility])     WILSON RIGHT 0.97 LEFT 0.79, stent JADYN ZELAYA 2015      • Surgical follow-up care     Emergent CABG X 3     • Type 2 diabetes mellitus (CMS/[facility])      Past Surgical History:   Procedure Laterality Date   • AMPUTATION FINGER / THUMB Right     RIGHT middle finger   • CARDIAC CATHETERIZATION  10/11/2014    Distal left main critical stenosis of up to 95-99% with evidence of filling defect suggestive of a plaque. LAD and OMB 1 and 2 stenosis.A 100% occludeed RCA.   • CARDIAC CATHETERIZATION N/A 2020    Procedure: Left Heart Cath 2020 @ 9;  Surgeon: Gibran Garza MD;  Location: [facility] INVASIVE LOCATION;  Service: Cardiology;  Laterality: N/A;   • CARDIAC CATHETERIZATION N/A 10/9/2020    Procedure: Left Heart Cath;  Surgeon: Gibran Garza MD;  Location: [facility] CATH  INVASIVE LOCATION;  Service: Cardiology;  Laterality: N/A;   • CARDIAC SURGERY     • CORONARY ARTERY BYPASS GRAFT  10/11/2014    Emergent CABG X 3 LIMA->LAD SVG->OM SVG->PDA          OT ASSESSMENT FLOWSHEET (last 12 hours)      OT Evaluation and Treatment     Row Name 10/16/20 1355                   OT Time and Intention    Subjective Information  no complaints  -KD        Document Type  therapy note (daily note)  -KD        Mode of Treatment  individual therapy;occupational therapy  -KD        Patient Effort  good  -KD           General Information    Patient/Family/Caregiver Comments/Observations  Spouse present  -KD        Existing Precautions/Restrictions  fall  -KD           Cognition    Affect/Mental Status (Cognitive)  WFL  -KD        Orientation Status (Cognition)  oriented x 4  -KD           Pain Scale: Numbers Pre/Post-Treatment    Pretreatment Pain Rating  0/10 - no pain  -KD        Posttreatment Pain Rating  0/10 - no pain  -KD           Shoulder (Therapeutic Exercise)    Shoulder (Therapeutic Exercise)  AROM (active range of motion);strengthening exercise  -KD        Shoulder AROM (Therapeutic Exercise)  bilateral;flexion;extension;aBduction;aDduction;external rotation;internal rotation;horizontal aBduction/aDduction  -KD        Shoulder Strengthening (Therapeutic Exercise)  2 lb free weight  -KD           Elbow/Forearm (Therapeutic Exercise)    Elbow/Forearm (Therapeutic Exercise)  AROM (active range of motion);strengthening exercise  -KD        Elbow/Forearm AROM (Therapeutic Exercise)  bilateral;flexion;extension;supination;pronation  -KD        Elbow/Forearm Strengthening (Therapeutic Exercise)  2 lb free weight  -KD           Wrist (Therapeutic Exercise)    Wrist (Therapeutic Exercise)  AROM (active range of motion);strengthening exercise  -KD        Wrist AROM (Therapeutic Exercise)  bilateral;flexion;extension  -KD        Wrist Strengthening (Therapeutic Exercise)  2 lb free weight  -KD            Hand (Therapeutic Exercise)    Hand (Therapeutic Exercise)  AROM (active range of motion);strengthening exercise  -KD        Hand AROM/AAROM (Therapeutic Exercise)  finger flexion;finger extension  -KD           Coping    Observed Emotional State  calm;cooperative;pleasant  -KD           Plan of Care Review    Plan of Care Reviewed With  patient  -KD        Progress  improving  -KD        Outcome Summary  Pt sitting in recliner upon entry. Pt agreeable to ex. Pt tolerated 2 sets x 20 reps BUE ther ex in all planes w/ 2lb HW and rb's as needed to complete task.  -KD           Vital Signs    Pre Systolic BP Rehab  165  -KD        Pre Treatment Diastolic BP  89  -KD        Pretreatment Heart Rate (beats/min)  98  -KD        Posttreatment Heart Rate (beats/min)  99  -KD        Pre SpO2 (%)  92  -KD        O2 Delivery Pre Treatment  supplemental O2  -KD        Post SpO2 (%)  94  -KD        O2 Delivery Post Treatment  supplemental O2  -KD        Pre Patient Position  Sitting  -KD        Intra Patient Position  Sitting  -KD        Post Patient Position  Sitting  -KD           Positioning and Restraints    Pre-Treatment Position  sitting in chair/recliner  -KD        Post Treatment Position  chair  -KD        In Chair  sitting;call light within reach;encouraged to call for assist;exit alarm on  -KD          User Key  (r) = Recorded By, (t) = Taken By, (c) = Cosigned By    Initials Name Effective Dates     Bridgette Nash, MAYTE/DONI 03/07/18 -          Occupational Therapy Education                 Title: PT OT SLP Therapies (In Progress)     Topic: Occupational Therapy (In Progress)     Point: ADL training (Done)     Description:   Instruct learner(s) on proper safety adaptation and remediation techniques during self care or transfers.   Instruct in proper use of assistive devices.              Learning Progress Summary           Patient Acceptance, E, VU by AS at 10/13/2020 1654    Comment: endurance actvities, LBD, PLB    Significant Other Acceptance, E, VU by AS at 10/13/2020 1307    Comment: endurance actvities, LBD, PLB                   Point: Home exercise program (Done)     Description:   Instruct learner(s) on appropriate technique for monitoring, assisting and/or progressing therapeutic exercises/activities.              Learning Progress Summary           Patient Acceptance, E, VU by AS at 10/13/2020 1307    Comment: endurance actvities, LBD, PLB   Significant Other Acceptance, E, VU by AS at 10/13/2020 1307    Comment: endurance actvities, LBD, PLB                   Point: Precautions (Done)     Description:   Instruct learner(s) on prescribed precautions during self-care and functional transfers.              Learning Progress Summary           Patient Acceptance, E, VU by KO at 10/12/2020 1320    Comment: Educated pt on safety during ADLs and fall prevention                   Point: Body mechanics (Not Started)     Description:   Instruct learner(s) on proper positioning and spine alignment during self-care, functional mobility activities and/or exercises.              Learner Progress:  Not documented in this visit.                      User Key     Initials Effective Dates Name Provider Type Discipline    AS 07/05/20 -  Noemi Madrid OT Occupational Therapist OT    KO 08/09/20 -  Tiana Sifuentes OT Occupational Therapist OT                  OT Recommendation and Plan     Plan of Care Review  Plan of Care Reviewed With: patient  Progress: improving  Outcome Summary: Pt sitting in recliner upon entry. Pt agreeable to ex. Pt tolerated 2 sets x 20 reps BUE ther ex in all planes w/ 2lb HW and rb's as needed to complete task.  Plan of Care Reviewed With: patient  Outcome Summary: Pt sitting in recliner upon entry. Pt agreeable to ex. Pt tolerated 2 sets x 20 reps BUE ther ex in all planes w/ 2lb HW and rb's as needed to complete task.    Outcome Measures     Row Name 10/16/20 1355 10/16/20 1200 10/15/20 1200       How  much help from another person do you currently need...    Turning from your back to your side while in flat bed without using bedrails?  --  3  -TA  3  -TA    Moving from lying on back to sitting on the side of a flat bed without bedrails?  --  3  -TA  3  -TA    Moving to and from a bed to a chair (including a wheelchair)?  --  3  -TA  3  -TA    Standing up from a chair using your arms (e.g., wheelchair, bedside chair)?  --  3  -TA  3  -TA    Climbing 3-5 steps with a railing?  --  3  -TA  3  -TA    To walk in hospital room?  --  3  -TA  3  -TA    AM-PAC 6 Clicks Score (PT)  --  18  -TA  18  -TA       How much help from another is currently needed...    Putting on and taking off regular lower body clothing?  2  -KD  --  --    Bathing (including washing, rinsing, and drying)  3  -KD  --  --    Toileting (which includes using toilet bed pan or urinal)  3  -KD  --  --    Putting on and taking off regular upper body clothing  3  -KD  --  --    Taking care of personal grooming (such as brushing teeth)  3  -KD  --  --    Eating meals  4  -KD  --  --    AM-PAC 6 Clicks Score (OT)  18  -KD  --  --       Functional Assessment    Outcome Measure Options  --  AM-PAC 6 Clicks Basic Mobility (PT)  -TA  AM-PAC 6 Clicks Basic Mobility (PT)  -TA    Row Name 10/14/20 [facility]             How much help from another person do you currently need...    Turning from your back to your side while in flat bed without using bedrails?  3  -TA      Moving from lying on back to sitting on the side of a flat bed without bedrails?  3  -TA      Moving to and from a bed to a chair (including a wheelchair)?  3  -TA      Standing up from a chair using your arms (e.g., wheelchair, bedside chair)?  3  -TA      Climbing 3-5 steps with a railing?  3  -TA      To walk in hospital room?  3  -TA      AM-PAC 6 Clicks Score (PT)  18  -TA         Functional Assessment    Outcome Measure Options  AM-PAC 6 Clicks Basic Mobility (PT)  -TA        User Key  (r) =  Recorded By, (t) = Taken By, (c) = Cosigned By    Initials Name Provider Type    Rosemarie Morales PTA Physical Therapy Assistant    Bridgette Smart COTA/L Occupational Therapy Assistant          Time Calculation:   Time Calculation- OT     Row Name 10/16/20 1452             Time Calculation- OT    OT Start Time  1355  -KD      OT Stop Time  1419  -KD      OT Time Calculation (min)  24 min  -KD      Total Timed Code Minutes- OT  24 minute(s)  -KD      OT Received On  10/16/20  -KD        User Key  (r) = Recorded By, (t) = Taken By, (c) = Cosigned By    Initials Name Provider Type    Bridgette Smart, MAYTE/L Occupational Therapy Assistant        Therapy Charges for Today     Code Description Service Date Service Provider Modifiers Qty    37119202667 HC OT THER PROC EA 15 MIN 10/16/2020 Bridgette Nash COTA/L GO 2               RADHA Galeas  10/16/2020

## 2020-10-16 NOTE — THERAPY TREATMENT NOTE
Acute Care - Physical Therapy Treatment Note  [facility]     Patient Name: Sumanth Robledo  : 1960  MRN: [id number]  Today's Date: 10/16/2020   Onset of Illness/Injury or Date of Surgery: 10/08/20       PT Assessment (last 12 hours)      PT Evaluation and Treatment     Row Name 10/16/20 1000          Physical Therapy Time and Intention    Subjective Information  no complaints  -TA     Document Type  therapy note (daily note)  -TA     Mode of Treatment  individual therapy;physical therapy  -TA     Patient Effort  good  -TA     Row Name 10/16/20 1000          General Information    Patient Profile Reviewed  yes  -TA     Row Name 10/16/20 1000          Cognition    Affect/Mental Status (Cognitive)  WFL  -TA     Orientation Status (Cognition)  oriented x 4  -TA     Personal Safety Interventions  fall prevention program maintained;gait belt;muscle strengthening facilitated;nonskid shoes/slippers when out of bed;supervised activity  -TA     Row Name 10/16/20 1000          Pain Scale: Numbers Pre/Post-Treatment    Pretreatment Pain Rating  0/10 - no pain  -TA     Posttreatment Pain Rating  0/10 - no pain  -TA     Row Name 10/16/20 1000          Bed Mobility    Bed Mobility  scooting/bridging  -TA     Supine-Sit [place] (Bed Mobility)  not tested  -TA     Sit-Supine [place] (Bed Mobility)  not tested  -TA     Assistive Device (Bed Mobility)  bed rails;head of bed elevated;draw sheet  -TA     Row Name 10/16/20 1000          Transfers    Comment (Transfers)  pt performed sit<>stand x 2 & performed marching in place with CGA of 1  -TA     Sit-Stand [place] (Transfers)  contact guard;verbal cues  -TA     Stand-Sit [place] (Transfers)  contact guard;verbal cues  -TA     Row Name 10/16/20 1000          Sit-Stand Transfer    Assistive Device (Sit-Stand Transfers)  walker, front-wheeled  -TA     Row Name 10/16/20 1000          Stand-Sit Transfer    Assistive Device (Stand-Sit Transfers)  walker,  front-wheeled  -TA     Row Name 10/16/20 1000          Gait/Stairs (Locomotion)    [place] Level (Gait)  contact guard  -TA     Assistive Device (Gait)  walker, front-wheeled  -TA     Distance in Feet (Gait)  50` x 2  -TA     Row Name 10/16/20 1000          Safety Issues, Functional Mobility    Impairments Affecting Function (Mobility)  strength;endurance/activity tolerance;balance  -TA     Row Name 10/16/20 1000          Hip (Therapeutic Exercise)    Hip (Therapeutic Exercise)  AROM (active range of motion)  -TA     Hip AROM (Therapeutic Exercise)  bilateral;flexion;sitting;10 repetitions;5 repetitions  -TA     Row Name 10/16/20 1000          Knee (Therapeutic Exercise)    Knee (Therapeutic Exercise)  AROM (active range of motion)  -TA     Knee AROM (Therapeutic Exercise)  bilateral;LAQ (long arc quad);sitting;10 repetitions;5 repetitions  -TA     Row Name 10/16/20 1000          Ankle (Therapeutic Exercise)    Ankle (Therapeutic Exercise)  AROM (active range of motion)  -TA     Ankle AROM (Therapeutic Exercise)  bilateral;sitting;dorsiflexion;plantarflexion;10 repetitions;5 repetitions;2 sets  -TA     Row Name 10/16/20 1000          Plan of Care Review    Plan of Care Reviewed With  patient  -TA     Progress  improving  -TA     Outcome Summary  pt sitting in chair upon entry. pt sit<>stand with CGA., pt ambulated 50` x 2 with RW & CGA of 1. pt will require assistance @ home, a RW  & HHPT @ D/C  -TA     Row Name 10/16/20 1000          Vital Signs    Pre Systolic BP Rehab  171  -TA     Pre Treatment Diastolic BP  81  -TA     Post Systolic BP Rehab  170  -TA     Post Treatment Diastolic BP  82  -TA     Pretreatment Heart Rate (beats/min)  98  -TA     Intratreatment Heart Rate (beats/min)  98  -TA     Pre SpO2 (%)  95  -TA     O2 Delivery Pre Treatment  supplemental O2  -TA     Intra SpO2 (%)  89  -TA     O2 Delivery Intra Treatment  supplemental O2  -TA     Post SpO2 (%)  93  -TA     O2 Delivery Post Treatment   supplemental O2  -TA     Pre Patient Position  Sitting  -TA     Post Patient Position  Sitting  -TA     Row Name 10/16/20 1000          Bed Mobility Goal 1 (PT)    Activity/Assistive Device (Bed Mobility Goal 1, PT)  sit to supine;supine to sit  -TA     [place] Level/Cues Needed (Bed Mobility Goal 1, PT)  modified independence  -TA     Time Frame (Bed Mobility Goal 1, PT)  3 days  -TA     Strategies/Barriers (Bed Mobility Goal 1, PT)  bed flat, no hand rails  -TA     Progress/Outcomes (Bed Mobility Goal 1, PT)  goal not met  -TA     Row Name 10/16/20 1000          Transfer Goal 1 (PT)    Activity/Assistive Device (Transfer Goal 1, PT)  sit-to-stand/stand-to-sit;bed-to-chair/chair-to-bed LRAD PRN  -TA     [place]ates Level/Cues Needed (Transfer Goal 1, PT)  modified independence  -TA     Time Frame (Transfer Goal 1, PT)  3 days  -TA     Progress/Outcome (Transfer Goal 1, PT)  goal not met  -TA     Row Name 10/16/20 1000          Gait Training Goal 1 (PT)    Activity/Assistive Device (Gait Training Goal 1, PT)  gait (walking locomotion);assistive device use;decrease fall risk;increase endurance/gait distance LRAD PRN  -TA     [place] Level (Gait Training Goal 1, PT)  modified independence  -TA     Distance (Gait Training Goal 1, PT)  150ft or more  -TA     Time Frame (Gait Training Goal 1, PT)  5 days  -TA     Progress/Outcome (Gait Training Goal 1, PT)  goal not met  -TA     Row Name 10/16/20 1000          Stairs Goal 1 (PT)    Activity/Assistive Device (Stairs Goal 1, PT)  ascending stairs;descending stairs;using handrail, left  -TA     [place] Level/Cues Needed (Stairs Goal 1, PT)  standby assist  -TA     Number of Stairs (Stairs Goal 1, PT)  2 or more  -TA     Time Frame (Stairs Goal 1, PT)  by discharge  -TA     Progress/Outcome (Stairs Goal 1, PT)  goal not met  -TA     Row Name 10/16/20 1000          Positioning and Restraints    Pre-Treatment Position  sitting in chair/recliner  -TA     Post  Treatment Position  chair  -TA     In Chair  reclined;sitting;call light within reach;exit alarm on  -TA     Row Name 10/16/20 1000          Therapy Assessment/Plan (PT)    Rehab Potential (PT)  good, to achieve stated therapy goals  -TA     Criteria for Skilled Interventions Met (PT)  yes;skilled treatment is necessary  -TA     Comment, Therapy Assessment/Plan (PT)  continue  -TA     Row Name 10/16/20 1000          Progress Summary (PT)    Progress Toward Functional Goals (PT)  progress toward functional goals is good  -TA       User Key  (r) = Recorded By, (t) = Taken By, (c) = Cosigned By    Initials Name Provider Type    TA Rosemarie Lam, PTA Physical Therapy Assistant        Physical Therapy Education                 Title: PT OT SLP Therapies (In Progress)     Topic: Physical Therapy (In Progress)     Point: Mobility training (Done)     Learning Progress Summary           Patient Acceptance, E, VU by JERAMY at 10/13/2020 0940    Comment: PT POC and prognosis, PLB    Acceptance, E, VU by [facility] at 10/12/2020 1431    Comment: Role of PT, POC, use of gait belt                   Point: Home exercise program (Not Started)     Learner Progress:  Not documented in this visit.          Point: Body mechanics (Not Started)     Learner Progress:  Not documented in this visit.          Point: Precautions (Done)     Learning Progress Summary           Patient Acceptance, E, VU by JERAMY at 10/13/2020 0940    Comment: PT POC and prognosis, PLB    Acceptance, E, VU by MAYCO at 10/12/2020 1431    Comment: Role of PT, POC, use of gait belt                               User Key     Initials Effective Dates Name Provider Type Discipline    [facility] 08/09/20 -  Jaquelin Gabriel, PT Physical Therapist PT     08/09/20 -  Getachew Beck, PT Physical Therapist PT              PT Recommendation and Plan  Anticipated Discharge Disposition (PT): home with assist, home with home health  Therapy Frequency (PT): other (see comments)(6-11x/wk)  Progress  "Summary (PT)  Progress Toward Functional Goals (PT): progress toward functional goals is good  Barriers to Overall Progress (PT): \"jerkiness\"  Plan of Care Reviewed With: patient  Progress: improving  Outcome Summary: pt sitting in chair upon entry. pt sit<>stand with CGA., pt ambulated 50` x 2 with RW & CGA of 1. pt will require assistance @ home, a RW  & HHPT @ D/C  Outcome Measures     Row Name 10/16/20 1200 10/15/20 1200 10/14/20 1200       How much help from another person do you currently need...    Turning from your back to your side while in flat bed without using bedrails?  3  -TA  3  -TA  3  -TA    Moving from lying on back to sitting on the side of a flat bed without bedrails?  3  -TA  3  -TA  3  -TA    Moving to and from a bed to a chair (including a wheelchair)?  3  -TA  3  -TA  3  -TA    Standing up from a chair using your arms (e.g., wheelchair, bedside chair)?  3  -TA  3  -TA  3  -TA    Climbing 3-5 steps with a railing?  3  -TA  3  -TA  3  -TA    To walk in hospital room?  3  -TA  3  -TA  3  -TA    AM-PAC 6 Clicks Score (PT)  18  -TA  18  -TA  18  -TA       Functional Assessment    Outcome Measure Options  AM-PAC 6 Clicks Basic Mobility (PT)  -TA  AM-PAC 6 Clicks Basic Mobility (PT)  -TA  AM-PAC 6 Clicks Basic Mobility (PT)  -TA      User Key  (r) = Recorded By, (t) = Taken By, (c) = Cosigned By    Initials Name Provider Type    Rosemarie Morales PTA Physical Therapy Assistant           Time Calculation:   PT Charges     Row Name 10/16/20 1248             Time Calculation    Start Time  1000  -TA      Stop Time  1046  -TA      Time Calculation (min)  46 min  -TA      PT Received On  10/16/20  -TA         Time Calculation- PT    Total Timed Code Minutes- PT  46 minute(s)  -TA        User Key  (r) = Recorded By, (t) = Taken By, (c) = Cosigned By    Initials Name Provider Type    Rosemarie Morales PTA Physical Therapy Assistant        Therapy Charges for Today     Code Description Service Date " Service Provider Modifiers Qty    28702925403 HC PT THERAPEUTIC ACT EA 15 MIN 10/15/2020 Rosemarie Lam, PTA GP 2    52138733620 HC PT THER PROC EA 15 MIN 10/15/2020 Rosemarie Lam, PTA GP 1    54414550226 HC GAIT TRAINING EA 15 MIN 10/16/2020 Rosemarie Lam, PTA GP 1    68210211429 HC PT THERAPEUTIC ACT EA 15 MIN 10/16/2020 Rosemarie Lam, PTA GP 1    82216623519 HC PT THER PROC EA 15 MIN 10/16/2020 Rosemarie Lam, PTA GP 1          PT G-Codes  Outcome Measure Options: AM-PAC 6 Clicks Basic Mobility (PT)  AM-PAC 6 Clicks Score (PT): 18  AM-PAC 6 Clicks Score (OT): 18    Rosemarie Lam PTA  10/16/2020

## 2020-10-16 NOTE — PROGRESS NOTES
Cardiology Progress Note     LOS: 5 days   Patient Care Team:  Elizabeth Burleson APRN as PCP - General (Family Medicine)    Subjective:   Chart reviewed. Patient seen and examined. Patient denies any chest pain, shortness of breath, or palpitation.  Patient has not complained of having symptoms of chest pain.  Patient continues to have unsteady gait fatigue and tremors.    Objective:  Temp:  [96.6 °F (35.9 °C)-97.6 °F (36.4 °C)] 96.6 °F (35.9 °C)  Heart Rate:  [88-97] 95  Resp:  [16-18] 18  BP: (142-165)/(67-77) 146/77    Intake/Output Summary (Last 24 hours) at 10/15/2020 1924  Last data filed at 10/15/2020 0938  Gross per 24 hour   Intake 120 ml   Output 2456 ml   Net -2336 ml       Physical Exam:   General Appearance:    Alert, oriented, cooperative, in no acute distress.   Head:    Normocephalic, atraumatic, without obvious abnormality   Eyes:             ZULEMA. Lids and lashes normal, conjunctivae and sclerae normal, no icterus, no pallor.   Ears:    Ears appear intact with no abnormalities noted.   Throat:   Mucous membranes pink and moist.   Neck:  Supple, trachea midline, no carotid bruit, no organomegaly or JVD.   Lungs:    Clear to auscultation and percussion.  Respirations regular, even and unlabored. No wheezes, rales, or rhonchi.    Heart:    Regular rhythm and normal rate, normal S1 and S2, no      murmur, no gallop, no rub, no click.   Abdomen:    Soft, nontender, nondistended, no guarding, no rebound tenderness. Normal bowel sounds in all four quadrants, no masses, liver and spleen nonpalpable.    Genitalia:    Deferred.   Extremities:   Moves all extremities well, no edema, no cyanosis, no       redness, no clubbing.   Pulses:   Pulses palpable and equal bilaterally.   Skin:   Moist and warm. No bleeding, bruising or rash.   Neurologic/Psychiatric:   Alert and oriented to person, place, and time.  Motor, power and tone in upper and lower extremities are grossly intact.  No focal neurological  deficits. Normal cognitive function. No psychomotor reaction or tangential thought. No depression, homicidal ideations and suicidal ideations.          Results Review:    Results from last 7 days   Lab Units 10/15/20  0640   SODIUM mmol/L 136   POTASSIUM mmol/L 3.7   CHLORIDE mmol/L 94*   CO2 mmol/L 27.0   BUN mg/dL 57*   CREATININE mg/dL 12.02*   CALCIUM mg/dL 9.3   BILIRUBIN mg/dL 0.3   ALK PHOS U/L 64   ALT (SGPT) U/L 11   AST (SGOT) U/L 11   GLUCOSE mg/dL 76     Results from last 7 days   Lab Units 10/10/20  1316 10/09/20  0647 10/08/20  2138   TROPONIN T ng/mL 1.940* 2.020* 1.930*         Results from last 7 days   Lab Units 10/15/20  0640   WBC 10*3/mm3 12.12*   HEMOGLOBIN g/dL 8.5*   HEMATOCRIT % 26.5*   PLATELETS 10*3/mm3 277     Results from last 7 days   Lab Units 10/09/20  1403 10/08/20  2054   INR  1.80* 1.05   APTT seconds  --  37.2     Results from last 7 days   Lab Units 10/15/20  0640   MAGNESIUM mg/dL 2.0         Results from last 7 days   Lab Units 10/15/20  0640   TSH uIU/mL 1.770           ECHO:  Results for orders placed during the hospital encounter of 10/08/20   Adult Transthoracic Echo Complete W/ Cont if Necessary Per Protocol    Narrative · Left ventricular ejection fraction appears to be 41 - 45%. Left   ventricular systolic function is mildly decreased.  · Left ventricular diastolic function is consistent with (grade II w/high   LAP) pseudonormalization.  · Left ventricular wall thickness is consistent with concentric   hypertrophy.  · Mild mitral annular calcification is present.  · Mild mitral valve regurgitation is present.  · Estimated right ventricular systolic pressure from tricuspid   regurgitation is normal (<35 mmHg).  · The left atrial cavity is moderately dilated.  · Mildly reduced right ventricular systolic function noted.          ECG 12 Lead   Final Result   Test Reason : Syncope   Blood Pressure : **/** mmHG   Vent. Rate : 074 BPM     Atrial Rate : 074 BPM      P-R Int :  178 ms          QRS Dur : 112 ms       QT Int : 448 ms       P-R-T Axes : 025 033 193 degrees      QTc Int : 497 ms      Normal sinus rhythm   Incomplete left bundle branch block   ST &  T wave abnormality, consider inferior ischemia   ST &  T wave abnormality, consider anterolateral ischemia   Prolonged QT   Abnormal ECG   When compared with ECG of 08-OCT-2020 20:44,   No significant change was found   Confirmed by EUGENIO FLETCHER ([id number]) on 10/10/2020 2:58:17 PM      Referred By:             Confirmed By:EUGENIO FLETCHER      ECG 12 Lead   ED Interpretation   Abnormal   Musa Saenz MD     10/8/2020 10:43 PM   ECG 12 Lead         Date/Time: 10/8/2020 9:04 PM   Performed by: Musa Saenz MD   Authorized by: Musa Saenz MD    Interpreted by physician   Rhythm: sinus rhythm   Rate: normal   T depression: I, II, aVF, aVL, V3, V4, V5 and V6   Clinical impression: abnormal ECG         Final Result   Test Reason : abnormal lab   Blood Pressure : **/** mmHG   Vent. Rate : 091 BPM     Atrial Rate : 091 BPM      P-R Int : 134 ms          QRS Dur : 102 ms       QT Int : 368 ms       P-R-T Axes : 042 026 197 degrees      QTc Int : 452 ms      Normal sinus rhythm   Possible Left atrial enlargement   ST &  T wave abnormality, consider inferior ischemia   ST &  T wave abnormality, consider anterolateral ischemia   Abnormal ECG   When compared with ECG of 29-OCT-2014 09:12,   No significant change was found   Confirmed by LUDMILA HAGER ([id number]) on 10/9/2020 12:16:27 PM      Referred By:  CHELSIE           Confirmed By:LUDMILA HAGER      SCANNED EKG   Final Result      SCANNED EKG   Final Result      SCANNED EKG   Final Result      SCANNED EKG   Final Result      SCANNED EKG   Final Result      SCANNED EKG   Final Result      SCANNED EKG   Final Result           Medication Review:   Current Facility-Administered Medications   Medication Dose Route Frequency Provider Last Rate Last Dose   • acetaminophen (TYLENOL) tablet 650 mg  650  mg Oral Q4H PRN Brendan Moon MD   650 mg at 10/11/20 1108    Or   • acetaminophen (TYLENOL) suppository 650 mg  650 mg Rectal Q4H PRN Brendan Moon MD       • acetaminophen (TYLENOL) tablet 650 mg  650 mg Oral Q4H PRN Brendan Moon MD       • aspirin chewable tablet 81 mg  81 mg Oral Daily Brendan Moon MD   81 mg at 10/15/20 0938   • bisacodyl (DULCOLAX) EC tablet 5 mg  5 mg Oral Daily PRN Brendan Moon MD       • calcitriol (ROCALTROL) capsule 1 mcg  1 mcg Oral Daily Brendan Moon MD   1 mcg at 10/15/20 0936   • carvedilol (COREG) tablet 6.25 mg  6.25 mg Oral BID With Meals Brendan Moon MD   6.25 mg at 10/15/20 1811   • clopidogrel (PLAVIX) tablet 75 mg  75 mg Oral Daily Brendan Moon MD   75 mg at 10/15/20 0937   • Delflex-LC/1.5% Dextrose (DIANEAL) CAPD 12,200 mL  12,200 mL Intraperitoneal PRN Brendan Moon MD       • Delflex-LC/2.5% Dextrose (DIANEAL) CAPD 5,000 mL  5,000 mL Intraperitoneal PRN Brendan Moon MD       • Delflex-LC/2.5% Dextrose (DIANEAL) CAPD 5,000 mL  5,000 mL Intraperitoneal PRN Brendan Moon MD   5,000 mL at 10/12/20 1556   • Delflex-LC/2.5% Dextrose (DIANEAL) CAPD 5,000 mL  5,000 mL Intraperitoneal PRN Mauro Saravia MD   5,000 mL at 10/13/20 1545   • Delflex-LC/2.5% Dextrose (DIANEAL) CAPD 5,000 mL  5,000 mL Intraperitoneal PRN Mauro Saravia MD       • Delflex-LC/4.25% Dextrose (DIANEAL) CAPD 5,000 mL  5,000 mL Intraperitoneal PRN Mauro Saravia MD       • [START ON 10/17/2020] epoetin arturo-epbx (RETACRIT) injection 10,000 Units  10,000 Units Subcutaneous Weekly Brendan Moon MD       • famotidine (PEPCID) tablet 20 mg  20 mg Oral Daily Brendan Moon MD   20 mg at 10/15/20 0938   • gabapentin (NEURONTIN) capsule 100 mg  100 mg Oral Nightly Mauro Saravia MD   100 mg at 10/14/20 2104   • heparin (porcine) 5000 UNIT/ML injection 5,000 Units  5,000 Units Subcutaneous Q12H Brendan Moon MD   5,000 Units at  10/15/20 0936   • hydrALAZINE (APRESOLINE) tablet 25 mg  25 mg Oral Q8H Brendan Moon MD   25 mg at 10/15/20 1333   • influenza vac split quad (FLUZONE,FLUARIX,AFLURIA,FLULAVAL) injection 0.5 mL  0.5 mL Intramuscular During Hospitalization Brendan Moon MD       • insulin detemir (LEVEMIR) injection 20 Units  20 Units Subcutaneous Nightly Brendan Moon MD   20 Units at 10/14/20 2105   • isosorbide mononitrate (IMDUR) 24 hr tablet 60 mg  60 mg Oral Q24H Brendan Moon MD   60 mg at 10/15/20 0937   • lanthanum (FOSRENOL) chewable tablet 500 mg  500 mg Oral TID With Meals Mauro Saravia MD   500 mg at 10/15/20 1811   • losartan (COZAAR) tablet 50 mg  50 mg Oral Q24H Brendan Mono MD   50 mg at 10/15/20 0937   • metoprolol tartrate (LOPRESSOR) injection 5 mg  5 mg Intravenous Q6H PRN Brendan Moon MD   5 mg at 10/12/20 2318   • nitroglycerin (NITROSTAT) SL tablet 0.4 mg  0.4 mg Sublingual Q5 Min PRN Brendan Moon MD       • ondansetron (ZOFRAN) tablet 4 mg  4 mg Oral Q6H PRN Brendan Moon MD        Or   • ondansetron (ZOFRAN) injection 4 mg  4 mg Intravenous Q6H PRN Brendan Moon MD   4 mg at 10/15/20 0926   • pravastatin (PRAVACHOL) tablet 20 mg  20 mg Oral Daily Brendan Moon MD   20 mg at 10/15/20 0938   • sevelamer (RENVELA) tablet 2,400 mg  2,400 mg Oral TID With Meals Brendan Moon MD   2,400 mg at 10/15/20 1811   • sodium chloride 0.9 % flush 10 mL  10 mL Intravenous Q12H Brendan Moon MD   10 mL at 10/15/20 0938   • sodium chloride 0.9 % flush 10 mL  10 mL Intravenous PRN Brendan Moon MD   10 mL at 10/11/20 2038       Assessment and Plan:      Hypotension    CAD (coronary artery disease)    ESRF (end stage renal failure) (CMS/HCC)    Diabetes mellitus (CMS/HCC)    Cardiac arrest (CMS/HCC)    Syncope  1.  Chest pain.Patient has had previous history of coronary artery bypass grafting.  Patient coronary angiogram done had revealed a  patent LIMA to the LAD and a patent saphenous vein graft to the obtuse marginal branch and to the right coronary artery.  Patient has 100% occlusion of the native right coronary artery and left main.  Patient left main chronic total occlusion percutaneous intervention was unsuccessful and patient was recommended medical management.  Patient has not had any further recurrent symptoms or chest pain.  Patient Ranexa would be stopped completely as the patient is currently not having chest pain  Patient has history of documented atherosclerotic coronary artery disease.  Patient Ranexa would be stopped and patient has not had any further recurrent symptoms of chest pain.    2.  Pulseless electrical activity s/p cardiopulmonary arrest. Patient has stable rhythm on telemetry monitor.  Patient did not complain of having any symptoms of lightheaded dizziness or near syncope.  Patient has been started back on the carvedilol which would be increased as blood pressure tolerates.  Patient echocardiogram done had revealed preserved left ventricular systolic function.    3.  Arterial hypertension.  Patient would be continued on the present dose of the carvedilol hydralazine and the losartan.    4.  Hyperlipidemia.  Patient has been counseled on low-fat low-cholesterol diet and to continue with the present dose of the pravastatin.    5.  End-stage renal disease on peritoneal dialysis.  Patient has been followed by nephrology.    The above plan of management were discussed with the patient            Electronically signed by Gibran Garza MD, 10/15/20, 7:24 PM CDT.      Time: Time spent on face-to-face interaction 20 minutes    Dictated utilizing Dragon dictation.

## 2020-10-16 NOTE — PLAN OF CARE
Problem: Adult Inpatient Plan of Care  Goal: Plan of Care Review  Recent Flowsheet Documentation  Taken 10/16/2020 1355 by Bridgette Nash, HU/L  Progress: improving  Plan of Care Reviewed With: patient  Outcome Summary: Pt sitting in recliner upon entry. Pt agreeable to ex. Pt tolerated 2 sets x 20 reps BUE ther ex in all planes w/ 2lb HW and rb's as needed to complete task.  No goals met this date. Cont OT POC.

## 2020-10-16 NOTE — PROGRESS NOTES
"[facility]  [address] [place] [address]. [address]  T - [phone]  F - [phone]     Progress Note          PATIENT  DEMOGRAPHICS   PATIENT NAME: Sumanth Robledo                      PHYSICIAN: Jose E Fuentes MD  : 1960  MRN: [id number]   LOS: 6 days    Patient Care Team:  Elizabeth Burleson APRN as PCP - General (Family Medicine)  Subjective   SUBJECTIVE   No acute events overnight.  Denied any complaints. Still has tremor.          Objective   OBJECTIVE   Vital Signs  Temp:  [96.6 °F (35.9 °C)-97.8 °F (36.6 °C)] 97.8 °F (36.6 °C)  Heart Rate:  [] 96  Resp:  [16-18] 16  BP: (133-166)/(63-89) 133/89    Flowsheet Rows      First Filed Value   Admission Height  170.2 cm (67\") Documented at 10/08/2020 2035   Admission Weight  81.5 kg (179 lb 9.6 oz) Documented at 10/08/2020 2046           I/O last 3 completed shifts:  In: 240 [P.O.:240]  Out: 4590 [Urine:200; Other:4390]    PHYSICAL EXAM    Physical Exam  Vitals signs reviewed.   Constitutional:       Appearance: Normal appearance.   Cardiovascular:      Rate and Rhythm: Normal rate and regular rhythm.      Heart sounds: No murmur. No gallop.    Pulmonary:      Effort: Pulmonary effort is normal. No respiratory distress.      Breath sounds: No wheezing, rhonchi or rales.   Abdominal:      General: Bowel sounds are normal. There is no distension.      Palpations: Abdomen is soft. There is no mass.      Tenderness: There is no abdominal tenderness.   Musculoskeletal:         General: No swelling.   Skin:     General: Skin is warm and dry.   Neurological:      Mental Status: He is alert.         RESULTS   Results Review:    Results from last 7 days   Lab Units 10/15/20  0640 10/13/20  0358 10/10/20  1316   SODIUM mmol/L 136 134* 136   POTASSIUM mmol/L 3.7 3.8 4.1   CHLORIDE mmol/L 94* 95* 95*   CO2 mmol/L 27.0 24.0 19.0*   BUN mg/dL 57* 60* 68*   CREATININE mg/dL 12.02* 11.87* 13.33*   CALCIUM mg/dL 9.3 8.3* 7.7* "   BILIRUBIN mg/dL 0.3  --  0.3   ALK PHOS U/L 64  --  58   ALT (SGPT) U/L 11  --  31   AST (SGOT) U/L 11  --  39   GLUCOSE mg/dL 76 111* 174*       Estimated Creatinine Clearance: 7.1 mL/min (A) (by C-G formula based on SCr of 12.02 mg/dL (H)).    Results from last 7 days   Lab Units 10/15/20  0640 10/14/20  0512 10/13/20  0358 10/11/20  0936   MAGNESIUM mg/dL 2.0  --  1.8 1.8   PHOSPHORUS mg/dL 9.4* 9.9* 10.0*  --              Results from last 7 days   Lab Units 10/15/20  0640 10/13/20  0358 10/10/20  1316 10/10/20  0610   WBC 10*3/mm3 12.12* 8.50 11.44* 8.57   HEMOGLOBIN g/dL 8.5* 8.2* 8.6* 8.8*   PLATELETS 10*3/mm3 277 248 273 303       Results from last 7 days   Lab Units 10/09/20  1403   INR  1.80*         Imaging Results (Last 24 Hours)     ** No results found for the last 24 hours. **           MEDICATIONS    aspirin, 81 mg, Oral, Daily  calcitriol, 1 mcg, Oral, Daily  carvedilol, 6.25 mg, Oral, BID With Meals  clopidogrel, 75 mg, Oral, Daily  [START ON 10/17/2020] epoetin arturo/arturo-epbx, 10,000 Units, Subcutaneous, Weekly  famotidine, 20 mg, Oral, Daily  gabapentin, 100 mg, Oral, Nightly  heparin (porcine), 5,000 Units, Subcutaneous, Q12H  hydrALAZINE, 25 mg, Oral, Q8H  insulin detemir, 20 Units, Subcutaneous, Nightly  isosorbide mononitrate, 60 mg, Oral, Q24H  lanthanum, 500 mg, Oral, TID With Meals  losartan, 50 mg, Oral, Q24H  pravastatin, 20 mg, Oral, Daily  sevelamer, 2,400 mg, Oral, TID With Meals  sodium chloride, 10 mL, Intravenous, Q12H      Assessment/Plan   ASSESSMENT / PLAN      Hypotension    CAD (coronary artery disease)    ESRF (end stage renal failure) (CMS/[facility])    Diabetes mellitus (CMS/[facility])    Cardiac arrest (CMS/[facility])    Syncope    1. End-stage renal disease on PD:  - Outpatient prescription: 2800 cc 5 cycles with last fill of 1000 cc.  His estimated dry weight is 84 kg.    - Weight is is improving slowly. Tanvir to 92.1 kg. Will do all 4.25% dextrose bags tonight. Seen and examined on PD.  Has last fill dwelling.     2. Hypertension:  - Blood pressure is acceptable.     3. Anemia chronic kidney disease:  - Hemoglobin is low at 8.5. On Epogen 40328 units weekly.      4. Secondary hyperparathyroidism:  - Patient will remain on calcitriol 1 mcg daily along with Renvela 2400 mg 3 times a day. Added Fosrenol as phosphorus remained high.     5. CAD:  - Cardiac cath 10/9/20 showed 100% occlusion left main . Had episode of PEA Saturday AM and CPR was administered x 4 minutes and patient received epinepherine x 2.      6. S/p PEA arrest     7. ? Seizures:  - Witnessed seizure x 2 post Code Blue. CT scan of head was unremarkable.      8. Metabolic acidosis:  - Bicarbonate is better. Will discontinue oral bicarbonate.    9. Tremors:  - Lowered gabapentin to HS only. Off Ranexa per Dr. Garza.            This document has been electronically signed by Jose E Fuentes MD on October 16, 2020 09:32 CDT

## 2020-10-16 NOTE — PLAN OF CARE
Goal Outcome Evaluation:  Plan of Care Reviewed With: patient  Progress: improving  Outcome Summary: initial assessment, VSS, no complaints throughout the night, will continue to monitor

## 2020-10-16 NOTE — PLAN OF CARE
Problem: Adult Inpatient Plan of Care  Goal: Plan of Care Review  Recent Flowsheet Documentation  Taken 10/16/2020 1000 by Rosemarie Lam, PTA  Progress: improving  Plan of Care Reviewed With: patient  Outcome Summary: pt sitting in chair upon entry. pt sit<>stand with CGA., pt ambulated 50` x 2 with RW & CGA of 1. pt will require assistance @ home, a RW  & HHPT @ D/C

## 2020-10-17 LAB — GLUCOSE BLDC GLUCOMTR-MCNC: 147 MG/DL (ref 70–130)

## 2020-10-17 PROCEDURE — 25010000002 EPOETIN ALFA-EPBX 10000 UNIT/ML SOLUTION: Performed by: INTERNAL MEDICINE

## 2020-10-17 PROCEDURE — 97110 THERAPEUTIC EXERCISES: CPT

## 2020-10-17 PROCEDURE — 25010000002 ONDANSETRON PER 1 MG: Performed by: INTERNAL MEDICINE

## 2020-10-17 PROCEDURE — 25010000002 HEPARIN (PORCINE) PER 1000 UNITS: Performed by: INTERNAL MEDICINE

## 2020-10-17 PROCEDURE — 82962 GLUCOSE BLOOD TEST: CPT

## 2020-10-17 PROCEDURE — 63710000001 INSULIN DETEMIR PER 5 UNITS: Performed by: INTERNAL MEDICINE

## 2020-10-17 PROCEDURE — 97530 THERAPEUTIC ACTIVITIES: CPT

## 2020-10-17 RX ADMIN — ISOSORBIDE MONONITRATE 60 MG: 60 TABLET, EXTENDED RELEASE ORAL at 09:09

## 2020-10-17 RX ADMIN — SODIUM CHLORIDE, PRESERVATIVE FREE 10 ML: 5 INJECTION INTRAVENOUS at 09:09

## 2020-10-17 RX ADMIN — ASPIRIN 81 MG: 81 TABLET, CHEWABLE ORAL at 09:09

## 2020-10-17 RX ADMIN — HYDRALAZINE HYDROCHLORIDE 25 MG: 25 TABLET, FILM COATED ORAL at 20:01

## 2020-10-17 RX ADMIN — SEVELAMER CARBONATE 2400 MG: 800 TABLET, FILM COATED ORAL at 09:08

## 2020-10-17 RX ADMIN — CALCITRIOL 1 MCG: 0.25 CAPSULE ORAL at 09:08

## 2020-10-17 RX ADMIN — INSULIN DETEMIR 20 UNITS: 100 INJECTION, SOLUTION SUBCUTANEOUS at 20:01

## 2020-10-17 RX ADMIN — HYDRALAZINE HYDROCHLORIDE 25 MG: 25 TABLET, FILM COATED ORAL at 13:19

## 2020-10-17 RX ADMIN — SEVELAMER CARBONATE 2400 MG: 800 TABLET, FILM COATED ORAL at 11:23

## 2020-10-17 RX ADMIN — HEPARIN SODIUM 5000 UNITS: 5000 INJECTION INTRAVENOUS; SUBCUTANEOUS at 20:01

## 2020-10-17 RX ADMIN — ONDANSETRON HYDROCHLORIDE 4 MG: 2 INJECTION, SOLUTION INTRAMUSCULAR; INTRAVENOUS at 09:06

## 2020-10-17 RX ADMIN — FAMOTIDINE 20 MG: 20 TABLET, FILM COATED ORAL at 09:09

## 2020-10-17 RX ADMIN — LANTHANUM CARBONATE 500 MG: 500 TABLET, CHEWABLE ORAL at 09:09

## 2020-10-17 RX ADMIN — LANTHANUM CARBONATE 500 MG: 500 TABLET, CHEWABLE ORAL at 17:22

## 2020-10-17 RX ADMIN — EPOETIN ALFA-EPBX 10000 UNITS: 10000 INJECTION, SOLUTION INTRAVENOUS; SUBCUTANEOUS at 13:19

## 2020-10-17 RX ADMIN — PRAVASTATIN SODIUM 20 MG: 20 TABLET ORAL at 09:09

## 2020-10-17 RX ADMIN — SEVELAMER CARBONATE 2400 MG: 800 TABLET, FILM COATED ORAL at 17:21

## 2020-10-17 RX ADMIN — HYDRALAZINE HYDROCHLORIDE 25 MG: 25 TABLET, FILM COATED ORAL at 05:55

## 2020-10-17 RX ADMIN — GABAPENTIN 100 MG: 100 CAPSULE ORAL at 20:00

## 2020-10-17 RX ADMIN — CARVEDILOL 6.25 MG: 6.25 TABLET, FILM COATED ORAL at 09:09

## 2020-10-17 RX ADMIN — CLOPIDOGREL BISULFATE 75 MG: 75 TABLET ORAL at 09:09

## 2020-10-17 RX ADMIN — ONDANSETRON HYDROCHLORIDE 4 MG: 2 INJECTION, SOLUTION INTRAMUSCULAR; INTRAVENOUS at 00:55

## 2020-10-17 RX ADMIN — CARVEDILOL 6.25 MG: 6.25 TABLET, FILM COATED ORAL at 17:22

## 2020-10-17 RX ADMIN — LOSARTAN POTASSIUM 50 MG: 50 TABLET, FILM COATED ORAL at 09:09

## 2020-10-17 RX ADMIN — HEPARIN SODIUM 5000 UNITS: 5000 INJECTION INTRAVENOUS; SUBCUTANEOUS at 09:08

## 2020-10-17 NOTE — PROGRESS NOTES
"[facility]  [address] [place] [address]. [address]  T - [phone]  F - [phone]     Progress Note          PATIENT  DEMOGRAPHICS   PATIENT NAME: Sumanth Robledo                      PHYSICIAN: EMANUEL Ferrari  : 1960  MRN: [id number]   LOS: 7 days    Patient Care Team:  Elizabeth Burleson APRN as PCP - General (Family Medicine)  Subjective   SUBJECTIVE   No acute events overnight.  Denied any complaints. Still has tremor.          Objective   OBJECTIVE   Vital Signs  Temp:  [97.7 °F (36.5 °C)-98 °F (36.7 °C)] 98 °F (36.7 °C)  Heart Rate:  [] 95  Resp:  [16-18] 18  BP: (151-183)/(73-89) 151/82    Flowsheet Rows      First Filed Value   Admission Height  170.2 cm (67\") Documented at 10/08/2020 2035   Admission Weight  81.5 kg (179 lb 9.6 oz) Documented at 10/08/2020 2046           I/O last 3 completed shifts:  In: 360 [P.O.:360]  Out: 4775 [Other:4775]    PHYSICAL EXAM    Physical Exam  Vitals signs reviewed.   Constitutional:       Appearance: Normal appearance.   Cardiovascular:      Rate and Rhythm: Normal rate and regular rhythm.      Heart sounds: No murmur. No gallop.    Pulmonary:      Effort: Pulmonary effort is normal. No respiratory distress.      Breath sounds: No wheezing, rhonchi or rales.   Abdominal:      General: Bowel sounds are normal. There is no distension.      Palpations: Abdomen is soft. There is no mass.      Tenderness: There is no abdominal tenderness.   Musculoskeletal:         General: No swelling.   Skin:     General: Skin is warm and dry.   Neurological:      Mental Status: He is alert.         RESULTS   Results Review:    Results from last 7 days   Lab Units 10/15/20  0640 10/13/20  0358 10/10/20  1316   SODIUM mmol/L 136 134* 136   POTASSIUM mmol/L 3.7 3.8 4.1   CHLORIDE mmol/L 94* 95* 95*   CO2 mmol/L 27.0 24.0 19.0*   BUN mg/dL 57* 60* 68*   CREATININE mg/dL 12.02* 11.87* 13.33*   CALCIUM mg/dL 9.3 8.3* 7.7*   BILIRUBIN mg/dL " 0.3  --  0.3   ALK PHOS U/L 64  --  58   ALT (SGPT) U/L 11  --  31   AST (SGOT) U/L 11  --  39   GLUCOSE mg/dL 76 111* 174*       Estimated Creatinine Clearance: 7 mL/min (A) (by C-G formula based on SCr of 12.02 mg/dL (H)).    Results from last 7 days   Lab Units 10/15/20  0640 10/14/20  0512 10/13/20  0358 10/11/20  0936   MAGNESIUM mg/dL 2.0  --  1.8 1.8   PHOSPHORUS mg/dL 9.4* 9.9* 10.0*  --              Results from last 7 days   Lab Units 10/15/20  0640 10/13/20  0358 10/10/20  1316   WBC 10*3/mm3 12.12* 8.50 11.44*   HEMOGLOBIN g/dL 8.5* 8.2* 8.6*   PLATELETS 10*3/mm3 277 248 273               Imaging Results (Last 24 Hours)     ** No results found for the last 24 hours. **           MEDICATIONS    aspirin, 81 mg, Oral, Daily  calcitriol, 1 mcg, Oral, Daily  carvedilol, 6.25 mg, Oral, BID With Meals  clopidogrel, 75 mg, Oral, Daily  epoetin arturo/arturo-epbx, 10,000 Units, Subcutaneous, Weekly  famotidine, 20 mg, Oral, Daily  gabapentin, 100 mg, Oral, Nightly  heparin (porcine), 5,000 Units, Subcutaneous, Q12H  hydrALAZINE, 25 mg, Oral, Q8H  insulin detemir, 20 Units, Subcutaneous, Nightly  isosorbide mononitrate, 60 mg, Oral, Q24H  lanthanum, 500 mg, Oral, TID With Meals  losartan, 50 mg, Oral, Q24H  pravastatin, 20 mg, Oral, Daily  sevelamer, 2,400 mg, Oral, TID With Meals  sodium chloride, 10 mL, Intravenous, Q12H      Assessment/Plan   ASSESSMENT / PLAN      Hypotension    CAD (coronary artery disease)    ESRF (end stage renal failure) (CMS/HCC)    Diabetes mellitus (CMS/HCC)    Cardiac arrest (CMS/[facility])    Syncope    1. End-stage renal disease on PD:  - Outpatient prescription: 2800 cc 5 cycles with last fill of 1000 cc.  His estimated dry weight is 84 kg.    - Weight is is improving slowly. Down to 90 kg. Will do 2.5% bags as 4.25% bas are in short supply. Seen and examined on PD. Has last fill dwelling.     2. Hypertension:  - Blood pressure is acceptable.     3. Anemia chronic kidney disease:  -  Hemoglobin is low at 8.5. On Epogen 76678 units weekly.      4. Secondary hyperparathyroidism:  - Patient will remain on calcitriol 1 mcg daily along with Renvela 2400 mg 3 times a day. Added Fosrenol as phosphorus remained high.     5. CAD:  - Cardiac cath 10/9/20 showed 100% occlusion left main . Had episode of PEA Saturday AM and CPR was administered x 4 minutes and patient received epinepherine x 2.      6. S/p PEA arrest     7. ? Seizures:  - Witnessed seizure x 2 post Code Blue. CT scan of head was unremarkable.      8. Metabolic acidosis:  - Bicarbonate is better. Off oral bicarbonate.    9. Tremors:  - Lowered gabapentin to HS only. Off Ranexa per Dr. Garza.            This document has been electronically signed by EMANUEL Ferrari on October 17, 2020 10:36 CDT

## 2020-10-17 NOTE — PLAN OF CARE
Problem: Adult Inpatient Plan of Care  Goal: Plan of Care Review  Outcome: Ongoing, Progressing  Flowsheets (Taken 10/17/2020 5140)  Plan of Care Reviewed With: patient  Outcome Summary: pt wanting to get OOB to recliner for breakfast, t/alan sup to sit w/ SBA, sit<>stand w/ CGA, bed to recliner w/ CGA and no AD ~4', cont to work on t/fers and gt

## 2020-10-17 NOTE — THERAPY TREATMENT NOTE
Acute Care - Physical Therapy Treatment Note  [facility]     Patient Name: Sumanth Robledo  : 1960  MRN: [id number]  Today's Date: 10/17/2020   Onset of Illness/Injury or Date of Surgery: 10/08/20       PT Assessment (last 12 hours)      PT Evaluation and Treatment     Row Name 10/17/20 0840          Physical Therapy Time and Intention    Subjective Information  no complaints  -JW     Document Type  therapy note (daily note)  -JW     Mode of Treatment  individual therapy;physical therapy  -JW     Patient Effort  good  -     Comment  pt states he only wants to get up OOB to recliner so he can eat his breakfast  -     Row Name 10/17/20 0840          General Information    Patient Profile Reviewed  yes  -     Row Name 10/17/20 0840          Cognition    Affect/Mental Status (Cognitive)  WFL  -     Orientation Status (Cognition)  oriented x 4  -     Row Name 10/17/20 0840          Pain Scale: Numbers Pre/Post-Treatment    Pretreatment Pain Rating  0/10 - no pain  -     Posttreatment Pain Rating  0/10 - no pain  -     Row Name 10/17/20 0840          Bed Mobility    Supine-Sit [place] (Bed Mobility)  standby assist  -     Assistive Device (Bed Mobility)  bed rails;head of bed elevated  -     Row Name 10/17/20 0840          Transfers    Bed-Chair [place] (Transfers)  contact guard  -     Assistive Device (Bed-Chair Transfers)  other (see comments) none  -JW     Sit-Stand [place] (Transfers)  contact guard  -     Stand-Sit [place] (Transfers)  contact guard  -     Row Name 10/17/20 0840          Gait/Stairs (Locomotion)    [place] Level (Gait)  contact guard  -     Distance in Feet (Gait)  4'  -     Comment (Gait/Stairs)  bed to chair for breakfast  -     Row Name 10/17/20 0840          Safety Issues, Functional Mobility    Impairments Affecting Function (Mobility)  strength;endurance/activity tolerance;balance  -     Row Name 10/17/20 0840           Vital Signs    Pre Systolic BP Rehab  177  -JW     Pre Treatment Diastolic BP  84  -JW     Pretreatment Heart Rate (beats/min)  100  -JW     Pre SpO2 (%)  97  -JW     O2 Delivery Pre Treatment  supplemental O2  -JW     Pre Patient Position  Supine  -JW     Intra Patient Position  Standing  -JW     Post Patient Position  Sitting  -JW     Row Name 10/17/20 0873          Bed Mobility Goal 1 (PT)    Activity/Assistive Device (Bed Mobility Goal 1, PT)  sit to supine;supine to sit  -JW     [place] Level/Cues Needed (Bed Mobility Goal 1, PT)  modified independence  -JW     Time Frame (Bed Mobility Goal 1, PT)  3 days  -JW     Strategies/Barriers (Bed Mobility Goal 1, PT)  bed flat, no hand rails  -JW     Progress/Outcomes (Bed Mobility Goal 1, PT)  goal not met  -     Row Name 10/17/20 0868          Transfer Goal 1 (PT)    Activity/Assistive Device (Transfer Goal 1, PT)  sit-to-stand/stand-to-sit;bed-to-chair/chair-to-bed LRAD PRN  -JW     [place] Level/Cues Needed (Transfer Goal 1, PT)  modified independence  -JW     Time Frame (Transfer Goal 1, PT)  3 days  -JW     Progress/Outcome (Transfer Goal 1, PT)  goal not met  -     Row Name 10/17/20 0810          Gait Training Goal 1 (PT)    Activity/Assistive Device (Gait Training Goal 1, PT)  gait (walking locomotion);assistive device use;decrease fall risk;increase endurance/gait distance LRAD PRN  -JW     [place] Level (Gait Training Goal 1, PT)  modified independence  -JW     Distance (Gait Training Goal 1, PT)  150ft or more  -JW     Time Frame (Gait Training Goal 1, PT)  5 days  -JW     Progress/Outcome (Gait Training Goal 1, PT)  goal not met  -     Row Name 10/17/20 0860          Stairs Goal 1 (PT)    Activity/Assistive Device (Stairs Goal 1, PT)  ascending stairs;descending stairs;using handrail, left  -JW     [place] Level/Cues Needed (Stairs Goal 1, PT)  standby assist  -JW     Number of Stairs (Stairs Goal 1, PT)  2 or more  -JW     Time  Frame (Stairs Goal 1, PT)  by discharge  -     Progress/Outcome (Stairs Goal 1, PT)  goal not met  -     Row Name 10/17/20 0840          Positioning and Restraints    Pre-Treatment Position  in bed  -     Post Treatment Position  chair  -     In Chair  sitting;call light within reach;encouraged to call for assist;exit alarm on  -     Row Name 10/17/20 0840          Therapy Assessment/Plan (PT)    Rehab Potential (PT)  good, to achieve stated therapy goals  -     Criteria for Skilled Interventions Met (PT)  yes;skilled treatment is necessary  -       User Key  (r) = Recorded By, (t) = Taken By, (c) = Cosigned By    Initials Name Provider Type     Talia Mckeon, PTA Physical Therapy Assistant        Physical Therapy Education                 Title: PT OT SLP Therapies (In Progress)     Topic: Physical Therapy (In Progress)     Point: Mobility training (Done)     Learning Progress Summary           Patient Acceptance, E, VU by  at 10/13/2020 0940    Comment: PT POC and prognosis, PLB    Acceptance, E, VU by Mackenzie at 10/12/2020 1431    Comment: Role of PT, POC, use of gait belt                   Point: Home exercise program (Not Started)     Learner Progress:  Not documented in this visit.          Point: Body mechanics (Not Started)     Learner Progress:  Not documented in this visit.          Point: Precautions (Done)     Learning Progress Summary           Patient Acceptance, E, VU by  at 10/13/2020 0940    Comment: PT POC and prognosis, PLB    Acceptance, E, VU by MAYCO at 10/12/2020 1431    Comment: Role of PT, POC, use of gait belt                               User Key     Initials Effective Dates Name Provider Type Discipline    [facility] 08/09/20 -  Jaquelin Gabriel, PT Physical Therapist PT     08/09/20 -  Getachew Beck PT Physical Therapist PT              PT Recommendation and Plan  Anticipated Discharge Disposition (PT): home with assist, home with home health  Therapy Frequency (PT):  other (see comments)(6-11x/wk)  Plan of Care Reviewed With: patient  Outcome Summary: pt wanting to get OOB to recliner for breakfast, t/alan sup to sit w/ SBA, sit<>stand w/ CGA, bed to recliner w/ CGA and no AD ~4', cont to work on t/fers and gt  Outcome Measures     Row Name 10/17/20 0840 10/16/20 1355 10/16/20 1200       How much help from another person do you currently need...    Turning from your back to your side while in flat bed without using bedrails?  3  -JW  --  3  -TA    Moving from lying on back to sitting on the side of a flat bed without bedrails?  3  -JW  --  3  -TA    Moving to and from a bed to a chair (including a wheelchair)?  3  -JW  --  3  -TA    Standing up from a chair using your arms (e.g., wheelchair, bedside chair)?  3  -JW  --  3  -TA    Climbing 3-5 steps with a railing?  3  -JW  --  3  -TA    To walk in hospital room?  3  -JW  --  3  -TA    AM-PAC 6 Clicks Score (PT)  18  -JW  --  18  -TA       How much help from another is currently needed...    Putting on and taking off regular lower body clothing?  --  2  -KD  --    Bathing (including washing, rinsing, and drying)  --  3  -KD  --    Toileting (which includes using toilet bed pan or urinal)  --  3  -KD  --    Putting on and taking off regular upper body clothing  --  3  -KD  --    Taking care of personal grooming (such as brushing teeth)  --  3  -KD  --    Eating meals  --  4  -KD  --    AM-PAC 6 Clicks Score (OT)  --  18  -KD  --       Functional Assessment    Outcome Measure Options  AM-PAC 6 Clicks Basic Mobility (PT)  -JW  --  AM-PAC 6 Clicks Basic Mobility (PT)  -TA    Row Name 10/15/20 1200             How much help from another person do you currently need...    Turning from your back to your side while in flat bed without using bedrails?  3  -TA      Moving from lying on back to sitting on the side of a flat bed without bedrails?  3  -TA      Moving to and from a bed to a chair (including a wheelchair)?  3  -TA      Standing  up from a chair using your arms (e.g., wheelchair, bedside chair)?  3  -TA      Climbing 3-5 steps with a railing?  3  -TA      To walk in hospital room?  3  -TA      AM-PAC 6 Clicks Score (PT)  18  -TA         Functional Assessment    Outcome Measure Options  AM-PAC 6 Clicks Basic Mobility (PT)  -TA        User Key  (r) = Recorded By, (t) = Taken By, (c) = Cosigned By    Initials Name Provider Type    Talia Kirkpatrick PTA Physical Therapy Assistant    Rosemarie Morales, JG Physical Therapy Assistant    Bridgette Smart COTA/L Occupational Therapy Assistant           Time Calculation:   PT Charges     Row Name 10/17/20 0840             Time Calculation    Start Time  0840  -      Stop Time  0855  -      Time Calculation (min)  15 min  -      PT Received On  10/17/20  -         Time Calculation- PT    Total Timed Code Minutes- PT  15 minute(s)  -        User Key  (r) = Recorded By, (t) = Taken By, (c) = Cosigned By    Initials Name Provider Type    Talia Kirkpatrick PTA Physical Therapy Assistant        Therapy Charges for Today     Code Description Service Date Service Provider Modifiers Qty    28713307008 HC PT THERAPEUTIC ACT EA 15 MIN 10/17/2020 Talia Mckeon PTA GP 1          PT G-Codes  Outcome Measure Options: AM-PAC 6 Clicks Basic Mobility (PT)  AM-PAC 6 Clicks Score (PT): 18  AM-PAC 6 Clicks Score (OT): 18    Talia Mckeon PTA  10/17/2020

## 2020-10-17 NOTE — PLAN OF CARE
Problem: Adult Inpatient Plan of Care  Goal: Plan of Care Review  Flowsheets  Taken 10/17/2020 1320 by Naomy Harvey, MAYTE/L  Progress: improving  Outcome Summary: Pt was sitting up in the chair upon arrival. Pt c/o nausea. Pt gave good effort with UE ther ex. Continue OT POC.  Taken 10/17/2020 0840 by Talia Mckeon PTA  Plan of Care Reviewed With: patient

## 2020-10-17 NOTE — PROGRESS NOTES
[facility] Medicine Services  INPATIENT PROGRESS NOTE    Length of Stay: 7  Date of Admission: 10/8/2020  Primary Care Physician: Elizabeth Burleson APRN    Subjective   Chief Complaint:  Chest pain  HPI: Patient states he feels full.  He states that he has increased nausea decreased appetite.  He attributes this to the dialysate that remains in his abdomen at the end of his PD cycle.    Review of Systems   Constitutional: Negative for appetite change, chills, fatigue and fever.   Respiratory: Negative for chest tightness and shortness of breath.    Cardiovascular: Negative for chest pain, palpitations and leg swelling.   Gastrointestinal: Negative for abdominal pain, constipation, diarrhea, nausea and vomiting.   Skin: Negative for wound.   Neurological: Positive for tremors (better today). Negative for dizziness, weakness, light-headedness, numbness and headaches.     All pertinent negatives and positives are as above. All other systems have been reviewed and are negative unless otherwise stated.     Objective    Temp:  [97.7 °F (36.5 °C)-98 °F (36.7 °C)] 97.8 °F (36.6 °C)  Heart Rate:  [] 98  Resp:  [16-18] 17  BP: (138-171)/(73-89) 138/85    Physical Exam  Vitals signs reviewed.   Constitutional:       Appearance: He is well-developed.   HENT:      Head: Normocephalic and atraumatic.   Eyes:      Pupils: Pupils are equal, round, and reactive to light.   Neck:      Musculoskeletal: Normal range of motion and neck supple.   Cardiovascular:      Rate and Rhythm: Normal rate and regular rhythm.      Heart sounds: Normal heart sounds. No murmur. No friction rub. No gallop.    Pulmonary:      Effort: Pulmonary effort is normal. No respiratory distress.      Breath sounds: Normal breath sounds. No wheezing or rales.   Chest:      Chest wall: No tenderness.   Abdominal:      General: Bowel sounds are normal. There is no distension.      Palpations: Abdomen is soft.       Tenderness: There is no abdominal tenderness.   Psychiatric:         Behavior: Behavior normal.       Results Review:  I have reviewed the labs, radiology results, and diagnostic studies.    Laboratory Data:   Results from last 7 days   Lab Units 10/15/20  0640 10/13/20  0358 10/10/20  1316   SODIUM mmol/L 136 134* 136   POTASSIUM mmol/L 3.7 3.8 4.1   CHLORIDE mmol/L 94* 95* 95*   CO2 mmol/L 27.0 24.0 19.0*   BUN mg/dL 57* 60* 68*   CREATININE mg/dL 12.02* 11.87* 13.33*   GLUCOSE mg/dL 76 111* 174*   CALCIUM mg/dL 9.3 8.3* 7.7*   BILIRUBIN mg/dL 0.3  --  0.3   ALK PHOS U/L 64  --  58   ALT (SGPT) U/L 11  --  31   AST (SGOT) U/L 11  --  39   ANION GAP mmol/L 15.0 15.0 22.0*     Estimated Creatinine Clearance: 7 mL/min (A) (by C-G formula based on SCr of 12.02 mg/dL (H)).  Results from last 7 days   Lab Units 10/15/20  0640 10/14/20  0512 10/13/20  0358 10/11/20  0936   MAGNESIUM mg/dL 2.0  --  1.8 1.8   PHOSPHORUS mg/dL 9.4* 9.9* 10.0*  --          Results from last 7 days   Lab Units 10/15/20  0640 10/13/20  0358 10/10/20  1316   WBC 10*3/mm3 12.12* 8.50 11.44*   HEMOGLOBIN g/dL 8.5* 8.2* 8.6*   HEMATOCRIT % 26.5* 26.5* 27.9*   PLATELETS 10*3/mm3 277 248 273           Culture Data:   No results found for: BLOODCX  No results found for: URINECX  No results found for: RESPCX  No results found for: WOUNDCX  No results found for: STOOLCX  No components found for: BODYFLD    Radiology Data:   Imaging Results (Last 24 Hours)     ** No results found for the last 24 hours. **          I have reviewed the patient's current medications.     Assessment/Plan     Active Hospital Problems    Diagnosis   • **Hypotension   • Cardiac arrest (CMS/HCC)   • Syncope   • Diabetes mellitus (CMS/[facility])   • ESRF (end stage renal failure) (CMS/[facility])   • CAD (coronary artery disease)       Plan:    1.  Coronary artery disease: No symptoms.  Continue aspirin, beta-blocker, Plavix, ARB, nitrate, and statin.  2.  End-stage renal failure: Nephrology  following.  Continue with peritoneal dialysis.  3.  Diabetes mellitus: Continue sliding scale and long-acting insulin.  Reasonably controlled.  4.  Status post cardiac arrest: PEA.  No significant neurologic deficit.  5.  Myoclonus: Still minimally present, but continued to improve.  6.  Seizure: Patient had 2 witnessed seizures after his cardiac arrest.  None since then.  Work-up thus far has been negative.  Likely metabolic in etiology      The patient was evaluated during the global COVID-19 pandemic, and the diagnosis was suspected/considered upon their initial presentation.  Evaluation, treatment, and testing were consistent with current guidelines for patients who present with complaints or symptoms that may be related to COVID-19.    Discharge Planning: I expect patient to be discharged to home with 24/7 care in 1-2 days.        This document has been electronically signed by Randolph Mcconnell MD on October 17, 2020 12:56 CDT

## 2020-10-17 NOTE — THERAPY TREATMENT NOTE
Acute Care - Occupational Therapy Treatment Note  [facility]     Patient Name: Sumanth Robledo  : 1960  MRN: [id number]  Today's Date: 10/17/2020  Onset of Illness/Injury or Date of Surgery: 10/08/20  Date of Referral to OT: 10/12/20  Referring Physician: Red BARKSDALE MD    Admit Date: 10/8/2020       ICD-10-CM ICD-9-CM   1. Precordial pain  R07.2 786.51   2. Impaired mobility and ADLs  Z74.09 V49.89    Z78.9    3. Impaired functional mobility, balance, gait, and endurance  Z74.09 V49.89     Patient Active Problem List   Diagnosis   • Chest pain   • CAD (coronary artery disease)   • ESRF (end stage renal failure) (CMS/[facility])   • Hypertension   • Diabetes mellitus (CMS/[facility])   • Precordial pain   • Cardiac arrest (CMS/[facility])   • Hypotension   • Syncope     Past Medical History:   Diagnosis Date   • Chronic kidney disease, stage 3    • Coronary arteriosclerosis     eCABGx3 10/2014 LM 95% LAD diff 70% D1 70% OM2 70% OM3 70% & % USA STEMI      • End stage renal disease (CMS/[facility])    • Essential hypertension    • Heterozygous thalassemia     Thalassemia minor      • Hyperlipidemia    • Peripheral vascular disease (CMS/[facility])     WILSON RIGHT 0.97 LEFT 0.79, stent JADYN ZELAYA 2015      • Surgical follow-up care     Emergent CABG X 3     • Type 2 diabetes mellitus (CMS/[facility])      Past Surgical History:   Procedure Laterality Date   • AMPUTATION FINGER / THUMB Right     RIGHT middle finger   • CARDIAC CATHETERIZATION  10/11/2014    Distal left main critical stenosis of up to 95-99% with evidence of filling defect suggestive of a plaque. LAD and OMB 1 and 2 stenosis.A 100% occludeed RCA.   • CARDIAC CATHETERIZATION N/A 2020    Procedure: Left Heart Cath 2020 @ 9;  Surgeon: Gibran Garza MD;  Location: [facility] INVASIVE LOCATION;  Service: Cardiology;  Laterality: N/A;   • CARDIAC CATHETERIZATION N/A 10/9/2020    Procedure: Left Heart Cath;  Surgeon: Gibran Garza MD;  Location: [facility] CATH  INVASIVE LOCATION;  Service: Cardiology;  Laterality: N/A;   • CARDIAC SURGERY     • CORONARY ARTERY BYPASS GRAFT  10/11/2014    Emergent CABG X 3 LIMA->LAD SVG->OM SVG->PDA          OT ASSESSMENT FLOWSHEET (last 12 hours)      OT Evaluation and Treatment     Row Name 10/17/20 1132                   OT Time and Intention    Subjective Information  complains of;nausea/vomiting  -CS        Document Type  therapy note (daily note)  -CS        Mode of Treatment  occupational therapy  -CS        Patient Effort  good  -CS           Cognition    Affect/Mental Status (Cognitive)  WFL  -CS        Orientation Status (Cognition)  oriented x 4  -CS        Follows Commands (Cognition)  WFL  -CS           Pain Scale: Numbers Pre/Post-Treatment    Pretreatment Pain Rating  0/10 - no pain  -CS        Posttreatment Pain Rating  0/10 - no pain  -CS           Shoulder (Therapeutic Exercise)    Shoulder (Therapeutic Exercise)  AROM (active range of motion)  -CS        Shoulder AROM (Therapeutic Exercise)  bilateral;flexion;extension;external rotation;internal rotation;aDduction;aBduction;sitting  -CS        Shoulder Strengthening (Therapeutic Exercise)  2 lb free weight  -CS           Elbow/Forearm (Therapeutic Exercise)    Elbow/Forearm (Therapeutic Exercise)  AROM (active range of motion)  -CS        Elbow/Forearm AROM (Therapeutic Exercise)  bilateral;flexion;extension;supination;pronation;sitting  -CS        Elbow/Forearm Strengthening (Therapeutic Exercise)  2 lb free weight  -CS           Wrist (Therapeutic Exercise)    Wrist (Therapeutic Exercise)  AROM (active range of motion)  -CS        Wrist AROM (Therapeutic Exercise)  bilateral;flexion;extension  -CS        Wrist Strengthening (Therapeutic Exercise)  2 lb free weight  -CS           Hand (Therapeutic Exercise)    Hand (Therapeutic Exercise)  AROM (active range of motion)  -CS        Hand AROM/AAROM (Therapeutic Exercise)  AROM (active range of motion)  -CS        Hand  Strengthening (Therapeutic Exercise)  bilateral;finger flexion;finger extension  -CS           Vital Signs    Pretreatment Heart Rate (beats/min)  95  -CS        Pre SpO2 (%)  92  -CS        O2 Delivery Pre Treatment  supplemental O2  -CS        Pre Patient Position  Sitting  -CS        Intra Patient Position  Sitting  -CS        Post Patient Position  Sitting  -CS           Progress Summary (OT)    Progress Toward Functional Goals (OT)  progress toward functional goals is good  -CS          User Key  (r) = Recorded By, (t) = Taken By, (c) = Cosigned By    Initials Name Effective Dates     Naomy Harvey COTA/DONI 03/07/18 -          Occupational Therapy Education                 Title: PT OT SLP Therapies (In Progress)     Topic: Occupational Therapy (Done)     Point: ADL training (Done)     Description:   Instruct learner(s) on proper safety adaptation and remediation techniques during self care or transfers.   Instruct in proper use of assistive devices.              Learning Progress Summary           Patient Acceptance, E,TB,D, NR,VU by CS at 10/17/2020 1320    Acceptance, E, VU by AS at 10/13/2020 1307    Comment: endurance actvities, LBD, PLB   Significant Other Acceptance, E, VU by AS at 10/13/2020 1307    Comment: endurance actvities, LBD, PLB                   Point: Home exercise program (Done)     Description:   Instruct learner(s) on appropriate technique for monitoring, assisting and/or progressing therapeutic exercises/activities.              Learning Progress Summary           Patient Acceptance, E,TB,D, NR,VU by CS at 10/17/2020 1320    Acceptance, E, VU by AS at 10/13/2020 1307    Comment: endurance actvities, LBD, PLB   Significant Other Acceptance, E, VU by AS at 10/13/2020 1307    Comment: endurance actvities, LBD, PLB                   Point: Precautions (Done)     Description:   Instruct learner(s) on prescribed precautions during self-care and functional transfers.              Learning  Progress Summary           Patient Acceptance, E,TB,D, NR,VU by TEMI at 10/17/2020 1320    Acceptance, E, VU by KO at 10/12/2020 1320    Comment: Educated pt on safety during ADLs and fall prevention                   Point: Body mechanics (Done)     Description:   Instruct learner(s) on proper positioning and spine alignment during self-care, functional mobility activities and/or exercises.              Learning Progress Summary           Patient Acceptance, E,TB,D, NR,VU by TEMI at 10/17/2020 1320                               User Key     Initials Effective Dates Name Provider Type Discipline     03/07/18 -  Naomy Harvey COTA/DONI Occupational Therapy Assistant OT    AS 07/05/20 -  Noemi Madrid, OT Occupational Therapist OT    STEPHANIE 08/09/20 -  Tiana Sifuentes OT Occupational Therapist OT                  OT Recommendation and Plan     Progress Toward Functional Goals (OT): progress toward functional goals is good  Plan of Care Review  Progress: improving  Outcome Summary: Pt was sitting up in the chair upon arrival. Pt c/o nausea. Pt gave good effort with UE ther ex. Continue OT POC.  Outcome Summary: Pt was sitting up in the chair upon arrival. Pt c/o nausea. Pt gave good effort with UE ther ex. Continue OT POC.    Outcome Measures     Row Name 10/17/20 1300 10/17/20 0840 10/16/20 1355       How much help from another person do you currently need...    Turning from your back to your side while in flat bed without using bedrails?  --  3  -JW  --    Moving from lying on back to sitting on the side of a flat bed without bedrails?  --  3  -JW  --    Moving to and from a bed to a chair (including a wheelchair)?  --  3  -JW  --    Standing up from a chair using your arms (e.g., wheelchair, bedside chair)?  --  3  -JW  --    Climbing 3-5 steps with a railing?  --  3  -JW  --    To walk in hospital room?  --  3  -JW  --    AM-PAC 6 Clicks Score (PT)  --  18  -JW  --       How much help from another is currently  needed...    Putting on and taking off regular lower body clothing?  2  -CS  --  2  -KD    Bathing (including washing, rinsing, and drying)  3  -CS  --  3  -KD    Toileting (which includes using toilet bed pan or urinal)  3  -CS  --  3  -KD    Putting on and taking off regular upper body clothing  3  -CS  --  3  -KD    Taking care of personal grooming (such as brushing teeth)  3  -CS  --  3  -KD    Eating meals  4  -CS  --  4  -KD    AM-PAC 6 Clicks Score (OT)  18  -CS  --  18  -KD       Functional Assessment    Outcome Measure Options  --  AM-PAC 6 Clicks Basic Mobility (PT)  -JW  --    Row Name 10/16/20 1200 10/15/20 1200          How much help from another person do you currently need...    Turning from your back to your side while in flat bed without using bedrails?  3  -TA  3  -TA     Moving from lying on back to sitting on the side of a flat bed without bedrails?  3  -TA  3  -TA     Moving to and from a bed to a chair (including a wheelchair)?  3  -TA  3  -TA     Standing up from a chair using your arms (e.g., wheelchair, bedside chair)?  3  -TA  3  -TA     Climbing 3-5 steps with a railing?  3  -TA  3  -TA     To walk in hospital room?  3  -TA  3  -TA     AM-PAC 6 Clicks Score (PT)  18  -TA  18  -TA        Functional Assessment    Outcome Measure Options  AM-PAC 6 Clicks Basic Mobility (PT)  -TA  AM-PAC 6 Clicks Basic Mobility (PT)  -TA       User Key  (r) = Recorded By, (t) = Taken By, (c) = Cosigned By    Initials Name Provider Type    JW Talia Mckeon, PTA Physical Therapy Assistant    TA Rosemarie Lam, JG Physical Therapy Assistant    KD Bridgette Nash, HU/L Occupational Therapy Assistant    Naomy Cortez, HU/L Occupational Therapy Assistant          Time Calculation:   Time Calculation- OT     Row Name 10/17/20 1324             Time Calculation- OT    OT Start Time  1132  -CS      OT Stop Time  1155  -CS      OT Time Calculation (min)  23 min  -CS      Total Timed Code Minutes- OT   23 minute(s)  -CS      OT Received On  10/17/20  -CS        User Key  (r) = Recorded By, (t) = Taken By, (c) = Cosigned By    Initials Name Provider Type    CS Naomy Harvey COTA/DONI Occupational Therapy Assistant        Therapy Charges for Today     Code Description Service Date Service Provider Modifiers Qty    [id number] HC OT THER PROC EA 15 MIN 10/17/2020 Naomy Harvey COTA/L GO 2               RADHA Arias  10/17/2020

## 2020-10-18 LAB
ALBUMIN SERPL-MCNC: 3.1 G/DL (ref 3.5–5.2)
ALBUMIN/GLOB SERPL: 0.7 G/DL
ALP SERPL-CCNC: 66 U/L (ref 39–117)
ALT SERPL W P-5'-P-CCNC: 8 U/L (ref 1–41)
ANION GAP SERPL CALCULATED.3IONS-SCNC: 15 MMOL/L (ref 5–15)
AST SERPL-CCNC: 8 U/L (ref 1–40)
BILIRUB SERPL-MCNC: 0.3 MG/DL (ref 0–1.2)
BUN SERPL-MCNC: 49 MG/DL (ref 8–23)
BUN/CREAT SERPL: 4 (ref 7–25)
CALCIUM SPEC-SCNC: 9.4 MG/DL (ref 8.6–10.5)
CHLORIDE SERPL-SCNC: 95 MMOL/L (ref 98–107)
CO2 SERPL-SCNC: 28 MMOL/L (ref 22–29)
CREAT SERPL-MCNC: 12.23 MG/DL (ref 0.76–1.27)
GFR SERPL CREATININE-BSD FRML MDRD: 5 ML/MIN/1.73
GFR SERPL CREATININE-BSD FRML MDRD: ABNORMAL ML/MIN/{1.73_M2}
GLOBULIN UR ELPH-MCNC: 4.6 GM/DL
GLUCOSE SERPL-MCNC: 117 MG/DL (ref 65–99)
POTASSIUM SERPL-SCNC: 3.7 MMOL/L (ref 3.5–5.2)
PROT SERPL-MCNC: 7.7 G/DL (ref 6–8.5)
SODIUM SERPL-SCNC: 138 MMOL/L (ref 136–145)
WHOLE BLOOD HOLD SPECIMEN: NORMAL

## 2020-10-18 PROCEDURE — 82962 GLUCOSE BLOOD TEST: CPT

## 2020-10-18 PROCEDURE — 25010000002 ONDANSETRON PER 1 MG: Performed by: INTERNAL MEDICINE

## 2020-10-18 PROCEDURE — 25010000002 HEPARIN (PORCINE) PER 1000 UNITS: Performed by: INTERNAL MEDICINE

## 2020-10-18 PROCEDURE — 63710000001 INSULIN DETEMIR PER 5 UNITS: Performed by: INTERNAL MEDICINE

## 2020-10-18 PROCEDURE — 80053 COMPREHEN METABOLIC PANEL: CPT | Performed by: INTERNAL MEDICINE

## 2020-10-18 RX ORDER — BISACODYL 10 MG
10 SUPPOSITORY, RECTAL RECTAL ONCE
Status: DISCONTINUED | OUTPATIENT
Start: 2020-10-18 | End: 2020-10-20 | Stop reason: HOSPADM

## 2020-10-18 RX ORDER — POLYETHYLENE GLYCOL 3350 17 G/17G
17 POWDER, FOR SOLUTION ORAL DAILY
Status: DISCONTINUED | OUTPATIENT
Start: 2020-10-18 | End: 2020-10-20 | Stop reason: HOSPADM

## 2020-10-18 RX ORDER — DOCUSATE SODIUM 100 MG/1
100 CAPSULE, LIQUID FILLED ORAL 2 TIMES DAILY
Status: DISCONTINUED | OUTPATIENT
Start: 2020-10-18 | End: 2020-10-20 | Stop reason: HOSPADM

## 2020-10-18 RX ORDER — GABAPENTIN 100 MG/1
200 CAPSULE ORAL NIGHTLY
Status: DISCONTINUED | OUTPATIENT
Start: 2020-10-18 | End: 2020-10-20 | Stop reason: HOSPADM

## 2020-10-18 RX ADMIN — SEVELAMER CARBONATE 2400 MG: 800 TABLET, FILM COATED ORAL at 08:34

## 2020-10-18 RX ADMIN — HYDRALAZINE HYDROCHLORIDE 25 MG: 25 TABLET, FILM COATED ORAL at 05:42

## 2020-10-18 RX ADMIN — HEPARIN SODIUM 5000 UNITS: 5000 INJECTION INTRAVENOUS; SUBCUTANEOUS at 20:43

## 2020-10-18 RX ADMIN — ISOSORBIDE MONONITRATE 60 MG: 60 TABLET, EXTENDED RELEASE ORAL at 08:34

## 2020-10-18 RX ADMIN — ASPIRIN 81 MG: 81 TABLET, CHEWABLE ORAL at 08:34

## 2020-10-18 RX ADMIN — SODIUM CHLORIDE, PRESERVATIVE FREE 10 ML: 5 INJECTION INTRAVENOUS at 08:37

## 2020-10-18 RX ADMIN — CLOPIDOGREL BISULFATE 75 MG: 75 TABLET ORAL at 08:34

## 2020-10-18 RX ADMIN — PRAVASTATIN SODIUM 20 MG: 20 TABLET ORAL at 08:34

## 2020-10-18 RX ADMIN — ONDANSETRON HYDROCHLORIDE 4 MG: 2 INJECTION, SOLUTION INTRAMUSCULAR; INTRAVENOUS at 08:38

## 2020-10-18 RX ADMIN — CARVEDILOL 6.25 MG: 6.25 TABLET, FILM COATED ORAL at 17:13

## 2020-10-18 RX ADMIN — LANTHANUM CARBONATE 500 MG: 500 TABLET, CHEWABLE ORAL at 17:13

## 2020-10-18 RX ADMIN — DOCUSATE SODIUM 100 MG: 100 CAPSULE, LIQUID FILLED ORAL at 20:43

## 2020-10-18 RX ADMIN — HYDRALAZINE HYDROCHLORIDE 25 MG: 25 TABLET, FILM COATED ORAL at 15:21

## 2020-10-18 RX ADMIN — HEPARIN SODIUM 5000 UNITS: 5000 INJECTION INTRAVENOUS; SUBCUTANEOUS at 08:33

## 2020-10-18 RX ADMIN — SODIUM CHLORIDE, PRESERVATIVE FREE 10 ML: 5 INJECTION INTRAVENOUS at 20:44

## 2020-10-18 RX ADMIN — HYDRALAZINE HYDROCHLORIDE 25 MG: 25 TABLET, FILM COATED ORAL at 20:44

## 2020-10-18 RX ADMIN — GABAPENTIN 200 MG: 100 CAPSULE ORAL at 20:43

## 2020-10-18 RX ADMIN — LANTHANUM CARBONATE 500 MG: 500 TABLET, CHEWABLE ORAL at 08:34

## 2020-10-18 RX ADMIN — SEVELAMER CARBONATE 2400 MG: 800 TABLET, FILM COATED ORAL at 17:13

## 2020-10-18 RX ADMIN — FAMOTIDINE 20 MG: 20 TABLET, FILM COATED ORAL at 08:34

## 2020-10-18 RX ADMIN — INSULIN DETEMIR 20 UNITS: 100 INJECTION, SOLUTION SUBCUTANEOUS at 20:43

## 2020-10-18 RX ADMIN — CARVEDILOL 6.25 MG: 6.25 TABLET, FILM COATED ORAL at 08:34

## 2020-10-18 RX ADMIN — LOSARTAN POTASSIUM 50 MG: 50 TABLET, FILM COATED ORAL at 08:34

## 2020-10-18 RX ADMIN — CALCITRIOL 1 MCG: 0.25 CAPSULE ORAL at 08:33

## 2020-10-18 RX ADMIN — POLYETHYLENE GLYCOL 3350 17 G: 17 POWDER, FOR SOLUTION ORAL at 17:15

## 2020-10-18 NOTE — NURSING NOTE
[employer] initiated per policy with dressing changed, no redness or drainage noted, line secured to abd

## 2020-10-18 NOTE — PROGRESS NOTES
"[facility]  [address] [place] [address]. [address]  T - [phone]  F - [phone]     Progress Note          PATIENT  DEMOGRAPHICS   PATIENT NAME: Sumanth Robledo                      PHYSICIAN: EMANUEL Ferrari  : 1960  MRN: [id number]   LOS: 8 days    Patient Care Team:  Elizabeth Burleson APRN as PCP - General (Family Medicine)  Subjective   SUBJECTIVE   No acute events overnight.  Denied any complaints. Still has tremor.          Objective   OBJECTIVE   Vital Signs  Temp:  [97.2 °F (36.2 °C)-98.1 °F (36.7 °C)] 97.6 °F (36.4 °C)  Heart Rate:  [93-99] 96  Resp:  [17-18] 18  BP: (138-176)/(64-88) 157/68    Flowsheet Rows      First Filed Value   Admission Height  170.2 cm (67\") Documented at 10/08/2020 2035   Admission Weight  81.5 kg (179 lb 9.6 oz) Documented at 10/08/2020 2046           I/O last 3 completed shifts:  In: 360 [P.O.:360]  Out: 2641 [Other:2641]    PHYSICAL EXAM    Physical Exam  Vitals signs reviewed.   Constitutional:       Appearance: Normal appearance.   Cardiovascular:      Rate and Rhythm: Normal rate and regular rhythm.      Heart sounds: No murmur. No gallop.    Pulmonary:      Effort: Pulmonary effort is normal. No respiratory distress.      Breath sounds: No wheezing, rhonchi or rales.   Abdominal:      General: Bowel sounds are normal. There is no distension.      Palpations: Abdomen is soft. There is no mass.      Tenderness: There is no abdominal tenderness.   Musculoskeletal:         General: No swelling.   Skin:     General: Skin is warm and dry.   Neurological:      Mental Status: He is alert.         RESULTS   Results Review:    Results from last 7 days   Lab Units 10/18/20  0923 10/15/20  0640 10/13/20  0358   SODIUM mmol/L 138 136 134*   POTASSIUM mmol/L 3.7 3.7 3.8   CHLORIDE mmol/L 95* 94* 95*   CO2 mmol/L 28.0 27.0 24.0   BUN mg/dL 49* 57* 60*   CREATININE mg/dL 12.23* 12.02* 11.87*   CALCIUM mg/dL 9.4 9.3 8.3*   BILIRUBIN mg/dL " 0.3 0.3  --    ALK PHOS U/L 66 64  --    ALT (SGPT) U/L 8 11  --    AST (SGOT) U/L 8 11  --    GLUCOSE mg/dL 117* 76 111*       Estimated Creatinine Clearance: 6.9 mL/min (A) (by C-G formula based on SCr of 12.23 mg/dL (H)).    Results from last 7 days   Lab Units 10/15/20  0640 10/14/20  0512 10/13/20  0358   MAGNESIUM mg/dL 2.0  --  1.8   PHOSPHORUS mg/dL 9.4* 9.9* 10.0*             Results from last 7 days   Lab Units 10/15/20  0640 10/13/20  0358   WBC 10*3/mm3 12.12* 8.50   HEMOGLOBIN g/dL 8.5* 8.2*   PLATELETS 10*3/mm3 277 248               Imaging Results (Last 24 Hours)     ** No results found for the last 24 hours. **           MEDICATIONS    aspirin, 81 mg, Oral, Daily  calcitriol, 1 mcg, Oral, Daily  carvedilol, 6.25 mg, Oral, BID With Meals  clopidogrel, 75 mg, Oral, Daily  epoetin arturo/arturo-epbx, 10,000 Units, Subcutaneous, Weekly  famotidine, 20 mg, Oral, Daily  gabapentin, 100 mg, Oral, Nightly  heparin (porcine), 5,000 Units, Subcutaneous, Q12H  hydrALAZINE, 25 mg, Oral, Q8H  insulin detemir, 20 Units, Subcutaneous, Nightly  isosorbide mononitrate, 60 mg, Oral, Q24H  lanthanum, 500 mg, Oral, TID With Meals  losartan, 50 mg, Oral, Q24H  pravastatin, 20 mg, Oral, Daily  sevelamer, 2,400 mg, Oral, TID With Meals  sodium chloride, 10 mL, Intravenous, Q12H      Assessment/Plan   ASSESSMENT / PLAN      Hypotension    CAD (coronary artery disease)    ESRF (end stage renal failure) (CMS/HCC)    Diabetes mellitus (CMS/HCC)    Cardiac arrest (CMS/[facility])    Syncope    1. End-stage renal disease on PD:  - Outpatient prescription: 2800 cc 5 cycles with last fill of 1000 cc.  His estimated dry weight is 84 kg.    - Weight is is improving slowly. Down to 90 kg. Will do 2.5% bags as 4.25% bas are in short supply. Seen and examined on PD. Has last fill dwelling.     2. Hypertension:  - Blood pressure is acceptable.     3. Anemia chronic kidney disease:  - Hemoglobin is low at 8.5. On Epogen 35380 units weekly.      4.  Secondary hyperparathyroidism:  - Patient will remain on calcitriol 1 mcg daily along with Renvela 2400 mg 3 times a day. Added Fosrenol as phosphorus remained high.     5. CAD:  - Cardiac cath 10/9/20 showed 100% occlusion left main . Had episode of PEA Saturday AM and CPR was administered x 4 minutes and patient received epinepherine x 2.      6. S/p PEA arrest     7. ? Seizures:  - Witnessed seizure x 2 post Code Blue. CT scan of head was unremarkable.      8. Metabolic acidosis:  - Bicarbonate is better. Off oral bicarbonate.    9. Tremors:  - Lowered gabapentin to HS only. Off Ranexa per Dr. Garza.            This document has been electronically signed by EMANUEL Ferrari on October 18, 2020 10:30 CDT

## 2020-10-18 NOTE — PROGRESS NOTES
[facility] Medicine Services  INPATIENT PROGRESS NOTE    Length of Stay: 8  Date of Admission: 10/8/2020  Primary Care Physician: Elizabeth Burleson APRN    Subjective   Chief Complaint:  Chest pain  HPI: Patient states he feels full.  He also complains of burning in the bottom of his feet.  He states is been going on about a month.  Left greater than right.    Review of Systems   Constitutional: Negative for appetite change, chills, fatigue and fever.   Respiratory: Negative for chest tightness and shortness of breath.    Cardiovascular: Negative for chest pain, palpitations and leg swelling.   Gastrointestinal: Negative for abdominal pain, constipation, diarrhea, nausea and vomiting.   Skin: Negative for wound.   Neurological: Positive for tremors (better today). Negative for dizziness, weakness, light-headedness, numbness and headaches.     All pertinent negatives and positives are as above. All other systems have been reviewed and are negative unless otherwise stated.     Objective    Temp:  [97.2 °F (36.2 °C)-98.1 °F (36.7 °C)] 97.6 °F (36.4 °C)  Heart Rate:  [93-99] 96  Resp:  [17-18] 18  BP: (138-176)/(64-88) 157/68    Physical Exam  Vitals signs reviewed.   Constitutional:       Appearance: He is well-developed.   HENT:      Head: Normocephalic and atraumatic.   Eyes:      Pupils: Pupils are equal, round, and reactive to light.   Neck:      Musculoskeletal: Normal range of motion and neck supple.   Cardiovascular:      Rate and Rhythm: Normal rate and regular rhythm.      Heart sounds: Normal heart sounds. No murmur. No friction rub. No gallop.    Pulmonary:      Effort: Pulmonary effort is normal. No respiratory distress.      Breath sounds: Normal breath sounds. No wheezing or rales.   Chest:      Chest wall: No tenderness.   Abdominal:      General: Bowel sounds are normal. There is no distension.      Palpations: Abdomen is soft.      Tenderness: There is no  abdominal tenderness.   Psychiatric:         Behavior: Behavior normal.       Results Review:  I have reviewed the labs, radiology results, and diagnostic studies.    Laboratory Data:   Results from last 7 days   Lab Units 10/15/20  0640 10/13/20  0358   SODIUM mmol/L 136 134*   POTASSIUM mmol/L 3.7 3.8   CHLORIDE mmol/L 94* 95*   CO2 mmol/L 27.0 24.0   BUN mg/dL 57* 60*   CREATININE mg/dL 12.02* 11.87*   GLUCOSE mg/dL 76 111*   CALCIUM mg/dL 9.3 8.3*   BILIRUBIN mg/dL 0.3  --    ALK PHOS U/L 64  --    ALT (SGPT) U/L 11  --    AST (SGOT) U/L 11  --    ANION GAP mmol/L 15.0 15.0     Estimated Creatinine Clearance: 7 mL/min (A) (by C-G formula based on SCr of 12.02 mg/dL (H)).  Results from last 7 days   Lab Units 10/15/20  0640 10/14/20  0512 10/13/20  0358   MAGNESIUM mg/dL 2.0  --  1.8   PHOSPHORUS mg/dL 9.4* 9.9* 10.0*         Results from last 7 days   Lab Units 10/15/20  0640 10/13/20  0358   WBC 10*3/mm3 12.12* 8.50   HEMOGLOBIN g/dL 8.5* 8.2*   HEMATOCRIT % 26.5* 26.5*   PLATELETS 10*3/mm3 277 248           Culture Data:   No results found for: BLOODCX  No results found for: URINECX  No results found for: RESPCX  No results found for: WOUNDCX  No results found for: STOOLCX  No components found for: BODYFLD    Radiology Data:   Imaging Results (Last 24 Hours)     ** No results found for the last 24 hours. **          I have reviewed the patient's current medications.     Assessment/Plan     Active Hospital Problems    Diagnosis   • **Hypotension   • Cardiac arrest (CMS/[facility])   • Syncope   • Diabetes mellitus (CMS/[facility])   • ESRF (end stage renal failure) (CMS/[facility])   • CAD (coronary artery disease)       Plan:    1.  Coronary artery disease: No symptoms.  Continue aspirin, beta-blocker, Plavix, ARB, nitrate, and statin.  2.  End-stage renal failure: Nephrology following.  Continue with peritoneal dialysis.  3.  Diabetes mellitus: Continue sliding scale and long-acting insulin.  Reasonably controlled.  4.  Status  post cardiac arrest: PEA.  No significant neurologic deficit.  5.  Myoclonus: Still minimally present, but continued to improve.  6.  Seizure: Patient had 2 witnessed seizures after his cardiac arrest.  None since then.  Work-up thus far has been negative.  Likely metabolic in etiology  7.  Peripheral neuropathy: Increase neurontin.    The patient was evaluated during the global COVID-19 pandemic, and the diagnosis was suspected/considered upon their initial presentation.  Evaluation, treatment, and testing were consistent with current guidelines for patients who present with complaints or symptoms that may be related to COVID-19.    Discharge Planning: I expect patient to be discharged to home with 24/7 care in 1-2 days.        This document has been electronically signed by Randolph Mcconnell MD on October 18, 2020 09:56 CDT

## 2020-10-18 NOTE — SIGNIFICANT NOTE
10/18/20 1510   OTHER   Discipline physical therapy assistant   Rehab Time/Intention   Session Not Performed patient/family declined treatment  (pt. stated that he has been nauseated today and he did not feel like getting out of recliner and to check back in am.)

## 2020-10-19 LAB
ANISOCYTOSIS BLD QL: NORMAL
BASOPHILS # BLD AUTO: 0.05 10*3/MM3 (ref 0–0.2)
BASOPHILS NFR BLD AUTO: 0.4 % (ref 0–1.5)
DEPRECATED RDW RBC AUTO: 43.3 FL (ref 37–54)
EOSINOPHIL # BLD AUTO: 0.41 10*3/MM3 (ref 0–0.4)
EOSINOPHIL NFR BLD AUTO: 3.7 % (ref 0.3–6.2)
ERYTHROCYTE [DISTWIDTH] IN BLOOD BY AUTOMATED COUNT: 15.1 % (ref 12.3–15.4)
GLUCOSE BLDC GLUCOMTR-MCNC: 109 MG/DL (ref 70–130)
GLUCOSE BLDC GLUCOMTR-MCNC: 205 MG/DL (ref 70–130)
GLUCOSE BLDC GLUCOMTR-MCNC: 284 MG/DL (ref 70–130)
GLUCOSE BLDC GLUCOMTR-MCNC: 344 MG/DL (ref 70–130)
HCT VFR BLD AUTO: 32.4 % (ref 37.5–51)
HGB BLD-MCNC: 9.9 G/DL (ref 13–17.7)
HYPOCHROMIA BLD QL: NORMAL
IMM GRANULOCYTES # BLD AUTO: 0.29 10*3/MM3 (ref 0–0.05)
IMM GRANULOCYTES NFR BLD AUTO: 2.6 % (ref 0–0.5)
LYMPHOCYTES # BLD AUTO: 2.56 10*3/MM3 (ref 0.7–3.1)
LYMPHOCYTES NFR BLD AUTO: 22.8 % (ref 19.6–45.3)
MCH RBC QN AUTO: 24.6 PG (ref 26.6–33)
MCHC RBC AUTO-ENTMCNC: 30.6 G/DL (ref 31.5–35.7)
MCV RBC AUTO: 80.4 FL (ref 79–97)
MONOCYTES # BLD AUTO: 2.26 10*3/MM3 (ref 0.1–0.9)
MONOCYTES NFR BLD AUTO: 20.1 % (ref 5–12)
NEUTROPHILS NFR BLD AUTO: 5.66 10*3/MM3 (ref 1.7–7)
NEUTROPHILS NFR BLD AUTO: 50.4 % (ref 42.7–76)
NRBC BLD AUTO-RTO: 0.3 /100 WBC (ref 0–0.2)
PLATELET # BLD AUTO: 257 10*3/MM3 (ref 140–450)
PMV BLD AUTO: 11.6 FL (ref 6–12)
POLYCHROMASIA BLD QL SMEAR: NORMAL
RBC # BLD AUTO: 4.03 10*6/MM3 (ref 4.14–5.8)
SMALL PLATELETS BLD QL SMEAR: ADEQUATE
WBC # BLD AUTO: 11.23 10*3/MM3 (ref 3.4–10.8)
WBC MORPH BLD: NORMAL

## 2020-10-19 PROCEDURE — 97110 THERAPEUTIC EXERCISES: CPT

## 2020-10-19 PROCEDURE — 94799 UNLISTED PULMONARY SVC/PX: CPT

## 2020-10-19 PROCEDURE — 82962 GLUCOSE BLOOD TEST: CPT

## 2020-10-19 PROCEDURE — 85025 COMPLETE CBC W/AUTO DIFF WBC: CPT | Performed by: HOSPITALIST

## 2020-10-19 PROCEDURE — 97530 THERAPEUTIC ACTIVITIES: CPT

## 2020-10-19 PROCEDURE — 94760 N-INVAS EAR/PLS OXIMETRY 1: CPT

## 2020-10-19 PROCEDURE — 63710000001 INSULIN DETEMIR PER 5 UNITS: Performed by: INTERNAL MEDICINE

## 2020-10-19 PROCEDURE — 85007 BL SMEAR W/DIFF WBC COUNT: CPT | Performed by: HOSPITALIST

## 2020-10-19 PROCEDURE — 97116 GAIT TRAINING THERAPY: CPT

## 2020-10-19 PROCEDURE — 25010000002 HEPARIN (PORCINE) PER 1000 UNITS: Performed by: INTERNAL MEDICINE

## 2020-10-19 RX ORDER — DEXTROSE MONOHYDRATE, SODIUM CHLORIDE, SODIUM LACTATE, CALCIUM CHLORIDE, MAGNESIUM CHLORIDE 4.25; 538; 448; 18.4; 5.08 G/100ML; MG/100ML; MG/100ML; MG/100ML; MG/100ML
5000 SOLUTION INTRAPERITONEAL AS NEEDED
Status: CANCELLED | OUTPATIENT
Start: 2020-10-19

## 2020-10-19 RX ORDER — DEXTROSE MONOHYDRATE, SODIUM CHLORIDE, SODIUM LACTATE, CALCIUM CHLORIDE, MAGNESIUM CHLORIDE 2.5; 538; 448; 18.4; 5.08 G/100ML; MG/100ML; MG/100ML; MG/100ML; MG/100ML
5000 SOLUTION INTRAPERITONEAL AS NEEDED
Status: CANCELLED | OUTPATIENT
Start: 2020-10-19

## 2020-10-19 RX ORDER — CARVEDILOL 12.5 MG/1
12.5 TABLET ORAL 2 TIMES DAILY WITH MEALS
Status: DISCONTINUED | OUTPATIENT
Start: 2020-10-19 | End: 2020-10-20 | Stop reason: HOSPADM

## 2020-10-19 RX ADMIN — ASPIRIN 81 MG: 81 TABLET, CHEWABLE ORAL at 08:44

## 2020-10-19 RX ADMIN — HYDRALAZINE HYDROCHLORIDE 25 MG: 25 TABLET, FILM COATED ORAL at 15:16

## 2020-10-19 RX ADMIN — GABAPENTIN 200 MG: 100 CAPSULE ORAL at 20:51

## 2020-10-19 RX ADMIN — DOCUSATE SODIUM 100 MG: 100 CAPSULE, LIQUID FILLED ORAL at 08:44

## 2020-10-19 RX ADMIN — LANTHANUM CARBONATE 500 MG: 500 TABLET, CHEWABLE ORAL at 11:59

## 2020-10-19 RX ADMIN — ACETAMINOPHEN 650 MG: 325 TABLET, FILM COATED ORAL at 08:48

## 2020-10-19 RX ADMIN — CARVEDILOL 6.25 MG: 6.25 TABLET, FILM COATED ORAL at 08:44

## 2020-10-19 RX ADMIN — HYDRALAZINE HYDROCHLORIDE 25 MG: 25 TABLET, FILM COATED ORAL at 05:54

## 2020-10-19 RX ADMIN — SEVELAMER CARBONATE 2400 MG: 800 TABLET, FILM COATED ORAL at 11:59

## 2020-10-19 RX ADMIN — SEVELAMER CARBONATE 2400 MG: 800 TABLET, FILM COATED ORAL at 08:44

## 2020-10-19 RX ADMIN — CALCITRIOL 1 MCG: 0.25 CAPSULE ORAL at 08:43

## 2020-10-19 RX ADMIN — INSULIN DETEMIR 20 UNITS: 100 INJECTION, SOLUTION SUBCUTANEOUS at 20:51

## 2020-10-19 RX ADMIN — HYDRALAZINE HYDROCHLORIDE 25 MG: 25 TABLET, FILM COATED ORAL at 20:51

## 2020-10-19 RX ADMIN — LOSARTAN POTASSIUM 50 MG: 50 TABLET, FILM COATED ORAL at 08:44

## 2020-10-19 RX ADMIN — FAMOTIDINE 20 MG: 20 TABLET, FILM COATED ORAL at 08:44

## 2020-10-19 RX ADMIN — SODIUM CHLORIDE, PRESERVATIVE FREE 10 ML: 5 INJECTION INTRAVENOUS at 08:45

## 2020-10-19 RX ADMIN — HEPARIN SODIUM 5000 UNITS: 5000 INJECTION INTRAVENOUS; SUBCUTANEOUS at 20:51

## 2020-10-19 RX ADMIN — HEPARIN SODIUM 5000 UNITS: 5000 INJECTION INTRAVENOUS; SUBCUTANEOUS at 08:44

## 2020-10-19 RX ADMIN — POLYETHYLENE GLYCOL 3350 17 G: 17 POWDER, FOR SOLUTION ORAL at 08:44

## 2020-10-19 RX ADMIN — SODIUM CHLORIDE, PRESERVATIVE FREE 10 ML: 5 INJECTION INTRAVENOUS at 20:51

## 2020-10-19 RX ADMIN — ISOSORBIDE MONONITRATE 60 MG: 60 TABLET, EXTENDED RELEASE ORAL at 08:44

## 2020-10-19 RX ADMIN — PRAVASTATIN SODIUM 20 MG: 20 TABLET ORAL at 08:44

## 2020-10-19 RX ADMIN — LANTHANUM CARBONATE 500 MG: 500 TABLET, CHEWABLE ORAL at 08:45

## 2020-10-19 RX ADMIN — CLOPIDOGREL BISULFATE 75 MG: 75 TABLET ORAL at 08:44

## 2020-10-19 RX ADMIN — CARVEDILOL 12.5 MG: 12.5 TABLET, FILM COATED ORAL at 17:50

## 2020-10-19 RX ADMIN — DOCUSATE SODIUM 100 MG: 100 CAPSULE, LIQUID FILLED ORAL at 20:51

## 2020-10-19 NOTE — PLAN OF CARE
Problem: Adult Inpatient Plan of Care  Goal: Plan of Care Review  Recent Flowsheet Documentation  Taken 10/19/2020 0920 by Bridgette Nash, HU/L  Progress: improving  Plan of Care Reviewed With: patient  Outcome Summary: Pt deferred ADL this AM, reported he perfer wife assist when she arrives. Sup-sit-Mod I, sit-stand-sit-SBA, amb ~ 2-3' SBA w/ no AD. Bed-chair-SBA. Pt completed BUE ther ex in all planes w/ 3lb HW and good tolerance w/ RB's PRN. Pt tolerated 5 sit<>stands w/ good tolerance. Pt met 1 new goal this date. Cont OT POC

## 2020-10-19 NOTE — PROGRESS NOTES
"[facility]  [address] [place] [address]. [address]  T - [phone]  F - [phone]     Progress Note          PATIENT  DEMOGRAPHICS   PATIENT NAME: Sumanth Robledo                      PHYSICIAN: Mauro Saravia MD  : 1960  MRN: [id number]   LOS: 9 days    Patient Care Team:  Elizabeth Burleson APRN as PCP - General (Family Medicine)  Subjective   SUBJECTIVE   No acute events overnight.  Had nearly 1.8L UF wt is coming down          Objective   OBJECTIVE   Vital Signs  Temp:  [97.2 °F (36.2 °C)-97.8 °F (36.6 °C)] 97.8 °F (36.6 °C)  Heart Rate:  [92-98] 92  Resp:  [17-18] 18  BP: (139-177)/(70-93) 177/75    Flowsheet Rows      First Filed Value   Admission Height  170.2 cm (67\") Documented at 10/08/2020 2035   Admission Weight  81.5 kg (179 lb 9.6 oz) Documented at 10/08/2020 2046           I/O last 3 completed shifts:  In: 360 [P.O.:360]  Out: 3651 [Other:3651]    PHYSICAL EXAM    Physical Exam  Vitals signs reviewed.   Constitutional:       Appearance: Normal appearance.   Cardiovascular:      Rate and Rhythm: Normal rate and regular rhythm.      Heart sounds: No murmur. No gallop.    Pulmonary:      Effort: Pulmonary effort is normal. No respiratory distress.      Breath sounds: No wheezing, rhonchi or rales.   Abdominal:      General: Bowel sounds are normal. There is no distension.      Palpations: Abdomen is soft. There is no mass.      Tenderness: There is no abdominal tenderness.   Musculoskeletal:         General: No swelling.   Skin:     General: Skin is warm and dry.   Neurological:      Mental Status: He is alert.         RESULTS   Results Review:    Results from last 7 days   Lab Units 10/18/20  0923 10/15/20  0640 10/13/20  0358   SODIUM mmol/L 138 136 134*   POTASSIUM mmol/L 3.7 3.7 3.8   CHLORIDE mmol/L 95* 94* 95*   CO2 mmol/L 28.0 27.0 24.0   BUN mg/dL 49* 57* 60*   CREATININE mg/dL 12.23* 12.02* 11.87*   CALCIUM mg/dL 9.4 9.3 8.3*   BILIRUBIN mg/dL 0.3 0.3 "  --    ALK PHOS U/L 66 64  --    ALT (SGPT) U/L 8 11  --    AST (SGOT) U/L 8 11  --    GLUCOSE mg/dL 117* 76 111*       Estimated Creatinine Clearance: 6.8 mL/min (A) (by C-G formula based on SCr of 12.23 mg/dL (H)).    Results from last 7 days   Lab Units 10/15/20  0640 10/14/20  0512 10/13/20  0358   MAGNESIUM mg/dL 2.0  --  1.8   PHOSPHORUS mg/dL 9.4* 9.9* 10.0*             Results from last 7 days   Lab Units 10/19/20  0450 10/15/20  0640 10/13/20  0358   WBC 10*3/mm3 11.23* 12.12* 8.50   HEMOGLOBIN g/dL 9.9* 8.5* 8.2*   PLATELETS 10*3/mm3 257 277 248               Imaging Results (Last 24 Hours)     ** No results found for the last 24 hours. **           MEDICATIONS    aspirin, 81 mg, Oral, Daily  bisacodyl, 10 mg, Rectal, Once  calcitriol, 1 mcg, Oral, Daily  carvedilol, 6.25 mg, Oral, BID With Meals  clopidogrel, 75 mg, Oral, Daily  docusate sodium, 100 mg, Oral, BID  epoetin arturo/arturo-epbx, 10,000 Units, Subcutaneous, Weekly  famotidine, 20 mg, Oral, Daily  gabapentin, 200 mg, Oral, Nightly  heparin (porcine), 5,000 Units, Subcutaneous, Q12H  hydrALAZINE, 25 mg, Oral, Q8H  insulin detemir, 20 Units, Subcutaneous, Nightly  isosorbide mononitrate, 60 mg, Oral, Q24H  lanthanum, 500 mg, Oral, TID With Meals  losartan, 50 mg, Oral, Q24H  polyethylene glycol, 17 g, Oral, Daily  pravastatin, 20 mg, Oral, Daily  sevelamer, 2,400 mg, Oral, TID With Meals  sodium chloride, 10 mL, Intravenous, Q12H      Assessment/Plan   ASSESSMENT / PLAN      Hypotension    CAD (coronary artery disease)    ESRF (end stage renal failure) (CMS/HCC)    Diabetes mellitus (CMS/HCC)    Cardiac arrest (CMS/[facility])    Syncope    1. End-stage renal disease on PD:  - Outpatient prescription: 2800 cc 5 cycles with last fill of 1000 cc.  His estimated dry weight is 84 kg.    - Weight is is improving slowly. Down to 87.8 kg. Will do 2.5% bags as well 1 4.25% bag  Seen and examined on PD. Lost 20 lbs so far    2. Hypertension:  - Blood pressure is  high and increase coreg    3. Anemia chronic kidney disease:  - Hemoglobin is low. On Epogen 98454 units weekly.      4. Secondary hyperparathyroidism:  - Patient will remain on calcitriol 1 mcg daily along with Renvela 2400 mg 3 times a day. Added Fosrenol as phosphorus remained high.     5. CAD:  - Cardiac cath 10/9/20 showed 100% occlusion left main . Had episode of PEA Saturday AM and CPR was administered x 4 minutes and patient received epinepherine x 2.      6. S/p PEA arrest     7. ? Seizures:  - Witnessed seizure x 2 post Code Blue. CT scan of head was unremarkable.      8. Metabolic acidosis:  - Bicarbonate is better. Off oral bicarbonate.    9. Tremors:  - Lowered gabapentin to HS only. Off Ranexa per Dr. Garza.            This document has been electronically signed by Mauro Saravia MD on October 19, 2020 17:32 CDT

## 2020-10-19 NOTE — PLAN OF CARE
Problem: Adult Inpatient Plan of Care  Goal: Plan of Care Review  Outcome: Ongoing, Progressing  Flowsheets (Taken 10/19/2020 1417)  Progress: improving  Plan of Care Reviewed With: patient  Outcome Summary: pt. showed good endurance and tolerance with therapy this date.  pt. t/f sit to stand with Mod I and ambulated 60' and 150' with RW and Mod I.  pt. met 1 new goal this tx.

## 2020-10-19 NOTE — PROGRESS NOTES
[facility]  INPATIENT PROGRESS NOTE    Length of Stay: 9  Date of Admission: 10/8/2020  Primary Care Physician: Elizabeth Burleson APRN    Subjective   Chief Complaint:  Chest pain  HPI: States he is doing okay today.  Burning in his feet and some better.    Review of Systems   Constitutional: Negative for appetite change, chills, fatigue and fever.   Respiratory: Negative for chest tightness and shortness of breath.    Cardiovascular: Negative for chest pain, palpitations and leg swelling.   Gastrointestinal: Negative for abdominal pain, constipation, diarrhea, nausea and vomiting.   Skin: Negative for wound.   Neurological: Positive for tremors (better today). Negative for dizziness, weakness, light-headedness, numbness and headaches.     All pertinent negatives and positives are as above. All other systems have been reviewed and are negative unless otherwise stated.     Objective    Temp:  [97.2 °F (36.2 °C)-97.5 °F (36.4 °C)] 97.5 °F (36.4 °C)  Heart Rate:  [] 97  Resp:  [17-18] 18  BP: (130-159)/(70-93) 142/70    Physical Exam  Vitals signs reviewed.   Constitutional:       Appearance: He is well-developed.   HENT:      Head: Normocephalic and atraumatic.   Eyes:      Pupils: Pupils are equal, round, and reactive to light.   Neck:      Musculoskeletal: Normal range of motion and neck supple.   Cardiovascular:      Rate and Rhythm: Normal rate and regular rhythm.      Heart sounds: Normal heart sounds. No murmur. No friction rub. No gallop.    Pulmonary:      Effort: Pulmonary effort is normal. No respiratory distress.      Breath sounds: Normal breath sounds. No wheezing or rales.   Chest:      Chest wall: No tenderness.   Abdominal:      General: Bowel sounds are normal. There is no distension.      Palpations: Abdomen is soft.      Tenderness: There is no abdominal tenderness.   Psychiatric:         Behavior: Behavior normal.       Results  Review:  I have reviewed the labs, radiology results, and diagnostic studies.    Laboratory Data:   Results from last 7 days   Lab Units 10/18/20  0923 10/15/20  0640 10/13/20  0358   SODIUM mmol/L 138 136 134*   POTASSIUM mmol/L 3.7 3.7 3.8   CHLORIDE mmol/L 95* 94* 95*   CO2 mmol/L 28.0 27.0 24.0   BUN mg/dL 49* 57* 60*   CREATININE mg/dL 12.23* 12.02* 11.87*   GLUCOSE mg/dL 117* 76 111*   CALCIUM mg/dL 9.4 9.3 8.3*   BILIRUBIN mg/dL 0.3 0.3  --    ALK PHOS U/L 66 64  --    ALT (SGPT) U/L 8 11  --    AST (SGOT) U/L 8 11  --    ANION GAP mmol/L 15.0 15.0 15.0     Estimated Creatinine Clearance: 6.8 mL/min (A) (by C-G formula based on SCr of 12.23 mg/dL (H)).  Results from last 7 days   Lab Units 10/15/20  0640 10/14/20  0512 10/13/20  0358   MAGNESIUM mg/dL 2.0  --  1.8   PHOSPHORUS mg/dL 9.4* 9.9* 10.0*         Results from last 7 days   Lab Units 10/19/20  0450 10/15/20  0640 10/13/20  0358   WBC 10*3/mm3 11.23* 12.12* 8.50   HEMOGLOBIN g/dL 9.9* 8.5* 8.2*   HEMATOCRIT % 32.4* 26.5* 26.5*   PLATELETS 10*3/mm3 257 277 248           Culture Data:   No results found for: BLOODCX  No results found for: URINECX  No results found for: RESPCX  No results found for: WOUNDCX  No results found for: STOOLCX  No components found for: BODYFLD    Radiology Data:   Imaging Results (Last 24 Hours)     ** No results found for the last 24 hours. **          I have reviewed the patient's current medications.     Assessment/Plan     Active Hospital Problems    Diagnosis   • **Hypotension   • Cardiac arrest (CMS/[facility])   • Syncope   • Diabetes mellitus (CMS/HCC)   • ESRF (end stage renal failure) (CMS/[facility])   • CAD (coronary artery disease)       Plan:    1.  Coronary artery disease: No symptoms.  Continue aspirin, beta-blocker, Plavix, ARB, nitrate, and statin.  2.  End-stage renal failure: Nephrology following.  Continue with peritoneal dialysis.  Getting closer to dry weight.  3.  Diabetes mellitus: Continue sliding scale and  long-acting insulin.  Reasonably controlled.  4.  Status post cardiac arrest: PEA.  No significant neurologic deficit.  5.  Myoclonus: Still minimally present, but continued to improve.  6.  Seizure: Patient had 2 witnessed seizures after his cardiac arrest.  None since then.  Work-up thus far has been negative.  Likely metabolic in etiology  7.  Peripheral neuropathy: Increase neurontin.  Some better.    The patient was evaluated during the global COVID-19 pandemic, and the diagnosis was suspected/considered upon their initial presentation.  Evaluation, treatment, and testing were consistent with current guidelines for patients who present with complaints or symptoms that may be related to COVID-19.    Discharge Planning: I expect patient to be discharged to home with 24/7 care in 1-2 days.        This document has been electronically signed by Randolph Mcconnell MD on October 19, 2020 12:43 CDT

## 2020-10-19 NOTE — PAYOR COMM NOTE
"  auth#[id number]  Sumanth Robledo (60 y.o. Male)     Date of Birth Social Security Number Address Home Phone MRN    1960  [address] [place] [address] [phone] [id number]    Quaker Marital Status          Roman Catholic        Admission Date Admission Type Admitting Provider Attending Provider Department, Room/Bed    10/8/20 Emergency Isael Miranda MD Stewart-Hubbard, Takasha Latoya Renee, MD [address], 304/1    Discharge Date Discharge Disposition Discharge Destination                       Attending Provider: Luana Zamudio MD    Allergies: No Known Allergies    Isolation: None   Infection: None   Code Status: CPR    Ht: 170.2 cm (67.01\")   Wt: 87.8 kg (193 lb 10.1 oz)    Admission Cmt: None   Principal Problem: Hypotension [I95.9]                 Active Insurance as of 10/8/2020     Primary Coverage     Payor Plan Insurance Group Employer/Plan Group    [facility] BLUE CROSS ANTH BLUE CROSS BLUE [facility] PPO [id number]     Payor Plan Address Payor Plan Phone Number Payor Plan Fax Number Effective Dates    [address] [phone]  9/1/2012 - None Entered    [facility] [id number]       Subscriber Name Subscriber Birth Date Member ID       SUMANTH ROBLEDO 1960 [id number]           Secondary Coverage     Payor Plan Insurance Group Employer/Plan Group    MEDICARE MEDICARE A & B      Payor Plan Address Payor Plan Phone Number Payor Plan Fax Number Effective Dates    [address] [phone]  3/1/2019 - None Entered    [facility] [id number]       Subscriber Name Subscriber Birth Date Member ID       SUMANTH ROBLEDO 1960 [id number]                 Emergency Contacts      (Rel.) Home Phone Work Phone Mobile Phone    LISA ROBLEDO (Spouse) [phone] -- [phone]            Vital Signs (last day)     Date/Time   Temp   Temp src   Pulse   Resp   BP   Patient Position   SpO2    10/19/20 0902   --   --   " 97   --   --   --   --    10/19/20 0821   --   --   98   --   --   --   98    10/19/20 0730   97.5 (36.4)   Temporal   92   18   142/70   Lying   93    10/19/20 0323   97.2 (36.2)   Temporal   94   17   145/76   Lying   92    10/19/20 0000   --   --   92   --   --   --   --    10/18/20 2308   97.2 (36.2)   Temporal   92   17   139/75   Lying   90    10/18/20 1923   97.3 (36.3)   Temporal   94   17   159/93   Lying   91    10/18/20 1641   --   --   103   --   --   --   --    10/18/20 1511   97.5 (36.4)   Temporal   98   17   130/76   Lying   94    10/18/20 1223   97.4 (36.3)   Temporal   100   18   116/74   Lying   95    10/18/20 0743   --   --   96   --   --   --   --    10/18/20 0700   97.6 (36.4)   Temporal   99   18   157/68   Lying   94    10/18/20 0356   98.1 (36.7)   Temporal   95   18   176/82   Lying   91    10/18/20 0215   --   --   99   --   --   --   --    10/18/20 0002   97.6 (36.4)   Oral   96   18   152/64   Lying   93              Current Facility-Administered Medications   Medication Dose Route Frequency Provider Last Rate Last Dose   • acetaminophen (TYLENOL) tablet 650 mg  650 mg Oral Q4H PRN Brendan Moon MD   650 mg at 10/19/20 0848    Or   • acetaminophen (TYLENOL) suppository 650 mg  650 mg Rectal Q4H PRN Brendan Moon MD       • acetaminophen (TYLENOL) tablet 650 mg  650 mg Oral Q4H PRN Brendan Moon MD       • aspirin chewable tablet 81 mg  81 mg Oral Daily Brendan Moon MD   81 mg at 10/19/20 0844   • bisacodyl (DULCOLAX) EC tablet 5 mg  5 mg Oral Daily PRN Brendan Moon MD       • bisacodyl (DULCOLAX) suppository 10 mg  10 mg Rectal Once Mellissa Hinson MD       • calcitriol (ROCALTROL) capsule 1 mcg  1 mcg Oral Daily Brendan Moon MD   1 mcg at 10/19/20 0843   • carvedilol (COREG) tablet 6.25 mg  6.25 mg Oral BID With Meals Brendan Moon MD   6.25 mg at 10/19/20 0844   • clopidogrel (PLAVIX) tablet 75 mg  75 mg Oral Daily Brendan Moon MD    75 mg at 10/19/20 0844   • Delflex-LC/1.5% Dextrose (DIANEAL) CAPD 12,200 mL  12,200 mL Intraperitoneal PRN Brendan Moon MD       • Delflex-LC/2.5% Dextrose (DIANEAL) CAPD 5,000 mL  5,000 mL Intraperitoneal PRN Brendan Moon MD       • Delflex-LC/2.5% Dextrose (DIANEAL) CAPD 5,000 mL  5,000 mL Intraperitoneal PRN Brendan Moon MD   5,000 mL at 10/12/20 1556   • Delflex-LC/2.5% Dextrose (DIANEAL) CAPD 5,000 mL  5,000 mL Intraperitoneal PRN Mauro Saravia MD   5,000 mL at 10/13/20 1545   • Delflex-LC/2.5% Dextrose (DIANEAL) CAPD 5,000 mL  5,000 mL Intraperitoneal PRN Mauro Saravia MD       • Delflex-LC/4.25% Dextrose (DIANEAL) CAPD 5,000 mL  5,000 mL Intraperitoneal PRN Mauro Saravia MD       • docusate sodium (COLACE) capsule 100 mg  100 mg Oral BID Mellissa Hinson MD   100 mg at 10/19/20 0844   • epoetin arturo-epbx (RETACRIT) injection 10,000 Units  10,000 Units Subcutaneous Weekly Brendan Moon MD   10,000 Units at 10/17/20 1319   • famotidine (PEPCID) tablet 20 mg  20 mg Oral Daily Brendan Moon MD   20 mg at 10/19/20 0844   • gabapentin (NEURONTIN) capsule 200 mg  200 mg Oral Nightly Randolph Mcconnell MD   200 mg at 10/18/20 2043   • heparin (porcine) 5000 UNIT/ML injection 5,000 Units  5,000 Units Subcutaneous Q12H Brendan Moon MD   5,000 Units at 10/19/20 0844   • hydrALAZINE (APRESOLINE) tablet 25 mg  25 mg Oral Q8H Brendan Moon MD   25 mg at 10/19/20 0554   • influenza vac split quad (FLUZONE,FLUARIX,AFLURIA,FLULAVAL) injection 0.5 mL  0.5 mL Intramuscular During Hospitalization Brendan Moon MD       • insulin detemir (LEVEMIR) injection 20 Units  20 Units Subcutaneous Nightly Brendan Moon MD   20 Units at 10/18/20 2043   • isosorbide mononitrate (IMDUR) 24 hr tablet 60 mg  60 mg Oral Q24H Brendan Moon MD   60 mg at 10/19/20 0844   • lanthanum (FOSRENOL) chewable tablet 500 mg  500 mg Oral TID With Meals Mauro Saravia MD   500 mg at  10/19/20 0845   • losartan (COZAAR) tablet 50 mg  50 mg Oral Q24H Brendan Moon MD   50 mg at 10/19/20 0844   • metoprolol tartrate (LOPRESSOR) injection 5 mg  5 mg Intravenous Q6H PRN Brendan Moon MD   5 mg at 10/16/20 1204   • nitroglycerin (NITROSTAT) SL tablet 0.4 mg  0.4 mg Sublingual Q5 Min PRN Brendan Moon MD       • ondansetron (ZOFRAN) tablet 4 mg  4 mg Oral Q6H PRN Brendan Moon MD        Or   • ondansetron (ZOFRAN) injection 4 mg  4 mg Intravenous Q6H PRN Brendan Moon MD   4 mg at 10/18/20 0838   • polyethylene glycol (MIRALAX) packet 17 g  17 g Oral Daily Mellissa Hinson MD   17 g at 10/19/20 0844   • pravastatin (PRAVACHOL) tablet 20 mg  20 mg Oral Daily Brendan Moon MD   20 mg at 10/19/20 0844   • sevelamer (RENVELA) tablet 2,400 mg  2,400 mg Oral TID With Meals Brendan Moon MD   2,400 mg at 10/19/20 0844   • sodium chloride 0.9 % flush 10 mL  10 mL Intravenous Q12H Brendan Moon MD   10 mL at 10/19/20 0845   • sodium chloride 0.9 % flush 10 mL  10 mL Intravenous PRN Brendan Moon MD   10 mL at 10/11/20 2038     Orders (last 24 hrs)      Start     Ordered    10/19/20 1036  POC Glucose Once  Once      10/19/20 1021    10/19/20 0624  POC Glucose Once  Once      10/19/20 0604    10/19/20 0600  CBC & Differential  Daily      10/18/20 0850    10/19/20 0600  CBC Auto Differential  PROCEDURE ONCE      10/19/20 0054    10/19/20 0518  Scan Slide  Once      10/19/20 0517    10/19/20 0319  POC Glucose Once  Once      10/18/20 1927    10/18/20 2100  gabapentin (NEURONTIN) capsule 200 mg  Nightly      10/18/20 1322    10/18/20 2100  docusate sodium (COLACE) capsule 100 mg  2 Times Daily      10/18/20 1548    10/18/20 1645  polyethylene glycol (MIRALAX) packet 17 g  Daily      10/18/20 1548    10/18/20 1645  bisacodyl (DULCOLAX) suppository 10 mg  Once      10/18/20 1549    10/17/20 0900  epoetin arturo-epbx (RETACRIT) injection 10,000 Units  Weekly       10/12/20 0954    10/14/20 2100  gabapentin (NEURONTIN) capsule 100 mg  Nightly,   Status:  Discontinued      10/14/20 1222    10/14/20 2000  Continuous Cyclic Peritoneal Dialysis (CCPD)  Nightly     Comments: 5 exchanges and 1 L of last fill    10/14/20 1222    10/14/20 1322  Weigh patient  Daily     Comments: Weight patient every 24 hours.    10/14/20 1321    10/14/20 1321  Delflex-LC/2.5% Dextrose (DIANEAL) CAPD 5,000 mL  As Needed      10/14/20 1321    10/14/20 1321  Delflex-LC/4.25% Dextrose (DIANEAL) CAPD 5,000 mL  As Needed      10/14/20 1321    10/14/20 1315  lanthanum (FOSRENOL) chewable tablet 500 mg  3 Times Daily With Meals      10/14/20 1222    10/13/20 1505  Weigh patient  Daily     Comments: Weight patient every 24 hours.    10/13/20 1504    10/13/20 1504  Delflex-LC/2.5% Dextrose (DIANEAL) CAPD 5,000 mL  As Needed      10/13/20 1504    10/13/20 0900  isosorbide mononitrate (IMDUR) 24 hr tablet 60 mg  Every 24 Hours Scheduled      10/12/20 1923    10/12/20 2312  metoprolol tartrate (LOPRESSOR) injection 5 mg  Every 6 Hours PRN      10/12/20 2313    10/12/20 2015  losartan (COZAAR) tablet 50 mg  Every 24 Hours Scheduled      10/12/20 1928    10/12/20 2015  hydrALAZINE (APRESOLINE) tablet 25 mg  Every 8 Hours Scheduled      10/12/20 1928    10/12/20 2015  carvedilol (COREG) tablet 6.25 mg  2 Times Daily With Meals      10/12/20 1928    10/12/20 1130  heparin (porcine) 5000 UNIT/ML injection 5,000 Units  Every 12 Hours Scheduled      10/12/20 1031    10/12/20 1130  famotidine (PEPCID) tablet 20 mg  Daily      10/12/20 1031    10/12/20 1128  Weigh patient  Daily     Comments: Weight patient every 24 hours.    10/12/20 1127    10/12/20 1127  Delflex-LC/2.5% Dextrose (DIANEAL) CAPD 5,000 mL  As Needed      10/12/20 1127    10/11/20 1553  Delflex-LC/1.5% Dextrose (DIANEAL) CAPD 12,200 mL  As Needed      10/11/20 1553    10/11/20 1215  aspirin chewable tablet 81 mg  Daily      10/11/20 1124    10/09/20 1253   acetaminophen (TYLENOL) tablet 650 mg  Every 4 Hours PRN      10/09/20 1253    10/09/20 1200  sevelamer (RENVELA) tablet 2,400 mg  3 Times Daily With Meals      10/09/20 0906    10/09/20 1100  calcitriol (ROCALTROL) capsule 1 mcg  Daily      10/09/20 1005    10/09/20 1019  Delflex-LC/2.5% Dextrose (DIANEAL) CAPD 5,000 mL  As Needed      10/09/20 1019    10/09/20 0900  clopidogrel (PLAVIX) tablet 75 mg  Daily      10/09/20 0205    10/09/20 0900  pravastatin (PRAVACHOL) tablet 20 mg  Daily      10/09/20 0205    10/09/20 0400  Vital Signs  Every 4 Hours      10/09/20 0157    10/09/20 0300  insulin detemir (LEVEMIR) injection 20 Units  Nightly      10/09/20 0205    10/09/20 0245  sodium chloride 0.9 % flush 10 mL  Every 12 Hours Scheduled      10/09/20 0157    10/09/20 0157  ondansetron (ZOFRAN) tablet 4 mg  Every 6 Hours PRN      10/09/20 0157    10/09/20 0157  ondansetron (ZOFRAN) injection 4 mg  Every 6 Hours PRN      10/09/20 0157    10/09/20 0157  bisacodyl (DULCOLAX) EC tablet 5 mg  Daily PRN      10/09/20 0157    10/09/20 0156  acetaminophen (TYLENOL) tablet 650 mg  Every 4 Hours PRN      10/09/20 0157    10/09/20 0156  acetaminophen (TYLENOL) 160 MG/5ML solution 650 mg  Every 4 Hours PRN,   Status:  Discontinued      10/09/20 0157    10/09/20 0156  acetaminophen (TYLENOL) suppository 650 mg  Every 4 Hours PRN      10/09/20 0157    10/09/20 0156  Intake & Output  Every Shift      10/09/20 0157    10/09/20 0155  sodium chloride 0.9 % flush 10 mL  As Needed      10/09/20 0157    10/09/20 0045  influenza vac split quad (FLUZONE,FLUARIX,AFLURIA,FLULAVAL) injection 0.5 mL  During Hospitalization      10/09/20 0046    10/08/20 2056  nitroglycerin (NITROSTAT) SL tablet 0.4 mg  Every 5 Minutes PRN      10/08/20 2056    Unscheduled  Up With Assistance  As Needed      10/09/20 0157    Unscheduled  Change site dressing  As Needed      10/09/20 1253    --  sevelamer (RENVELA) 800 MG tablet  3 Times Daily With Meals       10/09/20 0850    --  gabapentin (NEURONTIN) 100 MG capsule  3 Times Daily      10/09/20 0857    --  losartan (COZAAR) 50 MG tablet  Daily      10/09/20 0857    --  hydrALAZINE (APRESOLINE) 50 MG tablet  3 Times Daily      10/09/20 0857    --  furosemide (LASIX) 80 MG tablet  2 Times Daily      10/09/20 0857    --  NIFEdipine XL (PROCARDIA XL) 60 MG 24 hr tablet  Daily      10/09/20 0859    --  SCANNED EKG      10/08/20 0000    --  SCANNED - TELEMETRY        10/08/20 0000    --  SCANNED EKG      10/08/20 0000    --  SCANNED EKG      10/08/20 0000    --  SCANNED EKG      10/08/20 0000    --  SCANNED EKG      10/08/20 0000    --  SCANNED - TELEMETRY        10/08/20 0000    --  SCANNED - TELEMETRY        10/08/20 0000    --  SCANNED - TELEMETRY        10/08/20 0000    --  SCANNED EKG      10/08/20 0000    --  SCANNED EKG      10/08/20 0000    --  SCANNED - TELEMETRY        10/08/20 0000    --  SCANNED - TELEMETRY        10/08/20 0000    --  SCANNED - TELEMETRY        10/08/20 0000    --  SCANNED - TELEMETRY        10/08/20 0000    --  SCANNED - TELEMETRY        10/08/20 0000    --  SCANNED - TELEMETRY        10/08/20 0000    --  SCANNED - TELEMETRY        10/08/20 0000    --  SCANNED - TELEMETRY        10/08/20 0000    --  SCANNED - TELEMETRY        10/08/20 0000                   Physician Progress Notes (last 48 hours) (Notes from 10/17/20 1151 through 10/19/20 1151)      Vikas Santana APRN at 10/18/20 1030     Attestation signed by Mellissa Hinson MD at 10/18/20 4537    I have reviewed the case with EMANUEL Contreras. I have also examined and seen  the patient. I agree with the assessment and plan as documented in the note.    Lungs clear no crackles   Musculoskeletal 1+ edema bilaterally    Got 1.8 L UF today will do 100 by tomorrow still awaiting from his dry weight continue on daily standing weights.  He is complaining of constipation will start on docusate twice daily, MiraLAX 1 packet every day and Dulcolax  "suppository for tonight.    This document has been electronically signed by Mellissa Hinson MD on 2020 15:49 CDT                            [facility]  [address] [place] [address]. [address]  T - [phone]  F  [phone]     Progress Note          PATIENT  DEMOGRAPHICS   PATIENT NAME: Sumanth Robledo                      PHYSICIAN: EMANUEL Ferrari  : 1960  MRN: [id number]   LOS: 8 days    Patient Care Team:  Elizabeth Burleson APRN as PCP - General (Family Medicine)  Subjective   SUBJECTIVE   No acute events overnight.  Denied any complaints. Still has tremor.          Objective   OBJECTIVE   Vital Signs  Temp:  [97.2 °F (36.2 °C)-98.1 °F (36.7 °C)] 97.6 °F (36.4 °C)  Heart Rate:  [93-99] 96  Resp:  [17-18] 18  BP: (138-176)/(64-88) 157/68    Flowsheet Rows      First Filed Value   Admission Height  170.2 cm (67\") Documented at 10/08/2020 2035   Admission Weight  81.5 kg (179 lb 9.6 oz) Documented at 10/08/2020 2046           I/O last 3 completed shifts:  In: 360 [P.O.:360]  Out: 2641 [Other:2641]    PHYSICAL EXAM    Physical Exam  Vitals signs reviewed.   Constitutional:       Appearance: Normal appearance.   Cardiovascular:      Rate and Rhythm: Normal rate and regular rhythm.      Heart sounds: No murmur. No gallop.    Pulmonary:      Effort: Pulmonary effort is normal. No respiratory distress.      Breath sounds: No wheezing, rhonchi or rales.   Abdominal:      General: Bowel sounds are normal. There is no distension.      Palpations: Abdomen is soft. There is no mass.      Tenderness: There is no abdominal tenderness.   Musculoskeletal:         General: No swelling.   Skin:     General: Skin is warm and dry.   Neurological:      Mental Status: He is alert.         RESULTS   Results Review:    Results from last 7 days   Lab Units 10/18/20  0923 10/15/20  0640 10/13/20  0358   SODIUM mmol/L 138 136 134*   POTASSIUM mmol/L 3.7 3.7 3.8   CHLORIDE mmol/L " 95* 94* 95*   CO2 mmol/L 28.0 27.0 24.0   BUN mg/dL 49* 57* 60*   CREATININE mg/dL 12.23* 12.02* 11.87*   CALCIUM mg/dL 9.4 9.3 8.3*   BILIRUBIN mg/dL 0.3 0.3  --    ALK PHOS U/L 66 64  --    ALT (SGPT) U/L 8 11  --    AST (SGOT) U/L 8 11  --    GLUCOSE mg/dL 117* 76 111*       Estimated Creatinine Clearance: 6.9 mL/min (A) (by C-G formula based on SCr of 12.23 mg/dL (H)).    Results from last 7 days   Lab Units 10/15/20  0640 10/14/20  0512 10/13/20  0358   MAGNESIUM mg/dL 2.0  --  1.8   PHOSPHORUS mg/dL 9.4* 9.9* 10.0*             Results from last 7 days   Lab Units 10/15/20  0640 10/13/20  0358   WBC 10*3/mm3 12.12* 8.50   HEMOGLOBIN g/dL 8.5* 8.2*   PLATELETS 10*3/mm3 277 248               Imaging Results (Last 24 Hours)     ** No results found for the last 24 hours. **           MEDICATIONS    aspirin, 81 mg, Oral, Daily  calcitriol, 1 mcg, Oral, Daily  carvedilol, 6.25 mg, Oral, BID With Meals  clopidogrel, 75 mg, Oral, Daily  epoetin arturo/arturo-epbx, 10,000 Units, Subcutaneous, Weekly  famotidine, 20 mg, Oral, Daily  gabapentin, 100 mg, Oral, Nightly  heparin (porcine), 5,000 Units, Subcutaneous, Q12H  hydrALAZINE, 25 mg, Oral, Q8H  insulin detemir, 20 Units, Subcutaneous, Nightly  isosorbide mononitrate, 60 mg, Oral, Q24H  lanthanum, 500 mg, Oral, TID With Meals  losartan, 50 mg, Oral, Q24H  pravastatin, 20 mg, Oral, Daily  sevelamer, 2,400 mg, Oral, TID With Meals  sodium chloride, 10 mL, Intravenous, Q12H      Assessment/Plan   ASSESSMENT / PLAN      Hypotension    CAD (coronary artery disease)    ESRF (end stage renal failure) (CMS/[facility])    Diabetes mellitus (CMS/HCC)    Cardiac arrest (CMS/HCC)    Syncope    1. End-stage renal disease on PD:  - Outpatient prescription: 2800 cc 5 cycles with last fill of 1000 cc.  His estimated dry weight is 84 kg.    - Weight is is improving slowly. Down to 90 kg. Will do 2.5% bags as 4.25% bas are in short supply. Seen and examined on PD. Has last fill dwelling.     2.  Hypertension:  - Blood pressure is acceptable.     3. Anemia chronic kidney disease:  - Hemoglobin is low at 8.5. On Epogen 94184 units weekly.      4. Secondary hyperparathyroidism:  - Patient will remain on calcitriol 1 mcg daily along with Renvela 2400 mg 3 times a day. Added Fosrenol as phosphorus remained high.     5. CAD:  - Cardiac cath 10/9/20 showed 100% occlusion left main . Had episode of PEA Saturday AM and CPR was administered x 4 minutes and patient received epinepherine x 2.      6. S/p PEA arrest     7. ? Seizures:  - Witnessed seizure x 2 post Code Blue. CT scan of head was unremarkable.      8. Metabolic acidosis:  - Bicarbonate is better. Off oral bicarbonate.    9. Tremors:  - Lowered gabapentin to HS only. Off Ranexa per Dr. Garza.            This document has been electronically signed by EMANUEL Ferrari on October 18, 2020 10:30 CDT           Electronically signed by Mellissa Hinson MD at 10/18/20 1550     Randolph Mcconnell MD at 10/18/20 0940              [facility] Medicine Services  INPATIENT PROGRESS NOTE    Length of Stay: 8  Date of Admission: 10/8/2020  Primary Care Physician: Elizabeth Burleson APRN    Subjective   Chief Complaint:  Chest pain  HPI: Patient states he feels full.  He also complains of burning in the bottom of his feet.  He states is been going on about a month.  Left greater than right.    Review of Systems   Constitutional: Negative for appetite change, chills, fatigue and fever.   Respiratory: Negative for chest tightness and shortness of breath.    Cardiovascular: Negative for chest pain, palpitations and leg swelling.   Gastrointestinal: Negative for abdominal pain, constipation, diarrhea, nausea and vomiting.   Skin: Negative for wound.   Neurological: Positive for tremors (better today). Negative for dizziness, weakness, light-headedness, numbness and headaches.     All pertinent negatives and positives are as  above. All other systems have been reviewed and are negative unless otherwise stated.     Objective    Temp:  [97.2 °F (36.2 °C)-98.1 °F (36.7 °C)] 97.6 °F (36.4 °C)  Heart Rate:  [93-99] 96  Resp:  [17-18] 18  BP: (138-176)/(64-88) 157/68    Physical Exam  Vitals signs reviewed.   Constitutional:       Appearance: He is well-developed.   HENT:      Head: Normocephalic and atraumatic.   Eyes:      Pupils: Pupils are equal, round, and reactive to light.   Neck:      Musculoskeletal: Normal range of motion and neck supple.   Cardiovascular:      Rate and Rhythm: Normal rate and regular rhythm.      Heart sounds: Normal heart sounds. No murmur. No friction rub. No gallop.    Pulmonary:      Effort: Pulmonary effort is normal. No respiratory distress.      Breath sounds: Normal breath sounds. No wheezing or rales.   Chest:      Chest wall: No tenderness.   Abdominal:      General: Bowel sounds are normal. There is no distension.      Palpations: Abdomen is soft.      Tenderness: There is no abdominal tenderness.   Psychiatric:         Behavior: Behavior normal.       Results Review:  I have reviewed the labs, radiology results, and diagnostic studies.    Laboratory Data:   Results from last 7 days   Lab Units 10/15/20  0640 10/13/20  0358   SODIUM mmol/L 136 134*   POTASSIUM mmol/L 3.7 3.8   CHLORIDE mmol/L 94* 95*   CO2 mmol/L 27.0 24.0   BUN mg/dL 57* 60*   CREATININE mg/dL 12.02* 11.87*   GLUCOSE mg/dL 76 111*   CALCIUM mg/dL 9.3 8.3*   BILIRUBIN mg/dL 0.3  --    ALK PHOS U/L 64  --    ALT (SGPT) U/L 11  --    AST (SGOT) U/L 11  --    ANION GAP mmol/L 15.0 15.0     Estimated Creatinine Clearance: 7 mL/min (A) (by C-G formula based on SCr of 12.02 mg/dL (H)).  Results from last 7 days   Lab Units 10/15/20  0640 10/14/20  0512 10/13/20  0358   MAGNESIUM mg/dL 2.0  --  1.8   PHOSPHORUS mg/dL 9.4* 9.9* 10.0*         Results from last 7 days   Lab Units 10/15/20  0640 10/13/20  0358   WBC 10*3/mm3 12.12* 8.50   HEMOGLOBIN  g/dL 8.5* 8.2*   HEMATOCRIT % 26.5* 26.5*   PLATELETS 10*3/mm3 277 248           Culture Data:   No results found for: BLOODCX  No results found for: URINECX  No results found for: RESPCX  No results found for: WOUNDCX  No results found for: STOOLCX  No components found for: BODYFLD    Radiology Data:   Imaging Results (Last 24 Hours)     ** No results found for the last 24 hours. **          I have reviewed the patient's current medications.     Assessment/Plan     Active Hospital Problems    Diagnosis   • **Hypotension   • Cardiac arrest (CMS/[facility])   • Syncope   • Diabetes mellitus (CMS/[facility])   • ESRF (end stage renal failure) (CMS/[facility])   • CAD (coronary artery disease)       Plan:    1.  Coronary artery disease: No symptoms.  Continue aspirin, beta-blocker, Plavix, ARB, nitrate, and statin.  2.  End-stage renal failure: Nephrology following.  Continue with peritoneal dialysis.  3.  Diabetes mellitus: Continue sliding scale and long-acting insulin.  Reasonably controlled.  4.  Status post cardiac arrest: PEA.  No significant neurologic deficit.  5.  Myoclonus: Still minimally present, but continued to improve.  6.  Seizure: Patient had 2 witnessed seizures after his cardiac arrest.  None since then.  Work-up thus far has been negative.  Likely metabolic in etiology  7.  Peripheral neuropathy: Increase neurontin.    The patient was evaluated during the global COVID-19 pandemic, and the diagnosis was suspected/considered upon their initial presentation.  Evaluation, treatment, and testing were consistent with current guidelines for patients who present with complaints or symptoms that may be related to COVID-19.    Discharge Planning: I expect patient to be discharged to home with 24/7 care in 1-2 days.        This document has been electronically signed by Randolph Mcconnell MD on October 18, 2020 09:56 CDT        Electronically signed by Randolph Mcconnell MD at 10/18/20 1322     Randolph Mcconnell MD at 10/17/20  1256              [facility] Medicine Services  INPATIENT PROGRESS NOTE    Length of Stay: 7  Date of Admission: 10/8/2020  Primary Care Physician: Elizabeth Burleson APRN    Subjective   Chief Complaint:  Chest pain  HPI: Patient states he feels full.  He states that he has increased nausea decreased appetite.  He attributes this to the dialysate that remains in his abdomen at the end of his PD cycle.    Review of Systems   Constitutional: Negative for appetite change, chills, fatigue and fever.   Respiratory: Negative for chest tightness and shortness of breath.    Cardiovascular: Negative for chest pain, palpitations and leg swelling.   Gastrointestinal: Negative for abdominal pain, constipation, diarrhea, nausea and vomiting.   Skin: Negative for wound.   Neurological: Positive for tremors (better today). Negative for dizziness, weakness, light-headedness, numbness and headaches.     All pertinent negatives and positives are as above. All other systems have been reviewed and are negative unless otherwise stated.     Objective    Temp:  [97.7 °F (36.5 °C)-98 °F (36.7 °C)] 97.8 °F (36.6 °C)  Heart Rate:  [] 98  Resp:  [16-18] 17  BP: (138-171)/(73-89) 138/85    Physical Exam  Vitals signs reviewed.   Constitutional:       Appearance: He is well-developed.   HENT:      Head: Normocephalic and atraumatic.   Eyes:      Pupils: Pupils are equal, round, and reactive to light.   Neck:      Musculoskeletal: Normal range of motion and neck supple.   Cardiovascular:      Rate and Rhythm: Normal rate and regular rhythm.      Heart sounds: Normal heart sounds. No murmur. No friction rub. No gallop.    Pulmonary:      Effort: Pulmonary effort is normal. No respiratory distress.      Breath sounds: Normal breath sounds. No wheezing or rales.   Chest:      Chest wall: No tenderness.   Abdominal:      General: Bowel sounds are normal. There is no distension.      Palpations: Abdomen  is soft.      Tenderness: There is no abdominal tenderness.   Psychiatric:         Behavior: Behavior normal.       Results Review:  I have reviewed the labs, radiology results, and diagnostic studies.    Laboratory Data:   Results from last 7 days   Lab Units 10/15/20  0640 10/13/20  0358 10/10/20  1316   SODIUM mmol/L 136 134* 136   POTASSIUM mmol/L 3.7 3.8 4.1   CHLORIDE mmol/L 94* 95* 95*   CO2 mmol/L 27.0 24.0 19.0*   BUN mg/dL 57* 60* 68*   CREATININE mg/dL 12.02* 11.87* 13.33*   GLUCOSE mg/dL 76 111* 174*   CALCIUM mg/dL 9.3 8.3* 7.7*   BILIRUBIN mg/dL 0.3  --  0.3   ALK PHOS U/L 64  --  58   ALT (SGPT) U/L 11  --  31   AST (SGOT) U/L 11  --  39   ANION GAP mmol/L 15.0 15.0 22.0*     Estimated Creatinine Clearance: 7 mL/min (A) (by C-G formula based on SCr of 12.02 mg/dL (H)).  Results from last 7 days   Lab Units 10/15/20  0640 10/14/20  0512 10/13/20  0358 10/11/20  0936   MAGNESIUM mg/dL 2.0  --  1.8 1.8   PHOSPHORUS mg/dL 9.4* 9.9* 10.0*  --          Results from last 7 days   Lab Units 10/15/20  0640 10/13/20  0358 10/10/20  1316   WBC 10*3/mm3 12.12* 8.50 11.44*   HEMOGLOBIN g/dL 8.5* 8.2* 8.6*   HEMATOCRIT % 26.5* 26.5* 27.9*   PLATELETS 10*3/mm3 277 248 273           Culture Data:   No results found for: BLOODCX  No results found for: URINECX  No results found for: RESPCX  No results found for: WOUNDCX  No results found for: STOOLCX  No components found for: BODYFLD    Radiology Data:   Imaging Results (Last 24 Hours)     ** No results found for the last 24 hours. **          I have reviewed the patient's current medications.     Assessment/Plan     Active Hospital Problems    Diagnosis   • **Hypotension   • Cardiac arrest (CMS/HCC)   • Syncope   • Diabetes mellitus (CMS/[facility])   • ESRF (end stage renal failure) (CMS/[facility])   • CAD (coronary artery disease)       Plan:    1.  Coronary artery disease: No symptoms.  Continue aspirin, beta-blocker, Plavix, ARB, nitrate, and statin.  2.  End-stage renal  failure: Nephrology following.  Continue with peritoneal dialysis.  3.  Diabetes mellitus: Continue sliding scale and long-acting insulin.  Reasonably controlled.  4.  Status post cardiac arrest: PEA.  No significant neurologic deficit.  5.  Myoclonus: Still minimally present, but continued to improve.  6.  Seizure: Patient had 2 witnessed seizures after his cardiac arrest.  None since then.  Work-up thus far has been negative.  Likely metabolic in etiology      The patient was evaluated during the global COVID-19 pandemic, and the diagnosis was suspected/considered upon their initial presentation.  Evaluation, treatment, and testing were consistent with current guidelines for patients who present with complaints or symptoms that may be related to COVID-19.    Discharge Planning: I expect patient to be discharged to home with /7 care in 1-2 days.        This document has been electronically signed by Randolph Mcconnell MD on 2020 12:56 CDT        Electronically signed by Randolph Mcconnell MD at 10/17/20 1311       Consult Notes (last 24 hours) (Notes from 10/18/20 1151 through 10/19/20 1151)    No notes of this type exist for this encounter.            Physical Therapy Notes (last 24 hours) (Notes from 10/18/20 1151 through 10/19/20 1151)      Keyur Menchaca PTA at 10/18/20 1510  Version 1 of 1            10/18/20 1510   OTHER   Discipline physical therapy assistant   Rehab Time/Intention   Session Not Performed patient/family declined treatment  (pt. stated that he has been nauseated today and he did not feel like getting out of recliner and to check back in am.)       Electronically signed by Keyur Menchaca PTA at 10/18/20 1510          Occupational Therapy Notes (last 24 hours) (Notes from 10/18/20 1151 through 10/19/20 1151)      Bridgette Nash COTA/L at 10/19/20 1007          Acute Care - Occupational Therapy Treatment Note  [facility]     Patient Name: Sumanth Robledo  :  1960  MRN: [id number]  Today's Date: 10/19/2020  Onset of Illness/Injury or Date of Surgery: 10/08/20  Date of Referral to OT: 10/12/20  Referring Physician: Red BARKSDALE MD    Admit Date: 10/8/2020       ICD-10-CM ICD-9-CM   1. Precordial pain  R07.2 786.51   2. Impaired mobility and ADLs  Z74.09 V49.89    Z78.9    3. Impaired functional mobility, balance, gait, and endurance  Z74.09 V49.89     Patient Active Problem List   Diagnosis   • Chest pain   • CAD (coronary artery disease)   • ESRF (end stage renal failure) (CMS/[facility])   • Hypertension   • Diabetes mellitus (CMS/[facility])   • Precordial pain   • Cardiac arrest (CMS/[facility])   • Hypotension   • Syncope     Past Medical History:   Diagnosis Date   • Chronic kidney disease, stage 3    • Coronary arteriosclerosis     eCABGx3 10/2014 LM 95% LAD diff 70% D1 70% OM2 70% OM3 70% & % USA STEMI      • End stage renal disease (CMS/[facility])    • Essential hypertension    • Heterozygous thalassemia     Thalassemia minor      • Hyperlipidemia    • Peripheral vascular disease (CMS/[facility])     WILSON RIGHT 0.97 LEFT 0.79, stent JADYN GARRIDO 1/2015      • Surgical follow-up care     Emergent CABG X 3     • Type 2 diabetes mellitus (CMS/HCC)      Past Surgical History:   Procedure Laterality Date   • AMPUTATION FINGER / THUMB Right     RIGHT middle finger   • CARDIAC CATHETERIZATION  10/11/2014    Distal left main critical stenosis of up to 95-99% with evidence of filling defect suggestive of a plaque. LAD and OMB 1 and 2 stenosis.A 100% occludeed RCA.   • CARDIAC CATHETERIZATION N/A 2/28/2020    Procedure: Left Heart Cath 2/28/2020 @ 9;  Surgeon: Gibran Garza MD;  Location: [facility] CATH INVASIVE LOCATION;  Service: Cardiology;  Laterality: N/A;   • CARDIAC CATHETERIZATION N/A 10/9/2020    Procedure: Left Heart Cath;  Surgeon: Gibran Garza MD;  Location: [facility] CATH INVASIVE LOCATION;  Service: Cardiology;  Laterality: N/A;   • CARDIAC SURGERY     • CORONARY ARTERY BYPASS GRAFT   10/11/2014    Emergent CABG X 3 LIMA->LAD SVG->OM SVG->PDA          OT ASSESSMENT FLOWSHEET (last 12 hours)      OT Evaluation and Treatment     Row Name 10/19/20 8343                   OT Time and Intention    Subjective Information  complains of;pain  -KD        Document Type  therapy note (daily note)  -KD        Mode of Treatment  individual therapy;occupational therapy  -KD        Patient Effort  good  -KD        Comment  Pt deferred ADL. Reported wife will asssist when she arrives.  -KD           General Information    Patient/Family/Caregiver Comments/Observations  No family present  -KD        Existing Precautions/Restrictions  fall  -KD           Cognition    Affect/Mental Status (Cognitive)  WFL  -KD        Orientation Status (Cognition)  oriented x 4  -KD        Follows Commands (Cognition)  WFL  -KD           Pain Scale: Numbers Pre/Post-Treatment    Pretreatment Pain Rating  8/10  -KD        Posttreatment Pain Rating  8/10  -KD        Pain Location  chest  -KD        Pain Intervention(s)  Other (Comment) Meds given  -KD           Bed Mobility    Supine-Sit [place] (Bed Mobility)  modified independence  -KD        Assistive Device (Bed Mobility)  head of bed elevated;bed rails  -KD           Functional Mobility    Functional Mobility- Ind. Level  supervision required  -KD        Functional Mobility- Device  other (see comments) No AD  -KD        Functional Mobility-Distance (Feet)  2  -KD           Transfer Assessment/Treatment    Transfers  sit-stand transfer;stand-sit transfer;bed-chair transfer  -KD        Comment (Transfers)  Pt completed 5 sit<>stands w/ good tolerance  -KD           Transfers    Bed-Chair [place] (Transfers)  standby assist  -KD        Sit-Stand [place] (Transfers)  standby assist  -KD        Stand-Sit [place] (Transfers)  standby assist  -KD           Shoulder (Therapeutic Exercise)    Shoulder (Therapeutic Exercise)  AROM (active range of motion);strengthening  exercise  -KD        Shoulder AROM (Therapeutic Exercise)  bilateral;flexion;extension;aBduction;aDduction;external rotation;internal rotation;horizontal aBduction/aDduction  -KD        Shoulder Strengthening (Therapeutic Exercise)  2 lb free weight  -KD           Elbow/Forearm (Therapeutic Exercise)    Elbow/Forearm (Therapeutic Exercise)  AROM (active range of motion);strengthening exercise  -KD        Elbow/Forearm AROM (Therapeutic Exercise)  bilateral;flexion;extension;pronation;supination  -KD        Elbow/Forearm Strengthening (Therapeutic Exercise)  3 lb free weight  -KD           Wrist (Therapeutic Exercise)    Wrist (Therapeutic Exercise)  AROM (active range of motion);strengthening exercise  -KD        Wrist AROM (Therapeutic Exercise)  bilateral;flexion;extension  -KD        Wrist Strengthening (Therapeutic Exercise)  3 lb free weight  -KD           Hand (Therapeutic Exercise)    Hand (Therapeutic Exercise)  AROM (active range of motion);strengthening exercise  -KD        Hand AROM/AAROM (Therapeutic Exercise)  finger flexion;finger extension  -KD           Lower Body Dressing Assessment/Training    [place] Level (Lower Body Dressing)  lower body dressing skills;don;socks;maximum assist (25% patient effort)  -KD        Position (Lower Body Dressing)  unsupported sitting  -KD           Coping    Observed Emotional State  calm;cooperative;pleasant  -KD           Plan of Care Review    Plan of Care Reviewed With  patient  -KD        Progress  improving  -KD        Outcome Summary  Sup-sit-Mod I, sit-stand-sit-SBA, amb ~ 2-3' SBA w/ no AD. Bed-chair-SBA. Pt completed BUE ther ex in all planes w/ 3lb HW and good tolerance w/ RB's PRN. Pt tolerated 5 sit<>stands w/ good tolerance.   -KD           Vital Signs    Pre Systolic BP Rehab  145  -KD        Pre Treatment Diastolic BP  76  -KD        Pretreatment Heart Rate (beats/min)  94  -KD        Posttreatment Heart Rate (beats/min)  99  -KD        Pre SpO2  (%)  92  -KD        O2 Delivery Pre Treatment  nasal cannula  -KD        Post SpO2 (%)  93  -KD        O2 Delivery Post Treatment  nasal cannula  -KD        Pre Patient Position  Supine  -KD        Intra Patient Position  Standing  -KD        Post Patient Position  Sitting  -KD           Positioning and Restraints    Pre-Treatment Position  in bed  -KD        Post Treatment Position  chair  -KD        In Chair  sitting;call light within reach;encouraged to call for assist;exit alarm on  -KD          User Key  (r) = Recorded By, (t) = Taken By, (c) = Cosigned By    Initials Name Effective Dates    KD Saad Bridgette MARIPOSA, HU/DONI 03/07/18 -          Occupational Therapy Education                 Title: PT OT SLP Therapies (In Progress)     Topic: Occupational Therapy (Done)     Point: ADL training (Done)     Description:   Instruct learner(s) on proper safety adaptation and remediation techniques during self care or transfers.   Instruct in proper use of assistive devices.              Learning Progress Summary           Patient Acceptance, E,TB,D, NR,VU by CS at 10/17/2020 1320    Acceptance, E, VU by AS at 10/13/2020 1307    Comment: endurance actvities, LBD, PLB   Significant Other Acceptance, E, VU by AS at 10/13/2020 1307    Comment: endurance actvities, LBD, PLB                   Point: Home exercise program (Done)     Description:   Instruct learner(s) on appropriate technique for monitoring, assisting and/or progressing therapeutic exercises/activities.              Learning Progress Summary           Patient Acceptance, E,TB,D, NR,VU by CS at 10/17/2020 1320    Acceptance, E, VU by AS at 10/13/2020 1307    Comment: endurance actvities, LBD, PLB   Significant Other Acceptance, E, VU by AS at 10/13/2020 1307    Comment: endurance actvities, LBD, PLB                   Point: Precautions (Done)     Description:   Instruct learner(s) on prescribed precautions during self-care and functional transfers.               Learning Progress Summary           Patient Acceptance, E,TB,D, NR,VU by TEMI at 10/17/2020 1320    Acceptance, E, VU by KO at 10/12/2020 1320    Comment: Educated pt on safety during ADLs and fall prevention                   Point: Body mechanics (Done)     Description:   Instruct learner(s) on proper positioning and spine alignment during self-care, functional mobility activities and/or exercises.              Learning Progress Summary           Patient Acceptance, E,TB,D, NR,VU by TEMI at 10/17/2020 1320                               User Key     Initials Effective Dates Name Provider Type Discipline     03/07/18 -  Naomy Harvey, MAYTE/DONI Occupational Therapy Assistant OT    AS 07/05/20 -  Noemi Madrid, OT Occupational Therapist OT    STEPHANIE 08/09/20 -  Tiana Sifuentes, OT Occupational Therapist OT                  OT Recommendation and Plan     Plan of Care Review  Plan of Care Reviewed With: patient  Progress: improving  Outcome Summary: Sup-sit-Mod I, sit-stand-sit-SBA, amb ~ 2-3' SBA w/ no AD. Bed-chair-SBA. Pt completed BUE ther ex in all planes w/ 3lb HW and good tolerance w/ RB's PRN. Pt tolerated 5 sit<>stands w/ good tolerance.   Plan of Care Reviewed With: patient  Outcome Summary: Sup-sit-Mod I, sit-stand-sit-SBA, amb ~ 2-3' SBA w/ no AD. Bed-chair-SBA. Pt completed BUE ther ex in all planes w/ 3lb HW and good tolerance w/ RB's PRN. Pt tolerated 5 sit<>stands w/ good tolerance.     Outcome Measures     Row Name 10/19/20 0920 10/17/20 1300 10/17/20 0840       How much help from another person do you currently need...    Turning from your back to your side while in flat bed without using bedrails?  --  --  3  -JW    Moving from lying on back to sitting on the side of a flat bed without bedrails?  --  --  3  -JW    Moving to and from a bed to a chair (including a wheelchair)?  --  --  3  -JW    Standing up from a chair using your arms (e.g., wheelchair, bedside chair)?  --  --  3  -JW    Climbing 3-5  steps with a railing?  --  --  3  -JW    To walk in hospital room?  --  --  3  -JW    AM-PAC 6 Clicks Score (PT)  --  --  18  -JW       How much help from another is currently needed...    Putting on and taking off regular lower body clothing?  2  -KD  2  -CS  --    Bathing (including washing, rinsing, and drying)  3  -KD  3  -CS  --    Toileting (which includes using toilet bed pan or urinal)  3  -KD  3  -CS  --    Putting on and taking off regular upper body clothing  3  -KD  3  -CS  --    Taking care of personal grooming (such as brushing teeth)  4  -KD  3  -CS  --    Eating meals  4  -KD  4  -CS  --    AM-PAC 6 Clicks Score (OT)  19  -KD  18  -CS  --       Functional Assessment    Outcome Measure Options  --  --  AM-PAC 6 Clicks Basic Mobility (PT)  -    Row Name 10/16/20 1355 10/16/20 1200          How much help from another person do you currently need...    Turning from your back to your side while in flat bed without using bedrails?  --  3  -TA     Moving from lying on back to sitting on the side of a flat bed without bedrails?  --  3  -TA     Moving to and from a bed to a chair (including a wheelchair)?  --  3  -TA     Standing up from a chair using your arms (e.g., wheelchair, bedside chair)?  --  3  -TA     Climbing 3-5 steps with a railing?  --  3  -TA     To walk in hospital room?  --  3  -TA     AM-PAC 6 Clicks Score (PT)  --  18  -TA        How much help from another is currently needed...    Putting on and taking off regular lower body clothing?  2  -KD  --     Bathing (including washing, rinsing, and drying)  3  -KD  --     Toileting (which includes using toilet bed pan or urinal)  3  -KD  --     Putting on and taking off regular upper body clothing  3  -KD  --     Taking care of personal grooming (such as brushing teeth)  3  -KD  --     Eating meals  4  -KD  --     AM-PAC 6 Clicks Score (OT)  18  -KD  --        Functional Assessment    Outcome Measure Options  --  AM-PAC 6 Clicks Basic Mobility  (PT)  -TA       User Key  (r) = Recorded By, (t) = Taken By, (c) = Cosigned By    Initials Name Provider Type    JW Talia Mckeon, PTA Physical Therapy Assistant    TA Rosemarie Lam, PTA Physical Therapy Assistant    KD Bridgette Nash COTA/L Occupational Therapy Assistant    Naomy Cortez, HU/DONI Occupational Therapy Assistant          Time Calculation:   Time Calculation- OT     Row Name 10/19/20 1007             Time Calculation- OT    OT Start Time  0920  -KD      OT Stop Time  0944  -KD      OT Time Calculation (min)  24 min  -KD      Total Timed Code Minutes- OT  24 minute(s)  -KD      OT Received On  10/19/20  -KD        User Key  (r) = Recorded By, (t) = Taken By, (c) = Cosigned By    Initials Name Provider Type    Bridgette Smart COTA/L Occupational Therapy Assistant        Therapy Charges for Today     Code Description Service Date Service Provider Modifiers Qty    24750642306 HC OT THER PROC EA 15 MIN 10/19/2020 Bridgette Nash COTA/L GO 1    82110392462 HC OT THERAPEUTIC ACT EA 15 MIN 10/19/2020 Bridgette Nash COTA/L GO 1               RADHA Galeas  10/19/2020    Electronically signed by Bridgette Nash COTA/L at 10/19/20 1008     Bridgette Nash COTA/L at 10/19/20 1006          Problem: Adult Inpatient Plan of Care  Goal: Plan of Care Review  Recent Flowsheet Documentation  Taken 10/19/2020 0920 by Bridgette Nash COTA/L  Progress: improving  Plan of Care Reviewed With: patient  Outcome Summary: Pt deferred ADL this AM, reported he perfer wife assist when she arrives. Sup-sit-Mod I, sit-stand-sit-SBA, amb ~ 2-3' SBA w/ no AD. Bed-chair-SBA. Pt completed BUE ther ex in all planes w/ 3lb HW and good tolerance w/ RB's PRN. Pt tolerated 5 sit<>stands w/ good tolerance. Pt met 1 new goal this date. Cont OT POC       Electronically signed by Bridgette Nash COTA/L at 10/19/20 1007       Susana Diaz RN  [facility]  [phone]  Fax [phone]

## 2020-10-19 NOTE — PLAN OF CARE
Problem: Adult Inpatient Plan of Care  Goal: Plan of Care Review  Outcome: Ongoing, Progressing  Flowsheets (Taken 10/19/2020 1621)  Progress: improving  Plan of Care Reviewed With: patient  Outcome Summary: pt up in chair ambulating very well   Goal Outcome Evaluation:  Plan of Care Reviewed With: patient  Progress: improving  Outcome Summary: pt up in chair ambulating very well

## 2020-10-19 NOTE — THERAPY TREATMENT NOTE
Acute Care - Physical Therapy Treatment Note  [facility]     Patient Name: Sumanth Robledo  : 1960  MRN: [id number]  Today's Date: 10/19/2020   Onset of Illness/Injury or Date of Surgery: 10/08/20       PT Assessment (last 12 hours)      PT Evaluation and Treatment     Row Name 10/19/20 1346          Physical Therapy Time and Intention    Subjective Information  no complaints  -CA     Document Type  therapy note (daily note)  -CA     Mode of Treatment  individual therapy;physical therapy  -CA     Patient Effort  good  -CA     Row Name 10/19/20 1346          General Information    Existing Precautions/Restrictions  fall  -CA     Row Name 10/19/20 1346          Transfers    Transfers  sit-stand transfer;stand-sit transfer  -CA     Sit-Stand Stigler (Transfers)  modified independence  -CA     Stand-Sit Stigler (Transfers)  modified independence  -CA     Row Name 10/19/20 1346          Sit-Stand Transfer    Assistive Device (Sit-Stand Transfers)  walker, front-wheeled  -CA     Row Name 10/19/20 1346          Stand-Sit Transfer    Assistive Device (Stand-Sit Transfers)  walker, front-wheeled  -CA     Row Name 10/19/20 1346          Gait/Stairs (Locomotion)    Stigler Level (Gait)  modified independence;supervision  -CA     Assistive Device (Gait)  walker, front-wheeled  -CA     Distance in Feet (Gait)  60' and 150  -CA     Pattern (Gait)  step-through  -CA     Row Name 10/19/20 1346          Vital Signs    Pretreatment Heart Rate (beats/min)  94  -CA     Intratreatment Heart Rate (beats/min)  104  -CA     Posttreatment Heart Rate (beats/min)  92  -CA     Pre SpO2 (%)  95  -CA     O2 Delivery Pre Treatment  nasal cannula  -CA     Intra SpO2 (%)  91  -CA     O2 Delivery Intra Treatment  room air  -CA     Post SpO2 (%)  93  -CA     O2 Delivery Post Treatment  room air  -CA     Pre Patient Position  Sitting  -CA     Intra Patient Position  Standing  -CA     Post Patient Position  Sitting  -CA      Row Name 10/19/20 1346          Positioning and Restraints    Pre-Treatment Position  sitting in chair/recliner  -CA     Post Treatment Position  chair  -CA     In Bed  sitting;call light within reach;encouraged to call for assist  -CA       User Key  (r) = Recorded By, (t) = Taken By, (c) = Cosigned By    Initials Name Provider Type    CA Keyur Menchaca, PTA Physical Therapy Assistant        Physical Therapy Education                 Title: PT OT SLP Therapies (In Progress)     Topic: Physical Therapy (In Progress)     Point: Mobility training (Done)     Learning Progress Summary           Patient Acceptance, E, VU by  at 10/13/2020 0940    Comment: PT POC and prognosis, PLB    Acceptance, E, VU by [facility] at 10/12/2020 1431    Comment: Role of PT, POC, use of gait belt                   Point: Home exercise program (Not Started)     Learner Progress:  Not documented in this visit.          Point: Body mechanics (Not Started)     Learner Progress:  Not documented in this visit.          Point: Precautions (Done)     Learning Progress Summary           Patient Acceptance, E, VU by  at 10/13/2020 0940    Comment: PT POC and prognosis, PLB    Acceptance, E, VU by [facility] at 10/12/2020 1431    Comment: Role of PT, POC, use of gait belt                               User Key     Initials Effective Dates Name Provider Type Discipline    [facility] 08/09/20 -  Jaquelin Gabriel, PT Physical Therapist PT     08/09/20 -  Getachew Beck PT Physical Therapist PT              PT Recommendation and Plan     Plan of Care Reviewed With: patient  Progress: improving  Outcome Summary: pt. showed good endurance and tolerance with therapy this date.  pt. t/f sit to stand with Mod I and ambulated 60' and 150' with RW and Mod I.  pt. met 1 new goal this tx.  Outcome Measures     Row Name 10/19/20 1346 10/19/20 0920 10/17/20 1300       How much help from another person do you currently need...    Turning from your back to your side while in flat  bed without using bedrails?  3  -CA  --  --    Moving from lying on back to sitting on the side of a flat bed without bedrails?  3  -CA  --  --    Moving to and from a bed to a chair (including a wheelchair)?  3  -CA  --  --    Standing up from a chair using your arms (e.g., wheelchair, bedside chair)?  3  -CA  --  --    Climbing 3-5 steps with a railing?  3  -CA  --  --    To walk in hospital room?  3  -CA  --  --    AM-PAC 6 Clicks Score (PT)  18  -CA  --  --       How much help from another is currently needed...    Putting on and taking off regular lower body clothing?  --  2  -KD  2  -CS    Bathing (including washing, rinsing, and drying)  --  3  -KD  3  -CS    Toileting (which includes using toilet bed pan or urinal)  --  3  -KD  3  -CS    Putting on and taking off regular upper body clothing  --  3  -KD  3  -CS    Taking care of personal grooming (such as brushing teeth)  --  4  -KD  3  -CS    Eating meals  --  4  -KD  4  -CS    AM-PAC 6 Clicks Score (OT)  --  19  -KD  18  -CS       Functional Assessment    Outcome Measure Options  AM-PAC 6 Clicks Basic Mobility (PT)  -CA  --  --    Row Name 10/17/20 0840             How much help from another person do you currently need...    Turning from your back to your side while in flat bed without using bedrails?  3  -JW      Moving from lying on back to sitting on the side of a flat bed without bedrails?  3  -JW      Moving to and from a bed to a chair (including a wheelchair)?  3  -JW      Standing up from a chair using your arms (e.g., wheelchair, bedside chair)?  3  -JW      Climbing 3-5 steps with a railing?  3  -JW      To walk in hospital room?  3  -JW      AM-PAC 6 Clicks Score (PT)  18  -         Functional Assessment    Outcome Measure Options  AM-PAC 6 Clicks Basic Mobility (PT)  -        User Key  (r) = Recorded By, (t) = Taken By, (c) = Cosigned By    Initials Name Provider Type    Talia Kirkpatrick, PTA Physical Therapy Assistant    COOPER Menchaca,  Keyur ESCOBAR PTA Physical Therapy Assistant    Bridgette Smart, HU/L Occupational Therapy Assistant    Naomy Cortez, HU/L Occupational Therapy Assistant           Time Calculation:   PT Charges     Row Name 10/19/20 1418             Time Calculation    Start Time  1346  -CA      Stop Time  1410  -CA      Time Calculation (min)  24 min  -CA      PT Received On  10/19/20  -CA         Time Calculation- PT    Total Timed Code Minutes- PT  24 minute(s)  -CA        User Key  (r) = Recorded By, (t) = Taken By, (c) = Cosigned By    Initials Name Provider Type    Keyur Mitchell PTA Physical Therapy Assistant        Therapy Charges for Today     Code Description Service Date Service Provider Modifiers Qty    41153500490 HC GAIT TRAINING EA 15 MIN 10/19/2020 Keyur Menchaca PTA GP 1    02286425975 HC PT THERAPEUTIC ACT EA 15 MIN 10/19/2020 Keyur Menchaca PTA GP 1          PT G-Codes  Outcome Measure Options: AM-PAC 6 Clicks Basic Mobility (PT)  AM-PAC 6 Clicks Score (PT): 18  AM-PAC 6 Clicks Score (OT): 19    Keyur Menchaca PTA  10/19/2020

## 2020-10-19 NOTE — PLAN OF CARE
Problem: Adult Inpatient Plan of Care  Goal: Plan of Care Review  Outcome: Ongoing, Progressing  Goal: Patient-Specific Goal (Individualized)  Outcome: Ongoing, Progressing  Goal: Absence of Hospital-Acquired Illness or Injury  Outcome: Ongoing, Progressing  Intervention: Identify and Manage Fall Risk  Recent Flowsheet Documentation  Taken 10/19/2020 0600 by Mahnaz Menchaca RN  Safety Promotion/Fall Prevention:   assistive device/personal items within reach   clutter free environment maintained   safety round/check completed  Taken 10/19/2020 0400 by Mahnaz Menchaca RN  Safety Promotion/Fall Prevention:   assistive device/personal items within reach   clutter free environment maintained   safety round/check completed  Taken 10/19/2020 0200 by Mahnaz Menchaca RN  Safety Promotion/Fall Prevention:   assistive device/personal items within reach   clutter free environment maintained   safety round/check completed  Taken 10/19/2020 0000 by Mahnaz Menchaca RN  Safety Promotion/Fall Prevention:   assistive device/personal items within reach   clutter free environment maintained   safety round/check completed  Taken 10/18/2020 2200 by Mahnaz Menchaca RN  Safety Promotion/Fall Prevention:   assistive device/personal items within reach   clutter free environment maintained   safety round/check completed  Taken 10/18/2020 2100 by Mahnaz Menchaca RN  Safety Promotion/Fall Prevention:   assistive device/personal items within reach   clutter free environment maintained   safety round/check completed  Taken 10/18/2020 2000 by Mahnaz Menchaca RN  Safety Promotion/Fall Prevention:   assistive device/personal items within reach   clutter free environment maintained   safety round/check completed  Taken 10/18/2020 1900 by Mahnaz Menchaca RN  Safety Promotion/Fall Prevention:   assistive device/personal items within reach   clutter free environment maintained   safety  round/check completed  Intervention: Prevent Skin Injury  Recent Flowsheet Documentation  Taken 10/19/2020 0600 by Mahnaz Menchaca RN  Body Position: position changed independently  Taken 10/19/2020 0400 by Mahnaz Menchaca RN  Body Position: position changed independently  Taken 10/19/2020 0200 by Mahnaz Menchaca RN  Body Position: side-lying, right  Taken 10/19/2020 0000 by Mahnaz Menchaca RN  Body Position: supine  Taken 10/18/2020 2200 by Mahnaz Menchaca RN  Body Position: side-lying, left  Taken 10/18/2020 2000 by Mahnaz Menchaca RN  Body Position: sitting up in bed  Intervention: Prevent and Manage VTE (venous thromboembolism) Risk  Recent Flowsheet Documentation  Taken 10/18/2020 2010 by Mahnaz Menchaca RN  VTE Prevention/Management: (heparin subq) bleeding risk factor(s) identified  Goal: Optimal Comfort and Wellbeing  Outcome: Ongoing, Progressing  Intervention: Provide Person-Centered Care  Recent Flowsheet Documentation  Taken 10/18/2020 2010 by Mahnaz Menchaca RN  Trust Relationship/Rapport:   care explained   questions encouraged   questions answered  Goal: Readiness for Transition of Care  Outcome: Ongoing, Progressing     Problem: Adjustment to Illness (Acute Coronary Syndrome)  Goal: Optimal Adaptation to Illness  Outcome: Ongoing, Progressing     Problem: Arrhythmia/Dysrhythmia (Acute Coronary Syndrome)  Goal: Normalized Cardiac Rhythm  Outcome: Ongoing, Progressing  Intervention: Monitor and Manage Cardiac Rhythm Effects  Recent Flowsheet Documentation  Taken 10/18/2020 2010 by Mahnaz Menchaca RN  VTE Prevention/Management: (heparin subq) bleeding risk factor(s) identified     Problem: Cardiac-Related Pain (Acute Coronary Syndrome)  Goal: Absence of Cardiac-Related Pain  Outcome: Ongoing, Progressing     Problem: Hemodynamic Instability (Acute Coronary Syndrome)  Goal: Effective Cardiac Pump Function  Outcome: Ongoing,  Progressing     Problem: Tissue Perfusion (Acute Coronary Syndrome)  Goal: Adequate Tissue Perfusion  Outcome: Ongoing, Progressing  Intervention: Optimize Cardiac Tissue Perfusion  Recent Flowsheet Documentation  Taken 10/19/2020 0600 by Mahnaz Menchaca RN  Activity Management: activity adjusted per tolerance  Taken 10/19/2020 0400 by Mahnaz Menchaca RN  Activity Management: activity adjusted per tolerance  Taken 10/19/2020 0200 by Mahnaz Menchaca RN  Activity Management: activity adjusted per tolerance  Taken 10/19/2020 0000 by Mahnaz Menchaca RN  Activity Management: activity adjusted per tolerance  Taken 10/18/2020 2200 by Mahnaz Menchaca RN  Activity Management: activity adjusted per tolerance  Taken 10/18/2020 2100 by Mahnaz Menchaca RN  Activity Management: activity adjusted per tolerance  Taken 10/18/2020 2000 by Mahnaz Menchaca RN  Activity Management: activity adjusted per tolerance  Taken 10/18/2020 1900 by Mahnaz Menchaca RN  Activity Management: activity adjusted per tolerance     Problem: Bleeding (Cardiac Catheterization)  Goal: Absence of Bleeding  Outcome: Ongoing, Progressing     Problem: Embolism (Cardiac Catheterization)  Goal: Absence of Embolism Signs and Symptoms  Outcome: Ongoing, Progressing  Intervention: Prevent or Manage Embolism  Recent Flowsheet Documentation  Taken 10/18/2020 2010 by Mahnaz Menchaca RN  VTE Prevention/Management: (heparin subq) bleeding risk factor(s) identified     Problem: Pain (Cardiac Catheterization)  Goal: Acceptable Pain Control  Outcome: Ongoing, Progressing     Problem: Vascular Access Protection (Cardiac Catheterization)  Goal: Absence of Vascular Access Complication  Outcome: Ongoing, Progressing  Intervention: Prevent Vascular Access Site Complication  Recent Flowsheet Documentation  Taken 10/19/2020 0600 by Mahnaz Menchaca RN  Activity Management: activity adjusted per  tolerance  Taken 10/19/2020 0400 by Mahnaz Menchaca RN  Activity Management: activity adjusted per tolerance  Taken 10/19/2020 0200 by Mahnaz Menchaca RN  Activity Management: activity adjusted per tolerance  Taken 10/19/2020 0000 by Mahnaz Menchaca RN  Activity Management: activity adjusted per tolerance  Taken 10/18/2020 2200 by Mahnaz Menchaca RN  Activity Management: activity adjusted per tolerance  Taken 10/18/2020 2100 by Mahnaz Menchaca RN  Activity Management: activity adjusted per tolerance  Taken 10/18/2020 2000 by Mahnaz Menchaca RN  Activity Management: activity adjusted per tolerance  Taken 10/18/2020 1900 by Mahnaz Menchaca RN  Activity Management: activity adjusted per tolerance     Problem: Fall Injury Risk  Goal: Absence of Fall and Fall-Related Injury  Outcome: Ongoing, Progressing  Intervention: Promote Injury-Free Environment  Recent Flowsheet Documentation  Taken 10/19/2020 0600 by Mahnaz Menchaca RN  Safety Promotion/Fall Prevention:   assistive device/personal items within reach   clutter free environment maintained   safety round/check completed  Taken 10/19/2020 0400 by Mahnaz Menchaca RN  Safety Promotion/Fall Prevention:   assistive device/personal items within reach   clutter free environment maintained   safety round/check completed  Taken 10/19/2020 0200 by Mahnaz Menchaca RN  Safety Promotion/Fall Prevention:   assistive device/personal items within reach   clutter free environment maintained   safety round/check completed  Taken 10/19/2020 0000 by Mahnaz Menchaca RN  Safety Promotion/Fall Prevention:   assistive device/personal items within reach   clutter free environment maintained   safety round/check completed  Taken 10/18/2020 2200 by Mahnaz Menchaca RN  Safety Promotion/Fall Prevention:   assistive device/personal items within reach   clutter free environment maintained   safety  round/check completed  Taken 10/18/2020 2100 by Mahnaz Menchaca, RN  Safety Promotion/Fall Prevention:   assistive device/personal items within reach   clutter free environment maintained   safety round/check completed  Taken 10/18/2020 2000 by Mahnaz Menchaca, RN  Safety Promotion/Fall Prevention:   assistive device/personal items within reach   clutter free environment maintained   safety round/check completed  Taken 10/18/2020 1900 by Mahnaz Menchaca, RN  Safety Promotion/Fall Prevention:   assistive device/personal items within reach   clutter free environment maintained   safety round/check completed   Goal Outcome Evaluation:  Plan of Care Reviewed With: patient  Progress: improving

## 2020-10-19 NOTE — THERAPY TREATMENT NOTE
Acute Care - Occupational Therapy Treatment Note  [facility]     Patient Name: Sumanth Robledo  : 1960  MRN: [id number]  Today's Date: 10/19/2020  Onset of Illness/Injury or Date of Surgery: 10/08/20  Date of Referral to OT: 10/12/20  Referring Physician: Red BARKSDALE MD    Admit Date: 10/8/2020       ICD-10-CM ICD-9-CM   1. Precordial pain  R07.2 786.51   2. Impaired mobility and ADLs  Z74.09 V49.89    Z78.9    3. Impaired functional mobility, balance, gait, and endurance  Z74.09 V49.89     Patient Active Problem List   Diagnosis   • Chest pain   • CAD (coronary artery disease)   • ESRF (end stage renal failure) (CMS/[facility])   • Hypertension   • Diabetes mellitus (CMS/[facility])   • Precordial pain   • Cardiac arrest (CMS/[facility])   • Hypotension   • Syncope     Past Medical History:   Diagnosis Date   • Chronic kidney disease, stage 3    • Coronary arteriosclerosis     eCABGx3 10/2014 LM 95% LAD diff 70% D1 70% OM2 70% OM3 70% & % USA STEMI      • End stage renal disease (CMS/[facility])    • Essential hypertension    • Heterozygous thalassemia     Thalassemia minor      • Hyperlipidemia    • Peripheral vascular disease (CMS/[facility])     WILSON RIGHT 0.97 LEFT 0.79, stent JADYN ZELAYA 2015      • Surgical follow-up care     Emergent CABG X 3     • Type 2 diabetes mellitus (CMS/[facility])      Past Surgical History:   Procedure Laterality Date   • AMPUTATION FINGER / THUMB Right     RIGHT middle finger   • CARDIAC CATHETERIZATION  10/11/2014    Distal left main critical stenosis of up to 95-99% with evidence of filling defect suggestive of a plaque. LAD and OMB 1 and 2 stenosis.A 100% occludeed RCA.   • CARDIAC CATHETERIZATION N/A 2020    Procedure: Left Heart Cath 2020 @ 9;  Surgeon: Gibran Garza MD;  Location: [facility] INVASIVE LOCATION;  Service: Cardiology;  Laterality: N/A;   • CARDIAC CATHETERIZATION N/A 10/9/2020    Procedure: Left Heart Cath;  Surgeon: Gibran Garza MD;  Location: [facility] CATH  INVASIVE LOCATION;  Service: Cardiology;  Laterality: N/A;   • CARDIAC SURGERY     • CORONARY ARTERY BYPASS GRAFT  10/11/2014    Emergent CABG X 3 LIMA->LAD SVG->OM SVG->PDA          OT ASSESSMENT FLOWSHEET (last 12 hours)      OT Evaluation and Treatment     Row Name 10/19/20 1014                   OT Time and Intention    Subjective Information  complains of;pain  -KD        Document Type  therapy note (daily note)  -KD        Mode of Treatment  individual therapy;occupational therapy  -KD        Patient Effort  good  -KD        Comment  Pt deferred ADL. Reported wife will asssist when she arrives.  -KD           General Information    Patient/Family/Caregiver Comments/Observations  No family present  -KD        Existing Precautions/Restrictions  fall  -KD           Cognition    Affect/Mental Status (Cognitive)  WFL  -KD        Orientation Status (Cognition)  oriented x 4  -KD        Follows Commands (Cognition)  WFL  -KD           Pain Scale: Numbers Pre/Post-Treatment    Pretreatment Pain Rating  8/10  -KD        Posttreatment Pain Rating  8/10  -KD        Pain Location  chest  -KD        Pain Intervention(s)  Other (Comment) Meds given  -KD           Bed Mobility    Supine-Sit [place] (Bed Mobility)  modified independence  -KD        Assistive Device (Bed Mobility)  head of bed elevated;bed rails  -KD           Functional Mobility    Functional Mobility- Ind. Level  supervision required  -KD        Functional Mobility- Device  other (see comments) No AD  -KD        Functional Mobility-Distance (Feet)  2  -KD           Transfer Assessment/Treatment    Transfers  sit-stand transfer;stand-sit transfer;bed-chair transfer  -KD        Comment (Transfers)  Pt completed 5 sit<>stands w/ good tolerance  -KD           Transfers    Bed-Chair [place] (Transfers)  standby assist  -KD        Sit-Stand [place] (Transfers)  standby assist  -KD        Stand-Sit [place] (Transfers)  standby assist  -KD            Shoulder (Therapeutic Exercise)    Shoulder (Therapeutic Exercise)  AROM (active range of motion);strengthening exercise  -KD        Shoulder AROM (Therapeutic Exercise)  bilateral;flexion;extension;aBduction;aDduction;external rotation;internal rotation;horizontal aBduction/aDduction  -KD        Shoulder Strengthening (Therapeutic Exercise)  2 lb free weight  -KD           Elbow/Forearm (Therapeutic Exercise)    Elbow/Forearm (Therapeutic Exercise)  AROM (active range of motion);strengthening exercise  -KD        Elbow/Forearm AROM (Therapeutic Exercise)  bilateral;flexion;extension;pronation;supination  -KD        Elbow/Forearm Strengthening (Therapeutic Exercise)  3 lb free weight  -KD           Wrist (Therapeutic Exercise)    Wrist (Therapeutic Exercise)  AROM (active range of motion);strengthening exercise  -KD        Wrist AROM (Therapeutic Exercise)  bilateral;flexion;extension  -KD        Wrist Strengthening (Therapeutic Exercise)  3 lb free weight  -KD           Hand (Therapeutic Exercise)    Hand (Therapeutic Exercise)  AROM (active range of motion);strengthening exercise  -KD        Hand AROM/AAROM (Therapeutic Exercise)  finger flexion;finger extension  -KD           Lower Body Dressing Assessment/Training    [place] Level (Lower Body Dressing)  lower body dressing skills;don;socks;maximum assist (25% patient effort)  -KD        Position (Lower Body Dressing)  unsupported sitting  -KD           Coping    Observed Emotional State  calm;cooperative;pleasant  -KD           Plan of Care Review    Plan of Care Reviewed With  patient  -KD        Progress  improving  -KD        Outcome Summary  Sup-sit-Mod I, sit-stand-sit-SBA, amb ~ 2-3' SBA w/ no AD. Bed-chair-SBA. Pt completed BUE ther ex in all planes w/ 3lb HW and good tolerance w/ RB's PRN. Pt tolerated 5 sit<>stands w/ good tolerance.   -KD           Vital Signs    Pre Systolic BP Rehab  145  -KD        Pre Treatment Diastolic BP  76  -KD         Pretreatment Heart Rate (beats/min)  94  -KD        Posttreatment Heart Rate (beats/min)  99  -KD        Pre SpO2 (%)  92  -KD        O2 Delivery Pre Treatment  nasal cannula  -KD        Post SpO2 (%)  93  -KD        O2 Delivery Post Treatment  nasal cannula  -KD        Pre Patient Position  Supine  -KD        Intra Patient Position  Standing  -KD        Post Patient Position  Sitting  -KD           Positioning and Restraints    Pre-Treatment Position  in bed  -KD        Post Treatment Position  chair  -KD        In Chair  sitting;call light within reach;encouraged to call for assist;exit alarm on  -KD          User Key  (r) = Recorded By, (t) = Taken By, (c) = Cosigned By    Initials Name Effective Dates    KD Bridgette Nash MARIPOSA, HU/L 03/07/18 -          Occupational Therapy Education                 Title: PT OT SLP Therapies (In Progress)     Topic: Occupational Therapy (Done)     Point: ADL training (Done)     Description:   Instruct learner(s) on proper safety adaptation and remediation techniques during self care or transfers.   Instruct in proper use of assistive devices.              Learning Progress Summary           Patient Acceptance, E,TB,D, NR,VU by  at 10/17/2020 1320    Acceptance, E, VU by AS at 10/13/2020 1307    Comment: endurance actvities, LBD, PLB   Significant Other Acceptance, E, VU by AS at 10/13/2020 1307    Comment: endurance actvities, LBD, PLB                   Point: Home exercise program (Done)     Description:   Instruct learner(s) on appropriate technique for monitoring, assisting and/or progressing therapeutic exercises/activities.              Learning Progress Summary           Patient Acceptance, E,TB,D, NR,VU by CS at 10/17/2020 1320    Acceptance, E, VU by AS at 10/13/2020 1307    Comment: endurance actvities, LBD, PLB   Significant Other Acceptance, E, VU by AS at 10/13/2020 1307    Comment: endurance actvities, LBD, PLB                   Point: Precautions (Done)      Description:   Instruct learner(s) on prescribed precautions during self-care and functional transfers.              Learning Progress Summary           Patient Acceptance, E,TB,D, NR,VU by TEMI at 10/17/2020 1320    Acceptance, E, VU by KO at 10/12/2020 1320    Comment: Educated pt on safety during ADLs and fall prevention                   Point: Body mechanics (Done)     Description:   Instruct learner(s) on proper positioning and spine alignment during self-care, functional mobility activities and/or exercises.              Learning Progress Summary           Patient Acceptance, E,TB,D, NR,VU by TEMI at 10/17/2020 1320                               User Key     Initials Effective Dates Name Provider Type Discipline    CS 03/07/18 -  Naomy Harvey, MAYTE/DONI Occupational Therapy Assistant OT    AS 07/05/20 -  Noemi Madrid, OT Occupational Therapist OT    STEPHANIE 08/09/20 -  Tiana Sifuentes, OT Occupational Therapist OT                  OT Recommendation and Plan     Plan of Care Review  Plan of Care Reviewed With: patient  Progress: improving  Outcome Summary: Sup-sit-Mod I, sit-stand-sit-SBA, amb ~ 2-3' SBA w/ no AD. Bed-chair-SBA. Pt completed BUE ther ex in all planes w/ 3lb HW and good tolerance w/ RB's PRN. Pt tolerated 5 sit<>stands w/ good tolerance.   Plan of Care Reviewed With: patient  Outcome Summary: Sup-sit-Mod I, sit-stand-sit-SBA, amb ~ 2-3' SBA w/ no AD. Bed-chair-SBA. Pt completed BUE ther ex in all planes w/ 3lb HW and good tolerance w/ RB's PRN. Pt tolerated 5 sit<>stands w/ good tolerance.     Outcome Measures     Row Name 10/19/20 0920 10/17/20 1300 10/17/20 0840       How much help from another person do you currently need...    Turning from your back to your side while in flat bed without using bedrails?  --  --  3  -JW    Moving from lying on back to sitting on the side of a flat bed without bedrails?  --  --  3  -JW    Moving to and from a bed to a chair (including a wheelchair)?  --  --  3   -JW    Standing up from a chair using your arms (e.g., wheelchair, bedside chair)?  --  --  3  -JW    Climbing 3-5 steps with a railing?  --  --  3  -JW    To walk in hospital room?  --  --  3  -JW    AM-PAC 6 Clicks Score (PT)  --  --  18  -JW       How much help from another is currently needed...    Putting on and taking off regular lower body clothing?  2  -KD  2  -CS  --    Bathing (including washing, rinsing, and drying)  3  -KD  3  -CS  --    Toileting (which includes using toilet bed pan or urinal)  3  -KD  3  -CS  --    Putting on and taking off regular upper body clothing  3  -KD  3  -CS  --    Taking care of personal grooming (such as brushing teeth)  4  -KD  3  -CS  --    Eating meals  4  -KD  4  -CS  --    AM-PAC 6 Clicks Score (OT)  19  -KD  18  -CS  --       Functional Assessment    Outcome Measure Options  --  --  AM-PAC 6 Clicks Basic Mobility (PT)  -    Row Name 10/16/20 1355 10/16/20 1200          How much help from another person do you currently need...    Turning from your back to your side while in flat bed without using bedrails?  --  3  -TA     Moving from lying on back to sitting on the side of a flat bed without bedrails?  --  3  -TA     Moving to and from a bed to a chair (including a wheelchair)?  --  3  -TA     Standing up from a chair using your arms (e.g., wheelchair, bedside chair)?  --  3  -TA     Climbing 3-5 steps with a railing?  --  3  -TA     To walk in hospital room?  --  3  -TA     AM-PAC 6 Clicks Score (PT)  --  18  -TA        How much help from another is currently needed...    Putting on and taking off regular lower body clothing?  2  -KD  --     Bathing (including washing, rinsing, and drying)  3  -KD  --     Toileting (which includes using toilet bed pan or urinal)  3  -KD  --     Putting on and taking off regular upper body clothing  3  -KD  --     Taking care of personal grooming (such as brushing teeth)  3  -KD  --     Eating meals  4  -KD  --     AM-PAC 6 Clicks  Score (OT)  18  -KD  --        Functional Assessment    Outcome Measure Options  --  AM-PAC 6 Clicks Basic Mobility (PT)  -TA       User Key  (r) = Recorded By, (t) = Taken By, (c) = Cosigned By    Initials Name Provider Type    Talia Kirkpatrick, PTA Physical Therapy Assistant    TA Rosemarie Lam, PTA Physical Therapy Assistant    Bridgette Smart, MAYTE/L Occupational Therapy Assistant    Naomy Cortez, HU/DONI Occupational Therapy Assistant          Time Calculation:   Time Calculation- OT     Row Name 10/19/20 [facility]             Time Calculation- OT    OT Start Time  0920  -KD      OT Stop Time  0944  -KD      OT Time Calculation (min)  24 min  -KD      Total Timed Code Minutes- OT  24 minute(s)  -KD      OT Received On  10/19/20  -KD        User Key  (r) = Recorded By, (t) = Taken By, (c) = Cosigned By    Initials Name Provider Type    Bridgette Smart COTA/L Occupational Therapy Assistant        Therapy Charges for Today     Code Description Service Date Service Provider Modifiers Qty    38905401496 HC OT THER PROC EA 15 MIN 10/19/2020 Bridgette Nash COTA/L GO 1    72042829685 HC OT THERAPEUTIC ACT EA 15 MIN 10/19/2020 Bridgette Nash COTA/L GO 1               MAYTE Galeas/DONI  10/19/2020

## 2020-10-19 NOTE — NURSING NOTE
CCPD initiated per policy and procedure, dsg changed , gent cream applied from home( not on pharmacy formulary), line secured

## 2020-10-20 VITALS
DIASTOLIC BLOOD PRESSURE: 80 MMHG | WEIGHT: 192.6 LBS | RESPIRATION RATE: 18 BRPM | HEIGHT: 67 IN | BODY MASS INDEX: 30.23 KG/M2 | OXYGEN SATURATION: 93 % | TEMPERATURE: 98.1 F | SYSTOLIC BLOOD PRESSURE: 172 MMHG | HEART RATE: 103 BPM

## 2020-10-20 LAB
ANISOCYTOSIS BLD QL: ABNORMAL
BASOPHILS # BLD AUTO: 0.05 10*3/MM3 (ref 0–0.2)
BASOPHILS NFR BLD AUTO: 0.5 % (ref 0–1.5)
DEPRECATED RDW RBC AUTO: 40 FL (ref 37–54)
EOSINOPHIL # BLD AUTO: 0.44 10*3/MM3 (ref 0–0.4)
EOSINOPHIL # BLD MANUAL: 0.5 10*3/MM3 (ref 0–0.4)
EOSINOPHIL NFR BLD AUTO: 4.4 % (ref 0.3–6.2)
EOSINOPHIL NFR BLD MANUAL: 5 % (ref 0.3–6.2)
ERYTHROCYTE [DISTWIDTH] IN BLOOD BY AUTOMATED COUNT: 14.5 % (ref 12.3–15.4)
GLUCOSE BLDC GLUCOMTR-MCNC: 337 MG/DL (ref 70–130)
HCT VFR BLD AUTO: 31.2 % (ref 37.5–51)
HGB BLD-MCNC: 9.7 G/DL (ref 13–17.7)
HYPOCHROMIA BLD QL: ABNORMAL
IMM GRANULOCYTES # BLD AUTO: 0.13 10*3/MM3 (ref 0–0.05)
IMM GRANULOCYTES NFR BLD AUTO: 1.3 % (ref 0–0.5)
LYMPHOCYTES # BLD AUTO: 2.35 10*3/MM3 (ref 0.7–3.1)
LYMPHOCYTES # BLD MANUAL: 2.61 10*3/MM3 (ref 0.7–3.1)
LYMPHOCYTES NFR BLD AUTO: 23.5 % (ref 19.6–45.3)
LYMPHOCYTES NFR BLD MANUAL: 17 % (ref 5–12)
LYMPHOCYTES NFR BLD MANUAL: 26 % (ref 19.6–45.3)
MCH RBC QN AUTO: 24.6 PG (ref 26.6–33)
MCHC RBC AUTO-ENTMCNC: 31.1 G/DL (ref 31.5–35.7)
MCV RBC AUTO: 79 FL (ref 79–97)
MONOCYTES # BLD AUTO: 1.7 10*3/MM3 (ref 0.1–0.9)
MONOCYTES # BLD AUTO: 2.09 10*3/MM3 (ref 0.1–0.9)
MONOCYTES NFR BLD AUTO: 20.9 % (ref 5–12)
NEUTROPHILS # BLD AUTO: 5.11 10*3/MM3 (ref 1.7–7)
NEUTROPHILS NFR BLD AUTO: 4.96 10*3/MM3 (ref 1.7–7)
NEUTROPHILS NFR BLD AUTO: 49.4 % (ref 42.7–76)
NEUTROPHILS NFR BLD MANUAL: 51 % (ref 42.7–76)
NRBC BLD AUTO-RTO: 0.2 /100 WBC (ref 0–0.2)
PHOSPHATE SERPL-MCNC: 6.2 MG/DL (ref 2.5–4.5)
PLATELET # BLD AUTO: 292 10*3/MM3 (ref 140–450)
PMV BLD AUTO: 11.1 FL (ref 6–12)
POLYCHROMASIA BLD QL SMEAR: ABNORMAL
RBC # BLD AUTO: 3.95 10*6/MM3 (ref 4.14–5.8)
SMALL PLATELETS BLD QL SMEAR: ADEQUATE
TARGETS BLD QL SMEAR: ABNORMAL
VARIANT LYMPHS NFR BLD MANUAL: 1 % (ref 0–5)
WBC # BLD AUTO: 10.02 10*3/MM3 (ref 3.4–10.8)
WBC MORPH BLD: NORMAL

## 2020-10-20 PROCEDURE — 97116 GAIT TRAINING THERAPY: CPT

## 2020-10-20 PROCEDURE — 85025 COMPLETE CBC W/AUTO DIFF WBC: CPT | Performed by: HOSPITALIST

## 2020-10-20 PROCEDURE — 85007 BL SMEAR W/DIFF WBC COUNT: CPT | Performed by: HOSPITALIST

## 2020-10-20 PROCEDURE — 97110 THERAPEUTIC EXERCISES: CPT

## 2020-10-20 PROCEDURE — 97535 SELF CARE MNGMENT TRAINING: CPT

## 2020-10-20 PROCEDURE — 84100 ASSAY OF PHOSPHORUS: CPT | Performed by: INTERNAL MEDICINE

## 2020-10-20 RX ORDER — FUROSEMIDE 80 MG
80 TABLET ORAL DAILY
Qty: 30 TABLET | Refills: 0 | Status: SHIPPED | OUTPATIENT
Start: 2020-10-20 | End: 2021-06-17 | Stop reason: HOSPADM

## 2020-10-20 RX ORDER — CARVEDILOL 12.5 MG/1
12.5 TABLET ORAL 2 TIMES DAILY WITH MEALS
Qty: 60 TABLET | Refills: 0 | Status: SHIPPED | OUTPATIENT
Start: 2020-10-20

## 2020-10-20 RX ORDER — POLYETHYLENE GLYCOL 3350 17 G/17G
17 POWDER, FOR SOLUTION ORAL DAILY
Qty: 20 EACH | Refills: 0 | Status: SHIPPED | OUTPATIENT
Start: 2020-10-20

## 2020-10-20 RX ORDER — BISACODYL 5 MG/1
5 TABLET, DELAYED RELEASE ORAL DAILY PRN
Qty: 30 TABLET | Refills: 0 | Status: SHIPPED | OUTPATIENT
Start: 2020-10-20

## 2020-10-20 RX ORDER — LANTHANUM CARBONATE 500 MG/1
500 TABLET, CHEWABLE ORAL
Qty: 90 TABLET | Refills: 0 | Status: SHIPPED | OUTPATIENT
Start: 2020-10-20 | End: 2021-06-17 | Stop reason: HOSPADM

## 2020-10-20 RX ADMIN — HYDRALAZINE HYDROCHLORIDE 25 MG: 25 TABLET, FILM COATED ORAL at 05:34

## 2020-10-20 NOTE — DISCHARGE PLACEMENT REQUEST
"Sumanth Robledo (60 y.o. Male)     Date of Birth Social Security Number Address Home Phone MRN    1960  [address] [place] [address] [phone] [id number]    Rastafari Marital Status          Latter day        Admission Date Admission Type Admitting Provider Attending Provider Department, Room/Bed    10/8/20 Emergency Isael Miranda MD Stewart-Hubbard, Takasha Latoya Renee, MD [address], [address]    Discharge Date Discharge Disposition Discharge Destination         [place] [facility]              Attending Provider: Luana Zamudio MD    Allergies: No Known Allergies    Isolation: None   Infection: None   Code Status: CPR    Ht: 170.2 cm (67.01\")   Wt: 87.4 kg (192 lb 9.6 oz)    Admission Cmt: None   Principal Problem: Hypotension [I95.9]                 Active Insurance as of 10/8/2020     Primary Coverage     Payor Plan Insurance Group Employer/Plan Group    ANTHEM BLUE CROSS ANTHEM BLUE CROSS BLUE SHIELD PPO [id number]     Payor Plan Address Payor Plan Phone Number Payor Plan Fax Number Effective Dates    [address] [phone]  9/1/2012 - None Entered    [facility] [id number]       Subscriber Name Subscriber Birth Date Member ID       SUMANTH ROBLEDO 1960 [id number]           Secondary Coverage     Payor Plan Insurance Group Employer/Plan Group    MEDICARE MEDICARE A & B      Payor Plan Address Payor Plan Phone Number Payor Plan Fax Number Effective Dates    [address] [phone]  3/1/2019 - None Entered    [facility] [id number]       Subscriber Name Subscriber Birth Date Member ID       SUMANTH ROBLEDO 1960 [id number]                 Emergency Contacts      (Rel.) Home Phone Work Phone Mobile Phone    LISA ROBLEDO (Spouse) [phone] -- [phone]            Insurance Information                ANTHEM BLUE CROSS/ANTHEM BLUE CROSS BLUE SHIELD PPO Phone: [phone]    Subscriber: Venkat" Sumanth Mcfarland Subscriber#: [id number]    Group#: [id number] Precert#:         MEDICARE/MEDICARE A & B Phone: [phone]    Subscriber: Sumanth Robledo Subscriber#: [id number]    Group#:  Precert#:         [address] [address]  Phone:  [phone]  Fax:  [phone] Date: Oct 20, 2020      Referral to Home Health     Patient:  Sumanth Robledo MRN:  [id number]   [address] [facility] [address] :  1960  SSN:    Phone: [phone] Sex:  M      INSURANCE PAYOR PLAN GROUP # SUBSCRIBER ID   Primary:  Secondary:    [employer] BLUE CROSS MEDICARE [id number]  [id number]    [id number]  [id number]      Referring Provider Information:  MYLES [place] Phone: [phone] Fax: [phone]      Referral Information:   # Visits:  1 Referral Type: Home Health [42]   Urgency:  Routine Referral Reason: Specialty Services Required   Start Date: Oct 20, 2020 End Date:  To be determined by Insurer   Diagnosis: Impaired mobility and ADLs (Z74.09,Z78.9 [ICD-10-CM] V49.89 [ICD-9-CM])  Impaired functional mobility, balance, gait, and endurance (Z74.09 [ICD-10-CM] V49.89 [ICD-9-CM])  Cardiac arrest (CMS/[facility]) (I46.9 [ICD-10-CM] 427.5 [ICD-9-CM])      Refer to Dept:   Refer to Provider:   Refer to Facility:       Face to Face Visit Date: 10/20/2020  Follow-up provider for Plan of Care? I treated the patient in an acute care facility and will not continue treatment after discharge.  Follow-up provider: TEO LEDESMA [[id number]]  Reason/Clinical Findings: Deconditioning  Describe mobility limitations that make leaving home difficult: Deconditioning  Nursing/Therapeutic Services Requested: Skilled Nursing  Nursing/Therapeutic Services Requested: Physical Therapy  Nursing/Therapeutic Services Requested: Occupational Therapy  Skilled nursing orders: Cardiopulmonary assessments  PT orders: Therapeutic exercise  Occupational orders:  Activities of daily living  Frequency: 1 Week 1     This document serves as a request of services and does not constitute Insurance authorization or approval of services.  To determine eligibility, please contact the members Insurance carrier to verify and review coverage.     If you have medical questions regarding this request for services. Please contact [address] at [phone] during normal business hours.       Authorizing Provider:Randolph Mcconnell MD  Authorizing Provider's NPI: [id number]  Order Entered By: Randolph Mcconnell MD 10/20/2020 10:10 AM     Electronically signed by: Randolph Mcconnell MD 10/20/2020 10:10 AM

## 2020-10-20 NOTE — THERAPY TREATMENT NOTE
Acute Care - Physical Therapy Treatment Note  [facility]     Patient Name: Sumanth Robledo  : 1960  MRN: [id number]  Today's Date: 10/20/2020   Onset of Illness/Injury or Date of Surgery: 10/08/20       PT Assessment (last 12 hours)      PT Evaluation and Treatment     Row Name 10/20/20 0933          Physical Therapy Time and Intention    Subjective Information  no complaints  -     Document Type  therapy note (daily note)  -     Mode of Treatment  individual therapy;physical therapy  -     Patient Effort  good  -     Row Name 10/20/20 0933          General Information    Patient Profile Reviewed  yes  -     Existing Precautions/Restrictions  fall  -     Row Name 10/20/20 0933          Pain Scale: Numbers Pre/Post-Treatment    Pretreatment Pain Rating  0/10 - no pain  -     Posttreatment Pain Rating  0/10 - no pain  -     Row Name 10/20/20 0933          Transfers    Transfers  sit-stand transfer;stand-sit transfer  -     Sit-Stand [place] (Transfers)  modified independence  -     Stand-Sit [place] (Transfers)  modified independence  -     Row Name 10/20/20 0933          Sit-Stand Transfer    Assistive Device (Sit-Stand Transfers)  walker, front-wheeled  -     Row Name 10/20/20 0933          Stand-Sit Transfer    Assistive Device (Stand-Sit Transfers)  walker, front-wheeled  -     Row Name 10/20/20 0933          Gait/Stairs (Locomotion)    [place] Level (Gait)  modified independence;supervision  -     Assistive Device (Gait)  walker, front-wheeled  -     Distance in Feet (Gait)  250  -JW     Pattern (Gait)  step-through  -     Comment (Gait/Stairs)  defers step training, O2 dropped to 83% after gt, increased to 93% w/ PLB and sitting rest break  -     Row Name 10/20/20 0933          Vital Signs    Pre Systolic BP Rehab  156  -JW     Pre Treatment Diastolic BP  85  -     Pretreatment Heart Rate (beats/min)  96  -JW     Intratreatment Heart Rate  (beats/min)  113  -JW     Posttreatment Heart Rate (beats/min)  105  -JW     Pre SpO2 (%)  85 up to 91% w/ PLB  -JW     O2 Delivery Pre Treatment  room air  -JW     Intra SpO2 (%)  83 after gt  -JW     Post SpO2 (%)  93  -JW     O2 Delivery Post Treatment  room air  -JW     Pre Patient Position  Sitting  -JW     Intra Patient Position  Standing  -JW     Post Patient Position  Sitting  -JW     Row Name 10/20/20 0933          Bed Mobility Goal 1 (PT)    Activity/Assistive Device (Bed Mobility Goal 1, PT)  sit to supine;supine to sit  -JW     [place] Level/Cues Needed (Bed Mobility Goal 1, PT)  modified independence  -JW     Time Frame (Bed Mobility Goal 1, PT)  3 days  -JW     Strategies/Barriers (Bed Mobility Goal 1, PT)  bed flat, no hand rails  -JW     Progress/Outcomes (Bed Mobility Goal 1, PT)  goal not met  -     Row Name 10/20/20 0933          Transfer Goal 1 (PT)    Activity/Assistive Device (Transfer Goal 1, PT)  sit-to-stand/stand-to-sit;bed-to-chair/chair-to-bed LRAD PRN  -JW     [place] Level/Cues Needed (Transfer Goal 1, PT)  modified independence  -JW     Time Frame (Transfer Goal 1, PT)  3 days  -JW     Progress/Outcome (Transfer Goal 1, PT)  goal not met  -     Row Name 10/20/20 0933          Gait Training Goal 1 (PT)    Activity/Assistive Device (Gait Training Goal 1, PT)  gait (walking locomotion);assistive device use;decrease fall risk;increase endurance/gait distance LRAD PRN  -JW     [place] Level (Gait Training Goal 1, PT)  modified independence  -JW     Distance (Gait Training Goal 1, PT)  150ft or more  -JW     Time Frame (Gait Training Goal 1, PT)  5 days  -JW     Progress/Outcome (Gait Training Goal 1, PT)  (S) goal met  -     Row Name 10/20/20 0933          Stairs Goal 1 (PT)    Activity/Assistive Device (Stairs Goal 1, PT)  ascending stairs;descending stairs;using handrail, left  -JW     [place] Level/Cues Needed (Stairs Goal 1, PT)  standby assist  -JW     Number  of Stairs (Stairs Goal 1, PT)  2 or more  -     Time Frame (Stairs Goal 1, PT)  by discharge  -     Progress/Outcome (Stairs Goal 1, PT)  goal not met  -     Row Name 10/20/20 0933          Positioning and Restraints    Pre-Treatment Position  sitting in chair/recliner  -     Post Treatment Position  chair  -JW     In Chair  sitting;call light within reach;encouraged to call for assist;with other staff w/ MD  -       User Key  (r) = Recorded By, (t) = Taken By, (c) = Cosigned By    Initials Name Provider Type    JW Talia Mckeon, PTA Physical Therapy Assistant        Physical Therapy Education                 Title: PT OT SLP Therapies (Done)     Topic: Physical Therapy (Done)     Point: Mobility training (Done)     Learning Progress Summary           Patient Acceptance, TB, DU by  at 10/19/2020 2309    Acceptance, E, VU by  at 10/13/2020 0940    Comment: PT POC and prognosis, PLB    Acceptance, E, VU by [facility] at 10/12/2020 1431    Comment: Role of PT, POC, use of gait belt                   Point: Home exercise program (Done)     Learning Progress Summary           Patient Acceptance, TB, DU by  at 10/19/2020 2309                   Point: Body mechanics (Done)     Learning Progress Summary           Patient Acceptance, TB, DU by  at 10/19/2020 2309                   Point: Precautions (Done)     Learning Progress Summary           Patient Acceptance, TB, DU by  at 10/19/2020 2309    Acceptance, E, VU by  at 10/13/2020 0940    Comment: PT POC and prognosis, PLB    Acceptance, E, VU by [facility] at 10/12/2020 1431    Comment: Role of PT, POC, use of gait belt                               User Key     Initials Effective Dates Name Provider Type Discipline    [facility] 08/09/20 -  Jaquelin Gabriel, PT Physical Therapist PT     12/31/18 -  Aretha King, RN Registered Nurse Nurse     08/09/20 -  Getachew Beck, PT Physical Therapist PT              PT Recommendation and Plan  Anticipated Discharge  Disposition (PT): home with assist, home with home health  Therapy Frequency (PT): other (see comments)(6-11x/wk)  Plan of Care Reviewed With: patient  Outcome Summary: pt wanting to get OOB to recliner for breakfast, t/alan sup to sit w/ SBA, sit<>stand w/ CGA, bed to recliner w/ CGA and no AD ~4', cont to work on t/fers and gt  Outcome Measures     Row Name 10/20/20 0933 10/19/20 1346 10/19/20 0920       How much help from another person do you currently need...    Turning from your back to your side while in flat bed without using bedrails?  3  -  3  -CA  --    Moving from lying on back to sitting on the side of a flat bed without bedrails?  3  -  3  -CA  --    Moving to and from a bed to a chair (including a wheelchair)?  3  -  3  -CA  --    Standing up from a chair using your arms (e.g., wheelchair, bedside chair)?  4  -  3  -CA  --    Climbing 3-5 steps with a railing?  3  -  3  -CA  --    To walk in hospital room?  4  -  3  -CA  --    AM-PAC 6 Clicks Score (PT)  20  -  18  -CA  --       How much help from another is currently needed...    Putting on and taking off regular lower body clothing?  --  --  2  -KD    Bathing (including washing, rinsing, and drying)  --  --  3  -KD    Toileting (which includes using toilet bed pan or urinal)  --  --  3  -KD    Putting on and taking off regular upper body clothing  --  --  3  -KD    Taking care of personal grooming (such as brushing teeth)  --  --  4  -KD    Eating meals  --  --  4  -KD    AM-PAC 6 Clicks Score (OT)  --  --  19  -KD       Functional Assessment    Outcome Measure Options  AM-PAC 6 Clicks Basic Mobility (PT)  -JW  AM-PAC 6 Clicks Basic Mobility (PT)  -CA  --    Row Name 10/17/20 1300             How much help from another is currently needed...    Putting on and taking off regular lower body clothing?  2  -CS      Bathing (including washing, rinsing, and drying)  3  -CS      Toileting (which includes using toilet bed pan or urinal)  3  -CS       Putting on and taking off regular upper body clothing  3  -CS      Taking care of personal grooming (such as brushing teeth)  3  -CS      Eating meals  4  -CS      AM-PAC 6 Clicks Score (OT)  18  -CS        User Key  (r) = Recorded By, (t) = Taken By, (c) = Cosigned By    Initials Name Provider Type    Talia Kirkpatrick PTA Physical Therapy Assistant    Keyur Mitchell, JG Physical Therapy Assistant    Bridgette Smart, HU/L Occupational Therapy Assistant    Naomy Cortez, HU/L Occupational Therapy Assistant           Time Calculation:   PT Charges     Row Name 10/20/20 0933             Time Calculation    Start Time  0933  -      Stop Time  0948  -      Time Calculation (min)  15 min  -      PT Received On  10/20/20  -         Time Calculation- PT    Total Timed Code Minutes- PT  15 minute(s)  -        User Key  (r) = Recorded By, (t) = Taken By, (c) = Cosigned By    Initials Name Provider Type    Talia Kirkpatrick PTA Physical Therapy Assistant        Therapy Charges for Today     Code Description Service Date Service Provider Modifiers Qty    [id number] HC GAIT TRAINING EA 15 MIN 10/20/2020 Talia Mckeon PTA GP 1          PT G-Codes  Outcome Measure Options: AM-PAC 6 Clicks Basic Mobility (PT)  AM-PAC 6 Clicks Score (PT): 20  AM-PAC 6 Clicks Score (OT): 19    Talia Mckeon PTA  10/20/2020

## 2020-10-20 NOTE — PLAN OF CARE
Goal Outcome Evaluation:  Plan of Care Reviewed With: patient  Progress: improving  VS stable, expected to discharge to home with home health. Will continue to monitor.

## 2020-10-20 NOTE — DISCHARGE PLACEMENT REQUEST
"Sumanth Robledo (60 y.o. Male)     Date of Birth Social Security Number Address Home Phone MRN    1960  [address] [place] [address] [phone] [id number]    Tenriism Marital Status          Baptism        Admission Date Admission Type Admitting Provider Attending Provider Department, Room/Bed    10/8/20 Emergency Isael Miranda MD Stewart-Hubbard, Takasha Latoya Renee, MD [address], 304/1    Discharge Date Discharge Disposition Discharge Destination         [facility]-Health Care [facility]              Attending Provider: Luana Zamudio MD    Allergies: No Known Allergies    Isolation: None   Infection: None   Code Status: CPR    Ht: 170.2 cm (67.01\")   Wt: 87.4 kg (192 lb 9.6 oz)    Admission Cmt: None   Principal Problem: Hypotension [I95.9]                 Active Insurance as of 10/8/2020     Primary Coverage     Payor Plan Insurance Group Employer/Plan Group    ANTHEM BLUE CROSS ANTHEM BLUE CROSS BLUE SHIELD PPO [id number]     Payor Plan Address Payor Plan Phone Number Payor Plan Fax Number Effective Dates    [address] [phone]  9/1/2012 - None Entered    [facility] [id number]       Subscriber Name Subscriber Birth Date Member ID       SUMANTH ROBLEDO 1960 [id number]           Secondary Coverage     Payor Plan Insurance Group Employer/Plan Group    MEDICARE MEDICARE A & B      Payor Plan Address Payor Plan Phone Number Payor Plan Fax Number Effective Dates    [address] [phone]  3/1/2019 - None Entered    [facility] [id number]       Subscriber Name Subscriber Birth Date Member ID       SUMANTH ROBLEDO 1960 [id number]                 Emergency Contacts      (Rel.) Home Phone Work Phone Mobile Phone    LISA ROBLEDO (Spouse) [phone] -- [phone]            Insurance Information                ANTHEM BLUE CROSS/ANTHEM BLUE CROSS BLUE SHIELD PPO Phone: [phone]    Subscriber: Venkat" Sumanth Mcfarland Subscriber#: [id number]    Group#: [id number] Precert#:         MEDICARE/MEDICARE A & B Phone: [phone]    Subscriber: Sumanth Robledo Subscriber#: [id number]    Group#:  Precert#:              History & Physical      Luana Zamudio MD at 10/09/20 0206          .        [facility] Medicine Admission      Date of Admission: 10/8/2020  Patient was seen and evaluated on 10/8/2020.  However note was written after midnight    Primary Care Physician: Elizabeth Burleson APRN      Chief Complaint: **Chest pain with elevated labs*    HPI:*  Patient is a 60-year-old male who presents to the [facility] emergency room with complaints of chest pain.  He was seen at his cardiology office today and had blood work drawn.  The cardiologist called the ER stating that the patient needed to come to the ED due to abnormal test results of and the patient was unsure of what test was abnormal.  Noted in the emergency room to have a troponin of 2.1.  He has been having chest pain off and on for the past 2 years he has a history of coronary artery disease with CABG in 2014.  He has had no stents since then.  His last cath was in February 2020 4 by Dr. Garza   (with unsuccessful PTCA of the chronic total occlusion of the left main and selective cannulation of the left internal mammary artery and of the saphenous vein graft to the obtuse marginal branch.  There is 100% occlusion of the left main 100% occlusion of the proximal right coronary artery 100% occlusion of the second obtuse marginal after branch patent LIMA to the LAD patent saphenous vein graft to the obtuse marginal branch #1.)    The emergency room patient was found to have a troponin of 2.1 at age of 13 and a BNP of 41,000.  He was started on a heparin drip.  Nitroglycerin and aspirin was given prior in the ER.    Concurrent Medical History:  has a past medical history of  Chronic kidney disease, stage 3, Coronary arteriosclerosis, End stage renal disease (CMS/[facility]), Essential hypertension, Heterozygous thalassemia, Hyperlipidemia, Peripheral vascular disease (CMS/[facility]), Surgical follow-up care, and Type 2 diabetes mellitus (CMS/[facility]).    Past Surgical History:  has a past surgical history that includes Amputation finger / thumb (Right); Coronary artery bypass graft (10/11/2014); Cardiac catheterization (10/11/2014); Cardiac catheterization (N/A, 2/28/2020); and Cardiac surgery.    Family History: family history includes Coronary artery disease in an other family member; Heart disease in an other family member. **    Social History:  reports that he has never smoked. He has never used smokeless tobacco. He reports previous alcohol use.    Allergies: No Known Allergies    Medications:   Prior to Admission medications    Medication Sig Start Date End Date Taking? Authorizing Provider   amLODIPine (NORVASC) 10 MG tablet Take 10 mg by mouth 3 (Three) Times a Day.   Yes Lynne Castillo MD   aspirin 81 MG tablet Take 1 tablet by mouth Daily.   Yes Darlene Dickey APRN   calcitriol (ROCALTROL) 0.5 MCG capsule Take 0.5 mcg by mouth 2 (Two) Times a Day.   Yes Lynne Castillo MD   cinacalcet (SENSIPAR) 30 MG tablet Take 30 mg by mouth Daily.   Yes Lynne Castillo MD   clopidogrel (PLAVIX) 75 MG tablet Take 75 mg by mouth Daily.   Yes Lynne Castillo MD   Dulaglutide (TRULICITY) 1.5 MG/0.5ML solution pen-injector Inject 10 mg under the skin into the appropriate area as directed 1 (One) Time Per Week.   Yes Lynne Castillo MD   Evolocumab (REPATHA SC) Inject 140 mg under the skin into the appropriate area as directed Every 14 (Fourteen) Days.   Yes Lynne Castillo MD   gabapentin (NEURONTIN) 300 MG capsule Take 500 mg by mouth 2 (Two) Times a Day.   Yes Lynne Castillo MD   gentamicin (GARAMYCIN) 0.1 % cream Apply 1 application topically to the  appropriate area as directed 3 (Three) Times a Day.   Yes Lynne Castillo MD   hydrALAZINE (APRESOLINE) 25 MG tablet Take 25 mg by mouth 3 (Three) Times a Day.   Yes Lynne Castillo MD   Insulin Glargine, 2 Unit Dial, (TOUJEO MAX SOLOSTAR) 300 UNIT/ML solution pen-injector injection Inject 28 Units under the skin into the appropriate area as directed Every Night.   Yes Lynne Castillo MD   isosorbide mononitrate (IMDUR) 30 MG 24 hr tablet Take 30 mg by mouth Daily.   Yes Lynne Castillo MD   losartan (COZAAR) 100 MG tablet Take 100 mg by mouth Daily.   Yes Lynne Castillo MD   Metoprolol Succinate 100 MG capsule extended-release 24 hour sprinkle Take 200 mg by mouth Daily.   Yes Lynne Castillo MD   nitroglycerin (NITROSTAT) 0.4 MG SL tablet 1 under the tongue as needed for angina, may repeat q5mins for up three doses 2/29/20  Yes Darlene Dickey APRN   pravastatin (PRAVACHOL) 20 MG tablet Take 20 mg by mouth Daily.   Yes Lynne Castillo MD   ranolazine (RANEXA) 500 MG 12 hr tablet Take 1 tablet by mouth 2 (Two) Times a Day.  Patient taking differently: Take 1,000 mg by mouth 2 (Two) Times a Day. 2/29/20  Yes Darlene Dickey APRN   albuterol sulfate  (90 Base) MCG/ACT inhaler Inhale 2 puffs Every 4 (Four) Hours As Needed for Wheezing.    ProviderLynne MD       Review of Systems:  Review of Systems   12 point review of systems obtained pertinent positives and negatives noted above in HPI otherwise complete ROS is negative except as mentioned above.    Physical Exam:   Temp:  [97.3 °F (36.3 °C)] 97.3 °F (36.3 °C)  Heart Rate:  [74-88] 74  Resp:  [16-18] 16  BP: (139-177)/(76-94) 152/78  Physical Exam  Vitals signs and nursing note reviewed.   Constitutional:       Appearance: Normal appearance.   HENT:      Head: Normocephalic and atraumatic.      Nose: Nose normal.      Mouth/Throat:      Pharynx: Oropharynx is clear.   Eyes:      Extraocular Movements:  Extraocular movements intact.      Conjunctiva/sclera: Conjunctivae normal.   Neck:      Musculoskeletal: No neck rigidity.   Cardiovascular:      Rate and Rhythm: Normal rate and regular rhythm.      Pulses: Normal pulses.   Pulmonary:      Breath sounds: Normal breath sounds.   Abdominal:      General: Bowel sounds are normal.      Palpations: Abdomen is soft.   Musculoskeletal:         General: No tenderness or deformity.   Skin:     General: Skin is warm and dry.   Neurological:      General: No focal deficit present.      Mental Status: He is alert.      Cranial Nerves: No cranial nerve deficit.   Psychiatric:         Mood and Affect: Mood normal.         Behavior: Behavior normal.         Thought Content: Thought content normal.         Judgment: Judgment normal.           Results Reviewed:  I have personally reviewed current lab, radiology, and data and agree with results.  Lab Results (last 24 hours)     Procedure Component Value Units Date/Time    Protime-INR [[id number]]  (Normal) Collected: 10/08/20 2054    Specimen: Blood Updated: 10/09/20 0058     Protime 14.1 Seconds      INR 1.05    Narrative:      Therapeutic range for most indications is 2.0-3.0 INR,  or 2.5-3.5 for mechanical heart valves.    aPTT [[id number]]  (Normal) Collected: 10/08/20 2054    Specimen: Blood Updated: 10/09/20 0058     PTT 37.2 seconds     Narrative:      The recommended Heparin therapeutic range is 68-97 seconds.    COVID PRE-OP / PRE-PROCEDURE SCREENING ORDER (NO ISOLATION) - Swab, Nasopharynx [[id number]] Collected: 10/08/20 2321    Specimen: Swab from Nasopharynx Updated: 10/08/20 2328    Narrative:      The following orders were created for panel order COVID PRE-OP / PRE-PROCEDURE SCREENING ORDER (NO ISOLATION) - Swab, Nasopharynx.  Procedure                               Abnormality         Status                     ---------                               -----------         ------                     COVID-19, [facility] M[facility]  IN-HOUS...[[id number]]                      In process                   Please view results for these tests on the individual orders.    COVID-19, [facility] IN-HOUSE, NP SWAB IN TRANSPORT MEDIA 8-10 HR TAT - Swab, Nasopharynx [[id number]] Collected: 10/08/20 2321    Specimen: Swab from Nasopharynx Updated: 10/08/20 2328    Novelty Draw [[id number]] Collected: 10/08/20 2054    Specimen: Blood Updated: 10/08/20 2245    Narrative:      The following orders were created for panel order Novelty Draw.  Procedure                               Abnormality         Status                     ---------                               -----------         ------                     Light Blue Top[[id number]]                                   Final result               Green Top (Gel)[[id number]]                                  Final result               Lavender Top[[id number]]                                     Final result               Gold Top - SST[[id number]]                                   Final result                 Please view results for these tests on the individual orders.    Green Top (Gel) [[id number]] Collected: 10/08/20 2138    Specimen: Blood Updated: 10/08/20 2245     Extra Tube Hold for add-ons.     Comment: Auto resulted.       BNP [[id number]]  (Abnormal) Collected: 10/08/20 2138    Specimen: Blood Updated: 10/08/20 2228     proBNP 40,839.0 pg/mL     Narrative:      Among patients with dyspnea, NT-proBNP is highly sensitive for the detection of acute congestive heart failure. In addition NT-proBNP of <300 pg/ml effectively rules out acute congestive heart failure with 99% negative predictive value.    Results may be falsely decreased if patient taking Biotin.      Troponin [[id number]]  (Abnormal) Collected: 10/08/20 2138    Specimen: Blood Updated: 10/08/20 2223     Troponin T 1.930 ng/mL     Narrative:      Troponin T Reference Range:  <= 0.03 ng/mL-   Negative for AMI  >0.03 ng/mL-     Abnormal for myocardial  necrosis.  Clinicians would have to utilize clinical acumen, EKG, Troponin and serial changes to determine if it is an Acute Myocardial Infarction or myocardial injury due to an underlying chronic condition.       Results may be falsely decreased if patient taking Biotin.      Basic Metabolic Panel [[id number]]  (Abnormal) Collected: 10/08/20 2138    Specimen: Blood Updated: 10/08/20 2219     Glucose 254 mg/dL      BUN 66 mg/dL      Creatinine 13.08 mg/dL      Sodium 140 mmol/L      Potassium 3.8 mmol/L      Chloride 99 mmol/L      CO2 24.0 mmol/L      Calcium 8.2 mg/dL      eGFR  African Amer 5 mL/min/1.73      Comment: <15 Indicative of kidney failure.        eGFR Non  Amer --     Comment: <15 Indicative of kidney failure.        BUN/Creatinine Ratio 5.0     Anion Gap 17.0 mmol/L     Narrative:      GFR Normal >60  Chronic Kidney Disease <60  Kidney Failure <15      Light Blue Top [[id number]] Collected: 10/08/20 2054    Specimen: Blood Updated: 10/08/20 2200     Extra Tube hold for add-on     Comment: Auto resulted       Lavender Top [[id number]] Collected: 10/08/20 2054    Specimen: Blood Updated: 10/08/20 2200     Extra Tube hold for add-on     Comment: Auto resulted       Gold Top - SST [[id number]] Collected: 10/08/20 2054    Specimen: Blood Updated: 10/08/20 2200     Extra Tube Hold for add-ons.     Comment: Auto resulted.       D-dimer, Quantitative [[id number]]  (Abnormal) Collected: 10/08/20 2054    Specimen: Blood Updated: 10/08/20 2131     D-Dimer, Quantitative 903 ng/mL (FEU)     Narrative:      Dimer values <500 ng/ml FEU are FDA approved as aid in diagnosis of deep venous thrombosis and pulmonary embolism.  This test should not be used in an exclusion strategy with pretest probability alone.    A recent guideline regarding diagnosis for pulmonary thromboembolism recommends an adjusted exclusion criterion of age x 10 ng/ml FEU for patients >50 years of age (Shirley Intern Med 2015; 163: 701-711).       Lactic Acid, Plasma [[id number]]  (Normal) Collected: 10/08/20 2054    Specimen: Blood Updated: 10/08/20 2118     Lactate 1.1 mmol/L     CBC & Differential [[id number]]  (Abnormal) Collected: 10/08/20 2054    Specimen: Blood Updated: 10/08/20 2103    Narrative:      The following orders were created for panel order CBC & Differential.  Procedure                               Abnormality         Status                     ---------                               -----------         ------                     CBC Auto Differential[[id number]]        Abnormal            Final result                 Please view results for these tests on the individual orders.    CBC Auto Differential [[id number]]  (Abnormal) Collected: 10/08/20 2054    Specimen: Blood Updated: 10/08/20 2103     WBC 9.95 10*3/mm3      RBC 4.13 10*6/mm3      Hemoglobin 10.2 g/dL      Hematocrit 32.6 %      MCV 78.9 fL      MCH 24.7 pg      MCHC 31.3 g/dL      RDW 14.8 %      RDW-SD 41.6 fl      MPV 11.2 fL      Platelets 319 10*3/mm3      Neutrophil % 62.7 %      Lymphocyte % 22.7 %      Monocyte % 10.8 %      Eosinophil % 1.9 %      Basophil % 0.4 %      Immature Grans % 1.5 %      Neutrophils, Absolute 6.24 10*3/mm3      Lymphocytes, Absolute 2.26 10*3/mm3      Monocytes, Absolute 1.07 10*3/mm3      Eosinophils, Absolute 0.19 10*3/mm3      Basophils, Absolute 0.04 10*3/mm3      Immature Grans, Absolute 0.15 10*3/mm3      nRBC 0.2 /100 WBC         Imaging Results (Last 24 Hours)     Procedure Component Value Units Date/Time    XR Chest 2 View [[id number]] Collected: 10/08/20 2122     Updated: 10/08/20 2140    Narrative:        CHEST X-RAY 2 view on  10/8/2020     CLINICAL INDICATION: Elevated troponin, weakness, shortness of  breath    COMPARISON: 10/29/2014    FINDINGS: The patient is status post median sternotomy and  probable CABG. Cardiomegaly is noted. Mild vascular congestion is  noted. There is mild elevation of the right hemidiaphragm. The  lungs are  "otherwise clear.      Impression:      Cardiomegaly with mild vascular congestion.    Electronically signed by:  Alek Conklin  10/8/2020 9:38 PM  CDT Workstation: [id number]            Assessment:    Active Hospital Problems    Diagnosis   • Chest pain     Added automatically from request for surgery [id number]               Plan:*  Chest pain patient was sent from Dr. Garza's office with an elevated troponin.  He is been placed on a heparin drip placed in stepdown as needed nitroglycerin patient is already on Ranexa and large amounts of Imdur.  Placed on Dr. Garza's list n.p.o. after midnight    End-stage renal failure on peritoneal dialysis will inform nephrology in the a.m.    Hypertension continue antihypertensive medications    Diabetes sliding scale insulin Accu-Cheks    I discussed the patient's findings and my recommendations with: *Patient and family at the bedside    Luana Zamudio MD                  Electronically signed by Luana Zamudio MD at 10/09/20 0216       [address] [address]  Dept. Phone:  [phone]  Dept. Fax:  [phone] Date Ordered: Oct 20, 2020         Patient:  Sumanth Robledo MRN:  [id number]   [address] [place] [address] :  1960  SSN:    Phone: [phone] Sex:  M     Weight: 87.4 kg (192 lb 9.6 oz)         Ht Readings from Last 1 Encounters:   10/10/20 170.2 cm (67.01\")         Walker               (Order ID: [id number])    Diagnosis:  Impaired mobility and ADLs (Z74.09,Z78.9 [ICD-10-CM] V49.89 [ICD-9-CM])  Impaired functional mobility, balance, gait, and endurance (Z74.09 [ICD-10-CM] V49.89 [ICD-9-CM])  Cardiac arrest (CMS/HCC) (I46.9 [ICD-10-CM] 427.5 [ICD-9-CM])   Quantity:  1     Equipment:  Walker Folding with Wheels  Length of Need (99 Months = Lifetime): 99 Months = Lifetime        Authorizing Provider's Phone: [phone]   "   Authorizing Provider:Randolph Mcconnell MD  Authorizing Provider's NPI: [id number]  Order Entered By: Randolph Mcconnell MD 10/20/2020 10:07 AM     Electronically signed by: Randolph Mcconnell MD 10/20/2020 10:07 AM

## 2020-10-20 NOTE — PROGRESS NOTES
Cardiology Progress Note     LOS: 9 days   Patient Care Team:  Elizabeth Burleson APRN as PCP - General (Family Medicine)    Subjective:    Chart reviewed. Patient seen and examined. Patient denies any chest pain, shortness of breath, or palpitation.  Patient complains of having generalized weakness and fatigue.  Patient symptoms of tremors have improved.  Patient is currently on peritoneal dialysis and has been followed by nephrology.  Patient blood pressure has remained labile.  Patient has not had any further recurrent symptoms of chest pain.  Patient Ranexa has been stopped.    Objective:  Temp:  [97.1 °F (36.2 °C)-97.8 °F (36.6 °C)] 97.1 °F (36.2 °C)  Heart Rate:  [91-98] 92  Resp:  [17-18] 18  BP: (142-177)/(70-79) 149/79    Intake/Output Summary (Last 24 hours) at 10/19/2020 2341  Last data filed at 10/19/2020 1800  Gross per 24 hour   Intake 1200 ml   Output 1850 ml   Net -650 ml       Physical Exam:   General Appearance:    Alert, oriented, cooperative, in no acute distress.   Head:    Normocephalic, atraumatic, without obvious abnormality   Eyes:             ZULEMA. Lids and lashes normal, conjunctivae and sclerae normal, no icterus, no pallor.   Ears:    Ears appear intact with no abnormalities noted.   Throat:   Mucous membranes pink and moist.   Neck:  Supple, trachea midline, no carotid bruit, no organomegaly or JVD.   Lungs:    Clear to auscultation and percussion.  Respirations regular, even and unlabored. No wheezes, rales, or rhonchi.    Heart:    Regular rhythm and normal rate, normal S1 and S2, no      murmur, no gallop, no rub, no click.   Abdomen:    Soft, nontender, nondistended, no guarding, no rebound tenderness. Normal bowel sounds in all four quadrants, no masses, liver and spleen nonpalpable.    Genitalia:    Deferred.   Extremities:   Moves all extremities well, no edema, no cyanosis, no       redness, no clubbing.   Pulses:   Pulses palpable and equal bilaterally.   Skin:   Moist and  warm. No bleeding, bruising or rash.   Neurologic/Psychiatric:   Alert and oriented to person, place, and time.  Motor, power and tone in upper and lower extremities are grossly intact.  No focal neurological deficits. Normal cognitive function. No psychomotor reaction or tangential thought. No depression, homicidal ideations and suicidal ideations.          Results Review:    Results from last 7 days   Lab Units 10/18/20  0923   SODIUM mmol/L 138   POTASSIUM mmol/L 3.7   CHLORIDE mmol/L 95*   CO2 mmol/L 28.0   BUN mg/dL 49*   CREATININE mg/dL 12.23*   CALCIUM mg/dL 9.4   BILIRUBIN mg/dL 0.3   ALK PHOS U/L 66   ALT (SGPT) U/L 8   AST (SGOT) U/L 8   GLUCOSE mg/dL 117*             Results from last 7 days   Lab Units 10/19/20  0450   WBC 10*3/mm3 11.23*   HEMOGLOBIN g/dL 9.9*   HEMATOCRIT % 32.4*   PLATELETS 10*3/mm3 257         Results from last 7 days   Lab Units 10/15/20  0640   MAGNESIUM mg/dL 2.0         Results from last 7 days   Lab Units 10/15/20  0640   TSH uIU/mL 1.770           ECHO:  Results for orders placed during the hospital encounter of 10/08/20   Adult Transthoracic Echo Complete W/ Cont if Necessary Per Protocol    Narrative · Left ventricular ejection fraction appears to be 41 - 45%. Left   ventricular systolic function is mildly decreased.  · Left ventricular diastolic function is consistent with (grade II w/high   LAP) pseudonormalization.  · Left ventricular wall thickness is consistent with concentric   hypertrophy.  · Mild mitral annular calcification is present.  · Mild mitral valve regurgitation is present.  · Estimated right ventricular systolic pressure from tricuspid   regurgitation is normal (<35 mmHg).  · The left atrial cavity is moderately dilated.  · Mildly reduced right ventricular systolic function noted.          ECG 12 Lead   Final Result   Test Reason : Syncope   Blood Pressure : **/** mmHG   Vent. Rate : 074 BPM     Atrial Rate : 074 BPM      P-R Int : 178 ms          QRS Dur :  112 ms       QT Int : 448 ms       P-R-T Axes : 025 033 193 degrees      QTc Int : 497 ms      Normal sinus rhythm   Incomplete left bundle branch block   ST &  T wave abnormality, consider inferior ischemia   ST &  T wave abnormality, consider anterolateral ischemia   Prolonged QT   Abnormal ECG   When compared with ECG of 08-OCT-2020 20:44,   No significant change was found   Confirmed by EUGENIO FLETCHER ([id number]) on 10/10/2020 2:58:17 PM      Referred By:             Confirmed By:EUGENIO FLETCHER      ECG 12 Lead   ED Interpretation   Abnormal   Musa Saenz MD     10/8/2020 10:43 PM   ECG 12 Lead         Date/Time: 10/8/2020 9:04 PM   Performed by: Musa Saenz MD   Authorized by: Musa Saenz MD    Interpreted by physician   Rhythm: sinus rhythm   Rate: normal   T depression: I, II, aVF, aVL, V3, V4, V5 and V6   Clinical impression: abnormal ECG         Final Result   Test Reason : abnormal lab   Blood Pressure : **/** mmHG   Vent. Rate : 091 BPM     Atrial Rate : 091 BPM      P-R Int : 134 ms          QRS Dur : 102 ms       QT Int : 368 ms       P-R-T Axes : 042 026 197 degrees      QTc Int : 452 ms      Normal sinus rhythm   Possible Left atrial enlargement   ST &  T wave abnormality, consider inferior ischemia   ST &  T wave abnormality, consider anterolateral ischemia   Abnormal ECG   When compared with ECG of 29-OCT-2014 09:12,   No significant change was found   Confirmed by LUDMILA HAGER ([id number]) on 10/9/2020 12:16:27 PM      Referred By:  CHELSIE           Confirmed By:LUDMILA HAGER      SCANNED EKG   Final Result      SCANNED EKG   Final Result      SCANNED EKG   Final Result      SCANNED EKG   Final Result      SCANNED EKG   Final Result      SCANNED EKG   Final Result      SCANNED EKG   Final Result           Medication Review:   Current Facility-Administered Medications   Medication Dose Route Frequency Provider Last Rate Last Dose   • acetaminophen (TYLENOL) tablet 650 mg  650 mg Oral Q4H PRN Echendu,  Brendan CLAUDIO MD   650 mg at 10/19/20 0848    Or   • acetaminophen (TYLENOL) suppository 650 mg  650 mg Rectal Q4H PRN Brendan Moon MD       • acetaminophen (TYLENOL) tablet 650 mg  650 mg Oral Q4H PRN Brendan Moon MD       • aspirin chewable tablet 81 mg  81 mg Oral Daily Brendan Moon MD   81 mg at 10/19/20 0844   • bisacodyl (DULCOLAX) EC tablet 5 mg  5 mg Oral Daily PRN Brendan Moon MD       • bisacodyl (DULCOLAX) suppository 10 mg  10 mg Rectal Once Mellissa Hinson MD       • calcitriol (ROCALTROL) capsule 1 mcg  1 mcg Oral Daily Brendan Moon MD   1 mcg at 10/19/20 0843   • carvedilol (COREG) tablet 12.5 mg  12.5 mg Oral BID With Meals Mauro Saravia MD   12.5 mg at 10/19/20 1750   • clopidogrel (PLAVIX) tablet 75 mg  75 mg Oral Daily Brendan Moon MD   75 mg at 10/19/20 0844   • Delflex-LC/1.5% Dextrose (DIANEAL) CAPD 12,200 mL  12,200 mL Intraperitoneal PRN Brendan Moon MD       • Delflex-LC/2.5% Dextrose (DIANEAL) CAPD 5,000 mL  5,000 mL Intraperitoneal PRN Brendan Moon MD       • Delflex-LC/2.5% Dextrose (DIANEAL) CAPD 5,000 mL  5,000 mL Intraperitoneal PRN Brendan Moon MD   5,000 mL at 10/12/20 1556   • Delflex-LC/2.5% Dextrose (DIANEAL) CAPD 5,000 mL  5,000 mL Intraperitoneal PRN Mauro Saravia MD   5,000 mL at 10/13/20 1545   • Delflex-LC/2.5% Dextrose (DIANEAL) CAPD 5,000 mL  5,000 mL Intraperitoneal PRN Mauro Saravia MD       • Delflex-LC/4.25% Dextrose (DIANEAL) CAPD 5,000 mL  5,000 mL Intraperitoneal PRN Mauro Saravia MD       • docusate sodium (COLACE) capsule 100 mg  100 mg Oral BID Mellissa Hinson MD   100 mg at 10/19/20 2051   • epoetin arturo-epbx (RETACRIT) injection 10,000 Units  10,000 Units Subcutaneous Weekly Brendan Moon MD   10,000 Units at 10/17/20 1319   • famotidine (PEPCID) tablet 20 mg  20 mg Oral Daily Brendan Moon MD   20 mg at 10/19/20 0814   • gabapentin (NEURONTIN) capsule 200 mg  200 mg Oral Nightly  Randolph Mcconnell MD   200 mg at 10/19/20 2051   • heparin (porcine) 5000 UNIT/ML injection 5,000 Units  5,000 Units Subcutaneous Q12H Brendan Moon MD   5,000 Units at 10/19/20 2051   • hydrALAZINE (APRESOLINE) tablet 25 mg  25 mg Oral Q8H Brendan Moon MD   25 mg at 10/19/20 2051   • influenza vac split quad (FLUZONE,FLUARIX,AFLURIA,FLULAVAL) injection 0.5 mL  0.5 mL Intramuscular During Hospitalization Brendan Moon MD       • insulin detemir (LEVEMIR) injection 20 Units  20 Units Subcutaneous Nightly Brendan Moon MD   20 Units at 10/19/20 2051   • isosorbide mononitrate (IMDUR) 24 hr tablet 60 mg  60 mg Oral Q24H Brendan Moon MD   60 mg at 10/19/20 0844   • lanthanum (FOSRENOL) chewable tablet 500 mg  500 mg Oral TID With Meals Mauro Saravia MD   500 mg at 10/19/20 1159   • losartan (COZAAR) tablet 50 mg  50 mg Oral Q24H Brendan Moon MD   50 mg at 10/19/20 0844   • metoprolol tartrate (LOPRESSOR) injection 5 mg  5 mg Intravenous Q6H PRN Brendan Moon MD   5 mg at 10/16/20 1204   • nitroglycerin (NITROSTAT) SL tablet 0.4 mg  0.4 mg Sublingual Q5 Min PRN Brendan Moon MD       • ondansetron (ZOFRAN) tablet 4 mg  4 mg Oral Q6H PRN Brendan Moon MD        Or   • ondansetron (ZOFRAN) injection 4 mg  4 mg Intravenous Q6H PRN Brendan Moon MD   4 mg at 10/18/20 0838   • polyethylene glycol (MIRALAX) packet 17 g  17 g Oral Daily Mellissa Hinson MD   17 g at 10/19/20 0844   • pravastatin (PRAVACHOL) tablet 20 mg  20 mg Oral Daily Brendan Moon MD   20 mg at 10/19/20 0844   • sevelamer (RENVELA) tablet 2,400 mg  2,400 mg Oral TID With Meals Brendan Moon MD   2,400 mg at 10/19/20 1159   • sodium chloride 0.9 % flush 10 mL  10 mL Intravenous Q12H Brendan Moon MD   10 mL at 10/19/20 2051   • sodium chloride 0.9 % flush 10 mL  10 mL Intravenous PRN Brendan Moon MD   10 mL at 10/11/20 2038       Assessment and Plan:      Hypotension     CAD (coronary artery disease)    ESRF (end stage renal failure) (CMS/[facility])    Diabetes mellitus (CMS/[facility])    Cardiac arrest (CMS/[facility])    Syncope  1.  Atherosclerotic coronary artery disease.  Patient has history of documented atherosclerotic coronary artery disease with previous coronary artery bypass grafting.  Patient had 100% occlusion of the left main coronary artery and 100% occlusion of the native right coronary artery with a patent saphenous vein graft to the right coronary artery obtuse marginal branch and a patent LIMA to the LAD.  Patient had an attempted PTCA of the chronic total occlusion of the left main coronary artery for a non-Q myocardial infarction and ongoing symptoms of chest pain.  Patient PTCA was unsuccessful.  Patient has not had any further recurrent symptoms or chest pain.  Patient was started on Ranexa.  Patient had episodes of tremors and Ranexa was stopped.  Patient symptoms of tremors have improved 100%.  Patient had not complained of having symptoms of substernal chest pain.    2.  Arterial hypertension.  Patient would be continued on the present dose of the hydralazine losartan and carvedilol.  Patient had not complained of symptoms of headache.    3.  Hypertensive heart disease.  Clinically at the present time patient is euvolemic and not in congestive heart failure.    4.  End-stage renal disease on peritoneal dialysis.  Patient has been followed by nephrology.    5.  S/p cardiopulmonary arrest with pulseless electrical activity noted 8 days ago.  Patient has not had any further recurrence of any telemetry evidence of any arrhythmia.    6.  Episodes of tremors.  Patient symptoms of tremors have improved 100% after stopping the Ranexa.  Patient does complain of having generalized fatigue and weakness though is getting physical therapy.  The initial plan was to consider skilled nursing facility for rehab.  Patient and the wife had indicated consideration of discharge home and to have  home health to assist in rehab.    The above plan of management were discussed with the patient and family.            Electronically signed by Gibran Garza MD, 10/19/20, 11:41 PM CDT.      Time: Time spent on face-to-face interaction 20 minutes    Dictated utilizing Dragon dictation.

## 2020-10-20 NOTE — PROGRESS NOTES
"[facility]  [address] [place] [address]. [id number]  T - [phone]  F - [phone]     Progress Note          PATIENT  DEMOGRAPHICS   PATIENT NAME: Sumanth Robledo                      PHYSICIAN: Mauro Saravia MD  : 1960  MRN: [id number]   LOS: 10 days    Patient Care Team:  Elizabeth Burleson APRN as PCP - General (Family Medicine)  Subjective   SUBJECTIVE   No acute events overnight.  Had nearly 1.7L UF wt is coming down          Objective   OBJECTIVE   Vital Signs  Temp:  [97 °F (36.1 °C)-98.1 °F (36.7 °C)] 97 °F (36.1 °C)  Heart Rate:  [91-98] 95  Resp:  [18] 18  BP: (110-177)/(71-80) 110/80    Flowsheet Rows      First Filed Value   Admission Height  170.2 cm (67\") Documented at 10/08/2020 2035   Admission Weight  81.5 kg (179 lb 9.6 oz) Documented at 10/08/2020 2046           I/O last 3 completed shifts:  In: 1200 [P.O.:1200]  Out: 3554 [Other:3554]    PHYSICAL EXAM    Physical Exam  Vitals signs reviewed.   Constitutional:       Appearance: Normal appearance.   Cardiovascular:      Rate and Rhythm: Normal rate and regular rhythm.      Heart sounds: No murmur. No gallop.    Pulmonary:      Effort: Pulmonary effort is normal. No respiratory distress.      Breath sounds: No wheezing, rhonchi or rales.   Abdominal:      General: Bowel sounds are normal. There is no distension.      Palpations: Abdomen is soft. There is no mass.      Tenderness: There is no abdominal tenderness.   Musculoskeletal:         General: No swelling.   Skin:     General: Skin is warm and dry.   Neurological:      Mental Status: He is alert.         RESULTS   Results Review:    Results from last 7 days   Lab Units 10/18/20  0923 10/15/20  0640   SODIUM mmol/L 138 136   POTASSIUM mmol/L 3.7 3.7   CHLORIDE mmol/L 95* 94*   CO2 mmol/L 28.0 27.0   BUN mg/dL 49* 57*   CREATININE mg/dL 12.23* 12.02*   CALCIUM mg/dL 9.4 9.3   BILIRUBIN mg/dL 0.3 0.3   ALK PHOS U/L 66 64   ALT (SGPT) U/L 8 11   AST " (SGOT) U/L 8 11   GLUCOSE mg/dL 117* 76       Estimated Creatinine Clearance: 6.8 mL/min (A) (by C-G formula based on SCr of 12.23 mg/dL (H)).    Results from last 7 days   Lab Units 10/20/20  0550 10/15/20  0640 10/14/20  0512   MAGNESIUM mg/dL  --  2.0  --    PHOSPHORUS mg/dL 6.2* 9.4* 9.9*             Results from last 7 days   Lab Units 10/20/20  0550 10/19/20  0450 10/15/20  0640   WBC 10*3/mm3 10.02 11.23* 12.12*   HEMOGLOBIN g/dL 9.7* 9.9* 8.5*   PLATELETS 10*3/mm3 292 257 277               Imaging Results (Last 24 Hours)     ** No results found for the last 24 hours. **           MEDICATIONS    aspirin, 81 mg, Oral, Daily  bisacodyl, 10 mg, Rectal, Once  calcitriol, 1 mcg, Oral, Daily  carvedilol, 12.5 mg, Oral, BID With Meals  clopidogrel, 75 mg, Oral, Daily  docusate sodium, 100 mg, Oral, BID  epoetin arturo/arturo-epbx, 10,000 Units, Subcutaneous, Weekly  famotidine, 20 mg, Oral, Daily  gabapentin, 200 mg, Oral, Nightly  heparin (porcine), 5,000 Units, Subcutaneous, Q12H  hydrALAZINE, 25 mg, Oral, Q8H  insulin detemir, 20 Units, Subcutaneous, Nightly  isosorbide mononitrate, 60 mg, Oral, Q24H  lanthanum, 500 mg, Oral, TID With Meals  losartan, 50 mg, Oral, Q24H  polyethylene glycol, 17 g, Oral, Daily  pravastatin, 20 mg, Oral, Daily  sevelamer, 2,400 mg, Oral, TID With Meals  sodium chloride, 10 mL, Intravenous, Q12H      Assessment/Plan   ASSESSMENT / PLAN      Hypotension    CAD (coronary artery disease)    ESRF (end stage renal failure) (CMS/[facility])    Diabetes mellitus (CMS/[facility])    Cardiac arrest (CMS/[facility])    Syncope    1. End-stage renal disease on PD:  - Outpatient prescription: 2800 cc 5 cycles with last fill of 1000 cc.  His estimated dry weight is 84 kg.    - Weight is is improving slowly. Down to 87 kg. Lost 20 lbs so far. Pd sheet is reviewed. Can keep 2.5% bags from tonight on discharge    2. Hypertension:  - Blood pressure is stable coreg    3. Anemia chronic kidney disease:  - Hemoglobin is low. On  Epogen 45706 units weekly.      4. Secondary hyperparathyroidism:  - Patient will remain on calcitriol 1 mcg daily along with Renvela 2400 mg 3 times a day. Added Fosrenol as phosphorus remained high. Now much better    5. CAD:  - Cardiac cath 10/9/20 showed 100% occlusion left main . Had episode of PEA Saturday AM and CPR was administered x 4 minutes and patient received epinepherine x 2.      6. S/p PEA arrest     7. ? Seizures:  - Witnessed seizure x 2 post Code Blue. CT scan of head was unremarkable.      8. Metabolic acidosis:  - Bicarbonate is better. Off oral bicarbonate.    9. Tremors:  - Lowered gabapentin to HS only. Off Ranexa per Dr. Garza.            This document has been electronically signed by Mauro Saravia MD on October 20, 2020 10:06 CDT

## 2020-10-20 NOTE — THERAPY TREATMENT NOTE
Acute Care - Occupational Therapy Treatment Note  [facility]     Patient Name: Sumanth Robledo  : 1960  MRN: [id number]  Today's Date: 10/20/2020  Onset of Illness/Injury or Date of Surgery: 10/08/20  Date of Referral to OT: 10/12/20  Referring Physician: Red BARKSDALE MD    Admit Date: 10/8/2020       ICD-10-CM ICD-9-CM   1. Precordial pain  R07.2 786.51   2. Impaired mobility and ADLs  Z74.09 V49.89    Z78.9    3. Impaired functional mobility, balance, gait, and endurance  Z74.09 V49.89   4. Cardiac arrest (CMS/[facility])  I46.9 427.5     Patient Active Problem List   Diagnosis   • Chest pain   • CAD (coronary artery disease)   • ESRF (end stage renal failure) (CMS/[facility])   • Hypertension   • Diabetes mellitus (CMS/[facility])   • Precordial pain   • Cardiac arrest (CMS/[facility])   • Hypotension   • Syncope     Past Medical History:   Diagnosis Date   • Chronic kidney disease, stage 3    • Coronary arteriosclerosis     eCABGx3 10/2014 LM 95% LAD diff 70% D1 70% OM2 70% OM3 70% & % USA STEMI      • End stage renal disease (CMS/[facility])    • Essential hypertension    • Heterozygous thalassemia     Thalassemia minor      • Hyperlipidemia    • Peripheral vascular disease (CMS/[facility])     WILSON RIGHT 0.97 LEFT 0.79, stent JADYN GARRIDO 2015      • Surgical follow-up care     Emergent CABG X 3     • Type 2 diabetes mellitus (CMS/[facility])      Past Surgical History:   Procedure Laterality Date   • AMPUTATION FINGER / THUMB Right     RIGHT middle finger   • CARDIAC CATHETERIZATION  10/11/2014    Distal left main critical stenosis of up to 95-99% with evidence of filling defect suggestive of a plaque. LAD and OMB 1 and 2 stenosis.A 100% occludeed RCA.   • CARDIAC CATHETERIZATION N/A 2020    Procedure: Left Heart Cath 2020 @ 9;  Surgeon: Gibran Garza MD;  Location: [facility] CATH INVASIVE LOCATION;  Service: Cardiology;  Laterality: N/A;   • CARDIAC CATHETERIZATION N/A 10/9/2020    Procedure: Left Heart Cath;  Surgeon:  Gibran Garza MD;  Location: [facility] CATH INVASIVE LOCATION;  Service: Cardiology;  Laterality: N/A;   • CARDIAC SURGERY     • CORONARY ARTERY BYPASS GRAFT  10/11/2014    Emergent CABG X 3 LIMA->LAD SVG->OM SVG->PDA          OT ASSESSMENT FLOWSHEET (last 12 hours)      OT Evaluation and Treatment     Row Name 10/20/20 1020                   OT Time and Intention    Subjective Information  no complaints  -KD        Document Type  therapy note (daily note)  -KD        Mode of Treatment  individual therapy;occupational therapy  -KD        Patient Effort  good  -KD           General Information    Patient/Family/Caregiver Comments/Observations  No family present  -KD        Existing Precautions/Restrictions  fall  -KD           Cognition    Affect/Mental Status (Cognitive)  WFL  -KD        Orientation Status (Cognition)  oriented x 4  -KD        Follows Commands (Cognition)  WFL  -KD        Personal Safety Interventions  fall prevention program maintained  -KD           Pain Scale: Numbers Pre/Post-Treatment    Pretreatment Pain Rating  0/10 - no pain  -KD        Posttreatment Pain Rating  0/10 - no pain  -KD        Pain Intervention(s)  -- pt showed no s/s of pain  -KD           Transfers    Sit-Stand Girard (Transfers)  independent  -KD        Stand-Sit Girard (Transfers)  independent  -KD           Sit-Stand Transfer    Assistive Device (Sit-Stand Transfers)  -- no ad  -KD           Safety Issues, Functional Mobility    Comment, Safety Issues/Impairments (Mobility)  Pt educated and literature given on HS  -KD           Motor Skills    Therapeutic Exercise  other (see comments) Pt given literature on HEP and acknowledge he can complete i  -KD           Coping    Observed Emotional State  calm;cooperative;pleasant  -KD           Plan of Care Review    Plan of Care Reviewed With  patient  -KD        Progress  improving  -KD           Vital Signs    Pre Systolic BP Rehab  130  -KD        Pre Treatment  Diastolic BP  71  -KD        Pretreatment Heart Rate (beats/min)  92  -KD        Posttreatment Heart Rate (beats/min)  94  -KD        Pre SpO2 (%)  93  -KD        O2 Delivery Pre Treatment  room air  -KD        Post SpO2 (%)  94  -KD        O2 Delivery Post Treatment  room air  -KD        Pre Patient Position  Sitting  -KD        Intra Patient Position  Standing  -KD        Post Patient Position  Sitting  -KD           Positioning and Restraints    Pre-Treatment Position  sitting in chair/recliner  -KD        Post Treatment Position  chair  -KD        In Chair  sitting;call light within reach;encouraged to call for assist;exit alarm on  -KD          User Key  (r) = Recorded By, (t) = Taken By, (c) = Cosigned By    Initials Name Effective Dates    KD Brenton Nashila MARIPOSA, HU/DONI 03/07/18 -          Occupational Therapy Education                 Title: PT OT SLP Therapies (Done)     Topic: Occupational Therapy (Done)     Point: ADL training (Done)     Description:   Instruct learner(s) on proper safety adaptation and remediation techniques during self care or transfers.   Instruct in proper use of assistive devices.              Learning Progress Summary           Patient Acceptance, TB, DU by VIKTOR at 10/19/2020 2309    Acceptance, E,TB,D, NR,VU by CS at 10/17/2020 1320    Acceptance, E, VU by AS at 10/13/2020 1307    Comment: endurance actvities, LBD, PLB   Significant Other Acceptance, E, VU by AS at 10/13/2020 1307    Comment: endurance actvities, LBD, PLB                   Point: Home exercise program (Done)     Description:   Instruct learner(s) on appropriate technique for monitoring, assisting and/or progressing therapeutic exercises/activities.              Learning Progress Summary           Patient Acceptance, TB, DU by VIKTOR at 10/19/2020 2309    Acceptance, E,TB,D, NR,VU by CS at 10/17/2020 1320    Acceptance, E, VU by AS at 10/13/2020 1307    Comment: endurance actvities, LBD, PLB   Significant Other Acceptance, E,  VU by AS at 10/13/2020 1307    Comment: endurance actvities, LBD, PLB                   Point: Precautions (Done)     Description:   Instruct learner(s) on prescribed precautions during self-care and functional transfers.              Learning Progress Summary           Patient Acceptance, TB, DU by KB at 10/19/2020 2309    Acceptance, E,TB,D, NR,VU by CS at 10/17/2020 1320    Acceptance, E, VU by KO at 10/12/2020 1320    Comment: Educated pt on safety during ADLs and fall prevention                   Point: Body mechanics (Done)     Description:   Instruct learner(s) on proper positioning and spine alignment during self-care, functional mobility activities and/or exercises.              Learning Progress Summary           Patient Acceptance, TB, DU by KB at 10/19/2020 2309    Acceptance, E,TB,D, NR,VU by TEMI at 10/17/2020 1320                               User Key     Initials Effective Dates Name Provider Type Discipline     03/07/18 -  Naomy Harvey, MAYTE/DONI Occupational Therapy Assistant OT    AS 07/05/20 -  Noemi Madrid, OT Occupational Therapist OT    VIKTOR 12/31/18 -  Aretha King, RN Registered Nurse Nurse    STEPHANIE 08/09/20 -  Tiana Sifuentes, OT Occupational Therapist OT                  OT Recommendation and Plan     Plan of Care Review  Plan of Care Reviewed With: patient  Progress: improving  Outcome Summary: Sup-sit-Mod I, sit-stand-sit-SBA, amb ~ 2-3' SBA w/ no AD. Bed-chair-SBA. Pt completed BUE ther ex in all planes w/ 3lb HW and good tolerance w/ RB's PRN. Pt tolerated 5 sit<>stands w/ good tolerance.   Plan of Care Reviewed With: patient  Outcome Summary: Sup-sit-Mod I, sit-stand-sit-SBA, amb ~ 2-3' SBA w/ no AD. Bed-chair-SBA. Pt completed BUE ther ex in all planes w/ 3lb HW and good tolerance w/ RB's PRN. Pt tolerated 5 sit<>stands w/ good tolerance.     Outcome Measures     Row Name 10/20/20 1020 10/20/20 0933 10/19/20 1346       How much help from another person do you currently need...     Turning from your back to your side while in flat bed without using bedrails?  --  3  -  3  -CA    Moving from lying on back to sitting on the side of a flat bed without bedrails?  --  3  -  3  -CA    Moving to and from a bed to a chair (including a wheelchair)?  --  3  -  3  -CA    Standing up from a chair using your arms (e.g., wheelchair, bedside chair)?  --  4  -  3  -CA    Climbing 3-5 steps with a railing?  --  3  -  3  -CA    To walk in hospital room?  --  4  -  3  -CA    AM-PAC 6 Clicks Score (PT)  --  20  -JW  18  -CA       How much help from another is currently needed...    Putting on and taking off regular lower body clothing?  2  -KD  --  --    Bathing (including washing, rinsing, and drying)  3  -KD  --  --    Toileting (which includes using toilet bed pan or urinal)  3  -KD  --  --    Putting on and taking off regular upper body clothing  3  -KD  --  --    Taking care of personal grooming (such as brushing teeth)  4  -KD  --  --    Eating meals  4  -KD  --  --    AM-PAC 6 Clicks Score (OT)  19  -KD  --  --       Functional Assessment    Outcome Measure Options  --  AM-PAC 6 Clicks Basic Mobility (PT)  -  AM-PAC 6 Clicks Basic Mobility (PT)  -CA    Row Name 10/19/20 0920 10/17/20 1300          How much help from another is currently needed...    Putting on and taking off regular lower body clothing?  2  -KD  2  -CS     Bathing (including washing, rinsing, and drying)  3  -KD  3  -CS     Toileting (which includes using toilet bed pan or urinal)  3  -KD  3  -CS     Putting on and taking off regular upper body clothing  3  -KD  3  -CS     Taking care of personal grooming (such as brushing teeth)  4  -KD  3  -CS     Eating meals  4  -KD  4  -CS     AM-PAC 6 Clicks Score (OT)  19  -KD  18  -CS       User Key  (r) = Recorded By, (t) = Taken By, (c) = Cosigned By    Initials Name Provider Type    Talia Kirkpatrick, JG Physical Therapy Assistant    Keyur Mitchell, JG Physical Therapy  Assistant    Bridgette Smart COTA/L Occupational Therapy Assistant    Naomy Cortez HU/L Occupational Therapy Assistant          Time Calculation:   Time Calculation- OT     Row Name 10/20/20 1114             Time Calculation- OT    OT Start Time  1020  -KD      OT Stop Time  1043  -KD      OT Time Calculation (min)  23 min  -KD      Total Timed Code Minutes- OT  23 minute(s)  -KD      OT Received On  10/20/20  -KD        User Key  (r) = Recorded By, (t) = Taken By, (c) = Cosigned By    Initials Name Provider Type    Bridgette Smart COTA/L Occupational Therapy Assistant        Therapy Charges for Today     Code Description Service Date Service Provider Modifiers Qty    31685222677 HC OT THER PROC EA 15 MIN 10/19/2020 Bridgette Nash COTA/L GO 1    66944973358 HC OT THERAPEUTIC ACT EA 15 MIN 10/19/2020 Bridgette Nash COTA/L GO 1    06700617159 HC OT THER PROC EA 15 MIN 10/20/2020 Bridgette Nash COTA/L GO 1    56772566986 HC OT SELF CARE/MGMT/TRAIN EA 15 MIN 10/20/2020 Bridgette Nash COTA/L GO 1               MAYTE Galeas/DONI  10/20/2020

## 2020-10-21 ENCOUNTER — READMISSION MANAGEMENT (OUTPATIENT)
Dept: CALL CENTER | Facility: HOSPITAL | Age: 60
End: 2020-10-21

## 2020-10-21 NOTE — DISCHARGE SUMMARY
[facility] Medicine Services  DISCHARGE SUMMARY       Date of Admission: 10/8/2020  Date of Discharge:  10/20/2020  Primary Care Physician: Elizabeth Burleson APRN    Presenting Problem/History of Present Illness:  Precordial pain [R07.2]  Cardiac arrest (CMS/[facility]) [I46.9]       Final Discharge Diagnoses:  Active Hospital Problems    Diagnosis   • **Hypotension   • Cardiac arrest (CMS/[facility])   • Syncope   • Diabetes mellitus (CMS/[facility])   • ESRF (end stage renal failure) (CMS/[facility])   • CAD (coronary artery disease)       Consults:   Consults     Date and Time Order Name Status Description    10/9/2020 0915 Inpatient Nephrology Consult Completed     10/9/2020 0206 Inpatient Cardiology Consult Completed           Procedures Performed: Procedure(s):  Left Heart Cath                Pertinent Test Results:   Lab Results (most recent)     Procedure Component Value Units Date/Time    Manual Differential [[id number]]  (Abnormal) Collected: 10/20/20 0550    Specimen: Blood Updated: 10/20/20 0759     Neutrophil % 51.0 %      Lymphocyte % 26.0 %      Monocyte % 17.0 %      Eosinophil % 5.0 %      Atypical Lymphocyte % 1.0 %      Neutrophils Absolute 5.11 10*3/mm3      Lymphocytes Absolute 2.61 10*3/mm3      Monocytes Absolute 1.70 10*3/mm3      Eosinophils Absolute 0.50 10*3/mm3      Anisocytosis Slight/1+     Hypochromia Slight/1+     Polychromasia Slight/1+     Target Cells Slight/1+     WBC Morphology Normal     Platelet Estimate Adequate    POC Glucose Once [[id number]]  (Abnormal) Collected: 10/19/20 1952    Specimen: Blood Updated: 10/20/20 0727     Glucose 337 mg/dL      Comment: RN NotifiedOperator: [id number] NABIL DOTSONNIFERMeter ID: [id number]       Phosphorus [[id number]]  (Abnormal) Collected: 10/20/20 0550    Specimen: Blood Updated: 10/20/20 0702     Phosphorus 6.2 mg/dL     CBC & Differential [[id number]]  (Abnormal) Collected: 10/20/20 0550    Specimen: Blood Updated:  Pt stated Rx not sent to [facility]. She does not have script. Rx sent via e-prescribe per protocol.    Brandi Wei RN   10/20/20 0656    Narrative:      The following orders were created for panel order CBC & Differential.  Procedure                               Abnormality         Status                     ---------                               -----------         ------                     CBC Auto Differential[[id number]]        Abnormal            Final result                 Please view results for these tests on the individual orders.    CBC Auto Differential [[id number]]  (Abnormal) Collected: 10/20/20 0550    Specimen: Blood Updated: 10/20/20 0656     WBC 10.02 10*3/mm3      RBC 3.95 10*6/mm3      Hemoglobin 9.7 g/dL      Hematocrit 31.2 %      MCV 79.0 fL      MCH 24.6 pg      MCHC 31.1 g/dL      RDW 14.5 %      RDW-SD 40.0 fl      MPV 11.1 fL      Platelets 292 10*3/mm3      Neutrophil % 49.4 %      Lymphocyte % 23.5 %      Monocyte % 20.9 %      Eosinophil % 4.4 %      Basophil % 0.5 %      Immature Grans % 1.3 %      Neutrophils, Absolute 4.96 10*3/mm3      Lymphocytes, Absolute 2.35 10*3/mm3      Monocytes, Absolute 2.09 10*3/mm3      Eosinophils, Absolute 0.44 10*3/mm3      Basophils, Absolute 0.05 10*3/mm3      Immature Grans, Absolute 0.13 10*3/mm3      nRBC 0.2 /100 WBC     POC Glucose Once [[id number]]  (Abnormal) Collected: 10/19/20 1714    Specimen: Blood Updated: 10/19/20 1728     Glucose 284 mg/dL     Scan Slide [[id number]] Collected: 10/19/20 0450    Specimen: Blood Updated: 10/19/20 1040     Anisocytosis Slight/1+     Hypochromia Slight/1+     Polychromasia Slight/1+     WBC Morphology Normal     Platelet Estimate Adequate    CBC & Differential [[id number]]  (Abnormal) Collected: 10/19/20 0450    Specimen: Blood Updated: 10/19/20 0518    Narrative:      The following orders were created for panel order CBC & Differential.  Procedure                               Abnormality         Status                     ---------                               -----------         ------                     CBC Auto  Differential[[id number]]        Abnormal            Final result                 Please view results for these tests on the individual orders.    CBC Auto Differential [[id number]]  (Abnormal) Collected: 10/19/20 0450    Specimen: Blood Updated: 10/19/20 0518     WBC 11.23 10*3/mm3      RBC 4.03 10*6/mm3      Hemoglobin 9.9 g/dL      Hematocrit 32.4 %      MCV 80.4 fL      MCH 24.6 pg      MCHC 30.6 g/dL      RDW 15.1 %      RDW-SD 43.3 fl      MPV 11.6 fL      Platelets 257 10*3/mm3      Neutrophil % 50.4 %      Lymphocyte % 22.8 %      Monocyte % 20.1 %      Eosinophil % 3.7 %      Basophil % 0.4 %      Immature Grans % 2.6 %      Neutrophils, Absolute 5.66 10*3/mm3      Lymphocytes, Absolute 2.56 10*3/mm3      Monocytes, Absolute 2.26 10*3/mm3      Eosinophils, Absolute 0.41 10*3/mm3      Basophils, Absolute 0.05 10*3/mm3      Immature Grans, Absolute 0.29 10*3/mm3      nRBC 0.3 /100 WBC     Comprehensive Metabolic Panel [[id number]]  (Abnormal) Collected: 10/18/20 0923    Specimen: Blood Updated: 10/18/20 1022     Glucose 117 mg/dL      BUN 49 mg/dL      Creatinine 12.23 mg/dL      Sodium 138 mmol/L      Potassium 3.7 mmol/L      Chloride 95 mmol/L      CO2 28.0 mmol/L      Calcium 9.4 mg/dL      Total Protein 7.7 g/dL      Albumin 3.10 g/dL      ALT (SGPT) 8 U/L      AST (SGOT) 8 U/L      Alkaline Phosphatase 66 U/L      Total Bilirubin 0.3 mg/dL      eGFR Non  Amer --     Comment: <15 Indicative of kidney failure.        eGFR  African Amer 5 mL/min/1.73      Comment: <15 Indicative of kidney failure.        Globulin 4.6 gm/dL      A/G Ratio 0.7 g/dL      BUN/Creatinine Ratio 4.0     Anion Gap 15.0 mmol/L     Narrative:      GFR Normal >60  Chronic Kidney Disease <60  Kidney Failure <15      Extra Tubes [[id number]] Collected: 10/18/20 0518    Specimen: Blood, Venous Line Updated: 10/18/20 0645    Narrative:      The following orders were created for panel order Extra Tubes.  Procedure                                Abnormality         Status                     ---------                               -----------         ------                     Lavender Top[[id number]]                                     Final result                 Please view results for these tests on the individual orders.    Lavender Top [[id number]] Collected: 10/18/20 0518    Specimen: Blood Updated: 10/18/20 0645     Extra Tube hold for add-on     Comment: Auto resulted       TSH [[id number]]  (Normal) Collected: 10/15/20 0640    Specimen: Blood Updated: 10/15/20 0800     TSH 1.770 uIU/mL     Comprehensive Metabolic Panel [[id number]]  (Abnormal) Collected: 10/15/20 0640    Specimen: Blood Updated: 10/15/20 0757     Glucose 76 mg/dL      BUN 57 mg/dL      Creatinine 12.02 mg/dL      Sodium 136 mmol/L      Potassium 3.7 mmol/L      Chloride 94 mmol/L      CO2 27.0 mmol/L      Calcium 9.3 mg/dL      Total Protein 8.0 g/dL      Albumin 3.30 g/dL      ALT (SGPT) 11 U/L      AST (SGOT) 11 U/L      Alkaline Phosphatase 64 U/L      Total Bilirubin 0.3 mg/dL      eGFR Non  Amer --     Comment: <15 Indicative of kidney failure.        eGFR  African Amer 5 mL/min/1.73      Comment: <15 Indicative of kidney failure.        Globulin 4.7 gm/dL      A/G Ratio 0.7 g/dL      BUN/Creatinine Ratio 4.7     Anion Gap 15.0 mmol/L     Narrative:      GFR Normal >60  Chronic Kidney Disease <60  Kidney Failure <15      Magnesium [[id number]]  (Normal) Collected: 10/15/20 0640    Specimen: Blood Updated: 10/15/20 0757     Magnesium 2.0 mg/dL     Phosphorus [[id number]]  (Abnormal) Collected: 10/15/20 0640    Specimen: Blood Updated: 10/15/20 0757     Phosphorus 9.4 mg/dL     Extra Tubes [[id number]] Collected: 10/14/20 0512    Specimen: Blood, Venous Line Updated: 10/14/20 0615    Narrative:      The following orders were created for panel order Extra Tubes.  Procedure                               Abnormality         Status                     ---------                                -----------         ------                     Lavender Top[[id number]]                                     Final result                 Please view results for these tests on the individual orders.    Lavender Top [[id number]] Collected: 10/14/20 0512    Specimen: Blood Updated: 10/14/20 0615     Extra Tube hold for add-on     Comment: Auto resulted       Magnesium [[id number]]  (Normal) Collected: 10/13/20 0358    Specimen: Blood Updated: 10/13/20 1210     Magnesium 1.8 mg/dL     Basic Metabolic Panel [[id number]]  (Abnormal) Collected: 10/13/20 0358    Specimen: Blood Updated: 10/13/20 0523     Glucose 111 mg/dL      BUN 60 mg/dL      Creatinine 11.87 mg/dL      Sodium 134 mmol/L      Potassium 3.8 mmol/L      Chloride 95 mmol/L      CO2 24.0 mmol/L      Calcium 8.3 mg/dL      eGFR  African Amer 5 mL/min/1.73      Comment: <15 Indicative of kidney failure.        eGFR Non  Amer --     Comment: <15 Indicative of kidney failure.        BUN/Creatinine Ratio 5.1     Anion Gap 15.0 mmol/L     Narrative:      GFR Normal >60  Chronic Kidney Disease <60  Kidney Failure <15      BNP [[id number]]  (Abnormal) Collected: 10/11/20 0936    Specimen: Blood Updated: 10/11/20 1001     proBNP 32,967.0 pg/mL     Narrative:      Among patients with dyspnea, NT-proBNP is highly sensitive for the detection of acute congestive heart failure. In addition NT-proBNP of <300 pg/ml effectively rules out acute congestive heart failure with 99% negative predictive value.    Results may be falsely decreased if patient taking Biotin.      Troponin [[id number]]  (Abnormal) Collected: 10/10/20 1316    Specimen: Blood Updated: 10/10/20 1358     Troponin T 1.940 ng/mL     Narrative:      Troponin T Reference Range:  <= 0.03 ng/mL-   Negative for AMI  >0.03 ng/mL-     Abnormal for myocardial necrosis.  Clinicians would have to utilize clinical acumen, EKG, Troponin and serial changes to determine if it is an Acute Myocardial  Infarction or myocardial injury due to an underlying chronic condition.       Results may be falsely decreased if patient taking Biotin.      Basic Metabolic Panel [[id number]]  (Abnormal) Collected: 10/10/20 0610    Specimen: Blood Updated: 10/10/20 0706     Glucose 127 mg/dL      BUN 69 mg/dL      Creatinine 12.62 mg/dL      Sodium 137 mmol/L      Potassium 3.8 mmol/L      Chloride 96 mmol/L      CO2 23.0 mmol/L      Calcium 8.3 mg/dL      eGFR  African Amer 5 mL/min/1.73      Comment: <15 Indicative of kidney failure.        eGFR Non  Amer --     Comment: <15 Indicative of kidney failure.        BUN/Creatinine Ratio 5.5     Anion Gap 18.0 mmol/L     Narrative:      GFR Normal >60  Chronic Kidney Disease <60  Kidney Failure <15      Protime-INR [[id number]]  (Abnormal) Collected: 10/09/20 1403    Specimen: Blood Updated: 10/09/20 1440     Protime 21.9 Seconds      INR 1.80    Narrative:      Therapeutic range for most indications is 2.0-3.0 INR,  or 2.5-3.5 for mechanical heart valves.    Hepatitis B Surface Antigen [[id number]]  (Normal) Collected: 10/08/20 2054    Specimen: Blood Updated: 10/09/20 1240     Hepatitis B Surface Ag Non-Reactive    Narrative:      Results may be falsely decreased if patient taking Biotin.      Troponin [[id number]]  (Abnormal) Collected: 10/09/20 0647    Specimen: Blood Updated: 10/09/20 0714     Troponin T 2.020 ng/mL     Narrative:      Troponin T Reference Range:  <= 0.03 ng/mL-   Negative for AMI  >0.03 ng/mL-     Abnormal for myocardial necrosis.  Clinicians would have to utilize clinical acumen, EKG, Troponin and serial changes to determine if it is an Acute Myocardial Infarction or myocardial injury due to an underlying chronic condition.       Results may be falsely decreased if patient taking Biotin.      CBC (No Diff) [[id number]]  (Abnormal) Collected: 10/09/20 0647    Specimen: Blood Updated: 10/09/20 0650     WBC 8.71 10*3/mm3      RBC 3.78 10*6/mm3      Hemoglobin  9.2 g/dL      Hematocrit 29.5 %      MCV 78.0 fL      MCH 24.3 pg      MCHC 31.2 g/dL      RDW 14.4 %      RDW-SD 39.8 fl      MPV 11.3 fL      Platelets 299 10*3/mm3     COVID PRE-OP / PRE-PROCEDURE SCREENING ORDER (NO ISOLATION) - Swab, Nasopharynx [[id number]]  (Normal) Collected: 10/08/20 2321    Specimen: Swab from Nasopharynx Updated: 10/09/20 0547    Narrative:      The following orders were created for panel order COVID PRE-OP / PRE-PROCEDURE SCREENING ORDER (NO ISOLATION) - Swab, Nasopharynx.  Procedure                               Abnormality         Status                     ---------                               -----------         ------                     COVID-19, [facility] IN-HOUS...[[id number]]  Normal              Final result                 Please view results for these tests on the individual orders.    COVID-19, [facility] IN-HOUSE, NP SWAB IN TRANSPORT MEDIA 8-10 HR TAT - Swab, Nasopharynx [[id number]]  (Normal) Collected: 10/08/20 2321    Specimen: Swab from Nasopharynx Updated: 10/09/20 0547     COVID19 Not Detected    Narrative:      Testing performed by Real Time RT-PCR  This test has not been approved by the [facility] but is authorized under the Emergency Use Act (EUA)    Fact sheet for providers: https://www.fda.gov/media/513466/download    Fact sheet for patients: https://www.fda.gov/media/352583/download        Protime-INR [[id number]]  (Normal) Collected: 10/08/20 2054    Specimen: Blood Updated: 10/09/20 0058     Protime 14.1 Seconds      INR 1.05    Narrative:      Therapeutic range for most indications is 2.0-3.0 INR,  or 2.5-3.5 for mechanical heart valves.    aPTT [[id number]]  (Normal) Collected: 10/08/20 2054    Specimen: Blood Updated: 10/09/20 0058     PTT 37.2 seconds     Narrative:      The recommended Heparin therapeutic range is 68-97 seconds.    [place] Draw [[id number]] Collected: 10/08/20 2054    Specimen: Blood Updated: 10/08/20 2245    Narrative:      The following orders were  created for panel order [place] Draw.  Procedure                               Abnormality         Status                     ---------                               -----------         ------                     Light Blue Top[[id number]]                                   Final result               Green Top (Gel)[[id number]]                                  Final result               Lavender Top[[id number]]                                     Final result               Gold Top - SST[[id number]]                                   Final result                 Please view results for these tests on the individual orders.    Green Top (Gel) [[id number]] Collected: 10/08/20 2138    Specimen: Blood Updated: 10/08/20 2245     Extra Tube Hold for add-ons.     Comment: Auto resulted.       BNP [[id number]]  (Abnormal) Collected: 10/08/20 2138    Specimen: Blood Updated: 10/08/20 2228     proBNP 40,839.0 pg/mL     Narrative:      Among patients with dyspnea, NT-proBNP is highly sensitive for the detection of acute congestive heart failure. In addition NT-proBNP of <300 pg/ml effectively rules out acute congestive heart failure with 99% negative predictive value.    Results may be falsely decreased if patient taking Biotin.      Light Blue Top [[id number]] Collected: 10/08/20 2054    Specimen: Blood Updated: 10/08/20 2200     Extra Tube hold for add-on     Comment: Auto resulted       Gold Top - SST [[id number]] Collected: 10/08/20 2054    Specimen: Blood Updated: 10/08/20 2200     Extra Tube Hold for add-ons.     Comment: Auto resulted.       D-dimer, Quantitative [[id number]]  (Abnormal) Collected: 10/08/20 2054    Specimen: Blood Updated: 10/08/20 2131     D-Dimer, Quantitative 903 ng/mL (FEU)     Narrative:      Dimer values <500 ng/ml FEU are FDA approved as aid in diagnosis of deep venous thrombosis and pulmonary embolism.  This test should not be used in an exclusion strategy with pretest probability alone.    A recent  guideline regarding diagnosis for pulmonary thromboembolism recommends an adjusted exclusion criterion of age x 10 ng/ml FEU for patients >50 years of age ([employer]ley Intern Med 2015; 163: 701-711).      Lactic Acid, Plasma [[id number]]  (Normal) Collected: 10/08/20 2054    Specimen: Blood Updated: 10/08/20 2118     Lactate 1.1 mmol/L         Imaging Results (Most Recent)     Procedure Component Value Units Date/Time    XR Chest Post CVA Port [[id number]] Collected: 10/10/20 1537     Updated: 10/10/20 1600    Narrative:      PROCEDURE: XR CHEST POST CVA PORT    VIEWS:Single    INDICATION: Venous catheter placement    COMPARISON: CXR: 10/8/2020    FINDINGS:       - lines/tubes: Right-sided central venous catheter terminates  at the level of the cavoatrial junction. Median sternotomy wires  again noted.    - cardiac: Mild cardiomegaly is similar to previous study    - mediastinum: Contour unchanged.     - lungs: Mild Central vascular and interstitial prominence and  indistinctness, concerning for CHF.     - pleura: No evidence of  fluid.      - osseous: Unremarkable for age.      Impression:      New right-sided central venous catheter terminates at the  cavoatrial junction, with no other significant interval change  since the previous examination of 10/8/2020      Electronically signed by:  Jazzy Pillai MD  10/10/2020 3:59 PM  CDT Workstation: [id number]    CT Head Without Contrast [[id number]] Collected: 10/10/20 1358     Updated: 10/10/20 1447    Narrative:      PROCEDURE: CT HEAD WO CONTRAST    INDICATION:  Fall, pain    COMPARISON:  None    TECHNIQUE:  CT head, without iv contrast.       This exam was performed according to our departmental  dose-optimization program, which includes automated exposure  control, adjustment of the mA and/or kV according to patient size  and/or use of iterative reconstruction technique.  DLP is 923.9    FINDINGS:    The degree of cerebral atrophy is within normal limits for age.    There is  preservation of the gray-white matter differentiation.      No focal parenchymal lesions.    There are mild age related degenerative cortical and deep white  matter changes.    There is no evidence of a hemorrhage or intracranial mass.    There is no midline shift.    The ventricles are normal in size and configuration.    The brain stem, cerebellum, globes, paranasal sinuses, and  osseous structures are within normal limits. It should be noted  that vessels and the dural venous sinuses are hyperattenuating  compared to their usual appearance but this may be due to  hemoconcentration. No definite hemorrhage is identified, and this  hyperdense appearance is symmetric along the tentorium  bilaterally as well as the falx. Veins in the extra-axial spaces  are unusually prominent as well.        Impression:      1. Hyperdense appearance of both venous and arterial structures,  without obvious intracranial hemorrhage. This may be due to  hemoconcentration/dehydration. Clinical correlation needed. No  intracranial abnormality identified.  2. Small left periorbital scalp hematoma    If pain or symptoms persist beyond reasonable expectations, an  MRI examination is suggested as is deemed clinically appropriate.    RSC requested to arrange critical complex with a shunt is  ordering clinician at 3:25 PM EST on 10/10/2020. Findings  discussed with Dr. Lewis at 3:40 PM EST on 10/10/2020    Electronically signed by:  Jazzy Pillai MD  10/10/2020 2:46 PM  CDT Workstation: [id number]    XR Chest 2 View [[id number]] Collected: 10/08/20 2122     Updated: 10/08/20 2140    Narrative:        CHEST X-RAY 2 view on  10/8/2020     CLINICAL INDICATION: Elevated troponin, weakness, shortness of  breath    COMPARISON: 10/29/2014    FINDINGS: The patient is status post median sternotomy and  probable CABG. Cardiomegaly is noted. Mild vascular congestion is  noted. There is mild elevation of the right hemidiaphragm. The  lungs are otherwise  "clear.      Impression:      Cardiomegaly with mild vascular congestion.    Electronically signed by:  Alek Conklin  10/8/2020 9:38 PM  CDT Workstation: [id number]          Chief Complaint on Day of Discharge: Generalized weakness    Hospital Course:  The patient is a 60 y.o. male who [facility].  Patient was started on a heparin drip and plan was made for left heart catheterization due to EKG changes.  Patient then had cardiac arrest.  PEA was the rhythm.  He did require CPR and had return of spontaneous circulation.  After return of spontaneous circulation, patient appeared to have 2 seizure type events.  Patient has end-stage renal disease and was followed by nephrology throughout his hospital stay.  He was continued on peritoneal dialysis.  His neurologic status remained relatively intact.  He was continued on sliding scale for his diabetes.  Physical therapy and Occupational Therapy were consulted.  Patient slowly improved over the course of his hospital stay.  He did develop myoclonic type reaction.  This appeared to be secondary to Ranexa.  Ranexa was stopped and a mild cause improved.  Initially plan was for patient to go to skilled nursing facility upon discharge, the patient and his family decided that he would go home with 24/7 care.  As he continued to improve with physical therapy occupational therapy further plans were made for discharge.  On day of discharge he was able to ambulate with a walker with standby assist only.  He was discharged home in good condition.  He will follow-up with cardiology, primary care, and nephrology as scheduled.    Condition on Discharge: Stable    Physical Exam on Discharge:  /80 (BP Location: Right arm, Patient Position: Sitting)   Pulse 103   Temp 98.1 °F (36.7 °C) (Temporal)   Resp 18   Ht 170.2 cm (67.01\")   Wt 87.4 kg (192 lb 9.6 oz)   SpO2 93%   BMI 30.16 kg/m²   Physical Exam  Vitals signs reviewed.   "   Constitutional:       Appearance: He is well-developed.   HENT:      Head: Normocephalic and atraumatic.   Eyes:      Pupils: Pupils are equal, round, and reactive to light.   Neck:      Musculoskeletal: Normal range of motion and neck supple.   Cardiovascular:      Rate and Rhythm: Normal rate and regular rhythm.      Heart sounds: Normal heart sounds. No murmur. No friction rub. No gallop.    Pulmonary:      Effort: Pulmonary effort is normal. No respiratory distress.      Breath sounds: Normal breath sounds. No wheezing or rales.   Chest:      Chest wall: No tenderness.   Abdominal:      General: Bowel sounds are normal. There is no distension.      Palpations: Abdomen is soft.      Tenderness: There is no abdominal tenderness.   Psychiatric:         Behavior: Behavior normal.         [place] [facility] [place] Medications:     Discharge Medications      New Medications      Instructions Start Date   bisacodyl 5 MG EC tablet  Commonly known as: DULCOLAX   5 mg, Oral, Daily PRN      carvedilol 12.5 MG tablet  Commonly known as: COREG   12.5 mg, Oral, 2 Times Daily With Meals      lanthanum 500 MG chewable tablet  Commonly known as: FOSRENOL   500 mg, Oral, 3 Times Daily With Meals      polyethylene glycol 17 g packet  Commonly known as: MIRALAX   17 g, Oral, Daily         Changes to Medications      Instructions Start Date   furosemide 80 MG tablet  Commonly known as: LASIX  What changed: when to take this   80 mg, Oral, Daily         Continue These Medications      Instructions Start Date   albuterol sulfate  (90 Base) MCG/ACT inhaler  Commonly known as: PROVENTIL HFA;VENTOLIN HFA;PROAIR HFA   2 puffs, Inhalation, Every 4 Hours PRN      aspirin 81 MG tablet   81 mg, Oral, Daily      calcitriol 0.5 MCG capsule  Commonly known as: ROCALTROL   0.5 mcg, Oral, 2 Times Daily      clopidogrel 75 MG tablet  Commonly known as: PLAVIX   75 mg, Oral, Daily      gabapentin 100 MG  capsule  Commonly known as: NEURONTIN   100 mg, Oral, 3 Times Daily      gentamicin 0.1 % cream  Commonly known as: GARAMYCIN   1 application, Topical, 3 Times Daily      hydrALAZINE 50 MG tablet  Commonly known as: APRESOLINE   50 mg, Oral, 3 Times Daily      isosorbide mononitrate 30 MG 24 hr tablet  Commonly known as: IMDUR   30 mg, Oral, Daily      losartan 50 MG tablet  Commonly known as: COZAAR   50 mg, Oral, Daily      nitroglycerin 0.4 MG SL tablet  Commonly known as: NITROSTAT   1 under the tongue as needed for angina, may repeat q5mins for up three doses      pravastatin 20 MG tablet  Commonly known as: PRAVACHOL   20 mg, Oral, Daily      REPATHA SC   140 mg, Subcutaneous, Every 14 Days      Sensipar 30 MG tablet  Generic drug: cinacalcet   30 mg, Oral, Daily      sevelamer 800 MG tablet  Commonly known as: RENVELA   800 mg, Oral, 3 Times Daily With Meals, 3 tablets three times a day with meals, plus 3 tablets once daily with snack       Toujeo Max SoloStar 300 UNIT/ML solution pen-injector injection  Generic drug: Insulin Glargine (2 Unit Dial)   28 Units, Subcutaneous, Nightly      Trulicity 1.5 MG/0.5ML solution pen-injector  Generic drug: Dulaglutide   10 mg, Subcutaneous, Weekly         Stop These Medications    Metoprolol Succinate 100 MG capsule extended-release 24 hour sprinkle     NIFEdipine XL 60 MG 24 hr tablet  Commonly known as: PROCARDIA XL     ranolazine 500 MG 12 hr tablet  Commonly known as: RANEXA            Discharge Diet:   Diet Instructions     Advance Diet As Tolerated            Activity at Discharge:   Activity Instructions     Activity as Tolerated              Follow-up Appointments:   PCP 1 week  Cardiology 2 weeks  Nephrology 2 weeks  Home health will be arranged        This document has been electronically signed by Randolph Mcconnell MD on October 20, 2020 20:28 CDT      Time: 40 minutes

## 2020-10-21 NOTE — PAYOR COMM NOTE
"Ayana Christine  [facility]  P :[phone]  F: [phone]    auth#[id number]    Sumanth Gar (60 y.o. Male)     Date of Birth Social Security Number Address Home Phone MRN    1960  [address] [place] [address] [phone] [id number]    [employer] Marital Status          Caodaism        Admission Date Admission Type Admitting Provider Attending Provider Department, Room/Bed    10/8/20 Emergency Isael Miranda MD  [address], 304/1    Discharge Date Discharge Disposition Discharge Destination        10/20/2020 Home-Health Care Svc              Attending Provider: (none)   Allergies: No Known Allergies    Isolation: None   Infection: None   Code Status: Prior    Ht: 170.2 cm (67.01\")   Wt: 87.4 kg (192 lb 9.6 oz)    Admission Cmt: None   Principal Problem: Hypotension [I95.9]                 Active Insurance as of 10/8/2020     Primary Coverage     Payor Plan Insurance Group Employer/Plan Group    [facility] BLUE CROSS ANTH BLUE CROSS BLUE SHIELD PPO [id number]     Payor Plan Address Payor Plan Phone Number Payor Plan Fax Number Effective Dates    [address] [phone]  9/1/2012 - None Entered    [facility] [id number]       Subscriber Name Subscriber Birth Date Member ID       SUMANTH GAR 1960 [id number]           Secondary Coverage     Payor Plan Insurance Group Employer/Plan Group    MEDICARE MEDICARE A & B      Payor Plan Address Payor Plan Phone Number Payor Plan Fax Number Effective Dates    [address] [phone]  3/1/2019 - None Entered    [facility] [id number]       Subscriber Name Subscriber Birth Date Member ID       SUMANTH GAR 1960 [id number]                 Emergency Contacts      (Rel.) Home Phone Work Phone Mobile Phone    RINEDUARDONICOLE (Spouse) [phone] -- [phone]               Discharge Summary      Randolph Mcconnell MD at 10/20/20 [facility]              [facility] " [facility]  DISCHARGE SUMMARY       Date of Admission: 10/8/2020  Date of Discharge:  10/20/2020  Primary Care Physician: Elizabeth Burleson APRN    Presenting Problem/History of Present Illness:  Precordial pain [R07.2]  Cardiac arrest (CMS/[facility]) [I46.9]       Final Discharge Diagnoses:  Active Hospital Problems    Diagnosis   • **Hypotension   • Cardiac arrest (CMS/[facility])   • Syncope   • Diabetes mellitus (CMS/[facility])   • ESRF (end stage renal failure) (CMS/[facility])   • CAD (coronary artery disease)       Consults:   Consults     Date and Time Order Name Status Description    10/9/2020 0915 Inpatient Nephrology Consult Completed     10/9/2020 0206 Inpatient Cardiology Consult Completed           Procedures Performed: Procedure(s):  Left Heart Cath                Pertinent Test Results:   Lab Results (most recent)     Procedure Component Value Units Date/Time    Manual Differential [[id number]]  (Abnormal) Collected: 10/20/20 0550    Specimen: Blood Updated: 10/20/20 0759     Neutrophil % 51.0 %      Lymphocyte % 26.0 %      Monocyte % 17.0 %      Eosinophil % 5.0 %      Atypical Lymphocyte % 1.0 %      Neutrophils Absolute 5.11 10*3/mm3      Lymphocytes Absolute 2.61 10*3/mm3      Monocytes Absolute 1.70 10*3/mm3      Eosinophils Absolute 0.50 10*3/mm3      Anisocytosis Slight/1+     Hypochromia Slight/1+     Polychromasia Slight/1+     Target Cells Slight/1+     WBC Morphology Normal     Platelet Estimate Adequate    POC Glucose Once [[id number]]  (Abnormal) Collected: 10/19/20 1952    Specimen: Blood Updated: 10/20/20 0727     Glucose 337 mg/dL      Comment: RN NotifiedOperator: [id number] NABIL CRUZFERMeter ID: [id number]       Phosphorus [[id number]]  (Abnormal) Collected: 10/20/20 0550    Specimen: Blood Updated: 10/20/20 0702     Phosphorus 6.2 mg/dL     CBC & Differential [[id number]]  (Abnormal) Collected: 10/20/20 0550    Specimen: Blood Updated: 10/20/20 0656     Narrative:      The following orders were created for panel order CBC & Differential.  Procedure                               Abnormality         Status                     ---------                               -----------         ------                     CBC Auto Differential[[id number]]        Abnormal            Final result                 Please view results for these tests on the individual orders.    CBC Auto Differential [[id number]]  (Abnormal) Collected: 10/20/20 0550    Specimen: Blood Updated: 10/20/20 0656     WBC 10.02 10*3/mm3      RBC 3.95 10*6/mm3      Hemoglobin 9.7 g/dL      Hematocrit 31.2 %      MCV 79.0 fL      MCH 24.6 pg      MCHC 31.1 g/dL      RDW 14.5 %      RDW-SD 40.0 fl      MPV 11.1 fL      Platelets 292 10*3/mm3      Neutrophil % 49.4 %      Lymphocyte % 23.5 %      Monocyte % 20.9 %      Eosinophil % 4.4 %      Basophil % 0.5 %      Immature Grans % 1.3 %      Neutrophils, Absolute 4.96 10*3/mm3      Lymphocytes, Absolute 2.35 10*3/mm3      Monocytes, Absolute 2.09 10*3/mm3      Eosinophils, Absolute 0.44 10*3/mm3      Basophils, Absolute 0.05 10*3/mm3      Immature Grans, Absolute 0.13 10*3/mm3      nRBC 0.2 /100 WBC     POC Glucose Once [[id number]]  (Abnormal) Collected: 10/19/20 1714    Specimen: Blood Updated: 10/19/20 1728     Glucose 284 mg/dL     Scan Slide [[id number]] Collected: 10/19/20 0450    Specimen: Blood Updated: 10/19/20 1040     Anisocytosis Slight/1+     Hypochromia Slight/1+     Polychromasia Slight/1+     WBC Morphology Normal     Platelet Estimate Adequate    CBC & Differential [[id number]]  (Abnormal) Collected: 10/19/20 0450    Specimen: Blood Updated: 10/19/20 0518    Narrative:      The following orders were created for panel order CBC & Differential.  Procedure                               Abnormality         Status                     ---------                               -----------         ------                     CBC Auto Differential[[id number]]         Abnormal            Final result                 Please view results for these tests on the individual orders.    CBC Auto Differential [[id number]]  (Abnormal) Collected: 10/19/20 0450    Specimen: Blood Updated: 10/19/20 0518     WBC 11.23 10*3/mm3      RBC 4.03 10*6/mm3      Hemoglobin 9.9 g/dL      Hematocrit 32.4 %      MCV 80.4 fL      MCH 24.6 pg      MCHC 30.6 g/dL      RDW 15.1 %      RDW-SD 43.3 fl      MPV 11.6 fL      Platelets 257 10*3/mm3      Neutrophil % 50.4 %      Lymphocyte % 22.8 %      Monocyte % 20.1 %      Eosinophil % 3.7 %      Basophil % 0.4 %      Immature Grans % 2.6 %      Neutrophils, Absolute 5.66 10*3/mm3      Lymphocytes, Absolute 2.56 10*3/mm3      Monocytes, Absolute 2.26 10*3/mm3      Eosinophils, Absolute 0.41 10*3/mm3      Basophils, Absolute 0.05 10*3/mm3      Immature Grans, Absolute 0.29 10*3/mm3      nRBC 0.3 /100 WBC     Comprehensive Metabolic Panel [[id number]]  (Abnormal) Collected: 10/18/20 0923    Specimen: Blood Updated: 10/18/20 1022     Glucose 117 mg/dL      BUN 49 mg/dL      Creatinine 12.23 mg/dL      Sodium 138 mmol/L      Potassium 3.7 mmol/L      Chloride 95 mmol/L      CO2 28.0 mmol/L      Calcium 9.4 mg/dL      Total Protein 7.7 g/dL      Albumin 3.10 g/dL      ALT (SGPT) 8 U/L      AST (SGOT) 8 U/L      Alkaline Phosphatase 66 U/L      Total Bilirubin 0.3 mg/dL      eGFR Non  Amer --     Comment: <15 Indicative of kidney failure.        eGFR  African Amer 5 mL/min/1.73      Comment: <15 Indicative of kidney failure.        Globulin 4.6 gm/dL      A/G Ratio 0.7 g/dL      BUN/Creatinine Ratio 4.0     Anion Gap 15.0 mmol/L     Narrative:      GFR Normal >60  Chronic Kidney Disease <60  Kidney Failure <15      Extra Tubes [[id number]] Collected: 10/18/20 0518    Specimen: Blood, Venous Line Updated: 10/18/20 0645    Narrative:      The following orders were created for panel order Extra Tubes.  Procedure                               Abnormality          Status                     ---------                               -----------         ------                     Lavender Top[[id number]]                                     Final result                 Please view results for these tests on the individual orders.    Lavfabiola Top [[id number]] Collected: 10/18/20 0518    Specimen: Blood Updated: 10/18/20 0645     Extra Tube hold for add-on     Comment: Auto resulted       TSH [[id number]]  (Normal) Collected: 10/15/20 0640    Specimen: Blood Updated: 10/15/20 0800     TSH 1.770 uIU/mL     Comprehensive Metabolic Panel [[id number]]  (Abnormal) Collected: 10/15/20 0640    Specimen: Blood Updated: 10/15/20 0757     Glucose 76 mg/dL      BUN 57 mg/dL      Creatinine 12.02 mg/dL      Sodium 136 mmol/L      Potassium 3.7 mmol/L      Chloride 94 mmol/L      CO2 27.0 mmol/L      Calcium 9.3 mg/dL      Total Protein 8.0 g/dL      Albumin 3.30 g/dL      ALT (SGPT) 11 U/L      AST (SGOT) 11 U/L      Alkaline Phosphatase 64 U/L      Total Bilirubin 0.3 mg/dL      eGFR Non  Amer --     Comment: <15 Indicative of kidney failure.        eGFR  African Amer 5 mL/min/1.73      Comment: <15 Indicative of kidney failure.        Globulin 4.7 gm/dL      A/G Ratio 0.7 g/dL      BUN/Creatinine Ratio 4.7     Anion Gap 15.0 mmol/L     Narrative:      GFR Normal >60  Chronic Kidney Disease <60  Kidney Failure <15      Magnesium [[id number]]  (Normal) Collected: 10/15/20 0640    Specimen: Blood Updated: 10/15/20 0757     Magnesium 2.0 mg/dL     Phosphorus [[id number]]  (Abnormal) Collected: 10/15/20 0640    Specimen: Blood Updated: 10/15/20 0757     Phosphorus 9.4 mg/dL     Extra Tubes [[id number]] Collected: 10/14/20 0512    Specimen: Blood, Venous Line Updated: 10/14/20 0615    Narrative:      The following orders were created for panel order Extra Tubes.  Procedure                               Abnormality         Status                     ---------                                -----------         ------                     Lavender Top[[id number]]                                     Final result                 Please view results for these tests on the individual orders.    Lavender Top [[id number]] Collected: 10/14/20 0512    Specimen: Blood Updated: 10/14/20 0615     Extra Tube hold for add-on     Comment: Auto resulted       Magnesium [[id number]]  (Normal) Collected: 10/13/20 0358    Specimen: Blood Updated: 10/13/20 1210     Magnesium 1.8 mg/dL     Basic Metabolic Panel [[id number]]  (Abnormal) Collected: 10/13/20 0358    Specimen: Blood Updated: 10/13/20 0523     Glucose 111 mg/dL      BUN 60 mg/dL      Creatinine 11.87 mg/dL      Sodium 134 mmol/L      Potassium 3.8 mmol/L      Chloride 95 mmol/L      CO2 24.0 mmol/L      Calcium 8.3 mg/dL      eGFR  African Amer 5 mL/min/1.73      Comment: <15 Indicative of kidney failure.        eGFR Non  Amer --     Comment: <15 Indicative of kidney failure.        BUN/Creatinine Ratio 5.1     Anion Gap 15.0 mmol/L     Narrative:      GFR Normal >60  Chronic Kidney Disease <60  Kidney Failure <15      BNP [[id number]]  (Abnormal) Collected: 10/11/20 0936    Specimen: Blood Updated: 10/11/20 1001     proBNP 32,967.0 pg/mL     Narrative:      Among patients with dyspnea, NT-proBNP is highly sensitive for the detection of acute congestive heart failure. In addition NT-proBNP of <300 pg/ml effectively rules out acute congestive heart failure with 99% negative predictive value.    Results may be falsely decreased if patient taking Biotin.      Troponin [[id number]]  (Abnormal) Collected: 10/10/20 1316    Specimen: Blood Updated: 10/10/20 1358     Troponin T 1.940 ng/mL     Narrative:      Troponin T Reference Range:  <= 0.03 ng/mL-   Negative for AMI  >0.03 ng/mL-     Abnormal for myocardial necrosis.  Clinicians would have to utilize clinical acumen, EKG, Troponin and serial changes to determine if it is an Acute Myocardial Infarction or  myocardial injury due to an underlying chronic condition.       Results may be falsely decreased if patient taking Biotin.      Basic Metabolic Panel [[id number]]  (Abnormal) Collected: 10/10/20 0610    Specimen: Blood Updated: 10/10/20 0706     Glucose 127 mg/dL      BUN 69 mg/dL      Creatinine 12.62 mg/dL      Sodium 137 mmol/L      Potassium 3.8 mmol/L      Chloride 96 mmol/L      CO2 23.0 mmol/L      Calcium 8.3 mg/dL      eGFR  African Amer 5 mL/min/1.73      Comment: <15 Indicative of kidney failure.        eGFR Non  Amer --     Comment: <15 Indicative of kidney failure.        BUN/Creatinine Ratio 5.5     Anion Gap 18.0 mmol/L     Narrative:      GFR Normal >60  Chronic Kidney Disease <60  Kidney Failure <15      Protime-INR [[id number]]  (Abnormal) Collected: 10/09/20 1403    Specimen: Blood Updated: 10/09/20 1440     Protime 21.9 Seconds      INR 1.80    Narrative:      Therapeutic range for most indications is 2.0-3.0 INR,  or 2.5-3.5 for mechanical heart valves.    Hepatitis B Surface Antigen [[id number]]  (Normal) Collected: 10/08/20 2054    Specimen: Blood Updated: 10/09/20 1240     Hepatitis B Surface Ag Non-Reactive    Narrative:      Results may be falsely decreased if patient taking Biotin.      Troponin [[id number]]  (Abnormal) Collected: 10/09/20 0647    Specimen: Blood Updated: 10/09/20 0714     Troponin T 2.020 ng/mL     Narrative:      Troponin T Reference Range:  <= 0.03 ng/mL-   Negative for AMI  >0.03 ng/mL-     Abnormal for myocardial necrosis.  Clinicians would have to utilize clinical acumen, EKG, Troponin and serial changes to determine if it is an Acute Myocardial Infarction or myocardial injury due to an underlying chronic condition.       Results may be falsely decreased if patient taking Biotin.      CBC (No Diff) [[id number]]  (Abnormal) Collected: 10/09/20 0647    Specimen: Blood Updated: 10/09/20 0650     WBC 8.71 10*3/mm3      RBC 3.78 10*6/mm3      Hemoglobin 9.2 g/dL       Hematocrit 29.5 %      MCV 78.0 fL      MCH 24.3 pg      MCHC 31.2 g/dL      RDW 14.4 %      RDW-SD 39.8 fl      MPV 11.3 fL      Platelets 299 10*3/mm3     COVID PRE-OP / PRE-PROCEDURE SCREENING ORDER (NO ISOLATION) - Swab, Nasopharynx [[id number]]  (Normal) Collected: 10/08/20 2321    Specimen: Swab from Nasopharynx Updated: 10/09/20 0547    Narrative:      The following orders were created for panel order COVID PRE-OP / PRE-PROCEDURE SCREENING ORDER (NO ISOLATION) - Swab, Nasopharynx.  Procedure                               Abnormality         Status                     ---------                               -----------         ------                     COVID-19, [facility] MAD IN-HOUS...[[id number]]  Normal              Final result                 Please view results for these tests on the individual orders.    COVID-19, [facility] M[facility] IN-HOUSE, NP SWAB IN TRANSPORT MEDIA 8-10 HR TAT - Swab, Nasopharynx [[id number]]  (Normal) Collected: 10/08/20 2321    Specimen: Swab from Nasopharynx Updated: 10/09/20 0547     COVID19 Not Detected    Narrative:      Testing performed by Real Time RT-PCR  This test has not been approved by the [facility] but is authorized under the Emergency Use Act (EUA)    Fact sheet for providers: https://www.fda.gov/media/928451/download    Fact sheet for patients: https://www.fda.gov/media/470532/download        Protime-INR [[id number]]  (Normal) Collected: 10/08/20 2054    Specimen: Blood Updated: 10/09/20 0058     Protime 14.1 Seconds      INR 1.05    Narrative:      Therapeutic range for most indications is 2.0-3.0 INR,  or 2.5-3.5 for mechanical heart valves.    aPTT [[id number]]  (Normal) Collected: 10/08/20 2054    Specimen: Blood Updated: 10/09/20 0058     PTT 37.2 seconds     Narrative:      The recommended Heparin therapeutic range is 68-97 seconds.    [place] Draw [[id number]] Collected: 10/08/20 2054    Specimen: Blood Updated: 10/08/20 2245    Narrative:      The following orders were created for panel  order [place] Draw.  Procedure                               Abnormality         Status                     ---------                               -----------         ------                     Light Blue Top[[id number]]                                   Final result               Green Top (Gel)[[id number]]                                  Final result               Lavender Top[[id number]]                                     Final result               Gold Top - SST[[id number]]                                   Final result                 Please view results for these tests on the individual orders.    Green Top (Gel) [[id number]] Collected: 10/08/20 2138    Specimen: Blood Updated: 10/08/20 2245     Extra Tube Hold for add-ons.     Comment: Auto resulted.       BNP [[id number]]  (Abnormal) Collected: 10/08/20 2138    Specimen: Blood Updated: 10/08/20 2228     proBNP 40,839.0 pg/mL     Narrative:      Among patients with dyspnea, NT-proBNP is highly sensitive for the detection of acute congestive heart failure. In addition NT-proBNP of <300 pg/ml effectively rules out acute congestive heart failure with 99% negative predictive value.    Results may be falsely decreased if patient taking Biotin.      Light Blue Top [[id number]] Collected: 10/08/20 2054    Specimen: Blood Updated: 10/08/20 2200     Extra Tube hold for add-on     Comment: Auto resulted       Gold Top - SST [[id number]] Collected: 10/08/20 2054    Specimen: Blood Updated: 10/08/20 2200     Extra Tube Hold for add-ons.     Comment: Auto resulted.       D-dimer, Quantitative [[id number]]  (Abnormal) Collected: 10/08/20 2054    Specimen: Blood Updated: 10/08/20 2131     D-Dimer, Quantitative 903 ng/mL (FEU)     Narrative:      Dimer values <500 ng/ml FEU are FDA approved as aid in diagnosis of deep venous thrombosis and pulmonary embolism.  This test should not be used in an exclusion strategy with pretest probability alone.    A recent guideline regarding  diagnosis for pulmonary thromboembolism recommends an adjusted exclusion criterion of age x 10 ng/ml FEU for patients >50 years of age (Shirley Intern Med 2015; 163: 701-711).      Lactic Acid, Plasma [[id number]]  (Normal) Collected: 10/08/20 2054    Specimen: Blood Updated: 10/08/20 2118     Lactate 1.1 mmol/L         Imaging Results (Most Recent)     Procedure Component Value Units Date/Time    XR Chest Post CVA Port [[id number]] Collected: 10/10/20 1537     Updated: 10/10/20 1600    Narrative:      PROCEDURE: XR CHEST POST CVA PORT    VIEWS:Single    INDICATION: Venous catheter placement    COMPARISON: CXR: 10/8/2020    FINDINGS:       - lines/tubes: Right-sided central venous catheter terminates  at the level of the cavoatrial junction. Median sternotomy wires  again noted.    - cardiac: Mild cardiomegaly is similar to previous study    - mediastinum: Contour unchanged.     - lungs: Mild Central vascular and interstitial prominence and  indistinctness, concerning for CHF.     - pleura: No evidence of  fluid.      - osseous: Unremarkable for age.      Impression:      New right-sided central venous catheter terminates at the  cavoatrial junction, with no other significant interval change  since the previous examination of 10/8/2020      Electronically signed by:  Jazzy Pillai MD  10/10/2020 3:59 PM  CDT Workstation: [id number]    CT Head Without Contrast [[id number]] Collected: 10/10/20 1358     Updated: 10/10/20 1447    Narrative:      PROCEDURE: CT HEAD WO CONTRAST    INDICATION:  Fall, pain    COMPARISON:  None    TECHNIQUE:  CT head, without iv contrast.       This exam was performed according to our departmental  dose-optimization program, which includes automated exposure  control, adjustment of the mA and/or kV according to patient size  and/or use of iterative reconstruction technique.  DLP is 923.9    FINDINGS:    The degree of cerebral atrophy is within normal limits for age.    There is preservation of the  gray-white matter differentiation.      No focal parenchymal lesions.    There are mild age related degenerative cortical and deep white  matter changes.    There is no evidence of a hemorrhage or intracranial mass.    There is no midline shift.    The ventricles are normal in size and configuration.    The brain stem, cerebellum, globes, paranasal sinuses, and  osseous structures are within normal limits. It should be noted  that vessels and the dural venous sinuses are hyperattenuating  compared to their usual appearance but this may be due to  hemoconcentration. No definite hemorrhage is identified, and this  hyperdense appearance is symmetric along the tentorium  bilaterally as well as the falx. Veins in the extra-axial spaces  are unusually prominent as well.        Impression:      1. Hyperdense appearance of both venous and arterial structures,  without obvious intracranial hemorrhage. This may be due to  hemoconcentration/dehydration. Clinical correlation needed. No  intracranial abnormality identified.  2. Small left periorbital scalp hematoma    If pain or symptoms persist beyond reasonable expectations, an  MRI examination is suggested as is deemed clinically appropriate.    RSC requested to arrange critical complex with a shunt is  ordering clinician at 3:25 PM EST on 10/10/2020. Findings  discussed with Dr. Lewis at 3:40 PM EST on 10/10/2020    Electronically signed by:  Jazzy Pillai MD  10/10/2020 2:46 PM  CDT Workstation: [id number]    XR Chest 2 View [[id number]] Collected: 10/08/20 2122     Updated: 10/08/20 2140    Narrative:        CHEST X-RAY 2 view on  10/8/2020     CLINICAL INDICATION: Elevated troponin, weakness, shortness of  breath    COMPARISON: 10/29/2014    FINDINGS: The patient is status post median sternotomy and  probable CABG. Cardiomegaly is noted. Mild vascular congestion is  noted. There is mild elevation of the right hemidiaphragm. The  lungs are otherwise clear.       "Impression:      Cardiomegaly with mild vascular congestion.    Electronically signed by:  Alek Conklin  10/8/2020 9:38 PM  CDT Workstation: [id number]          Chief Complaint on Day of Discharge: Generalized weakness    Hospital Course:  The patient is a 60 y.o. male who [facility] to [facility] with chest pain.  Patient was started on a heparin drip and plan was made for left heart catheterization due to EKG changes.  Patient then had cardiac arrest.  PEA was the rhythm.  He did require CPR and had return of spontaneous circulation.  After return of spontaneous circulation, patient appeared to have 2 seizure type events.  Patient has end-stage renal disease and was followed by nephrology throughout his hospital stay.  He was continued on peritoneal dialysis.  His neurologic status remained relatively intact.  He was continued on sliding scale for his diabetes.  Physical therapy and Occupational Therapy were consulted.  Patient slowly improved over the course of his hospital stay.  He did develop myoclonic type reaction.  This appeared to be secondary to Ranexa.  Ranexa was stopped and a mild cause improved.  Initially plan was for patient to go to skilled nursing facility upon discharge, the patient and his family decided that he would go home with 24/7 care.  As he continued to improve with physical therapy occupational therapy further plans were made for discharge.  On day of discharge he was able to ambulate with a walker with standby assist only.  He was discharged home in good condition.  He will follow-up with cardiology, primary care, and nephrology as scheduled.    Condition on Discharge: Stable    Physical Exam on Discharge:  /80 (BP Location: Right arm, Patient Position: Sitting)   Pulse 103   Temp 98.1 °F (36.7 °C) (Temporal)   Resp 18   Ht 170.2 cm (67.01\")   Wt 87.4 kg (192 lb 9.6 oz)   SpO2 93%   BMI 30.16 kg/m²   Physical Exam  Vitals signs reviewed.   Constitutional: "       Appearance: He is well-developed.   HENT:      Head: Normocephalic and atraumatic.   Eyes:      Pupils: Pupils are equal, round, and reactive to light.   Neck:      Musculoskeletal: Normal range of motion and neck supple.   Cardiovascular:      Rate and Rhythm: Normal rate and regular rhythm.      Heart sounds: Normal heart sounds. No murmur. No friction rub. No gallop.    Pulmonary:      Effort: Pulmonary effort is normal. No respiratory distress.      Breath sounds: Normal breath sounds. No wheezing or rales.   Chest:      Chest wall: No tenderness.   Abdominal:      General: Bowel sounds are normal. There is no distension.      Palpations: Abdomen is soft.      Tenderness: There is no abdominal tenderness.   Psychiatric:         Behavior: Behavior normal.         [place] [facility] [place] Medications:     Discharge Medications      New Medications      Instructions Start Date   bisacodyl 5 MG EC tablet  Commonly known as: DULCOLAX   5 mg, Oral, Daily PRN      carvedilol 12.5 MG tablet  Commonly known as: COREG   12.5 mg, Oral, 2 Times Daily With Meals      lanthanum 500 MG chewable tablet  Commonly known as: FOSRENOL   500 mg, Oral, 3 Times Daily With Meals      polyethylene glycol 17 g packet  Commonly known as: MIRALAX   17 g, Oral, Daily         Changes to Medications      Instructions Start Date   furosemide 80 MG tablet  Commonly known as: LASIX  What changed: when to take this   80 mg, Oral, Daily         Continue These Medications      Instructions Start Date   albuterol sulfate  (90 Base) MCG/ACT inhaler  Commonly known as: PROVENTIL HFA;VENTOLIN HFA;PROAIR HFA   2 puffs, Inhalation, Every 4 Hours PRN      aspirin 81 MG tablet   81 mg, Oral, Daily      calcitriol 0.5 MCG capsule  Commonly known as: ROCALTROL   0.5 mcg, Oral, 2 Times Daily      clopidogrel 75 MG tablet  Commonly known as: PLAVIX   75 mg, Oral, Daily      gabapentin 100 MG capsule  Commonly known  as: NEURONTIN   100 mg, Oral, 3 Times Daily      gentamicin 0.1 % cream  Commonly known as: GARAMYCIN   1 application, Topical, 3 Times Daily      hydrALAZINE 50 MG tablet  Commonly known as: APRESOLINE   50 mg, Oral, 3 Times Daily      isosorbide mononitrate 30 MG 24 hr tablet  Commonly known as: IMDUR   30 mg, Oral, Daily      losartan 50 MG tablet  Commonly known as: COZAAR   50 mg, Oral, Daily      nitroglycerin 0.4 MG SL tablet  Commonly known as: NITROSTAT   1 under the tongue as needed for angina, may repeat q5mins for up three doses      pravastatin 20 MG tablet  Commonly known as: PRAVACHOL   20 mg, Oral, Daily      REPATHA SC   140 mg, Subcutaneous, Every 14 Days      Sensipar 30 MG tablet  Generic drug: cinacalcet   30 mg, Oral, Daily      sevelamer 800 MG tablet  Commonly known as: RENVELA   800 mg, Oral, 3 Times Daily With Meals, 3 tablets three times a day with meals, plus 3 tablets once daily with snack       Toujeo Max SoloStar 300 UNIT/ML solution pen-injector injection  Generic drug: Insulin Glargine (2 Unit Dial)   28 Units, Subcutaneous, Nightly      Trulicity 1.5 MG/0.5ML solution pen-injector  Generic drug: Dulaglutide   10 mg, Subcutaneous, Weekly         Stop These Medications    Metoprolol Succinate 100 MG capsule extended-release 24 hour sprinkle     NIFEdipine XL 60 MG 24 hr tablet  Commonly known as: PROCARDIA XL     ranolazine 500 MG 12 hr tablet  Commonly known as: RANEXA            Discharge Diet:   Diet Instructions     Advance Diet As Tolerated            Activity at Discharge:   Activity Instructions     Activity as Tolerated              Follow-up Appointments:   PCP 1 week  Cardiology 2 weeks  Nephrology 2 weeks  Home health will be arranged        This document has been electronically signed by Randolph Mcconnell MD on October 20, 2020 20:28 CDT      Time: 40 minutes                Electronically signed by Randolph Mcconnell MD at 10/20/20 2034       Discharge Order (From  admission, onward)     Start     Ordered    10/20/20 0959  Discharge patient  Once     Expected Discharge Date: 10/20/20    Expected Discharge Time: Morning    Discharge Disposition: Home-Health Care [facility] [place]    Physician of Record for Attribution - Please select from Treatment Team: MYLES MONTGOMERY [[id number]]    Review needed by CMO to determine Physician of Record: No       Question Answer Comment   Physician of Record for Attribution - Please select from Treatment Team MYLES MONTGOMERY    Review needed by CMO to determine Physician of Record No        10/20/20 1007

## 2020-10-21 NOTE — OUTREACH NOTE
Prep Survey      Responses   [facility] facility patient discharged from?  [place]   Is LACE score < 7 ?  No   Eligibility  Readm Mgmt   Discharge diagnosis  Hypotension   Does the patient have one of the following disease processes/diagnoses(primary or secondary)?  Other   Does the patient have Home health ordered?  Yes   What is the Home health agency?   [facility]    Is there a DME ordered?  Yes   What DME was ordered?  [facility]? per [facility]    General alerts for this patient  Heart Cath   Prep survey completed?  Yes          Kori Lindsay RN

## 2020-10-26 ENCOUNTER — READMISSION MANAGEMENT (OUTPATIENT)
Dept: CALL CENTER | Facility: HOSPITAL | Age: 60
End: 2020-10-26

## 2020-10-26 NOTE — OUTREACH NOTE
Medical Week 1 Survey      Responses   [facility] [place] patient discharged from?  [place]   Does the patient have one of the following disease processes/diagnoses(primary or secondary)?  Other   Week 1 attempt successful?  No   Unsuccessful attempts  Attempt 1          Nancy Shelton RN

## 2020-10-29 ENCOUNTER — READMISSION MANAGEMENT (OUTPATIENT)
Dept: CALL CENTER | Facility: HOSPITAL | Age: 60
End: 2020-10-29

## 2020-10-29 NOTE — OUTREACH NOTE
Medical Week 1 Survey      Responses   [facility] patient discharged from?  [place]   Does the patient have one of the following disease processes/diagnoses(primary or secondary)?  Other   Week 1 attempt successful?  No   Unsuccessful attempts  Attempt 2          Reynold Kelley RN

## 2020-10-30 ENCOUNTER — READMISSION MANAGEMENT (OUTPATIENT)
Dept: CALL CENTER | Facility: HOSPITAL | Age: 60
End: 2020-10-30

## 2020-10-30 NOTE — OUTREACH NOTE
Medical Week 1 Survey      Responses   [facility] patient discharged from?  [place]   Does the patient have one of the following disease processes/diagnoses(primary or secondary)?  Other   Week 1 attempt successful?  Yes   Call start time  1454   Call end time  1457   General alerts for this patient  Heart Cath   Discharge diagnosis  Hypotension   Meds reviewed with patient/caregiver?  Yes   Is the patient having any side effects they believe may be caused by any medication additions or changes?  No   Does the patient have all medications ordered at discharge?  Yes   Is the patient taking all medications as directed (includes completed medication regime)?  Yes   Does the patient have a primary care provider?   Yes   Does the patient have an appointment with their PCP within 7 days of discharge?  Yes   Comments regarding PCP  Patient saw PCP on tuesday   Has the patient kept scheduled appointments due by today?  Yes   What is the Home health agency?   [facility]    Psychosocial issues?  No   Comments  Patient is checking BP daily and keeping a log.  He states today 118/73    Did the patient receive a copy of their discharge instructions?  Yes   Nursing interventions  Reviewed instructions with patient   What is the patient's perception of their health status since discharge?  Improving   Is the patient/caregiver able to teach back signs and symptoms related to disease process for when to call PCP?  Yes   Is the patient/caregiver able to teach back signs and symptoms related to disease process for when to call 911?  Yes   Is the patient/caregiver able to teach back the hierarchy of who to call/visit for symptoms/problems? PCP, Specialist, Home health nurse, Urgent Care, ED, 911  Yes   Week 1 call completed?  Yes   Wrap up additional comments  Patient states he is doing well this week.  he denies any needs during this call.          Sheryl Resendiz RN

## 2020-11-06 ENCOUNTER — READMISSION MANAGEMENT (OUTPATIENT)
Dept: CALL CENTER | Facility: HOSPITAL | Age: 60
End: 2020-11-06

## 2020-11-06 NOTE — OUTREACH NOTE
Medical Week 2 Survey      Responses   [facility] patient discharged from?  [place]   Does the patient have one of the following disease processes/diagnoses(primary or secondary)?  Other   Week 2 attempt successful?  No   Unsuccessful attempts  Attempt 1          Sherry Posadas RN

## 2020-11-10 ENCOUNTER — READMISSION MANAGEMENT (OUTPATIENT)
Dept: CALL CENTER | Facility: HOSPITAL | Age: 60
End: 2020-11-10

## 2020-11-10 NOTE — OUTREACH NOTE
Medical Week 2 Survey      Responses   [facility] patient discharged from?  [place]   Does the patient have one of the following disease processes/diagnoses(primary or secondary)?  Other   Week 2 attempt successful?  No   Unsuccessful attempts  Attempt 2          Jagruti Smith RN

## 2020-11-18 ENCOUNTER — READMISSION MANAGEMENT (OUTPATIENT)
Dept: CALL CENTER | Facility: HOSPITAL | Age: 60
End: 2020-11-18

## 2020-11-19 NOTE — OUTREACH NOTE
Medical Week 3 Survey      Responses   [facility] patient discharged from?  [place]   Does the patient have one of the following disease processes/diagnoses(primary or secondary)?  Other   Week 3 attempt successful?  No   Unsuccessful attempts  Attempt 1          Nay Stout LPN

## 2021-06-10 ENCOUNTER — HOSPITAL ENCOUNTER (INPATIENT)
Facility: HOSPITAL | Age: 61
LOS: 4 days | Discharge: HOME | End: 2021-06-17
Attending: FAMILY MEDICINE | Admitting: INTERNAL MEDICINE

## 2021-06-10 ENCOUNTER — APPOINTMENT (OUTPATIENT)
Dept: GENERAL RADIOLOGY | Facility: HOSPITAL | Age: 61
End: 2021-06-10

## 2021-06-10 DIAGNOSIS — M79.2 NEUROPATHIC PAIN: ICD-10-CM

## 2021-06-10 DIAGNOSIS — I50.9 ACUTE ON CHRONIC CONGESTIVE HEART FAILURE, UNSPECIFIED HEART FAILURE TYPE: Primary | ICD-10-CM

## 2021-06-10 DIAGNOSIS — J96.01 ACUTE RESPIRATORY FAILURE WITH HYPOXIA: ICD-10-CM

## 2021-06-10 LAB
ALBUMIN SERPL-MCNC: 3 G/DL (ref 3.5–5.2)
ALBUMIN/GLOB SERPL: 0.7 G/DL
ALP SERPL-CCNC: 419 U/L (ref 39–117)
ALT SERPL W P-5'-P-CCNC: 15 U/L (ref 1–41)
ANION GAP SERPL CALCULATED.3IONS-SCNC: 18 MMOL/L (ref 5–15)
AST SERPL-CCNC: 20 U/L (ref 1–40)
BASOPHILS # BLD AUTO: 0.04 10*3/MM3 (ref 0–0.2)
BASOPHILS NFR BLD AUTO: 0.4 % (ref 0–1.5)
BILIRUB SERPL-MCNC: 0.6 MG/DL (ref 0–1.2)
BUN SERPL-MCNC: 67 MG/DL (ref 8–23)
BUN/CREAT SERPL: 5.3 (ref 7–25)
CALCIUM SPEC-SCNC: 8.2 MG/DL (ref 8.6–10.5)
CHLORIDE SERPL-SCNC: 92 MMOL/L (ref 98–107)
CO2 SERPL-SCNC: 23 MMOL/L (ref 22–29)
CRE SCREEN PCR: NOT DETECTED
CREAT SERPL-MCNC: 12.54 MG/DL (ref 0.76–1.27)
DEPRECATED RDW RBC AUTO: 43.8 FL (ref 37–54)
EOSINOPHIL # BLD AUTO: 0.26 10*3/MM3 (ref 0–0.4)
EOSINOPHIL NFR BLD AUTO: 2.6 % (ref 0.3–6.2)
ERYTHROCYTE [DISTWIDTH] IN BLOOD BY AUTOMATED COUNT: 18.5 % (ref 12.3–15.4)
FLUAV RNA RESP QL NAA+PROBE: NOT DETECTED
FLUBV RNA RESP QL NAA+PROBE: NOT DETECTED
GFR SERPL CREATININE-BSD FRML MDRD: 5 ML/MIN/1.73
GFR SERPL CREATININE-BSD FRML MDRD: ABNORMAL ML/MIN/{1.73_M2}
GLOBULIN UR ELPH-MCNC: 4.5 GM/DL
GLUCOSE BLDC GLUCOMTR-MCNC: 104 MG/DL (ref 70–130)
GLUCOSE BLDC GLUCOMTR-MCNC: 227 MG/DL (ref 70–130)
GLUCOSE BLDC GLUCOMTR-MCNC: 95 MG/DL (ref 70–130)
GLUCOSE SERPL-MCNC: 104 MG/DL (ref 65–99)
HBV SURFACE AG SERPL QL IA: NORMAL
HCT VFR BLD AUTO: 31.7 % (ref 37.5–51)
HGB BLD-MCNC: 10.1 G/DL (ref 13–17.7)
HOLD SPECIMEN: NORMAL
HOLD SPECIMEN: NORMAL
IMM GRANULOCYTES # BLD AUTO: 0.07 10*3/MM3 (ref 0–0.05)
IMM GRANULOCYTES NFR BLD AUTO: 0.7 % (ref 0–0.5)
IMP STRAIN: NOT DETECTED
KPC STRAIN: NOT DETECTED
LYMPHOCYTES # BLD AUTO: 1.73 10*3/MM3 (ref 0.7–3.1)
LYMPHOCYTES NFR BLD AUTO: 17 % (ref 19.6–45.3)
MCH RBC QN AUTO: 24.3 PG (ref 26.6–33)
MCHC RBC AUTO-ENTMCNC: 31.9 G/DL (ref 31.5–35.7)
MCV RBC AUTO: 76.4 FL (ref 79–97)
MONOCYTES # BLD AUTO: 0.99 10*3/MM3 (ref 0.1–0.9)
MONOCYTES NFR BLD AUTO: 9.8 % (ref 5–12)
NDM STRAIN: NOT DETECTED
NEUTROPHILS NFR BLD AUTO: 69.5 % (ref 42.7–76)
NEUTROPHILS NFR BLD AUTO: 7.06 10*3/MM3 (ref 1.7–7)
NRBC BLD AUTO-RTO: 0.5 /100 WBC (ref 0–0.2)
NT-PROBNP SERPL-MCNC: ABNORMAL PG/ML (ref 0–900)
OXA 48 STRAIN: NOT DETECTED
PLATELET # BLD AUTO: 263 10*3/MM3 (ref 140–450)
PMV BLD AUTO: 10 FL (ref 6–12)
POTASSIUM SERPL-SCNC: 4.4 MMOL/L (ref 3.5–5.2)
PROT SERPL-MCNC: 7.5 G/DL (ref 6–8.5)
RBC # BLD AUTO: 4.15 10*6/MM3 (ref 4.14–5.8)
SARS-COV-2 RNA RESP QL NAA+PROBE: NOT DETECTED
SODIUM SERPL-SCNC: 133 MMOL/L (ref 136–145)
TROPONIN T SERPL-MCNC: 0.35 NG/ML (ref 0–0.03)
TROPONIN T SERPL-MCNC: 0.37 NG/ML (ref 0–0.03)
VIM STRAIN: NOT DETECTED
WBC # BLD AUTO: 10.15 10*3/MM3 (ref 3.4–10.8)
WHOLE BLOOD HOLD SPECIMEN: NORMAL

## 2021-06-10 PROCEDURE — G0378 HOSPITAL OBSERVATION PER HR: HCPCS

## 2021-06-10 PROCEDURE — 96374 THER/PROPH/DIAG INJ IV PUSH: CPT

## 2021-06-10 PROCEDURE — 93005 ELECTROCARDIOGRAM TRACING: CPT | Performed by: FAMILY MEDICINE

## 2021-06-10 PROCEDURE — 87636 SARSCOV2 & INF A&B AMP PRB: CPT | Performed by: FAMILY MEDICINE

## 2021-06-10 PROCEDURE — 83880 ASSAY OF NATRIURETIC PEPTIDE: CPT | Performed by: FAMILY MEDICINE

## 2021-06-10 PROCEDURE — 90945 DIALYSIS ONE EVALUATION: CPT

## 2021-06-10 PROCEDURE — 84484 ASSAY OF TROPONIN QUANT: CPT | Performed by: INTERNAL MEDICINE

## 2021-06-10 PROCEDURE — 99285 EMERGENCY DEPT VISIT HI MDM: CPT

## 2021-06-10 PROCEDURE — C9803 HOPD COVID-19 SPEC COLLECT: HCPCS

## 2021-06-10 PROCEDURE — 25010000002 HYDRALAZINE PER 20 MG: Performed by: FAMILY MEDICINE

## 2021-06-10 PROCEDURE — 96375 TX/PRO/DX INJ NEW DRUG ADDON: CPT

## 2021-06-10 PROCEDURE — 71045 X-RAY EXAM CHEST 1 VIEW: CPT

## 2021-06-10 PROCEDURE — 63710000001 INSULIN DETEMIR PER 5 UNITS: Performed by: INTERNAL MEDICINE

## 2021-06-10 PROCEDURE — 84484 ASSAY OF TROPONIN QUANT: CPT | Performed by: FAMILY MEDICINE

## 2021-06-10 PROCEDURE — 25010000002 HEPARIN (PORCINE) PER 1000 UNITS: Performed by: INTERNAL MEDICINE

## 2021-06-10 PROCEDURE — 85025 COMPLETE CBC W/AUTO DIFF WBC: CPT | Performed by: FAMILY MEDICINE

## 2021-06-10 PROCEDURE — 93010 ELECTROCARDIOGRAM REPORT: CPT | Performed by: INTERNAL MEDICINE

## 2021-06-10 PROCEDURE — 82962 GLUCOSE BLOOD TEST: CPT

## 2021-06-10 PROCEDURE — 25010000002 MORPHINE PER 10 MG: Performed by: FAMILY MEDICINE

## 2021-06-10 PROCEDURE — 80053 COMPREHEN METABOLIC PANEL: CPT | Performed by: FAMILY MEDICINE

## 2021-06-10 PROCEDURE — 87081 CULTURE SCREEN ONLY: CPT | Performed by: INTERNAL MEDICINE

## 2021-06-10 PROCEDURE — 84134 ASSAY OF PREALBUMIN: CPT | Performed by: INTERNAL MEDICINE

## 2021-06-10 PROCEDURE — 87340 HEPATITIS B SURFACE AG IA: CPT | Performed by: INTERNAL MEDICINE

## 2021-06-10 RX ORDER — CARVEDILOL 12.5 MG/1
12.5 TABLET ORAL 2 TIMES DAILY WITH MEALS
Status: DISCONTINUED | OUTPATIENT
Start: 2021-06-10 | End: 2021-06-17 | Stop reason: HOSPADM

## 2021-06-10 RX ORDER — BISACODYL 5 MG/1
5 TABLET, DELAYED RELEASE ORAL DAILY PRN
Status: DISCONTINUED | OUTPATIENT
Start: 2021-06-10 | End: 2021-06-17 | Stop reason: HOSPADM

## 2021-06-10 RX ORDER — CALCITRIOL 0.25 UG/1
0.5 CAPSULE, LIQUID FILLED ORAL 2 TIMES DAILY
Status: DISCONTINUED | OUTPATIENT
Start: 2021-06-10 | End: 2021-06-17 | Stop reason: HOSPADM

## 2021-06-10 RX ORDER — FUROSEMIDE 40 MG/1
80 TABLET ORAL DAILY
Status: DISCONTINUED | OUTPATIENT
Start: 2021-06-10 | End: 2021-06-15

## 2021-06-10 RX ORDER — CLOPIDOGREL BISULFATE 75 MG/1
75 TABLET ORAL DAILY
Status: DISCONTINUED | OUTPATIENT
Start: 2021-06-10 | End: 2021-06-17 | Stop reason: HOSPADM

## 2021-06-10 RX ORDER — SODIUM CHLORIDE 0.9 % (FLUSH) 0.9 %
10 SYRINGE (ML) INJECTION AS NEEDED
Status: DISCONTINUED | OUTPATIENT
Start: 2021-06-10 | End: 2021-06-17 | Stop reason: HOSPADM

## 2021-06-10 RX ORDER — HEPARIN SODIUM 5000 [USP'U]/ML
5000 INJECTION, SOLUTION INTRAVENOUS; SUBCUTANEOUS EVERY 8 HOURS SCHEDULED
Status: DISCONTINUED | OUTPATIENT
Start: 2021-06-10 | End: 2021-06-17 | Stop reason: HOSPADM

## 2021-06-10 RX ORDER — ALBUTEROL SULFATE 2.5 MG/3ML
2.5 SOLUTION RESPIRATORY (INHALATION) EVERY 4 HOURS PRN
Status: DISCONTINUED | OUTPATIENT
Start: 2021-06-10 | End: 2021-06-17 | Stop reason: HOSPADM

## 2021-06-10 RX ORDER — POLYETHYLENE GLYCOL 3350 17 G/17G
17 POWDER, FOR SOLUTION ORAL DAILY
Status: DISCONTINUED | OUTPATIENT
Start: 2021-06-10 | End: 2021-06-17 | Stop reason: HOSPADM

## 2021-06-10 RX ORDER — NITROGLYCERIN 0.4 MG/1
0.4 TABLET SUBLINGUAL
Status: DISCONTINUED | OUTPATIENT
Start: 2021-06-10 | End: 2021-06-17 | Stop reason: HOSPADM

## 2021-06-10 RX ORDER — LOSARTAN POTASSIUM 50 MG/1
50 TABLET ORAL DAILY
Status: DISCONTINUED | OUTPATIENT
Start: 2021-06-10 | End: 2021-06-17 | Stop reason: HOSPADM

## 2021-06-10 RX ORDER — DEXTROSE MONOHYDRATE 25 G/50ML
25 INJECTION, SOLUTION INTRAVENOUS
Status: DISCONTINUED | OUTPATIENT
Start: 2021-06-10 | End: 2021-06-17 | Stop reason: HOSPADM

## 2021-06-10 RX ORDER — DEXTROSE MONOHYDRATE, SODIUM CHLORIDE, SODIUM LACTATE, CALCIUM CHLORIDE, MAGNESIUM CHLORIDE 2.5; 538; 448; 18.4; 5.08 G/100ML; MG/100ML; MG/100ML; MG/100ML; MG/100ML
5000 SOLUTION INTRAPERITONEAL AS NEEDED
Status: DISCONTINUED | OUTPATIENT
Start: 2021-06-10 | End: 2021-06-17 | Stop reason: HOSPADM

## 2021-06-10 RX ORDER — NICOTINE POLACRILEX 4 MG
15 LOZENGE BUCCAL
Status: DISCONTINUED | OUTPATIENT
Start: 2021-06-10 | End: 2021-06-17 | Stop reason: HOSPADM

## 2021-06-10 RX ORDER — PRAVASTATIN SODIUM 20 MG
20 TABLET ORAL DAILY
Status: DISCONTINUED | OUTPATIENT
Start: 2021-06-10 | End: 2021-06-17 | Stop reason: HOSPADM

## 2021-06-10 RX ORDER — ISOSORBIDE MONONITRATE 30 MG/1
30 TABLET, EXTENDED RELEASE ORAL DAILY
Status: DISCONTINUED | OUTPATIENT
Start: 2021-06-10 | End: 2021-06-17 | Stop reason: HOSPADM

## 2021-06-10 RX ORDER — FUROSEMIDE 10 MG/ML
80 INJECTION INTRAMUSCULAR; INTRAVENOUS ONCE
Status: DISCONTINUED | OUTPATIENT
Start: 2021-06-10 | End: 2021-06-10

## 2021-06-10 RX ORDER — HYDRALAZINE HYDROCHLORIDE 20 MG/ML
10 INJECTION INTRAMUSCULAR; INTRAVENOUS ONCE
Status: COMPLETED | OUTPATIENT
Start: 2021-06-10 | End: 2021-06-10

## 2021-06-10 RX ORDER — SODIUM CHLORIDE 0.9 % (FLUSH) 0.9 %
10 SYRINGE (ML) INJECTION EVERY 12 HOURS SCHEDULED
Status: DISCONTINUED | OUTPATIENT
Start: 2021-06-10 | End: 2021-06-17 | Stop reason: HOSPADM

## 2021-06-10 RX ORDER — LANTHANUM CARBONATE 500 MG/1
500 TABLET, CHEWABLE ORAL
Status: DISCONTINUED | OUTPATIENT
Start: 2021-06-11 | End: 2021-06-11

## 2021-06-10 RX ORDER — HYDRALAZINE HYDROCHLORIDE 50 MG/1
50 TABLET, FILM COATED ORAL 3 TIMES DAILY
Status: DISCONTINUED | OUTPATIENT
Start: 2021-06-10 | End: 2021-06-17 | Stop reason: HOSPADM

## 2021-06-10 RX ORDER — GABAPENTIN 100 MG/1
100 CAPSULE ORAL EVERY 8 HOURS SCHEDULED
Status: DISCONTINUED | OUTPATIENT
Start: 2021-06-10 | End: 2021-06-15

## 2021-06-10 RX ORDER — DEXTROSE MONOHYDRATE, SODIUM CHLORIDE, SODIUM LACTATE, CALCIUM CHLORIDE, MAGNESIUM CHLORIDE 4.25; 538; 448; 18.4; 5.08 G/100ML; MG/100ML; MG/100ML; MG/100ML; MG/100ML
5000 SOLUTION INTRAPERITONEAL AS NEEDED
Status: COMPLETED | OUTPATIENT
Start: 2021-06-10 | End: 2021-06-15

## 2021-06-10 RX ORDER — ONDANSETRON 2 MG/ML
4 INJECTION INTRAMUSCULAR; INTRAVENOUS EVERY 6 HOURS PRN
Status: DISCONTINUED | OUTPATIENT
Start: 2021-06-10 | End: 2021-06-17 | Stop reason: HOSPADM

## 2021-06-10 RX ORDER — ASPIRIN 81 MG/1
81 TABLET ORAL DAILY
Status: DISCONTINUED | OUTPATIENT
Start: 2021-06-10 | End: 2021-06-17 | Stop reason: HOSPADM

## 2021-06-10 RX ADMIN — MORPHINE SULFATE 4 MG: 4 INJECTION, SOLUTION INTRAMUSCULAR; INTRAVENOUS at 14:54

## 2021-06-10 RX ADMIN — HEPARIN SODIUM 5000 UNITS: 5000 INJECTION INTRAVENOUS; SUBCUTANEOUS at 21:40

## 2021-06-10 RX ADMIN — DEXTROSE MONOHYDRATE, SODIUM CHLORIDE, SODIUM LACTATE, CALCIUM CHLORIDE, MAGNESIUM CHLORIDE 5000 ML: 4.25; 538; 448; 18.4; 5.08 SOLUTION INTRAPERITONEAL at 20:39

## 2021-06-10 RX ADMIN — HYDRALAZINE HYDROCHLORIDE 50 MG: 50 TABLET ORAL at 21:42

## 2021-06-10 RX ADMIN — INSULIN DETEMIR 28 UNITS: 100 INJECTION, SOLUTION SUBCUTANEOUS at 21:43

## 2021-06-10 RX ADMIN — CALCITRIOL 0.5 MCG: 0.25 CAPSULE ORAL at 21:39

## 2021-06-10 RX ADMIN — GABAPENTIN 100 MG: 100 CAPSULE ORAL at 21:39

## 2021-06-10 RX ADMIN — CARVEDILOL 12.5 MG: 12.5 TABLET, FILM COATED ORAL at 21:42

## 2021-06-10 RX ADMIN — DEXTROSE MONOHYDRATE, SODIUM CHLORIDE, SODIUM LACTATE, CALCIUM CHLORIDE, MAGNESIUM CHLORIDE 5000 ML: 2.5; 538; 448; 18.4; 5.08 SOLUTION INTRAPERITONEAL at 20:38

## 2021-06-10 RX ADMIN — HYDRALAZINE HYDROCHLORIDE 10 MG: 20 INJECTION INTRAMUSCULAR; INTRAVENOUS at 14:55

## 2021-06-10 RX ADMIN — SODIUM CHLORIDE, PRESERVATIVE FREE 10 ML: 5 INJECTION INTRAVENOUS at 21:40

## 2021-06-11 LAB
ALBUMIN SERPL-MCNC: 2.9 G/DL (ref 3.5–5.2)
ALBUMIN/GLOB SERPL: 0.5 G/DL
ALP SERPL-CCNC: 400 U/L (ref 39–117)
ALT SERPL W P-5'-P-CCNC: 13 U/L (ref 1–41)
ANION GAP SERPL CALCULATED.3IONS-SCNC: 18 MMOL/L (ref 5–15)
AST SERPL-CCNC: 17 U/L (ref 1–40)
BASOPHILS # BLD AUTO: 0.05 10*3/MM3 (ref 0–0.2)
BASOPHILS NFR BLD AUTO: 0.5 % (ref 0–1.5)
BILIRUB SERPL-MCNC: 0.4 MG/DL (ref 0–1.2)
BUN SERPL-MCNC: 64 MG/DL (ref 8–23)
BUN/CREAT SERPL: 5.1 (ref 7–25)
CALCIUM SPEC-SCNC: 8.2 MG/DL (ref 8.6–10.5)
CHLORIDE SERPL-SCNC: 98 MMOL/L (ref 98–107)
CO2 SERPL-SCNC: 23 MMOL/L (ref 22–29)
CREAT SERPL-MCNC: 12.6 MG/DL (ref 0.76–1.27)
DEPRECATED RDW RBC AUTO: 44 FL (ref 37–54)
EOSINOPHIL # BLD AUTO: 0.42 10*3/MM3 (ref 0–0.4)
EOSINOPHIL NFR BLD AUTO: 4.1 % (ref 0.3–6.2)
ERYTHROCYTE [DISTWIDTH] IN BLOOD BY AUTOMATED COUNT: 18.6 % (ref 12.3–15.4)
GFR SERPL CREATININE-BSD FRML MDRD: 5 ML/MIN/1.73
GFR SERPL CREATININE-BSD FRML MDRD: ABNORMAL ML/MIN/{1.73_M2}
GLOBULIN UR ELPH-MCNC: 5.5 GM/DL
GLUCOSE BLDC GLUCOMTR-MCNC: 107 MG/DL (ref 70–130)
GLUCOSE BLDC GLUCOMTR-MCNC: 119 MG/DL (ref 70–130)
GLUCOSE BLDC GLUCOMTR-MCNC: 156 MG/DL (ref 70–130)
GLUCOSE BLDC GLUCOMTR-MCNC: 252 MG/DL (ref 70–130)
GLUCOSE SERPL-MCNC: 153 MG/DL (ref 65–99)
HCT VFR BLD AUTO: 33.4 % (ref 37.5–51)
HGB BLD-MCNC: 10.7 G/DL (ref 13–17.7)
IMM GRANULOCYTES # BLD AUTO: 0.07 10*3/MM3 (ref 0–0.05)
IMM GRANULOCYTES NFR BLD AUTO: 0.7 % (ref 0–0.5)
LYMPHOCYTES # BLD AUTO: 2.01 10*3/MM3 (ref 0.7–3.1)
LYMPHOCYTES NFR BLD AUTO: 19.5 % (ref 19.6–45.3)
MCH RBC QN AUTO: 24.5 PG (ref 26.6–33)
MCHC RBC AUTO-ENTMCNC: 32 G/DL (ref 31.5–35.7)
MCV RBC AUTO: 76.6 FL (ref 79–97)
MONOCYTES # BLD AUTO: 0.99 10*3/MM3 (ref 0.1–0.9)
MONOCYTES NFR BLD AUTO: 9.6 % (ref 5–12)
NEUTROPHILS NFR BLD AUTO: 6.76 10*3/MM3 (ref 1.7–7)
NEUTROPHILS NFR BLD AUTO: 65.6 % (ref 42.7–76)
NRBC BLD AUTO-RTO: 0.6 /100 WBC (ref 0–0.2)
PHOSPHATE SERPL-MCNC: 8.9 MG/DL (ref 2.5–4.5)
PLATELET # BLD AUTO: 267 10*3/MM3 (ref 140–450)
PMV BLD AUTO: 9.6 FL (ref 6–12)
POTASSIUM SERPL-SCNC: 4.1 MMOL/L (ref 3.5–5.2)
PREALB SERPL-MCNC: 16.9 MG/DL (ref 20–40)
PROT SERPL-MCNC: 8.4 G/DL (ref 6–8.5)
RBC # BLD AUTO: 4.36 10*6/MM3 (ref 4.14–5.8)
SODIUM SERPL-SCNC: 139 MMOL/L (ref 136–145)
WBC # BLD AUTO: 10.3 10*3/MM3 (ref 3.4–10.8)
WHOLE BLOOD HOLD SPECIMEN: NORMAL

## 2021-06-11 PROCEDURE — 25010000002 HEPARIN (PORCINE) PER 1000 UNITS: Performed by: INTERNAL MEDICINE

## 2021-06-11 PROCEDURE — 63710000001 INSULIN DETEMIR PER 5 UNITS: Performed by: INTERNAL MEDICINE

## 2021-06-11 PROCEDURE — 85025 COMPLETE CBC W/AUTO DIFF WBC: CPT | Performed by: INTERNAL MEDICINE

## 2021-06-11 PROCEDURE — 94799 UNLISTED PULMONARY SVC/PX: CPT

## 2021-06-11 PROCEDURE — 3E1M39Z IRRIGATION OF PERITONEAL CAVITY USING DIALYSATE, PERCUTANEOUS APPROACH: ICD-10-PCS | Performed by: INTERNAL MEDICINE

## 2021-06-11 PROCEDURE — 84100 ASSAY OF PHOSPHORUS: CPT | Performed by: INTERNAL MEDICINE

## 2021-06-11 PROCEDURE — 82962 GLUCOSE BLOOD TEST: CPT

## 2021-06-11 PROCEDURE — 63710000001 INSULIN ASPART PER 5 UNITS: Performed by: INTERNAL MEDICINE

## 2021-06-11 PROCEDURE — 25010000002 MORPHINE PER 10 MG: Performed by: INTERNAL MEDICINE

## 2021-06-11 PROCEDURE — G0378 HOSPITAL OBSERVATION PER HR: HCPCS

## 2021-06-11 PROCEDURE — 80053 COMPREHEN METABOLIC PANEL: CPT | Performed by: INTERNAL MEDICINE

## 2021-06-11 RX ORDER — MORPHINE SULFATE 2 MG/ML
2 INJECTION, SOLUTION INTRAMUSCULAR; INTRAVENOUS
Status: DISCONTINUED | OUTPATIENT
Start: 2021-06-11 | End: 2021-06-17 | Stop reason: HOSPADM

## 2021-06-11 RX ORDER — DEXTROSE MONOHYDRATE, SODIUM CHLORIDE, SODIUM LACTATE, CALCIUM CHLORIDE, MAGNESIUM CHLORIDE 4.25; 538; 448; 18.4; 5.08 G/100ML; MG/100ML; MG/100ML; MG/100ML; MG/100ML
5000 SOLUTION INTRAPERITONEAL AS NEEDED
Status: COMPLETED | OUTPATIENT
Start: 2021-06-11 | End: 2021-06-11

## 2021-06-11 RX ORDER — DEXTROSE MONOHYDRATE, SODIUM CHLORIDE, SODIUM LACTATE, CALCIUM CHLORIDE, MAGNESIUM CHLORIDE 2.5; 538; 448; 18.4; 5.08 G/100ML; MG/100ML; MG/100ML; MG/100ML; MG/100ML
5000 SOLUTION INTRAPERITONEAL AS NEEDED
Status: COMPLETED | OUTPATIENT
Start: 2021-06-11 | End: 2021-06-11

## 2021-06-11 RX ORDER — LANTHANUM CARBONATE 500 MG/1
1000 TABLET, CHEWABLE ORAL
Status: DISCONTINUED | OUTPATIENT
Start: 2021-06-11 | End: 2021-06-13

## 2021-06-11 RX ADMIN — MORPHINE SULFATE 2 MG: 2 INJECTION, SOLUTION INTRAMUSCULAR; INTRAVENOUS at 14:56

## 2021-06-11 RX ADMIN — HEPARIN SODIUM 5000 UNITS: 5000 INJECTION INTRAVENOUS; SUBCUTANEOUS at 14:55

## 2021-06-11 RX ADMIN — PRAVASTATIN SODIUM 20 MG: 20 TABLET ORAL at 10:08

## 2021-06-11 RX ADMIN — GABAPENTIN 100 MG: 100 CAPSULE ORAL at 21:06

## 2021-06-11 RX ADMIN — SODIUM CHLORIDE, PRESERVATIVE FREE 10 ML: 5 INJECTION INTRAVENOUS at 10:09

## 2021-06-11 RX ADMIN — SODIUM CHLORIDE, PRESERVATIVE FREE 10 ML: 5 INJECTION INTRAVENOUS at 21:07

## 2021-06-11 RX ADMIN — LANTHANUM CARBONATE 1000 MG: 500 TABLET, CHEWABLE ORAL at 17:14

## 2021-06-11 RX ADMIN — DEXTROSE MONOHYDRATE, SODIUM CHLORIDE, SODIUM LACTATE, CALCIUM CHLORIDE, MAGNESIUM CHLORIDE 5000 ML: 4.25; 538; 448; 18.4; 5.08 SOLUTION INTRAPERITONEAL at 19:22

## 2021-06-11 RX ADMIN — CARVEDILOL 12.5 MG: 12.5 TABLET, FILM COATED ORAL at 17:15

## 2021-06-11 RX ADMIN — DEXTROSE MONOHYDRATE, SODIUM CHLORIDE, SODIUM LACTATE, CALCIUM CHLORIDE, MAGNESIUM CHLORIDE 5000 ML: 2.5; 538; 448; 18.4; 5.08 SOLUTION INTRAPERITONEAL at 19:23

## 2021-06-11 RX ADMIN — ISOSORBIDE MONONITRATE 30 MG: 30 TABLET, EXTENDED RELEASE ORAL at 10:08

## 2021-06-11 RX ADMIN — GABAPENTIN 100 MG: 100 CAPSULE ORAL at 14:56

## 2021-06-11 RX ADMIN — CALCITRIOL 0.5 MCG: 0.25 CAPSULE ORAL at 21:06

## 2021-06-11 RX ADMIN — ASPIRIN 81 MG: 81 TABLET, FILM COATED ORAL at 10:07

## 2021-06-11 RX ADMIN — HYDRALAZINE HYDROCHLORIDE 50 MG: 50 TABLET ORAL at 21:06

## 2021-06-11 RX ADMIN — MORPHINE SULFATE 2 MG: 2 INJECTION, SOLUTION INTRAMUSCULAR; INTRAVENOUS at 22:05

## 2021-06-11 RX ADMIN — GABAPENTIN 100 MG: 100 CAPSULE ORAL at 06:03

## 2021-06-11 RX ADMIN — MORPHINE SULFATE 2 MG: 2 INJECTION, SOLUTION INTRAMUSCULAR; INTRAVENOUS at 05:25

## 2021-06-11 RX ADMIN — CALCITRIOL 0.5 MCG: 0.25 CAPSULE ORAL at 10:11

## 2021-06-11 RX ADMIN — LOSARTAN POTASSIUM 50 MG: 50 TABLET, FILM COATED ORAL at 10:08

## 2021-06-11 RX ADMIN — HYDRALAZINE HYDROCHLORIDE 50 MG: 50 TABLET ORAL at 15:00

## 2021-06-11 RX ADMIN — HEPARIN SODIUM 5000 UNITS: 5000 INJECTION INTRAVENOUS; SUBCUTANEOUS at 06:03

## 2021-06-11 RX ADMIN — INSULIN ASPART 2 UNITS: 100 INJECTION, SOLUTION INTRAVENOUS; SUBCUTANEOUS at 10:12

## 2021-06-11 RX ADMIN — FUROSEMIDE 80 MG: 40 TABLET ORAL at 10:08

## 2021-06-11 RX ADMIN — INSULIN DETEMIR 28 UNITS: 100 INJECTION, SOLUTION SUBCUTANEOUS at 21:06

## 2021-06-11 RX ADMIN — HYDRALAZINE HYDROCHLORIDE 50 MG: 50 TABLET ORAL at 10:08

## 2021-06-11 RX ADMIN — LANTHANUM CARBONATE 1000 MG: 500 TABLET, CHEWABLE ORAL at 11:49

## 2021-06-11 RX ADMIN — CARVEDILOL 12.5 MG: 12.5 TABLET, FILM COATED ORAL at 10:09

## 2021-06-11 RX ADMIN — DEXTROSE MONOHYDRATE, SODIUM CHLORIDE, SODIUM LACTATE, CALCIUM CHLORIDE, MAGNESIUM CHLORIDE 5000 ML: 4.25; 538; 448; 18.4; 5.08 SOLUTION INTRAPERITONEAL at 19:21

## 2021-06-11 RX ADMIN — HEPARIN SODIUM 5000 UNITS: 5000 INJECTION INTRAVENOUS; SUBCUTANEOUS at 21:06

## 2021-06-11 RX ADMIN — CLOPIDOGREL BISULFATE 75 MG: 75 TABLET ORAL at 10:08

## 2021-06-12 ENCOUNTER — APPOINTMENT (OUTPATIENT)
Dept: CARDIOLOGY | Facility: HOSPITAL | Age: 61
End: 2021-06-12

## 2021-06-12 LAB
ALBUMIN SERPL-MCNC: 2.6 G/DL (ref 3.5–5.2)
ANION GAP SERPL CALCULATED.3IONS-SCNC: 18 MMOL/L (ref 5–15)
BH CV ECHO MEAS - ACS: 1.9 CM
BH CV ECHO MEAS - AO MAX PG (FULL): 8.9 MMHG
BH CV ECHO MEAS - AO MAX PG: 11.3 MMHG
BH CV ECHO MEAS - AO MEAN PG (FULL): 4 MMHG
BH CV ECHO MEAS - AO MEAN PG: 5 MMHG
BH CV ECHO MEAS - AO ROOT AREA (BSA CORRECTED): 1.8
BH CV ECHO MEAS - AO ROOT AREA: 9.1 CM^2
BH CV ECHO MEAS - AO ROOT DIAM: 3.4 CM
BH CV ECHO MEAS - AO V2 MAX: 168 CM/SEC
BH CV ECHO MEAS - AO V2 MEAN: 94.9 CM/SEC
BH CV ECHO MEAS - AO V2 VTI: 23 CM
BH CV ECHO MEAS - ASC AORTA: 3.1 CM
BH CV ECHO MEAS - AVA(I,A): 1.7 CM^2
BH CV ECHO MEAS - AVA(I,D): 1.7 CM^2
BH CV ECHO MEAS - AVA(V,A): 1.5 CM^2
BH CV ECHO MEAS - AVA(V,D): 1.5 CM^2
BH CV ECHO MEAS - BSA(HAYCOCK): 2 M^2
BH CV ECHO MEAS - BSA: 1.9 M^2
BH CV ECHO MEAS - BZI_BMI: 27.6 KILOGRAMS/M^2
BH CV ECHO MEAS - BZI_METRIC_HEIGHT: 170.2 CM
BH CV ECHO MEAS - BZI_METRIC_WEIGHT: 79.8 KG
BH CV ECHO MEAS - EDV(CUBED): 163.7 ML
BH CV ECHO MEAS - EDV(MOD-SP2): 178 ML
BH CV ECHO MEAS - EDV(MOD-SP4): 169 ML
BH CV ECHO MEAS - EDV(TEICH): 145.6 ML
BH CV ECHO MEAS - EF(CUBED): 34.5 %
BH CV ECHO MEAS - EF(MOD-SP2): 41 %
BH CV ECHO MEAS - EF(MOD-SP4): 38.5 %
BH CV ECHO MEAS - EF(TEICH): 27.9 %
BH CV ECHO MEAS - ESV(CUBED): 107.2 ML
BH CV ECHO MEAS - ESV(MOD-SP2): 105 ML
BH CV ECHO MEAS - ESV(MOD-SP4): 104 ML
BH CV ECHO MEAS - ESV(TEICH): 104.9 ML
BH CV ECHO MEAS - FS: 13.2 %
BH CV ECHO MEAS - IVS/LVPW: 1
BH CV ECHO MEAS - IVSD: 1.2 CM
BH CV ECHO MEAS - LA DIMENSION: 5 CM
BH CV ECHO MEAS - LA/AO: 1.5
BH CV ECHO MEAS - LV DIASTOLIC VOL/BSA (35-75): 88.2 ML/M^2
BH CV ECHO MEAS - LV MASS(C)D: 274.6 GRAMS
BH CV ECHO MEAS - LV MASS(C)DI: 143.4 GRAMS/M^2
BH CV ECHO MEAS - LV MAX PG: 2.4 MMHG
BH CV ECHO MEAS - LV MEAN PG: 1 MMHG
BH CV ECHO MEAS - LV SYSTOLIC VOL/BSA (12-30): 54.3 ML/M^2
BH CV ECHO MEAS - LV V1 MAX: 77.7 CM/SEC
BH CV ECHO MEAS - LV V1 MEAN: 48.2 CM/SEC
BH CV ECHO MEAS - LV V1 VTI: 12.1 CM
BH CV ECHO MEAS - LVIDD: 5.5 CM
BH CV ECHO MEAS - LVIDS: 4.8 CM
BH CV ECHO MEAS - LVLD AP2: 10.2 CM
BH CV ECHO MEAS - LVLD AP4: 9.3 CM
BH CV ECHO MEAS - LVLS AP2: 9.4 CM
BH CV ECHO MEAS - LVLS AP4: 8.6 CM
BH CV ECHO MEAS - LVOT AREA (M): 3.1 CM^2
BH CV ECHO MEAS - LVOT AREA: 3.1 CM^2
BH CV ECHO MEAS - LVOT DIAM: 2 CM
BH CV ECHO MEAS - LVPWD: 1.2 CM
BH CV ECHO MEAS - MR MAX PG: 70.2 MMHG
BH CV ECHO MEAS - MR MAX VEL: 419 CM/SEC
BH CV ECHO MEAS - MV A MAX VEL: 38.6 CM/SEC
BH CV ECHO MEAS - MV DEC SLOPE: 495 CM/SEC^2
BH CV ECHO MEAS - MV E MAX VEL: 81.4 CM/SEC
BH CV ECHO MEAS - MV E/A: 2.1
BH CV ECHO MEAS - MV MAX PG: 5 MMHG
BH CV ECHO MEAS - MV MEAN PG: 2 MMHG
BH CV ECHO MEAS - MV P1/2T MAX VEL: 112 CM/SEC
BH CV ECHO MEAS - MV P1/2T: 66.3 MSEC
BH CV ECHO MEAS - MV V2 MAX: 112 CM/SEC
BH CV ECHO MEAS - MV V2 MEAN: 66.1 CM/SEC
BH CV ECHO MEAS - MV V2 VTI: 27.8 CM
BH CV ECHO MEAS - MVA P1/2T LCG: 2 CM^2
BH CV ECHO MEAS - MVA(P1/2T): 3.3 CM^2
BH CV ECHO MEAS - MVA(VTI): 1.4 CM^2
BH CV ECHO MEAS - PA MAX PG: 2.3 MMHG
BH CV ECHO MEAS - PA V2 MAX: 76 CM/SEC
BH CV ECHO MEAS - PI END-D VEL: 134 CM/SEC
BH CV ECHO MEAS - RAP SYSTOLE: 10 MMHG
BH CV ECHO MEAS - RVDD: 2.4 CM
BH CV ECHO MEAS - RVSP: 44.1 MMHG
BH CV ECHO MEAS - SI(AO): 109 ML/M^2
BH CV ECHO MEAS - SI(CUBED): 29.5 ML/M^2
BH CV ECHO MEAS - SI(LVOT): 19.8 ML/M^2
BH CV ECHO MEAS - SI(MOD-SP2): 38.1 ML/M^2
BH CV ECHO MEAS - SI(MOD-SP4): 33.9 ML/M^2
BH CV ECHO MEAS - SI(TEICH): 21.2 ML/M^2
BH CV ECHO MEAS - SV(AO): 208.8 ML
BH CV ECHO MEAS - SV(CUBED): 56.5 ML
BH CV ECHO MEAS - SV(LVOT): 38 ML
BH CV ECHO MEAS - SV(MOD-SP2): 73 ML
BH CV ECHO MEAS - SV(MOD-SP4): 65 ML
BH CV ECHO MEAS - SV(TEICH): 40.7 ML
BH CV ECHO MEAS - TR MAX VEL: 292 CM/SEC
BUN SERPL-MCNC: 54 MG/DL (ref 8–23)
BUN/CREAT SERPL: 4.5 (ref 7–25)
CALCIUM SPEC-SCNC: 8.3 MG/DL (ref 8.6–10.5)
CHLORIDE SERPL-SCNC: 94 MMOL/L (ref 98–107)
CO2 SERPL-SCNC: 23 MMOL/L (ref 22–29)
CREAT SERPL-MCNC: 11.9 MG/DL (ref 0.76–1.27)
GFR SERPL CREATININE-BSD FRML MDRD: 5 ML/MIN/1.73
GFR SERPL CREATININE-BSD FRML MDRD: ABNORMAL ML/MIN/{1.73_M2}
GLUCOSE BLDC GLUCOMTR-MCNC: 104 MG/DL (ref 70–130)
GLUCOSE BLDC GLUCOMTR-MCNC: 113 MG/DL (ref 70–130)
GLUCOSE BLDC GLUCOMTR-MCNC: 186 MG/DL (ref 70–130)
GLUCOSE BLDC GLUCOMTR-MCNC: 242 MG/DL (ref 70–130)
GLUCOSE SERPL-MCNC: 188 MG/DL (ref 65–99)
MAXIMAL PREDICTED HEART RATE: 160 BPM
PHOSPHATE SERPL-MCNC: 9.2 MG/DL (ref 2.5–4.5)
POTASSIUM SERPL-SCNC: 3.8 MMOL/L (ref 3.5–5.2)
SODIUM SERPL-SCNC: 135 MMOL/L (ref 136–145)
STRESS TARGET HR: 136 BPM
WHOLE BLOOD HOLD SPECIMEN: NORMAL

## 2021-06-12 PROCEDURE — 3E1M39Z IRRIGATION OF PERITONEAL CAVITY USING DIALYSATE, PERCUTANEOUS APPROACH: ICD-10-PCS | Performed by: INTERNAL MEDICINE

## 2021-06-12 PROCEDURE — G0378 HOSPITAL OBSERVATION PER HR: HCPCS

## 2021-06-12 PROCEDURE — 63710000001 INSULIN DETEMIR PER 5 UNITS: Performed by: INTERNAL MEDICINE

## 2021-06-12 PROCEDURE — 93306 TTE W/DOPPLER COMPLETE: CPT | Performed by: INTERNAL MEDICINE

## 2021-06-12 PROCEDURE — 25010000002 HEPARIN (PORCINE) PER 1000 UNITS: Performed by: INTERNAL MEDICINE

## 2021-06-12 PROCEDURE — 25010000002 MORPHINE PER 10 MG: Performed by: INTERNAL MEDICINE

## 2021-06-12 PROCEDURE — 82962 GLUCOSE BLOOD TEST: CPT

## 2021-06-12 PROCEDURE — 94799 UNLISTED PULMONARY SVC/PX: CPT

## 2021-06-12 PROCEDURE — 63710000001 INSULIN ASPART PER 5 UNITS: Performed by: INTERNAL MEDICINE

## 2021-06-12 PROCEDURE — 93306 TTE W/DOPPLER COMPLETE: CPT

## 2021-06-12 PROCEDURE — 94760 N-INVAS EAR/PLS OXIMETRY 1: CPT

## 2021-06-12 PROCEDURE — 80069 RENAL FUNCTION PANEL: CPT | Performed by: INTERNAL MEDICINE

## 2021-06-12 RX ORDER — DEXTROSE MONOHYDRATE, SODIUM CHLORIDE, SODIUM LACTATE, CALCIUM CHLORIDE, MAGNESIUM CHLORIDE 4.25; 538; 448; 18.4; 5.08 G/100ML; MG/100ML; MG/100ML; MG/100ML; MG/100ML
5000 SOLUTION INTRAPERITONEAL AS NEEDED
Status: COMPLETED | OUTPATIENT
Start: 2021-06-12 | End: 2021-06-12

## 2021-06-12 RX ORDER — DEXTROSE MONOHYDRATE, SODIUM CHLORIDE, SODIUM LACTATE, CALCIUM CHLORIDE, MAGNESIUM CHLORIDE 2.5; 538; 448; 18.4; 5.08 G/100ML; MG/100ML; MG/100ML; MG/100ML; MG/100ML
5000 SOLUTION INTRAPERITONEAL AS NEEDED
Status: COMPLETED | OUTPATIENT
Start: 2021-06-12 | End: 2021-06-15

## 2021-06-12 RX ADMIN — CLOPIDOGREL BISULFATE 75 MG: 75 TABLET ORAL at 08:34

## 2021-06-12 RX ADMIN — GABAPENTIN 100 MG: 100 CAPSULE ORAL at 21:23

## 2021-06-12 RX ADMIN — ASPIRIN 81 MG: 81 TABLET, FILM COATED ORAL at 08:34

## 2021-06-12 RX ADMIN — DEXTROSE MONOHYDRATE, SODIUM CHLORIDE, SODIUM LACTATE, CALCIUM CHLORIDE, MAGNESIUM CHLORIDE 5000 ML: 4.25; 538; 448; 18.4; 5.08 SOLUTION INTRAPERITONEAL at 17:00

## 2021-06-12 RX ADMIN — HEPARIN SODIUM 5000 UNITS: 5000 INJECTION INTRAVENOUS; SUBCUTANEOUS at 15:05

## 2021-06-12 RX ADMIN — GABAPENTIN 100 MG: 100 CAPSULE ORAL at 15:05

## 2021-06-12 RX ADMIN — HEPARIN SODIUM 5000 UNITS: 5000 INJECTION INTRAVENOUS; SUBCUTANEOUS at 21:24

## 2021-06-12 RX ADMIN — DEXTROSE MONOHYDRATE, SODIUM CHLORIDE, SODIUM LACTATE, CALCIUM CHLORIDE, MAGNESIUM CHLORIDE 5000 ML: 4.25; 538; 448; 18.4; 5.08 SOLUTION INTRAPERITONEAL at 16:59

## 2021-06-12 RX ADMIN — GABAPENTIN 100 MG: 100 CAPSULE ORAL at 06:14

## 2021-06-12 RX ADMIN — MORPHINE SULFATE 2 MG: 2 INJECTION, SOLUTION INTRAMUSCULAR; INTRAVENOUS at 01:14

## 2021-06-12 RX ADMIN — CARVEDILOL 12.5 MG: 12.5 TABLET, FILM COATED ORAL at 17:21

## 2021-06-12 RX ADMIN — SODIUM CHLORIDE, PRESERVATIVE FREE 10 ML: 5 INJECTION INTRAVENOUS at 21:24

## 2021-06-12 RX ADMIN — ISOSORBIDE MONONITRATE 30 MG: 30 TABLET, EXTENDED RELEASE ORAL at 08:33

## 2021-06-12 RX ADMIN — HYDRALAZINE HYDROCHLORIDE 50 MG: 50 TABLET ORAL at 08:33

## 2021-06-12 RX ADMIN — MORPHINE SULFATE 2 MG: 2 INJECTION, SOLUTION INTRAMUSCULAR; INTRAVENOUS at 10:10

## 2021-06-12 RX ADMIN — SODIUM CHLORIDE, PRESERVATIVE FREE 10 ML: 5 INJECTION INTRAVENOUS at 08:34

## 2021-06-12 RX ADMIN — CALCITRIOL 0.5 MCG: 0.25 CAPSULE ORAL at 08:33

## 2021-06-12 RX ADMIN — POLYETHYLENE GLYCOL 3350 17 G: 17 POWDER, FOR SOLUTION ORAL at 08:39

## 2021-06-12 RX ADMIN — HYDRALAZINE HYDROCHLORIDE 50 MG: 50 TABLET ORAL at 15:05

## 2021-06-12 RX ADMIN — INSULIN ASPART 2 UNITS: 100 INJECTION, SOLUTION INTRAVENOUS; SUBCUTANEOUS at 07:48

## 2021-06-12 RX ADMIN — LANTHANUM CARBONATE 1000 MG: 500 TABLET, CHEWABLE ORAL at 17:21

## 2021-06-12 RX ADMIN — LOSARTAN POTASSIUM 50 MG: 50 TABLET, FILM COATED ORAL at 08:39

## 2021-06-12 RX ADMIN — HYDRALAZINE HYDROCHLORIDE 50 MG: 50 TABLET ORAL at 21:23

## 2021-06-12 RX ADMIN — CARVEDILOL 12.5 MG: 12.5 TABLET, FILM COATED ORAL at 07:48

## 2021-06-12 RX ADMIN — FUROSEMIDE 80 MG: 40 TABLET ORAL at 08:34

## 2021-06-12 RX ADMIN — DEXTROSE MONOHYDRATE, SODIUM CHLORIDE, SODIUM LACTATE, CALCIUM CHLORIDE, MAGNESIUM CHLORIDE 5000 ML: 2.5; 538; 448; 18.4; 5.08 SOLUTION INTRAPERITONEAL at 17:01

## 2021-06-12 RX ADMIN — HEPARIN SODIUM 5000 UNITS: 5000 INJECTION INTRAVENOUS; SUBCUTANEOUS at 06:14

## 2021-06-12 RX ADMIN — PRAVASTATIN SODIUM 20 MG: 20 TABLET ORAL at 08:33

## 2021-06-12 RX ADMIN — MORPHINE SULFATE 2 MG: 2 INJECTION, SOLUTION INTRAMUSCULAR; INTRAVENOUS at 21:28

## 2021-06-12 RX ADMIN — LANTHANUM CARBONATE 1000 MG: 500 TABLET, CHEWABLE ORAL at 07:48

## 2021-06-12 RX ADMIN — LANTHANUM CARBONATE 1000 MG: 500 TABLET, CHEWABLE ORAL at 12:14

## 2021-06-12 RX ADMIN — CALCITRIOL 0.5 MCG: 0.25 CAPSULE ORAL at 21:23

## 2021-06-12 RX ADMIN — INSULIN DETEMIR 28 UNITS: 100 INJECTION, SOLUTION SUBCUTANEOUS at 21:24

## 2021-06-13 LAB
GLUCOSE BLDC GLUCOMTR-MCNC: 161 MG/DL (ref 70–130)
GLUCOSE BLDC GLUCOMTR-MCNC: 167 MG/DL (ref 70–130)
GLUCOSE BLDC GLUCOMTR-MCNC: 181 MG/DL (ref 70–130)
GLUCOSE BLDC GLUCOMTR-MCNC: 219 MG/DL (ref 70–130)

## 2021-06-13 PROCEDURE — 25010000002 MORPHINE PER 10 MG: Performed by: INTERNAL MEDICINE

## 2021-06-13 PROCEDURE — 82962 GLUCOSE BLOOD TEST: CPT

## 2021-06-13 PROCEDURE — 63710000001 INSULIN DETEMIR PER 5 UNITS: Performed by: INTERNAL MEDICINE

## 2021-06-13 PROCEDURE — 63710000001 INSULIN ASPART PER 5 UNITS: Performed by: INTERNAL MEDICINE

## 2021-06-13 PROCEDURE — G0378 HOSPITAL OBSERVATION PER HR: HCPCS

## 2021-06-13 PROCEDURE — 25010000002 HEPARIN (PORCINE) PER 1000 UNITS: Performed by: INTERNAL MEDICINE

## 2021-06-13 PROCEDURE — 94799 UNLISTED PULMONARY SVC/PX: CPT

## 2021-06-13 PROCEDURE — 3E1M39Z IRRIGATION OF PERITONEAL CAVITY USING DIALYSATE, PERCUTANEOUS APPROACH: ICD-10-PCS | Performed by: INTERNAL MEDICINE

## 2021-06-13 PROCEDURE — 94760 N-INVAS EAR/PLS OXIMETRY 1: CPT

## 2021-06-13 RX ORDER — DEXTROSE MONOHYDRATE, SODIUM CHLORIDE, SODIUM LACTATE, CALCIUM CHLORIDE, MAGNESIUM CHLORIDE 2.5; 538; 448; 18.4; 5.08 G/100ML; MG/100ML; MG/100ML; MG/100ML; MG/100ML
5000 SOLUTION INTRAPERITONEAL AS NEEDED
Status: COMPLETED | OUTPATIENT
Start: 2021-06-13 | End: 2021-06-14

## 2021-06-13 RX ORDER — DEXTROSE MONOHYDRATE, SODIUM CHLORIDE, SODIUM LACTATE, CALCIUM CHLORIDE, MAGNESIUM CHLORIDE 4.25; 538; 448; 18.4; 5.08 G/100ML; MG/100ML; MG/100ML; MG/100ML; MG/100ML
5000 SOLUTION INTRAPERITONEAL AS NEEDED
Status: COMPLETED | OUTPATIENT
Start: 2021-06-13 | End: 2021-06-14

## 2021-06-13 RX ORDER — LANTHANUM CARBONATE 500 MG/1
2000 TABLET, CHEWABLE ORAL
Status: DISCONTINUED | OUTPATIENT
Start: 2021-06-13 | End: 2021-06-17 | Stop reason: HOSPADM

## 2021-06-13 RX ADMIN — HEPARIN SODIUM 5000 UNITS: 5000 INJECTION INTRAVENOUS; SUBCUTANEOUS at 15:34

## 2021-06-13 RX ADMIN — ASPIRIN 81 MG: 81 TABLET, FILM COATED ORAL at 08:15

## 2021-06-13 RX ADMIN — LANTHANUM CARBONATE 1000 MG: 500 TABLET, CHEWABLE ORAL at 08:15

## 2021-06-13 RX ADMIN — CARVEDILOL 12.5 MG: 12.5 TABLET, FILM COATED ORAL at 08:15

## 2021-06-13 RX ADMIN — SODIUM CHLORIDE, PRESERVATIVE FREE 10 ML: 5 INJECTION INTRAVENOUS at 21:10

## 2021-06-13 RX ADMIN — MORPHINE SULFATE 2 MG: 2 INJECTION, SOLUTION INTRAMUSCULAR; INTRAVENOUS at 10:31

## 2021-06-13 RX ADMIN — HEPARIN SODIUM 5000 UNITS: 5000 INJECTION INTRAVENOUS; SUBCUTANEOUS at 21:08

## 2021-06-13 RX ADMIN — DEXTROSE MONOHYDRATE, SODIUM CHLORIDE, SODIUM LACTATE, CALCIUM CHLORIDE, MAGNESIUM CHLORIDE 5000 ML: 4.25; 538; 448; 18.4; 5.08 SOLUTION INTRAPERITONEAL at 20:33

## 2021-06-13 RX ADMIN — ISOSORBIDE MONONITRATE 30 MG: 30 TABLET, EXTENDED RELEASE ORAL at 08:15

## 2021-06-13 RX ADMIN — GABAPENTIN 100 MG: 100 CAPSULE ORAL at 21:08

## 2021-06-13 RX ADMIN — HYDRALAZINE HYDROCHLORIDE 50 MG: 50 TABLET ORAL at 08:15

## 2021-06-13 RX ADMIN — MORPHINE SULFATE 2 MG: 2 INJECTION, SOLUTION INTRAMUSCULAR; INTRAVENOUS at 19:39

## 2021-06-13 RX ADMIN — DEXTROSE MONOHYDRATE, SODIUM CHLORIDE, SODIUM LACTATE, CALCIUM CHLORIDE, MAGNESIUM CHLORIDE 5000 ML: 4.25; 538; 448; 18.4; 5.08 SOLUTION INTRAPERITONEAL at 20:32

## 2021-06-13 RX ADMIN — DEXTROSE MONOHYDRATE, SODIUM CHLORIDE, SODIUM LACTATE, CALCIUM CHLORIDE, MAGNESIUM CHLORIDE 5000 ML: 2.5; 538; 448; 18.4; 5.08 SOLUTION INTRAPERITONEAL at 20:33

## 2021-06-13 RX ADMIN — INSULIN ASPART 3 UNITS: 100 INJECTION, SOLUTION INTRAVENOUS; SUBCUTANEOUS at 17:39

## 2021-06-13 RX ADMIN — INSULIN DETEMIR 28 UNITS: 100 INJECTION, SOLUTION SUBCUTANEOUS at 21:08

## 2021-06-13 RX ADMIN — PRAVASTATIN SODIUM 20 MG: 20 TABLET ORAL at 08:16

## 2021-06-13 RX ADMIN — SODIUM CHLORIDE, PRESERVATIVE FREE 10 ML: 5 INJECTION INTRAVENOUS at 08:16

## 2021-06-13 RX ADMIN — FUROSEMIDE 80 MG: 40 TABLET ORAL at 08:15

## 2021-06-13 RX ADMIN — CALCITRIOL 0.5 MCG: 0.25 CAPSULE ORAL at 09:40

## 2021-06-13 RX ADMIN — HYDRALAZINE HYDROCHLORIDE 50 MG: 50 TABLET ORAL at 15:34

## 2021-06-13 RX ADMIN — INSULIN ASPART 2 UNITS: 100 INJECTION, SOLUTION INTRAVENOUS; SUBCUTANEOUS at 08:17

## 2021-06-13 RX ADMIN — LANTHANUM CARBONATE 2000 MG: 500 TABLET, CHEWABLE ORAL at 12:30

## 2021-06-13 RX ADMIN — CLOPIDOGREL BISULFATE 75 MG: 75 TABLET ORAL at 08:15

## 2021-06-13 RX ADMIN — CARVEDILOL 12.5 MG: 12.5 TABLET, FILM COATED ORAL at 17:39

## 2021-06-13 RX ADMIN — LOSARTAN POTASSIUM 50 MG: 50 TABLET, FILM COATED ORAL at 08:16

## 2021-06-13 RX ADMIN — GABAPENTIN 100 MG: 100 CAPSULE ORAL at 15:34

## 2021-06-13 RX ADMIN — LANTHANUM CARBONATE 2000 MG: 500 TABLET, CHEWABLE ORAL at 17:38

## 2021-06-13 RX ADMIN — HYDRALAZINE HYDROCHLORIDE 50 MG: 50 TABLET ORAL at 21:08

## 2021-06-13 RX ADMIN — GABAPENTIN 100 MG: 100 CAPSULE ORAL at 05:48

## 2021-06-13 RX ADMIN — HEPARIN SODIUM 5000 UNITS: 5000 INJECTION INTRAVENOUS; SUBCUTANEOUS at 05:48

## 2021-06-14 LAB
ANION GAP SERPL CALCULATED.3IONS-SCNC: 15 MMOL/L (ref 5–15)
BASOPHILS # BLD AUTO: 0.04 10*3/MM3 (ref 0–0.2)
BASOPHILS NFR BLD AUTO: 0.4 % (ref 0–1.5)
BUN SERPL-MCNC: 49 MG/DL (ref 8–23)
BUN/CREAT SERPL: 4.5 (ref 7–25)
CALCIUM SPEC-SCNC: 8.8 MG/DL (ref 8.6–10.5)
CHLORIDE SERPL-SCNC: 96 MMOL/L (ref 98–107)
CO2 SERPL-SCNC: 27 MMOL/L (ref 22–29)
CREAT SERPL-MCNC: 10.99 MG/DL (ref 0.76–1.27)
DEPRECATED RDW RBC AUTO: 47.4 FL (ref 37–54)
EOSINOPHIL # BLD AUTO: 0.68 10*3/MM3 (ref 0–0.4)
EOSINOPHIL NFR BLD AUTO: 6.3 % (ref 0.3–6.2)
ERYTHROCYTE [DISTWIDTH] IN BLOOD BY AUTOMATED COUNT: 19.2 % (ref 12.3–15.4)
GFR SERPL CREATININE-BSD FRML MDRD: 6 ML/MIN/1.73
GFR SERPL CREATININE-BSD FRML MDRD: ABNORMAL ML/MIN/{1.73_M2}
GLUCOSE BLDC GLUCOMTR-MCNC: 110 MG/DL (ref 70–130)
GLUCOSE BLDC GLUCOMTR-MCNC: 138 MG/DL (ref 70–130)
GLUCOSE BLDC GLUCOMTR-MCNC: 159 MG/DL (ref 70–130)
GLUCOSE BLDC GLUCOMTR-MCNC: 214 MG/DL (ref 70–130)
GLUCOSE SERPL-MCNC: 156 MG/DL (ref 65–99)
HCT VFR BLD AUTO: 33.7 % (ref 37.5–51)
HGB BLD-MCNC: 10.3 G/DL (ref 13–17.7)
IMM GRANULOCYTES # BLD AUTO: 0.05 10*3/MM3 (ref 0–0.05)
IMM GRANULOCYTES NFR BLD AUTO: 0.5 % (ref 0–0.5)
LYMPHOCYTES # BLD AUTO: 1.7 10*3/MM3 (ref 0.7–3.1)
LYMPHOCYTES NFR BLD AUTO: 15.8 % (ref 19.6–45.3)
MCH RBC QN AUTO: 24.1 PG (ref 26.6–33)
MCHC RBC AUTO-ENTMCNC: 30.6 G/DL (ref 31.5–35.7)
MCV RBC AUTO: 78.7 FL (ref 79–97)
MONOCYTES # BLD AUTO: 1.35 10*3/MM3 (ref 0.1–0.9)
MONOCYTES NFR BLD AUTO: 12.5 % (ref 5–12)
NEUTROPHILS NFR BLD AUTO: 6.96 10*3/MM3 (ref 1.7–7)
NEUTROPHILS NFR BLD AUTO: 64.5 % (ref 42.7–76)
NRBC BLD AUTO-RTO: 0.2 /100 WBC (ref 0–0.2)
PLATELET # BLD AUTO: 257 10*3/MM3 (ref 140–450)
PMV BLD AUTO: 10.7 FL (ref 6–12)
POTASSIUM SERPL-SCNC: 3.9 MMOL/L (ref 3.5–5.2)
RBC # BLD AUTO: 4.28 10*6/MM3 (ref 4.14–5.8)
SODIUM SERPL-SCNC: 138 MMOL/L (ref 136–145)
WBC # BLD AUTO: 10.78 10*3/MM3 (ref 3.4–10.8)

## 2021-06-14 PROCEDURE — 25010000002 MORPHINE PER 10 MG: Performed by: INTERNAL MEDICINE

## 2021-06-14 PROCEDURE — 63710000001 INSULIN DETEMIR PER 5 UNITS: Performed by: INTERNAL MEDICINE

## 2021-06-14 PROCEDURE — 82962 GLUCOSE BLOOD TEST: CPT

## 2021-06-14 PROCEDURE — 3E1M39Z IRRIGATION OF PERITONEAL CAVITY USING DIALYSATE, PERCUTANEOUS APPROACH: ICD-10-PCS | Performed by: INTERNAL MEDICINE

## 2021-06-14 PROCEDURE — 63710000001 INSULIN ASPART PER 5 UNITS: Performed by: INTERNAL MEDICINE

## 2021-06-14 PROCEDURE — 85025 COMPLETE CBC W/AUTO DIFF WBC: CPT | Performed by: INTERNAL MEDICINE

## 2021-06-14 PROCEDURE — 25010000002 HEPARIN (PORCINE) PER 1000 UNITS: Performed by: INTERNAL MEDICINE

## 2021-06-14 PROCEDURE — 80048 BASIC METABOLIC PNL TOTAL CA: CPT | Performed by: INTERNAL MEDICINE

## 2021-06-14 RX ORDER — ACETAMINOPHEN 325 MG/1
650 TABLET ORAL EVERY 6 HOURS PRN
Status: DISCONTINUED | OUTPATIENT
Start: 2021-06-14 | End: 2021-06-17 | Stop reason: HOSPADM

## 2021-06-14 RX ORDER — DEXTROSE MONOHYDRATE, SODIUM CHLORIDE, SODIUM LACTATE, CALCIUM CHLORIDE, MAGNESIUM CHLORIDE 4.25; 538; 448; 18.4; 5.08 G/100ML; MG/100ML; MG/100ML; MG/100ML; MG/100ML
5000 SOLUTION INTRAPERITONEAL AS NEEDED
Status: COMPLETED | OUTPATIENT
Start: 2021-06-14 | End: 2021-06-14

## 2021-06-14 RX ORDER — DEXTROSE MONOHYDRATE, SODIUM CHLORIDE, SODIUM LACTATE, CALCIUM CHLORIDE, MAGNESIUM CHLORIDE 2.5; 538; 448; 18.4; 5.08 G/100ML; MG/100ML; MG/100ML; MG/100ML; MG/100ML
5000 SOLUTION INTRAPERITONEAL AS NEEDED
Status: COMPLETED | OUTPATIENT
Start: 2021-06-14 | End: 2021-06-14

## 2021-06-14 RX ADMIN — CLOPIDOGREL BISULFATE 75 MG: 75 TABLET ORAL at 09:37

## 2021-06-14 RX ADMIN — HEPARIN SODIUM 5000 UNITS: 5000 INJECTION INTRAVENOUS; SUBCUTANEOUS at 14:07

## 2021-06-14 RX ADMIN — DEXTROSE MONOHYDRATE, SODIUM CHLORIDE, SODIUM LACTATE, CALCIUM CHLORIDE, MAGNESIUM CHLORIDE 5000 ML: 4.25; 538; 448; 18.4; 5.08 SOLUTION INTRAPERITONEAL at 18:37

## 2021-06-14 RX ADMIN — INSULIN ASPART 3 UNITS: 100 INJECTION, SOLUTION INTRAVENOUS; SUBCUTANEOUS at 07:43

## 2021-06-14 RX ADMIN — ACETAMINOPHEN 650 MG: 325 TABLET, FILM COATED ORAL at 14:18

## 2021-06-14 RX ADMIN — BISACODYL 5 MG: 5 TABLET, COATED ORAL at 09:39

## 2021-06-14 RX ADMIN — SODIUM CHLORIDE, PRESERVATIVE FREE 10 ML: 5 INJECTION INTRAVENOUS at 21:25

## 2021-06-14 RX ADMIN — PRAVASTATIN SODIUM 20 MG: 20 TABLET ORAL at 09:37

## 2021-06-14 RX ADMIN — HYDRALAZINE HYDROCHLORIDE 50 MG: 50 TABLET ORAL at 21:18

## 2021-06-14 RX ADMIN — FUROSEMIDE 80 MG: 40 TABLET ORAL at 09:37

## 2021-06-14 RX ADMIN — LANTHANUM CARBONATE 2000 MG: 500 TABLET, CHEWABLE ORAL at 17:12

## 2021-06-14 RX ADMIN — HYDRALAZINE HYDROCHLORIDE 50 MG: 50 TABLET ORAL at 09:37

## 2021-06-14 RX ADMIN — ASPIRIN 81 MG: 81 TABLET, FILM COATED ORAL at 09:37

## 2021-06-14 RX ADMIN — LANTHANUM CARBONATE 2000 MG: 500 TABLET, CHEWABLE ORAL at 07:42

## 2021-06-14 RX ADMIN — DEXTROSE MONOHYDRATE, SODIUM CHLORIDE, SODIUM LACTATE, CALCIUM CHLORIDE, MAGNESIUM CHLORIDE 5000 ML: 4.25; 538; 448; 18.4; 5.08 SOLUTION INTRAPERITONEAL at 18:36

## 2021-06-14 RX ADMIN — ISOSORBIDE MONONITRATE 30 MG: 30 TABLET, EXTENDED RELEASE ORAL at 09:37

## 2021-06-14 RX ADMIN — INSULIN DETEMIR 28 UNITS: 100 INJECTION, SOLUTION SUBCUTANEOUS at 21:18

## 2021-06-14 RX ADMIN — DEXTROSE MONOHYDRATE, SODIUM CHLORIDE, SODIUM LACTATE, CALCIUM CHLORIDE, MAGNESIUM CHLORIDE 5000 ML: 2.5; 538; 448; 18.4; 5.08 SOLUTION INTRAPERITONEAL at 18:37

## 2021-06-14 RX ADMIN — HEPARIN SODIUM 5000 UNITS: 5000 INJECTION INTRAVENOUS; SUBCUTANEOUS at 06:09

## 2021-06-14 RX ADMIN — HYDRALAZINE HYDROCHLORIDE 50 MG: 50 TABLET ORAL at 15:58

## 2021-06-14 RX ADMIN — CALCITRIOL 0.5 MCG: 0.25 CAPSULE ORAL at 21:18

## 2021-06-14 RX ADMIN — CALCITRIOL 0.5 MCG: 0.25 CAPSULE ORAL at 09:37

## 2021-06-14 RX ADMIN — SODIUM CHLORIDE, PRESERVATIVE FREE 10 ML: 5 INJECTION INTRAVENOUS at 09:38

## 2021-06-14 RX ADMIN — LOSARTAN POTASSIUM 50 MG: 50 TABLET, FILM COATED ORAL at 09:37

## 2021-06-14 RX ADMIN — HEPARIN SODIUM 5000 UNITS: 5000 INJECTION INTRAVENOUS; SUBCUTANEOUS at 21:19

## 2021-06-14 RX ADMIN — GABAPENTIN 100 MG: 100 CAPSULE ORAL at 06:09

## 2021-06-14 RX ADMIN — LANTHANUM CARBONATE 2000 MG: 500 TABLET, CHEWABLE ORAL at 11:11

## 2021-06-14 RX ADMIN — CARVEDILOL 12.5 MG: 12.5 TABLET, FILM COATED ORAL at 07:43

## 2021-06-14 RX ADMIN — GABAPENTIN 100 MG: 100 CAPSULE ORAL at 21:18

## 2021-06-14 RX ADMIN — GABAPENTIN 100 MG: 100 CAPSULE ORAL at 14:07

## 2021-06-14 RX ADMIN — MORPHINE SULFATE 2 MG: 2 INJECTION, SOLUTION INTRAMUSCULAR; INTRAVENOUS at 15:58

## 2021-06-14 RX ADMIN — CARVEDILOL 12.5 MG: 12.5 TABLET, FILM COATED ORAL at 17:12

## 2021-06-15 LAB
ANION GAP SERPL CALCULATED.3IONS-SCNC: 17 MMOL/L (ref 5–15)
BASOPHILS # BLD AUTO: 0.03 10*3/MM3 (ref 0–0.2)
BASOPHILS NFR BLD AUTO: 0.3 % (ref 0–1.5)
BUN SERPL-MCNC: 45 MG/DL (ref 8–23)
BUN/CREAT SERPL: 4.2 (ref 7–25)
CALCIUM SPEC-SCNC: 9.1 MG/DL (ref 8.6–10.5)
CHLORIDE SERPL-SCNC: 95 MMOL/L (ref 98–107)
CO2 SERPL-SCNC: 25 MMOL/L (ref 22–29)
CREAT SERPL-MCNC: 10.82 MG/DL (ref 0.76–1.27)
DEPRECATED RDW RBC AUTO: 47.8 FL (ref 37–54)
EOSINOPHIL # BLD AUTO: 0.64 10*3/MM3 (ref 0–0.4)
EOSINOPHIL NFR BLD AUTO: 6.5 % (ref 0.3–6.2)
ERYTHROCYTE [DISTWIDTH] IN BLOOD BY AUTOMATED COUNT: 19.1 % (ref 12.3–15.4)
GFR SERPL CREATININE-BSD FRML MDRD: 6 ML/MIN/1.73
GFR SERPL CREATININE-BSD FRML MDRD: ABNORMAL ML/MIN/{1.73_M2}
GLUCOSE BLDC GLUCOMTR-MCNC: 102 MG/DL (ref 70–130)
GLUCOSE BLDC GLUCOMTR-MCNC: 167 MG/DL (ref 70–130)
GLUCOSE BLDC GLUCOMTR-MCNC: 203 MG/DL (ref 70–130)
GLUCOSE BLDC GLUCOMTR-MCNC: 232 MG/DL (ref 70–130)
GLUCOSE SERPL-MCNC: 251 MG/DL (ref 65–99)
HCT VFR BLD AUTO: 34.6 % (ref 37.5–51)
HGB BLD-MCNC: 11 G/DL (ref 13–17.7)
IMM GRANULOCYTES # BLD AUTO: 0.06 10*3/MM3 (ref 0–0.05)
IMM GRANULOCYTES NFR BLD AUTO: 0.6 % (ref 0–0.5)
LYMPHOCYTES # BLD AUTO: 1.73 10*3/MM3 (ref 0.7–3.1)
LYMPHOCYTES NFR BLD AUTO: 17.7 % (ref 19.6–45.3)
MCH RBC QN AUTO: 24.7 PG (ref 26.6–33)
MCHC RBC AUTO-ENTMCNC: 31.8 G/DL (ref 31.5–35.7)
MCV RBC AUTO: 77.6 FL (ref 79–97)
MONOCYTES # BLD AUTO: 1.31 10*3/MM3 (ref 0.1–0.9)
MONOCYTES NFR BLD AUTO: 13.4 % (ref 5–12)
NEUTROPHILS NFR BLD AUTO: 6.03 10*3/MM3 (ref 1.7–7)
NEUTROPHILS NFR BLD AUTO: 61.5 % (ref 42.7–76)
NRBC BLD AUTO-RTO: 0 /100 WBC (ref 0–0.2)
PLATELET # BLD AUTO: 253 10*3/MM3 (ref 140–450)
PMV BLD AUTO: 10.1 FL (ref 6–12)
POTASSIUM SERPL-SCNC: 3.4 MMOL/L (ref 3.5–5.2)
RBC # BLD AUTO: 4.46 10*6/MM3 (ref 4.14–5.8)
SODIUM SERPL-SCNC: 137 MMOL/L (ref 136–145)
WBC # BLD AUTO: 9.8 10*3/MM3 (ref 3.4–10.8)

## 2021-06-15 PROCEDURE — 85025 COMPLETE CBC W/AUTO DIFF WBC: CPT | Performed by: INTERNAL MEDICINE

## 2021-06-15 PROCEDURE — 25010000002 MORPHINE PER 10 MG: Performed by: INTERNAL MEDICINE

## 2021-06-15 PROCEDURE — 63710000001 INSULIN ASPART PER 5 UNITS: Performed by: INTERNAL MEDICINE

## 2021-06-15 PROCEDURE — 82962 GLUCOSE BLOOD TEST: CPT

## 2021-06-15 PROCEDURE — 80048 BASIC METABOLIC PNL TOTAL CA: CPT | Performed by: INTERNAL MEDICINE

## 2021-06-15 PROCEDURE — 63710000001 INSULIN DETEMIR PER 5 UNITS: Performed by: INTERNAL MEDICINE

## 2021-06-15 PROCEDURE — 25010000002 HEPARIN (PORCINE) PER 1000 UNITS: Performed by: INTERNAL MEDICINE

## 2021-06-15 PROCEDURE — 94799 UNLISTED PULMONARY SVC/PX: CPT

## 2021-06-15 PROCEDURE — 3E1M39Z IRRIGATION OF PERITONEAL CAVITY USING DIALYSATE, PERCUTANEOUS APPROACH: ICD-10-PCS | Performed by: INTERNAL MEDICINE

## 2021-06-15 RX ORDER — GABAPENTIN 100 MG/1
100 CAPSULE ORAL NIGHTLY
Status: DISCONTINUED | OUTPATIENT
Start: 2021-06-15 | End: 2021-06-17 | Stop reason: HOSPADM

## 2021-06-15 RX ORDER — FUROSEMIDE 40 MG/1
80 TABLET ORAL EVERY OTHER DAY
Status: DISCONTINUED | OUTPATIENT
Start: 2021-06-16 | End: 2021-06-17 | Stop reason: HOSPADM

## 2021-06-15 RX ORDER — DEXTROSE MONOHYDRATE, SODIUM CHLORIDE, SODIUM LACTATE, CALCIUM CHLORIDE, MAGNESIUM CHLORIDE 2.5; 538; 448; 18.4; 5.08 G/100ML; MG/100ML; MG/100ML; MG/100ML; MG/100ML
5000 SOLUTION INTRAPERITONEAL AS NEEDED
Status: DISCONTINUED | OUTPATIENT
Start: 2021-06-15 | End: 2021-06-17 | Stop reason: HOSPADM

## 2021-06-15 RX ORDER — LEVETIRACETAM 500 MG/1
500 TABLET ORAL DAILY
Status: DISCONTINUED | OUTPATIENT
Start: 2021-06-15 | End: 2021-06-17 | Stop reason: HOSPADM

## 2021-06-15 RX ADMIN — CARVEDILOL 12.5 MG: 12.5 TABLET, FILM COATED ORAL at 08:03

## 2021-06-15 RX ADMIN — ASPIRIN 81 MG: 81 TABLET, FILM COATED ORAL at 08:02

## 2021-06-15 RX ADMIN — SODIUM CHLORIDE, PRESERVATIVE FREE 10 ML: 5 INJECTION INTRAVENOUS at 08:02

## 2021-06-15 RX ADMIN — MORPHINE SULFATE 2 MG: 2 INJECTION, SOLUTION INTRAMUSCULAR; INTRAVENOUS at 14:00

## 2021-06-15 RX ADMIN — LOSARTAN POTASSIUM 50 MG: 50 TABLET, FILM COATED ORAL at 08:03

## 2021-06-15 RX ADMIN — INSULIN DETEMIR 28 UNITS: 100 INJECTION, SOLUTION SUBCUTANEOUS at 21:48

## 2021-06-15 RX ADMIN — DEXTROSE MONOHYDRATE, SODIUM CHLORIDE, SODIUM LACTATE, CALCIUM CHLORIDE, MAGNESIUM CHLORIDE 5000 ML: 2.5; 538; 448; 18.4; 5.08 SOLUTION INTRAPERITONEAL at 16:30

## 2021-06-15 RX ADMIN — GABAPENTIN 100 MG: 100 CAPSULE ORAL at 21:47

## 2021-06-15 RX ADMIN — LANTHANUM CARBONATE 2000 MG: 500 TABLET, CHEWABLE ORAL at 18:02

## 2021-06-15 RX ADMIN — HEPARIN SODIUM 5000 UNITS: 5000 INJECTION INTRAVENOUS; SUBCUTANEOUS at 06:10

## 2021-06-15 RX ADMIN — CLOPIDOGREL BISULFATE 75 MG: 75 TABLET ORAL at 08:03

## 2021-06-15 RX ADMIN — CALCITRIOL 0.5 MCG: 0.25 CAPSULE ORAL at 21:47

## 2021-06-15 RX ADMIN — LEVETIRACETAM 500 MG: 500 TABLET ORAL at 11:46

## 2021-06-15 RX ADMIN — CALCITRIOL 0.5 MCG: 0.25 CAPSULE ORAL at 08:03

## 2021-06-15 RX ADMIN — INSULIN ASPART 2 UNITS: 100 INJECTION, SOLUTION INTRAVENOUS; SUBCUTANEOUS at 11:04

## 2021-06-15 RX ADMIN — HYDRALAZINE HYDROCHLORIDE 50 MG: 50 TABLET ORAL at 21:47

## 2021-06-15 RX ADMIN — MORPHINE SULFATE 2 MG: 2 INJECTION, SOLUTION INTRAMUSCULAR; INTRAVENOUS at 19:26

## 2021-06-15 RX ADMIN — MORPHINE SULFATE 2 MG: 2 INJECTION, SOLUTION INTRAMUSCULAR; INTRAVENOUS at 11:04

## 2021-06-15 RX ADMIN — INSULIN ASPART 3 UNITS: 100 INJECTION, SOLUTION INTRAVENOUS; SUBCUTANEOUS at 07:54

## 2021-06-15 RX ADMIN — POLYETHYLENE GLYCOL 3350 17 G: 17 POWDER, FOR SOLUTION ORAL at 08:01

## 2021-06-15 RX ADMIN — LANTHANUM CARBONATE 2000 MG: 500 TABLET, CHEWABLE ORAL at 11:04

## 2021-06-15 RX ADMIN — ISOSORBIDE MONONITRATE 30 MG: 30 TABLET, EXTENDED RELEASE ORAL at 08:03

## 2021-06-15 RX ADMIN — DEXTROSE MONOHYDRATE, SODIUM CHLORIDE, SODIUM LACTATE, CALCIUM CHLORIDE, MAGNESIUM CHLORIDE 5000 ML: 4.25; 538; 448; 18.4; 5.08 SOLUTION INTRAPERITONEAL at 16:31

## 2021-06-15 RX ADMIN — CARVEDILOL 12.5 MG: 12.5 TABLET, FILM COATED ORAL at 18:02

## 2021-06-15 RX ADMIN — HYDRALAZINE HYDROCHLORIDE 50 MG: 50 TABLET ORAL at 08:03

## 2021-06-15 RX ADMIN — HEPARIN SODIUM 5000 UNITS: 5000 INJECTION INTRAVENOUS; SUBCUTANEOUS at 21:47

## 2021-06-15 RX ADMIN — PRAVASTATIN SODIUM 20 MG: 20 TABLET ORAL at 08:03

## 2021-06-15 RX ADMIN — LANTHANUM CARBONATE 2000 MG: 500 TABLET, CHEWABLE ORAL at 08:02

## 2021-06-15 RX ADMIN — GABAPENTIN 100 MG: 100 CAPSULE ORAL at 06:10

## 2021-06-15 RX ADMIN — MORPHINE SULFATE 2 MG: 2 INJECTION, SOLUTION INTRAMUSCULAR; INTRAVENOUS at 08:01

## 2021-06-15 RX ADMIN — HEPARIN SODIUM 5000 UNITS: 5000 INJECTION INTRAVENOUS; SUBCUTANEOUS at 14:00

## 2021-06-15 RX ADMIN — FUROSEMIDE 80 MG: 40 TABLET ORAL at 08:03

## 2021-06-16 LAB
ANION GAP SERPL CALCULATED.3IONS-SCNC: 13 MMOL/L (ref 5–15)
BASOPHILS # BLD AUTO: 0.05 10*3/MM3 (ref 0–0.2)
BASOPHILS NFR BLD AUTO: 0.5 % (ref 0–1.5)
BUN SERPL-MCNC: 44 MG/DL (ref 8–23)
BUN/CREAT SERPL: 4.2 (ref 7–25)
CALCIUM SPEC-SCNC: 9.6 MG/DL (ref 8.6–10.5)
CHLORIDE SERPL-SCNC: 93 MMOL/L (ref 98–107)
CO2 SERPL-SCNC: 29 MMOL/L (ref 22–29)
CREAT SERPL-MCNC: 10.47 MG/DL (ref 0.76–1.27)
DEPRECATED RDW RBC AUTO: 49.7 FL (ref 37–54)
EOSINOPHIL # BLD AUTO: 0.68 10*3/MM3 (ref 0–0.4)
EOSINOPHIL NFR BLD AUTO: 6.2 % (ref 0.3–6.2)
ERYTHROCYTE [DISTWIDTH] IN BLOOD BY AUTOMATED COUNT: 19.1 % (ref 12.3–15.4)
GFR SERPL CREATININE-BSD FRML MDRD: 6 ML/MIN/1.73
GFR SERPL CREATININE-BSD FRML MDRD: ABNORMAL ML/MIN/{1.73_M2}
GLUCOSE BLDC GLUCOMTR-MCNC: 151 MG/DL (ref 70–130)
GLUCOSE BLDC GLUCOMTR-MCNC: 170 MG/DL (ref 70–130)
GLUCOSE BLDC GLUCOMTR-MCNC: 172 MG/DL (ref 70–130)
GLUCOSE BLDC GLUCOMTR-MCNC: 212 MG/DL (ref 70–130)
GLUCOSE SERPL-MCNC: 187 MG/DL (ref 65–99)
HCT VFR BLD AUTO: 35 % (ref 37.5–51)
HGB BLD-MCNC: 11 G/DL (ref 13–17.7)
IMM GRANULOCYTES # BLD AUTO: 0.07 10*3/MM3 (ref 0–0.05)
IMM GRANULOCYTES NFR BLD AUTO: 0.6 % (ref 0–0.5)
LYMPHOCYTES # BLD AUTO: 1.98 10*3/MM3 (ref 0.7–3.1)
LYMPHOCYTES NFR BLD AUTO: 18 % (ref 19.6–45.3)
MAGNESIUM SERPL-MCNC: 1.8 MG/DL (ref 1.6–2.4)
MCH RBC QN AUTO: 24.4 PG (ref 26.6–33)
MCHC RBC AUTO-ENTMCNC: 31.4 G/DL (ref 31.5–35.7)
MCV RBC AUTO: 77.8 FL (ref 79–97)
MONOCYTES # BLD AUTO: 1.91 10*3/MM3 (ref 0.1–0.9)
MONOCYTES NFR BLD AUTO: 17.4 % (ref 5–12)
NEUTROPHILS NFR BLD AUTO: 57.3 % (ref 42.7–76)
NEUTROPHILS NFR BLD AUTO: 6.29 10*3/MM3 (ref 1.7–7)
NRBC BLD AUTO-RTO: 0 /100 WBC (ref 0–0.2)
PLATELET # BLD AUTO: 228 10*3/MM3 (ref 140–450)
PMV BLD AUTO: 9.5 FL (ref 6–12)
POTASSIUM SERPL-SCNC: 3.5 MMOL/L (ref 3.5–5.2)
RBC # BLD AUTO: 4.5 10*6/MM3 (ref 4.14–5.8)
SODIUM SERPL-SCNC: 135 MMOL/L (ref 136–145)
WBC # BLD AUTO: 10.98 10*3/MM3 (ref 3.4–10.8)

## 2021-06-16 PROCEDURE — 3E1M39Z IRRIGATION OF PERITONEAL CAVITY USING DIALYSATE, PERCUTANEOUS APPROACH: ICD-10-PCS | Performed by: INTERNAL MEDICINE

## 2021-06-16 PROCEDURE — 63710000001 INSULIN DETEMIR PER 5 UNITS: Performed by: INTERNAL MEDICINE

## 2021-06-16 PROCEDURE — 83735 ASSAY OF MAGNESIUM: CPT | Performed by: INTERNAL MEDICINE

## 2021-06-16 PROCEDURE — 82962 GLUCOSE BLOOD TEST: CPT

## 2021-06-16 PROCEDURE — 63710000001 INSULIN ASPART PER 5 UNITS: Performed by: INTERNAL MEDICINE

## 2021-06-16 PROCEDURE — 94799 UNLISTED PULMONARY SVC/PX: CPT

## 2021-06-16 PROCEDURE — 25010000002 MORPHINE PER 10 MG: Performed by: INTERNAL MEDICINE

## 2021-06-16 PROCEDURE — 94760 N-INVAS EAR/PLS OXIMETRY 1: CPT

## 2021-06-16 PROCEDURE — 25010000002 HEPARIN (PORCINE) PER 1000 UNITS: Performed by: INTERNAL MEDICINE

## 2021-06-16 PROCEDURE — 85025 COMPLETE CBC W/AUTO DIFF WBC: CPT | Performed by: INTERNAL MEDICINE

## 2021-06-16 PROCEDURE — 80048 BASIC METABOLIC PNL TOTAL CA: CPT | Performed by: INTERNAL MEDICINE

## 2021-06-16 RX ORDER — LEVETIRACETAM 500 MG/1
500 TABLET ORAL DAILY
Qty: 90 TABLET | Refills: 0 | Status: SHIPPED | OUTPATIENT
Start: 2021-06-17 | End: 2021-09-15

## 2021-06-16 RX ORDER — GABAPENTIN 100 MG/1
100 CAPSULE ORAL NIGHTLY
Qty: 90 CAPSULE | Refills: 0
Start: 2021-06-16 | End: 2021-09-14

## 2021-06-16 RX ORDER — DEXTROSE MONOHYDRATE, SODIUM CHLORIDE, SODIUM LACTATE, CALCIUM CHLORIDE, MAGNESIUM CHLORIDE 2.5; 538; 448; 18.4; 5.08 G/100ML; MG/100ML; MG/100ML; MG/100ML; MG/100ML
5000 SOLUTION INTRAPERITONEAL AS NEEDED
Status: DISCONTINUED | OUTPATIENT
Start: 2021-06-16 | End: 2021-06-17 | Stop reason: HOSPADM

## 2021-06-16 RX ORDER — DEXTROSE MONOHYDRATE, SODIUM CHLORIDE, SODIUM LACTATE, CALCIUM CHLORIDE, MAGNESIUM CHLORIDE 1.5; 538; 448; 18.4; 5.08 G/100ML; MG/100ML; MG/100ML; MG/100ML; MG/100ML
5000 SOLUTION INTRAPERITONEAL AS NEEDED
Status: DISCONTINUED | OUTPATIENT
Start: 2021-06-16 | End: 2021-06-17 | Stop reason: HOSPADM

## 2021-06-16 RX ORDER — LANTHANUM CARBONATE 1000 MG/1
2000 TABLET, CHEWABLE ORAL
Qty: 540 TABLET | Refills: 0 | Status: SHIPPED | OUTPATIENT
Start: 2021-06-16 | End: 2021-09-14

## 2021-06-16 RX ORDER — FUROSEMIDE 80 MG
80 TABLET ORAL EVERY OTHER DAY
Qty: 45 TABLET | Refills: 0 | Status: SHIPPED | OUTPATIENT
Start: 2021-06-18 | End: 2021-09-16

## 2021-06-16 RX ADMIN — SODIUM CHLORIDE, PRESERVATIVE FREE 10 ML: 5 INJECTION INTRAVENOUS at 08:35

## 2021-06-16 RX ADMIN — GABAPENTIN 100 MG: 100 CAPSULE ORAL at 21:49

## 2021-06-16 RX ADMIN — HYDRALAZINE HYDROCHLORIDE 50 MG: 50 TABLET ORAL at 21:48

## 2021-06-16 RX ADMIN — CALCITRIOL 0.5 MCG: 0.25 CAPSULE ORAL at 08:34

## 2021-06-16 RX ADMIN — CALCITRIOL 0.5 MCG: 0.25 CAPSULE ORAL at 21:48

## 2021-06-16 RX ADMIN — CARVEDILOL 12.5 MG: 12.5 TABLET, FILM COATED ORAL at 17:09

## 2021-06-16 RX ADMIN — PRAVASTATIN SODIUM 20 MG: 20 TABLET ORAL at 08:33

## 2021-06-16 RX ADMIN — HYDRALAZINE HYDROCHLORIDE 50 MG: 50 TABLET ORAL at 08:33

## 2021-06-16 RX ADMIN — LOSARTAN POTASSIUM 50 MG: 50 TABLET, FILM COATED ORAL at 08:34

## 2021-06-16 RX ADMIN — INSULIN ASPART 2 UNITS: 100 INJECTION, SOLUTION INTRAVENOUS; SUBCUTANEOUS at 08:36

## 2021-06-16 RX ADMIN — LEVETIRACETAM 500 MG: 500 TABLET ORAL at 08:33

## 2021-06-16 RX ADMIN — INSULIN DETEMIR 28 UNITS: 100 INJECTION, SOLUTION SUBCUTANEOUS at 21:49

## 2021-06-16 RX ADMIN — HEPARIN SODIUM 5000 UNITS: 5000 INJECTION INTRAVENOUS; SUBCUTANEOUS at 05:35

## 2021-06-16 RX ADMIN — HEPARIN SODIUM 5000 UNITS: 5000 INJECTION INTRAVENOUS; SUBCUTANEOUS at 15:08

## 2021-06-16 RX ADMIN — FUROSEMIDE 80 MG: 40 TABLET ORAL at 08:33

## 2021-06-16 RX ADMIN — HEPARIN SODIUM 5000 UNITS: 5000 INJECTION INTRAVENOUS; SUBCUTANEOUS at 21:49

## 2021-06-16 RX ADMIN — CARVEDILOL 12.5 MG: 12.5 TABLET, FILM COATED ORAL at 08:34

## 2021-06-16 RX ADMIN — MORPHINE SULFATE 2 MG: 2 INJECTION, SOLUTION INTRAMUSCULAR; INTRAVENOUS at 15:08

## 2021-06-16 RX ADMIN — ISOSORBIDE MONONITRATE 30 MG: 30 TABLET, EXTENDED RELEASE ORAL at 08:33

## 2021-06-16 RX ADMIN — LANTHANUM CARBONATE 2000 MG: 500 TABLET, CHEWABLE ORAL at 12:01

## 2021-06-16 RX ADMIN — HYDRALAZINE HYDROCHLORIDE 50 MG: 50 TABLET ORAL at 15:09

## 2021-06-16 RX ADMIN — LANTHANUM CARBONATE 2000 MG: 500 TABLET, CHEWABLE ORAL at 17:09

## 2021-06-16 RX ADMIN — MORPHINE SULFATE 2 MG: 2 INJECTION, SOLUTION INTRAMUSCULAR; INTRAVENOUS at 08:33

## 2021-06-16 RX ADMIN — ASPIRIN 81 MG: 81 TABLET, FILM COATED ORAL at 08:46

## 2021-06-16 RX ADMIN — LANTHANUM CARBONATE 2000 MG: 500 TABLET, CHEWABLE ORAL at 08:35

## 2021-06-16 RX ADMIN — INSULIN ASPART 2 UNITS: 100 INJECTION, SOLUTION INTRAVENOUS; SUBCUTANEOUS at 17:09

## 2021-06-16 RX ADMIN — CLOPIDOGREL BISULFATE 75 MG: 75 TABLET ORAL at 08:33

## 2021-06-16 RX ADMIN — POLYETHYLENE GLYCOL 3350 17 G: 17 POWDER, FOR SOLUTION ORAL at 08:33

## 2021-06-17 VITALS
HEIGHT: 67 IN | DIASTOLIC BLOOD PRESSURE: 78 MMHG | BODY MASS INDEX: 25.91 KG/M2 | RESPIRATION RATE: 18 BRPM | WEIGHT: 165.1 LBS | SYSTOLIC BLOOD PRESSURE: 122 MMHG | HEART RATE: 86 BPM | TEMPERATURE: 96.4 F | OXYGEN SATURATION: 94 %

## 2021-06-17 LAB
ANION GAP SERPL CALCULATED.3IONS-SCNC: 11 MMOL/L (ref 5–15)
BASOPHILS # BLD AUTO: 0.04 10*3/MM3 (ref 0–0.2)
BASOPHILS NFR BLD AUTO: 0.4 % (ref 0–1.5)
BUN SERPL-MCNC: 46 MG/DL (ref 8–23)
BUN/CREAT SERPL: 4.5 (ref 7–25)
CALCIUM SPEC-SCNC: 9.5 MG/DL (ref 8.6–10.5)
CHLORIDE SERPL-SCNC: 93 MMOL/L (ref 98–107)
CO2 SERPL-SCNC: 30 MMOL/L (ref 22–29)
CREAT SERPL-MCNC: 10.16 MG/DL (ref 0.76–1.27)
DEPRECATED RDW RBC AUTO: 48.7 FL (ref 37–54)
EOSINOPHIL # BLD AUTO: 0.69 10*3/MM3 (ref 0–0.4)
EOSINOPHIL NFR BLD AUTO: 7.5 % (ref 0.3–6.2)
ERYTHROCYTE [DISTWIDTH] IN BLOOD BY AUTOMATED COUNT: 18.6 % (ref 12.3–15.4)
GFR SERPL CREATININE-BSD FRML MDRD: 6 ML/MIN/1.73
GFR SERPL CREATININE-BSD FRML MDRD: ABNORMAL ML/MIN/{1.73_M2}
GLUCOSE BLDC GLUCOMTR-MCNC: 144 MG/DL (ref 70–130)
GLUCOSE BLDC GLUCOMTR-MCNC: 263 MG/DL (ref 70–130)
GLUCOSE SERPL-MCNC: 151 MG/DL (ref 65–99)
HCT VFR BLD AUTO: 32.9 % (ref 37.5–51)
HGB BLD-MCNC: 10.3 G/DL (ref 13–17.7)
IMM GRANULOCYTES # BLD AUTO: 0.05 10*3/MM3 (ref 0–0.05)
IMM GRANULOCYTES NFR BLD AUTO: 0.5 % (ref 0–0.5)
LYMPHOCYTES # BLD AUTO: 2.17 10*3/MM3 (ref 0.7–3.1)
LYMPHOCYTES NFR BLD AUTO: 23.5 % (ref 19.6–45.3)
MCH RBC QN AUTO: 24.3 PG (ref 26.6–33)
MCHC RBC AUTO-ENTMCNC: 31.3 G/DL (ref 31.5–35.7)
MCV RBC AUTO: 77.6 FL (ref 79–97)
MONOCYTES # BLD AUTO: 1.51 10*3/MM3 (ref 0.1–0.9)
MONOCYTES NFR BLD AUTO: 16.3 % (ref 5–12)
NEUTROPHILS NFR BLD AUTO: 4.79 10*3/MM3 (ref 1.7–7)
NEUTROPHILS NFR BLD AUTO: 51.8 % (ref 42.7–76)
NRBC BLD AUTO-RTO: 0 /100 WBC (ref 0–0.2)
PLATELET # BLD AUTO: 228 10*3/MM3 (ref 140–450)
PMV BLD AUTO: 10.1 FL (ref 6–12)
POTASSIUM SERPL-SCNC: 3.6 MMOL/L (ref 3.5–5.2)
RBC # BLD AUTO: 4.24 10*6/MM3 (ref 4.14–5.8)
SODIUM SERPL-SCNC: 134 MMOL/L (ref 136–145)
TSH SERPL DL<=0.05 MIU/L-ACNC: 4.21 UIU/ML (ref 0.27–4.2)
WBC # BLD AUTO: 9.25 10*3/MM3 (ref 3.4–10.8)

## 2021-06-17 PROCEDURE — 84443 ASSAY THYROID STIM HORMONE: CPT | Performed by: INTERNAL MEDICINE

## 2021-06-17 PROCEDURE — 82962 GLUCOSE BLOOD TEST: CPT

## 2021-06-17 PROCEDURE — 85025 COMPLETE CBC W/AUTO DIFF WBC: CPT | Performed by: INTERNAL MEDICINE

## 2021-06-17 PROCEDURE — 80048 BASIC METABOLIC PNL TOTAL CA: CPT | Performed by: INTERNAL MEDICINE

## 2021-06-17 PROCEDURE — 25010000002 HEPARIN (PORCINE) PER 1000 UNITS: Performed by: INTERNAL MEDICINE

## 2021-06-17 PROCEDURE — 25010000002 MORPHINE PER 10 MG: Performed by: INTERNAL MEDICINE

## 2021-06-17 PROCEDURE — 63710000001 INSULIN ASPART PER 5 UNITS: Performed by: INTERNAL MEDICINE

## 2021-06-17 RX ORDER — DEXTROSE MONOHYDRATE, SODIUM CHLORIDE, SODIUM LACTATE, CALCIUM CHLORIDE, MAGNESIUM CHLORIDE 2.5; 538; 448; 18.4; 5.08 G/100ML; MG/100ML; MG/100ML; MG/100ML; MG/100ML
5000 SOLUTION INTRAPERITONEAL AS NEEDED
Status: CANCELLED | OUTPATIENT
Start: 2021-06-17

## 2021-06-17 RX ORDER — DEXTROSE MONOHYDRATE, SODIUM CHLORIDE, SODIUM LACTATE, CALCIUM CHLORIDE, MAGNESIUM CHLORIDE 1.5; 538; 448; 18.4; 5.08 G/100ML; MG/100ML; MG/100ML; MG/100ML; MG/100ML
5000 SOLUTION INTRAPERITONEAL AS NEEDED
Status: CANCELLED | OUTPATIENT
Start: 2021-06-17

## 2021-06-17 RX ADMIN — ASPIRIN 81 MG: 81 TABLET, FILM COATED ORAL at 08:52

## 2021-06-17 RX ADMIN — ISOSORBIDE MONONITRATE 30 MG: 30 TABLET, EXTENDED RELEASE ORAL at 08:52

## 2021-06-17 RX ADMIN — MORPHINE SULFATE 2 MG: 2 INJECTION, SOLUTION INTRAMUSCULAR; INTRAVENOUS at 08:51

## 2021-06-17 RX ADMIN — INSULIN ASPART 4 UNITS: 100 INJECTION, SOLUTION INTRAVENOUS; SUBCUTANEOUS at 11:15

## 2021-06-17 RX ADMIN — CALCITRIOL 0.5 MCG: 0.25 CAPSULE ORAL at 08:52

## 2021-06-17 RX ADMIN — HYDRALAZINE HYDROCHLORIDE 50 MG: 50 TABLET ORAL at 08:53

## 2021-06-17 RX ADMIN — LOSARTAN POTASSIUM 50 MG: 50 TABLET, FILM COATED ORAL at 08:51

## 2021-06-17 RX ADMIN — HEPARIN SODIUM 5000 UNITS: 5000 INJECTION INTRAVENOUS; SUBCUTANEOUS at 06:08

## 2021-06-17 RX ADMIN — LANTHANUM CARBONATE 2000 MG: 500 TABLET, CHEWABLE ORAL at 07:58

## 2021-06-17 RX ADMIN — LANTHANUM CARBONATE 2000 MG: 500 TABLET, CHEWABLE ORAL at 11:15

## 2021-06-17 RX ADMIN — POLYETHYLENE GLYCOL 3350 17 G: 17 POWDER, FOR SOLUTION ORAL at 08:51

## 2021-06-17 RX ADMIN — CARVEDILOL 12.5 MG: 12.5 TABLET, FILM COATED ORAL at 08:54

## 2021-06-17 RX ADMIN — SODIUM CHLORIDE, PRESERVATIVE FREE 10 ML: 5 INJECTION INTRAVENOUS at 08:53

## 2021-06-17 RX ADMIN — LEVETIRACETAM 500 MG: 500 TABLET ORAL at 08:52

## 2021-06-17 RX ADMIN — PRAVASTATIN SODIUM 20 MG: 20 TABLET ORAL at 08:52

## 2021-06-17 RX ADMIN — CLOPIDOGREL BISULFATE 75 MG: 75 TABLET ORAL at 08:52

## 2021-06-18 ENCOUNTER — READMISSION MANAGEMENT (OUTPATIENT)
Dept: CALL CENTER | Facility: HOSPITAL | Age: 61
End: 2021-06-18

## 2021-06-18 LAB
QT INTERVAL: 440 MS
QTC INTERVAL: 491 MS

## 2021-06-18 NOTE — OUTREACH NOTE
Prep Survey      Responses   [facility] facility patient discharged from?  [place]   Is LACE score < 7 ?  No   Emergency Room discharge w/ pulse ox?  No   Eligibility  Readm Mgmt   Discharge diagnosis  •Acute on chronic congestive heart failure    Does the patient have one of the following disease processes/diagnoses(primary or secondary)?  CHF   Does the patient have Home health ordered?  No   Is there a DME ordered?  Yes   What DME was ordered?  [facility] per [facility]   General alerts for this patient  home PD   Prep survey completed?  Yes          Pat Null RN

## 2021-06-22 ENCOUNTER — READMISSION MANAGEMENT (OUTPATIENT)
Dept: CALL CENTER | Facility: HOSPITAL | Age: 61
End: 2021-06-22

## 2021-06-22 NOTE — OUTREACH NOTE
CHF Week 1 Survey      Responses   [facility] patient discharged from?  [place]   Does the patient have one of the following disease processes/diagnoses(primary or secondary)?  CHF   CHF Week 1 attempt successful?  No   Unsuccessful attempts  Attempt 1          Criselda Lemon RN

## 2021-06-24 ENCOUNTER — READMISSION MANAGEMENT (OUTPATIENT)
Dept: CALL CENTER | Facility: HOSPITAL | Age: 61
End: 2021-06-24

## 2021-06-24 NOTE — OUTREACH NOTE
CHF Week 1 Survey      Responses   [facility] patient discharged from?  [place]   Does the patient have one of the following disease processes/diagnoses(primary or secondary)?  CHF   CHF Week 1 attempt successful?  No   Unsuccessful attempts  Attempt 2          Carrie Bryant RN

## 2021-06-28 ENCOUNTER — READMISSION MANAGEMENT (OUTPATIENT)
Dept: CALL CENTER | Facility: HOSPITAL | Age: 61
End: 2021-06-28

## 2021-06-28 NOTE — OUTREACH NOTE
CHF Week 2 Survey      Responses   [facility] patient discharged from?  [place]   Does the patient have one of the following disease processes/diagnoses(primary or secondary)?  CHF   Week 2 attempt successful?  Yes   Call start time  155   Revoke  Patient  [Pt passed away  ]   Call end time  155          Criselda Lemon RN

## (undated) DEVICE — GW PERIPH GUIDERIGHT STD/J/TP PTFE/PCOAT SS 0.038IN 5X150CM

## (undated) DEVICE — GW CHOICE PT J 300CM

## (undated) DEVICE — MODEL BT2000 P/N 700287-012KIT CONTENTS: MANIFOLD WITH SALINE AND CONTRAST PORTS, SALINE TUBING WITH SPIKE AND HAND SYRINGE, TRANSDUCER: Brand: BT2000 AUTOMATED MANIFOLD KIT

## (undated) DEVICE — CATH GUIDE LAUNCHER JL4.0 6F 100CM

## (undated) DEVICE — COPILOT KIT INCLUDES BLEEDBACK CONTROL VALVE / GUIDE WIRE INTRODUCER / TORQUE DEVICE: Brand: ACCESSORIES

## (undated) DEVICE — INTRO SHEATH ART/FEM ENGAGE .038 6F12CM

## (undated) DEVICE — PK CATH LAB 60

## (undated) DEVICE — DEV INFL MONARCH 20ML

## (undated) DEVICE — CATH GUIDE LAUNCHER AL1.0 6F 100CM

## (undated) DEVICE — CATH DIAG EXPO M/ PK 6FR FL4/FR4 PIG 3PK

## (undated) DEVICE — Device: Brand: CONFIANZA PRO 12

## (undated) DEVICE — A2000 MULTI-USE SYRINGE KIT, P/N 701277-003KIT CONTENTS: 100ML CONTRAST RESERVOIR AND TUBING WITH CONTRAST SPIKE AND CLAMP: Brand: A2000 MULTI-USE SYRINGE KIT

## (undated) DEVICE — KT INTRO MINISTICK MAX W/GW NITNL/TUNG ECHO 4F 21G 7CM

## (undated) DEVICE — ELECTRODE,RT,STRESS,FOAM,50PK: Brand: MEDLINE

## (undated) DEVICE — Device: Brand: FIELDER XT

## (undated) DEVICE — 6F .070 XBC 3.5: Brand: VISTA BRITE TIP

## (undated) DEVICE — Device

## (undated) DEVICE — ANGIO-SEAL VIP VASCULAR CLOSURE DEVICE: Brand: ANGIO-SEAL

## (undated) DEVICE — MODEL AT P65, P/N 701554-001KIT CONTENTS: HAND CONTROLLER, 3-WAY HIGH-PRESSURE STOPCOCK WITH ROTATING END AND PREMIUM HIGH-PRESSURE TUBING: Brand: ANGIOTOUCH® KIT

## (undated) DEVICE — MINI TREK™ II CORONARY DILATATION CATHETER 2.00 MM X 8 MM / OVER-THE-WIRE: Brand: MINI TREK™

## (undated) DEVICE — CATH F6INF TL RCB 100CM: Brand: INFINITI

## (undated) DEVICE — CATH GUIDE LAUNCHER JR4.0 6F 100CM